# Patient Record
Sex: FEMALE | Race: WHITE | NOT HISPANIC OR LATINO | Employment: STUDENT
[De-identification: names, ages, dates, MRNs, and addresses within clinical notes are randomized per-mention and may not be internally consistent; named-entity substitution may affect disease eponyms.]

---

## 2017-07-08 ENCOUNTER — HEALTH MAINTENANCE LETTER (OUTPATIENT)
Age: 37
End: 2017-07-08

## 2017-07-14 DIAGNOSIS — O36.80X0 ENCOUNTER TO DETERMINE FETAL VIABILITY OF PREGNANCY, NOT APPLICABLE OR UNSPECIFIED FETUS: Primary | ICD-10-CM

## 2017-07-17 ENCOUNTER — RADIANT APPOINTMENT (OUTPATIENT)
Dept: ULTRASOUND IMAGING | Facility: CLINIC | Age: 37
End: 2017-07-17
Payer: COMMERCIAL

## 2017-07-17 ENCOUNTER — PRENATAL OFFICE VISIT (OUTPATIENT)
Dept: OBGYN | Facility: CLINIC | Age: 37
End: 2017-07-17
Payer: COMMERCIAL

## 2017-07-17 VITALS
WEIGHT: 270 LBS | SYSTOLIC BLOOD PRESSURE: 120 MMHG | HEIGHT: 69 IN | HEART RATE: 82 BPM | DIASTOLIC BLOOD PRESSURE: 70 MMHG | BODY MASS INDEX: 39.99 KG/M2

## 2017-07-17 DIAGNOSIS — O36.80X0 ENCOUNTER TO DETERMINE FETAL VIABILITY OF PREGNANCY, NOT APPLICABLE OR UNSPECIFIED FETUS: ICD-10-CM

## 2017-07-17 DIAGNOSIS — O09.521 HIGH-RISK PREGNANCY, ELDERLY MULTIGRAVIDA, FIRST TRIMESTER: Primary | ICD-10-CM

## 2017-07-17 DIAGNOSIS — Z36.9 ENCOUNTER FOR ANTENATAL SCREENING OF MOTHER: ICD-10-CM

## 2017-07-17 PROBLEM — O09.529 HIGH-RISK PREGNANCY, ELDERLY MULTIGRAVIDA: Status: ACTIVE | Noted: 2017-07-17

## 2017-07-17 LAB
ABO + RH BLD: NORMAL
ABO + RH BLD: NORMAL
ALBUMIN UR-MCNC: NEGATIVE MG/DL
APPEARANCE UR: CLEAR
BACTERIA #/AREA URNS HPF: ABNORMAL /HPF
BASOPHILS # BLD AUTO: 0 10E9/L (ref 0–0.2)
BASOPHILS NFR BLD AUTO: 0.1 %
BILIRUB UR QL STRIP: NEGATIVE
BLD GP AB SCN SERPL QL: NORMAL
BLOOD BANK CMNT PATIENT-IMP: NORMAL
COLOR UR AUTO: YELLOW
DIFFERENTIAL METHOD BLD: ABNORMAL
EOSINOPHIL # BLD AUTO: 0.2 10E9/L (ref 0–0.7)
EOSINOPHIL NFR BLD AUTO: 1.3 %
ERYTHROCYTE [DISTWIDTH] IN BLOOD BY AUTOMATED COUNT: 14.1 % (ref 10–15)
GLUCOSE UR STRIP-MCNC: NEGATIVE MG/DL
HCT VFR BLD AUTO: 35.8 % (ref 35–47)
HGB BLD-MCNC: 11.9 G/DL (ref 11.7–15.7)
HGB UR QL STRIP: NEGATIVE
KETONES UR STRIP-MCNC: ABNORMAL MG/DL
LEUKOCYTE ESTERASE UR QL STRIP: NEGATIVE
LYMPHOCYTES # BLD AUTO: 1.9 10E9/L (ref 0.8–5.3)
LYMPHOCYTES NFR BLD AUTO: 13 %
MCH RBC QN AUTO: 30.2 PG (ref 26.5–33)
MCHC RBC AUTO-ENTMCNC: 33.2 G/DL (ref 31.5–36.5)
MCV RBC AUTO: 91 FL (ref 78–100)
MONOCYTES # BLD AUTO: 0.9 10E9/L (ref 0–1.3)
MONOCYTES NFR BLD AUTO: 6.4 %
NEUTROPHILS # BLD AUTO: 11.6 10E9/L (ref 1.6–8.3)
NEUTROPHILS NFR BLD AUTO: 79.2 %
NITRATE UR QL: NEGATIVE
NON-SQ EPI CELLS #/AREA URNS LPF: ABNORMAL /LPF
PH UR STRIP: 6 PH (ref 5–7)
PLATELET # BLD AUTO: 263 10E9/L (ref 150–450)
RBC # BLD AUTO: 3.94 10E12/L (ref 3.8–5.2)
RBC #/AREA URNS AUTO: ABNORMAL /HPF (ref 0–2)
SP GR UR STRIP: 1.02 (ref 1–1.03)
SPECIMEN EXP DATE BLD: NORMAL
URN SPEC COLLECT METH UR: ABNORMAL
UROBILINOGEN UR STRIP-ACNC: 0.2 EU/DL (ref 0.2–1)
WBC # BLD AUTO: 14.6 10E9/L (ref 4–11)
WBC #/AREA URNS AUTO: ABNORMAL /HPF (ref 0–2)

## 2017-07-17 PROCEDURE — 81001 URINALYSIS AUTO W/SCOPE: CPT | Performed by: OBSTETRICS & GYNECOLOGY

## 2017-07-17 PROCEDURE — 86780 TREPONEMA PALLIDUM: CPT | Performed by: OBSTETRICS & GYNECOLOGY

## 2017-07-17 PROCEDURE — 86901 BLOOD TYPING SEROLOGIC RH(D): CPT | Performed by: OBSTETRICS & GYNECOLOGY

## 2017-07-17 PROCEDURE — 86900 BLOOD TYPING SEROLOGIC ABO: CPT | Performed by: OBSTETRICS & GYNECOLOGY

## 2017-07-17 PROCEDURE — 36415 COLL VENOUS BLD VENIPUNCTURE: CPT | Performed by: OBSTETRICS & GYNECOLOGY

## 2017-07-17 PROCEDURE — 87340 HEPATITIS B SURFACE AG IA: CPT | Performed by: OBSTETRICS & GYNECOLOGY

## 2017-07-17 PROCEDURE — 86762 RUBELLA ANTIBODY: CPT | Performed by: OBSTETRICS & GYNECOLOGY

## 2017-07-17 PROCEDURE — 85025 COMPLETE CBC W/AUTO DIFF WBC: CPT | Performed by: OBSTETRICS & GYNECOLOGY

## 2017-07-17 PROCEDURE — 87086 URINE CULTURE/COLONY COUNT: CPT | Performed by: OBSTETRICS & GYNECOLOGY

## 2017-07-17 PROCEDURE — 87389 HIV-1 AG W/HIV-1&-2 AB AG IA: CPT | Performed by: OBSTETRICS & GYNECOLOGY

## 2017-07-17 PROCEDURE — 76817 TRANSVAGINAL US OBSTETRIC: CPT | Performed by: OBSTETRICS & GYNECOLOGY

## 2017-07-17 PROCEDURE — 86850 RBC ANTIBODY SCREEN: CPT | Performed by: OBSTETRICS & GYNECOLOGY

## 2017-07-17 RX ORDER — SWAB
1 SWAB, NON-MEDICATED MISCELLANEOUS DAILY
Qty: 100 TABLET | Refills: 3 | Status: SHIPPED | OUTPATIENT
Start: 2017-07-17 | End: 2018-06-12

## 2017-07-17 ASSESSMENT — ANXIETY QUESTIONNAIRES
2. NOT BEING ABLE TO STOP OR CONTROL WORRYING: NOT AT ALL
5. BEING SO RESTLESS THAT IT IS HARD TO SIT STILL: NOT AT ALL
GAD7 TOTAL SCORE: 1
3. WORRYING TOO MUCH ABOUT DIFFERENT THINGS: NOT AT ALL
7. FEELING AFRAID AS IF SOMETHING AWFUL MIGHT HAPPEN: NOT AT ALL
IF YOU CHECKED OFF ANY PROBLEMS ON THIS QUESTIONNAIRE, HOW DIFFICULT HAVE THESE PROBLEMS MADE IT FOR YOU TO DO YOUR WORK, TAKE CARE OF THINGS AT HOME, OR GET ALONG WITH OTHER PEOPLE: NOT DIFFICULT AT ALL
1. FEELING NERVOUS, ANXIOUS, OR ON EDGE: NOT AT ALL
6. BECOMING EASILY ANNOYED OR IRRITABLE: SEVERAL DAYS

## 2017-07-17 ASSESSMENT — PATIENT HEALTH QUESTIONNAIRE - PHQ9: 5. POOR APPETITE OR OVEREATING: NOT AT ALL

## 2017-07-17 NOTE — NURSING NOTE
[facility] Obstetrical Risk History    Patient presents for new OB labs and teaching.      1. Please indicate any condition you have or have had in the past:  Herpes, Asthma, Abnormal Pap, Chlamydia  2. Do you smoke?  No  If yes, how many packs/day?   3. Do you drink alcoholic beverages? Yes-since last period, none since finding out is pregnant If yes, how often?  What type?   4. List any medications taken since your last period: Zyrtec, Albuterol nebulizer and inhaler, Tylenol  5. List any recreational drugs (cocaine, marijuana, etc. used since your last period:None    6. List any chemical or radiation exposure that you've encountered: [employer]  7. Are you on a restricted diet? No  If yes, please describe:  Do you have any Adventist objections to any form of treatment? No    GENETIC SCREENING    These questions apply to you, the baby's father, or anyone in either family with:    1. Patient's age 35 or greater at delivery? Yes  2. Syriac, Ukrainian, Mediterranean ancestry? No  3. Neural Tube Defect (Meningomyelocele, Spina Bifida, or Anencephaly)? No  4. Alevism, Ugandan [place] or history of Hola-Sachs disease? No  5. Down's Syndrome?   No  6. Hemophilia or clotting disorder? No  7. Muscular Dystrophy? No  8. Cystic Fibrosis? No  9. Larissa's Chorea? No  10. Mental Retardation? No  11. 3 or more miscarriages or a stillborn? No  12. Other inherited disease or chromosomal disorder? No  13. Have you or the baby's father had a child born with a birth defect? No  14. Did you or the baby's father have a birth defect yourselves? No    Do you have any other concerns about birth defects? No        -Pt has not had a flu shot this past season.  -Informed pt about genetic testing/genetic screening.

## 2017-07-17 NOTE — PROGRESS NOTES
This is a 36 year old female patient,   who presents for her first obstetrical visit.    Patient's last menstrual period was 2017. This gives her an EDC of 2018 .  She is 8w6d weeks.  Her cycles are regular.  Her last menstrual period was normal.  She has had an ultrasound on 2017 which showed viable IUP measuring 7w6d. .  Since her LMP, she has experienced  nasal congestion, breast tenderness, urgency, dizziness and difficulty sleeping).  She denies nausea, emesis, abdominal pain, fatigue, headache, loss of appetite, vaginal discharge, dysuria, pelvic pain, and vaginal bleeding.      Past History:  Her past medical history   Past Medical History:   Diagnosis Date     Abnormal Pap smear     HPV     Allergic rhinitis      Anemia     iron infusions     Asthma     Advair, Albuterol     Cervical dysplasia     SUMI I, SUMI II, treated with Leep   later had ASCUS+HPV sev times      Chlamydial cervicitis 10/2005, 3/2009    Treated both times and had neg JADEN both times     Diabetes mellitus (H)     GDDC with first pregnacy     Environmental allergies      Herpes simplex without mention of complication     valtrex at 36 weeks     History of chlamydia      Morbid obesity (H)      Postoperative hematoma involving genitourinary system     large rectus sheath hematoma, after failed attempt at laparoscopy, hospitalized for pain control      Trichomonal vulvovaginitis 06     Vaginal discharge    .      She has a history of  gestational diabetes, diet controlled    Since her last LMP she admits to the use of alcohol but none since finding out is pregnant.    HISTORY:  Obstetric History       T2      L4     SAB0   TAB0   Ectopic0   Multiple0   Live Births0       # Outcome Date GA Lbr Rene/2nd Weight Sex Delivery Anes PTL Lv   5 Current            4 Term 13 39w3d 04:05 / 00:02 9 lb 3.1 oz (4.17 kg) M  EPI  VINI      Name: MEL CONNER JANNIE GRAHAM      Apgar1:  9   3 Term  12 40w0d 03:00 / 00:16 7 lb 13.2 oz (3.55 kg) F  EPI  VINI      Name: Pattie      Apgar1:  8                Apgar5: 9   2 Para 08   7 lb 8 oz (3.402 kg) M  EPI  VINI      Name: Diamond   1 Para 99   7 lb 7 oz (3.374 kg) M Vag-Vacuum EPI  VINI      Name: [place]        Past Medical History:   Diagnosis Date     Abnormal Pap smear     HPV     Allergic rhinitis      Anemia     iron infusions     Asthma     Advair, Albuterol     Cervical dysplasia     SUMI I, SUMI II, treated with Leep   later had ASCUS+HPV sev times 2009     Chlamydial cervicitis 10/2005, 3/2009    Treated both times and had neg JADEN both times     Diabetes mellitus (H)     GDDC with first pregnacy     Environmental allergies      Herpes simplex without mention of complication     valtrex at 36 weeks     History of chlamydia      Morbid obesity (H)      Postoperative hematoma involving genitourinary system     large rectus sheath hematoma, after failed attempt at laparoscopy, hospitalized for pain control      Trichomonal vulvovaginitis 06     Vaginal discharge      Past Surgical History:   Procedure Laterality Date     ATTEMPTED LAPAROSCOPY  3/13/2014    Procedure: ATTEMPTED LAPAROSCOPY;;  Surgeon: Cee Garcia MD;  Location: [facility]     COLPOSCOPY CERVIX, BIOPSY CERVIX, ENDOCERVICAL CURETTAGE, COMBINED      2005:  SUMI I; 6/15/2009:  SUMI I.  Treated with cryo.  10/27/1997:  SUMI I-II     CONIZATION LEEP  3/13/2014    Procedure: CONIZATION LEEP;  LOOP ELECTROSURGICAL EXCISION PROCEDURE; LAPAROSCOPY ATTEMPTED;  Surgeon: Cee Garcia MD;  Location: [facility]     CRYOTHERAPY, CERVICAL  age 19    Pt reported     EXAM UNDER ANESTHESIA PELVIC  3/20/2014    Procedure: EXAM UNDER ANESTHESIA PELVIC;  EXAM UNDER ANESTHESIA, REMOVAL OF STICHES ;  Surgeon: Cee Garcia MD;  Location:  OR     GASTRIC BYPASS      pre-bypass weight was 320#     HC NASAL/SINUS SCOPE W THER INSTILL       HC REMOVAL OF OVARIAN CYST(S)        Family History   Problem Relation Age of Onset     Hypertension Mother      Allergies Mother      Obesity Mother      Asthma Father      DIABETES Father      Hypertension Father      Allergies Father      CANCER Father      Lymphoma d age 58     Asthma Brother      Allergies Sister      Depression Sister      Obesity Sister      Alzheimer Disease Maternal Grandmother      Arthritis Maternal Grandmother      Obesity Maternal Grandmother      Thyroid Disease Maternal Grandmother      Hypertension Maternal Grandfather      Cancer - colorectal Maternal Grandfather      CEREBROVASCULAR DISEASE Maternal Grandfather      DIABETES Paternal Grandmother      Social History     Social History     Marital status: Single     Spouse name: N/A     Number of children: 4     Years of education: N/A     Occupational History      [employer]     Social History Main Topics     Smoking status: Former Smoker     Quit date: 5/5/2009     Smokeless tobacco: Never Used     Alcohol use Yes      Comment: OCCS.     Drug use: No     Sexual activity: Yes     Partners: Male     Other Topics Concern     None     Social History Narrative     Current Outpatient Prescriptions   Medication Sig     Cetirizine HCl (ZYRTEC ALLERGY PO) Take 10 mg by mouth daily     VENTOLIN  (90 BASE) MCG/ACT inhaler INHALE 2 PUFFS INTO THE LUNGS EVERY 6 HOURS AS NEEDED FOR SHORTNESS OF BREATH / DYSPNEA     albuterol (2.5 MG/3ML) 0.083% nebulizer solution Take 3 mLs by nebulization every 4 hours as needed for shortness of breath / dyspnea.     No current facility-administered medications for this visit.      Facility-Administered Medications Ordered in Other Visits   Medication     ROPivacaine (NAROPIN) epidural     lidocaine-EPINEPHrine 1.5 %-1:[id number] injection     bupivacaine 0.5% PF     fentaNYL (SUBLIMAZE) injection     Allergies   Allergen Reactions     Cat Hair [Cats]      Dog Hair [Dogs]      Dust Mites      Ragweeds        Past medical, surgical, social and  "family history were reviewed and updated in EPIC.    ROS:   12 point review of systems negative other than symptoms noted below.  Head: Nasal Congestion  Breast: Tenderness  Genitourinary: Urgency  Neurologic: Dizziness  Psychiatric: Difficulty Sleeping    EXAM:  Ht 5' 8.75\" (1.746 m)  Wt 270 lb (122.5 kg)  LMP 2017  BMI 40.16 kg/m2   BMI: Body mass index is 40.16 kg/(m^2).    EXAM:  Constitutional:  Appearance: Well nourished, well developed alert, in no acute distress.  Neck:   Lymph Nodes:  No lymphadenopathy present.    Thyroid:  Gland size normal, nontender, no nodules or masses present  on palpation.  Chest:  Respiratory Effort:  Breathing unlabored.  Cardiovascular:  Heart Auscultation:  Regular rate, normal rhythm, no murmurs    present.  Breasts: {[facility] Breast exam:987289}    Axillary Lymph Nodes:  No lymphadenopathy present.  Gastrointestinal:  Abdominal Examination:  Abdomen nontender to palpation, tone  normal without rigidity or guarding, no masses present, umbilicus without  Lesions.    Liver and speen:  No hepatomegaly present, liver nontender to  palpation.    Hernias:  No hernias present.  Lymphatic: Lymph Nodes:  No other lymphadenopathy present.  Skin:  General Inspection:  No rashes present, no lesions present, no areas of  discoloration.    Genitalia and Groin:  No rashes present, no lesions present, no areas of  discoloration, no masses present.  Neurologic/Psychiatric:    Mental Status:  Oriented X3.    {[facility] Pelvic Exam:749120}    ASSESSMENT:      ICD-10-CM    1. Encounter for  screening of mother Z36 UA with Microscopic     Urine Culture Aerobic Bacterial       PLAN/PATIENT INSTRUCTIONS:    There are no Patient Instructions on file for this visit.    ***    Cee Garcia MD  "

## 2017-07-18 LAB
BACTERIA SPEC CULT: NORMAL
HBV SURFACE AG SERPL QL IA: NONREACTIVE
HIV 1+2 AB+HIV1 P24 AG SERPL QL IA: NORMAL
Lab: NORMAL
MICRO REPORT STATUS: NORMAL
RUBV IGG SERPL IA-ACNC: 18 IU/ML
SPECIMEN SOURCE: NORMAL
T PALLIDUM IGG+IGM SER QL: NEGATIVE

## 2017-07-18 ASSESSMENT — ANXIETY QUESTIONNAIRES: GAD7 TOTAL SCORE: 1

## 2017-07-18 ASSESSMENT — PATIENT HEALTH QUESTIONNAIRE - PHQ9: SUM OF ALL RESPONSES TO PHQ QUESTIONS 1-9: 0

## 2017-08-14 ENCOUNTER — RADIANT APPOINTMENT (OUTPATIENT)
Dept: ULTRASOUND IMAGING | Facility: CLINIC | Age: 37
End: 2017-08-14
Payer: COMMERCIAL

## 2017-08-14 ENCOUNTER — PRENATAL OFFICE VISIT (OUTPATIENT)
Dept: OBGYN | Facility: CLINIC | Age: 37
End: 2017-08-14
Payer: COMMERCIAL

## 2017-08-14 VITALS — SYSTOLIC BLOOD PRESSURE: 120 MMHG | DIASTOLIC BLOOD PRESSURE: 70 MMHG

## 2017-08-14 DIAGNOSIS — O36.80X0 PREGNANCY WITH INCONCLUSIVE FETAL VIABILITY, NOT APPLICABLE OR UNSPECIFIED FETUS: ICD-10-CM

## 2017-08-14 DIAGNOSIS — O28.8: Primary | ICD-10-CM

## 2017-08-14 PROCEDURE — 99207 ZZC FIRST OB VISIT: CPT | Performed by: OBSTETRICS & GYNECOLOGY

## 2017-08-14 PROCEDURE — 76817 TRANSVAGINAL US OBSTETRIC: CPT | Performed by: OBSTETRICS & GYNECOLOGY

## 2017-08-14 PROCEDURE — 82105 ALPHA-FETOPROTEIN SERUM: CPT | Mod: 90 | Performed by: OBSTETRICS & GYNECOLOGY

## 2017-08-14 PROCEDURE — 99000 SPECIMEN HANDLING OFFICE-LAB: CPT | Performed by: OBSTETRICS & GYNECOLOGY

## 2017-08-14 PROCEDURE — 36415 COLL VENOUS BLD VENIPUNCTURE: CPT | Performed by: OBSTETRICS & GYNECOLOGY

## 2017-08-14 NOTE — MR AVS SNAPSHOT
After Visit Summary   8/14/2017    Crystal Rose    MRN: [id number]           Patient Information     Date Of Birth          1980        Visit Information        Provider Department      8/14/2017 12:30 PM Cee Garcia MD [facility]        Today's Diagnoses     Integrated prenatal screen positive for neural tube defect    -  1    Pregnancy with inconclusive fetal viability, not applicable or unspecified fetus           Follow-ups after your visit        Your next 10 appointments already scheduled     Sep 13, 2017  9:00 AM CDT   ESTABLISHED PRENATAL with Cee Garcia MD   [facility]    [address]  [facility] [id number] [phone]              Who to contact     If you have questions or need follow up information about today's clinic visit or your schedule please contact [facility] [place] directly at [phone].  Normal or non-critical lab and imaging results will be communicated to you by MyChart, letter or phone within 4 business days after the clinic has received the results. If you do not hear from us within 7 days, please contact the clinic through eSofthart or phone. If you have a critical or abnormal lab result, we will notify you by phone as soon as possible.  Submit refill requests through [email or web address] or call your pharmacy and they will forward the refill request to us. Please allow 3 business days for your refill to be completed.          Additional Information About Your Visit        MyChart Information     [email or web address] gives you secure access to your electronic health record. If you see a primary care provider, you can also send messages to your care team and make appointments. If you have questions, please call your primary care clinic.  If you do not have a primary care provider, please call [phone] and they will assist you.        Care EveryWhere ID     This is  your Care EveryWhere ID. This could be used by other organizations to access your [place] medical records  [id number]        Your Vitals Were     Last Period                   05/16/2017            Blood Pressure from Last 3 Encounters:   08/14/17 120/70   07/17/17 120/70   12/30/15 112/78    Weight from Last 3 Encounters:   07/17/17 270 lb (122.5 kg)   12/30/15 268 lb (121.6 kg)   06/03/14 239 lb (108.4 kg)              We Performed the Following     Alpha fetoprotein maternal screen        Primary Care Provider Office Phone # Fax #    Cee Garcia [phone] [address] [facility] [address]  [facility] [address]        Equal Access to Services     LUPE JOHN : Hadii angeles bruceo Sonarayan, waaxda luqadaha, qaybta kaalmada adeegyada, criss torres . So [facility] [phone].    ATENCIÓN: Si habla español, tiene a garber disposición servicios gratuitos de asistencia lingüística. [facility] [phone].    We comply with applicable federal civil rights laws and [place] laws. We do not discriminate on the basis of race, color, national origin, age, disability sex, sexual orientation or gender identity.            Thank you!     Thank you for choosing [facility]  for your care. Our goal is always to provide you with excellent care. Hearing back from our patients is one way we can continue to improve our services. Please take a few minutes to complete the written survey that you may receive in the mail after your visit with us. Thank you!             Your Updated Medication List - Protect others around you: Learn how to safely use, store and throw away your medicines at www.disposemymeds.org.          This list is accurate as of: 8/14/17  2:47 PM.  Always use your most recent med list.                   Brand Name Dispense Instructions for use Diagnosis    * albuterol (2.5 MG/3ML) 0.083% neb solution     1 Box    Take 3 mLs by nebulization every 4 hours as  needed for shortness of breath / dyspnea.    Moderate persistent asthma with exacerbation       * VENTOLIN  (90 BASE) MCG/ACT Inhaler   Generic drug:  albuterol     1 Inhaler    INHALE 2 PUFFS INTO THE LUNGS EVERY 6 HOURS AS NEEDED FOR SHORTNESS OF BREATH / DYSPNEA    Unspecified asthma(493.90)       prenatal multivitamin  with iron 28-0.8 MG Tabs     100 tablet    Take 1 tablet by mouth daily    High-risk pregnancy, elderly multigravida, first trimester       ZYRTEC ALLERGY PO      Take 10 mg by mouth daily        * Notice:  This list has 2 medication(s) that are the same as other medications prescribed for you. Read the directions carefully, and ask your doctor or other care provider to review them with you.

## 2017-08-14 NOTE — PROGRESS NOTES
This is a 36 year old female patient,   who presents for her first obstetrical visit.    Patient's last menstrual period was 2017. This gives her an EDC of 2018 .  She is 8w6d weeks.  Her cycles are regular.  Her last menstrual period was normal.  She has had an ultrasound on 2017 which showed viable IUP measuring 7w6d. .  Since her LMP, she has experienced  nasal congestion, breast tenderness, urgency, dizziness and difficulty sleeping).  She denies nausea, emesis, abdominal pain, fatigue, headache, loss of appetite, vaginal discharge, dysuria, pelvic pain, and vaginal bleeding.    Patient was here last month for 1st ob visit but had to leave before I saw her since son cut his head.   Labs were done last week.   We discussed genetic testing options  She wants to do inatal test at 10 wks so will make appt for that.   Couldn't hear FHT today so sent for us.  US showed viable iup with small subchorionic bleed and low lying placenta so will check again at 20 wks  Next ob   Check at 12 wks      Past History:  Her past medical history   Past Medical History:   Diagnosis Date     Abnormal Pap smear     HPV     Allergic rhinitis      Anemia     iron infusions     Asthma     Advair, Albuterol     Cervical dysplasia     SUMI I, SUMI II, treated with Leep   later had ASCUS+HPV sev times      Chlamydial cervicitis 10/2005, 3/2009    Treated both times and had neg JADEN both times     Diabetes mellitus (H)     GDDC with first pregnacy     Environmental allergies      Herpes simplex without mention of complication     valtrex at 36 weeks     History of chlamydia      Morbid obesity (H)      Postoperative hematoma involving genitourinary system     large rectus sheath hematoma, after failed attempt at laparoscopy, hospitalized for pain control      Trichomonal vulvovaginitis 06     Vaginal discharge    .      She has a history of  gestational diabetes, diet controlled    Since her last LMP  she admits to the use of alcohol but none since finding out is pregnant.    HISTORY:  Obstetric History       T2      L4     SAB0   TAB0   Ectopic0   Multiple0   Live Births0       # Outcome Date GA Lbr Rene/2nd Weight Sex Delivery Anes PTL Lv   5 Current            4 Term 13 39w3d 04:05 / 00:02 9 lb 3.1 oz (4.17 kg) M  EPI  VINI      Name: MEL CONNER      Apgar1:  9   3 Term 12 40w0d 03:00 / 00:16 7 lb 13.2 oz (3.55 kg) F  EPI  VINI      Name: Pattie      Apgar1:  8                Apgar5: 9   2 Para 08   7 lb 8 oz (3.402 kg) M  EPI  VINI      Name: Aajahn   1 Para 99   7 lb 7 oz (3.374 kg) M Vag-Vacuum EPI  VINI      Name: Flavio        Past Medical History:   Diagnosis Date     Abnormal Pap smear     HPV     Allergic rhinitis      Anemia     iron infusions     Asthma     Advair, Albuterol     Cervical dysplasia     SUMI I, SUMI II, treated with Leep   later had ASCUS+HPV sev times 2009     Chlamydial cervicitis 10/2005, 3/2009    Treated both times and had neg JADEN both times     Diabetes mellitus (H)     GDDC with first pregnacy     Environmental allergies      Herpes simplex without mention of complication     valtrex at 36 weeks     History of chlamydia      Morbid obesity (H)      Postoperative hematoma involving genitourinary system     large rectus sheath hematoma, after failed attempt at laparoscopy, hospitalized for pain control      Trichomonal vulvovaginitis 06     Vaginal discharge      Past Surgical History:   Procedure Laterality Date     ATTEMPTED LAPAROSCOPY  3/13/2014    Procedure: ATTEMPTED LAPAROSCOPY;;  Surgeon: Cee Garcia MD;  Location: [facility]     COLPOSCOPY CERVIX, BIOPSY CERVIX, ENDOCERVICAL CURETTAGE, COMBINED      2005:  SUMI I; 6/15/2009:  SUMI I.  Treated with cryo.  10/27/1997:  SUMI I-II     CONIZATION LEEP  3/13/2014    Procedure: CONIZATION LEEP;  LOOP ELECTROSURGICAL EXCISION PROCEDURE; LAPAROSCOPY ATTEMPTED;   Surgeon: Cee Garcia MD;  Location:  [facility]     CRYOTHERAPY, CERVICAL  age 19    Pt reported     EXAM UNDER ANESTHESIA PELVIC  3/20/2014    Procedure: EXAM UNDER ANESTHESIA PELVIC;  EXAM UNDER ANESTHESIA, REMOVAL OF STICHES ;  Surgeon: Cee Garcia MD;  Location:  OR     GASTRIC BYPASS  2004    pre-bypass weight was 320#     HC NASAL/SINUS SCOPE W THER INSTILL       HC REMOVAL OF OVARIAN CYST(S)       Family History   Problem Relation Age of Onset     Hypertension Mother      Allergies Mother      Obesity Mother      Asthma Father      DIABETES Father      Hypertension Father      Allergies Father      CANCER Father      Lymphoma d age 58     Asthma Brother      Allergies Sister      Depression Sister      Obesity Sister      Alzheimer Disease Maternal Grandmother      Arthritis Maternal Grandmother      Obesity Maternal Grandmother      Thyroid Disease Maternal Grandmother      Hypertension Maternal Grandfather      Cancer - colorectal Maternal Grandfather      CEREBROVASCULAR DISEASE Maternal Grandfather      DIABETES Paternal Grandmother      Social History     Social History     Marital status: Single     Spouse name: N/A     Number of children: 4     Years of education: N/A     Occupational History      [employer]     Social History Main Topics     Smoking status: Former Smoker     Quit date: 5/5/2009     Smokeless tobacco: Never Used     Alcohol use Yes      Comment: OCCS.     Drug use: No     Sexual activity: Yes     Partners: Male     Other Topics Concern     None     Social History Narrative     Current Outpatient Prescriptions   Medication Sig     Cetirizine HCl (ZYRTEC ALLERGY PO) Take 10 mg by mouth daily     VENTOLIN  (90 BASE) MCG/ACT inhaler INHALE 2 PUFFS INTO THE LUNGS EVERY 6 HOURS AS NEEDED FOR SHORTNESS OF BREATH / DYSPNEA     albuterol (2.5 MG/3ML) 0.083% nebulizer solution Take 3 mLs by nebulization every 4 hours as needed for shortness of breath / dyspnea.     No current  "facility-administered medications for this visit.      Facility-Administered Medications Ordered in Other Visits   Medication     ROPivacaine (NAROPIN) epidural     lidocaine-EPINEPHrine 1.5 %-1:307303 injection     bupivacaine 0.5% PF     fentaNYL (SUBLIMAZE) injection     Allergies   Allergen Reactions     Cat Hair [Cats]      Dog Hair [Dogs]      Dust Mites      Ragweeds        Past medical, surgical, social and family history were reviewed and updated in EPIC.    ROS:   12 point review of systems negative other than symptoms noted below.  Head: Nasal Congestion  Breast: Tenderness  Genitourinary: Urgency  Neurologic: Dizziness  Psychiatric: Difficulty Sleeping    EXAM:  Ht 5' 8.75\" (1.746 m)  Wt 270 lb (122.5 kg)  LMP 2017  BMI 40.16 kg/m2   BMI: Body mass index is 40.16 kg/(m^2).    EXAM:  Constitutional:  Appearance: Well nourished, well developed alert, in no acute distress.  Neck:   Lymph Nodes:  No lymphadenopathy present.    Thyroid:  Gland size normal, nontender, no nodules or masses present  on palpation.  Chest:  Respiratory Effort:  Breathing unlabored.  Cardiovascular:  Heart Auscultation:  Regular rate, normal rhythm, no murmurs    present.  Breasts: Deferred.    Axillary Lymph Nodes:  No lymphadenopathy present.  Gastrointestinal:  Abdominal Examination:  Abdomen nontender to palpation, tone  normal without rigidity or guarding, no masses present, umbilicus without  Lesions.    Liver and speen:  No hepatomegaly present, liver nontender to  palpation.    Hernias:  No hernias present.  Lymphatic: Lymph Nodes:  No other lymphadenopathy present.  Skin:  General Inspection:  No rashes present, no lesions present, no areas of  discoloration.    Genitalia and Groin:  No rashes present, no lesions present, no areas of  discoloration, no masses present.  Neurologic/Psychiatric:    Mental Status:  Oriented X3.    No Pelvic Exam performed    ASSESSMENT:      ICD-10-CM    1. Encounter for  " screening of mother Z36 UA with Microscopic     Urine Culture Aerobic Bacterial       PLAN/PATIENT INSTRUCTIONS:    There are no Patient Instructions on file for this visit.    Return at 10 wks for inatal  Labs were done prior  Ob check at 12 wks    Cee Garcia MD

## 2017-08-16 LAB
# FETUSES US: NORMAL
AFP ADJ MOM AMN: NORMAL
AFP SERPL-MCNC: 10 NG/ML
AGE - REPORTED: 37.3 YR
DATING METHOD: NORMAL
DIABETIC AT CONCEPTION: NO
FAMILY MEMBER DISEASES HX: NO
FAMILY MEMBER DISEASES HX: NO
GA METHOD: NORMAL
GA: 11.86 WEEKS
HX OF HEREDITARY DISORDERS: NO
IDDM PATIENT QL: NO
INTEGRATED SCN PATIENT-IMP: NORMAL
LMP START DATE: NORMAL
PREV HX CHROMOSOME ABNORMALITY: NO
SPECIMEN DRAWN SERPL: NORMAL
TWINS: NORMAL

## 2017-08-17 ENCOUNTER — TRANSFERRED RECORDS (OUTPATIENT)
Dept: HEALTH INFORMATION MANAGEMENT | Facility: CLINIC | Age: 37
End: 2017-08-17

## 2017-08-17 ENCOUNTER — TELEPHONE (OUTPATIENT)
Dept: NURSING | Facility: CLINIC | Age: 37
End: 2017-08-17

## 2017-08-17 DIAGNOSIS — O09.521 HIGH-RISK PREGNANCY, ELDERLY MULTIGRAVIDA, FIRST TRIMESTER: Primary | ICD-10-CM

## 2017-08-17 DIAGNOSIS — O09.521 HIGH-RISK PREGNANCY, ELDERLY MULTIGRAVIDA, FIRST TRIMESTER: ICD-10-CM

## 2017-08-17 PROCEDURE — 36415 COLL VENOUS BLD VENIPUNCTURE: CPT | Performed by: OBSTETRICS & GYNECOLOGY

## 2017-08-17 PROCEDURE — 99000 SPECIMEN HANDLING OFFICE-LAB: CPT | Performed by: OBSTETRICS & GYNECOLOGY

## 2017-08-17 PROCEDURE — 40000791 ZZHCL STATISTIC VERIFI PRENATAL TRISOMY 21,18,13: Mod: 90 | Performed by: OBSTETRICS & GYNECOLOGY

## 2017-08-17 NOTE — TELEPHONE ENCOUNTER
12w2d; ANAMARIA: 2/27/18  Pt informed of message below. Innatal lab ordered. Talked with lab and Innatal lab venipuncture will be free of charge as it was an error on clinic's end, pt was supposed to get Innatal drawn the first time and AFP was drawn instead. Scheduled lab only appt today at 2:00 PM to get Innatal lab drawn (with note about free venipuncture).      Notes Recorded by Cee Garcia MD on 8/16/2017 at 8:48 PM  Wrong order was placed  She needs to return for inatal test, not afp  She isn't far enough along for afp  afp will be done at 1`6 wks

## 2017-08-22 ENCOUNTER — TELEPHONE (OUTPATIENT)
Dept: NURSING | Facility: CLINIC | Age: 37
End: 2017-08-22

## 2017-08-22 NOTE — TELEPHONE ENCOUNTER
Innatal results: Negative    Test    Result     Interpretation  Chromosome 21  No aneuploidy detected     Results consistent with two copies of chromosome 21  Chromosome 18  No aneuploidy detected  Results consistent with two copies of chromosome 18  Chromosome 13  No aneuploidy detected  Results consistent with two copies of chromosome 13  Sex Chromosome  No aneuploidy detected  Results consistent with two sex chromosomes (XY) Male        Called pt and tried to leave message to call back but her voicemail was full and could not accept messages at this time. Will try back later.

## 2017-09-13 ENCOUNTER — PRENATAL OFFICE VISIT (OUTPATIENT)
Dept: MIDWIFE SERVICES | Facility: CLINIC | Age: 37
End: 2017-09-13
Payer: COMMERCIAL

## 2017-09-13 VITALS — SYSTOLIC BLOOD PRESSURE: 118 MMHG | BODY MASS INDEX: 39.27 KG/M2 | DIASTOLIC BLOOD PRESSURE: 74 MMHG | WEIGHT: 264 LBS

## 2017-09-13 DIAGNOSIS — Z36.1 NEED FOR MATERNAL SERUM ALPHA-PROTEIN (MSAFP) SCREENING: ICD-10-CM

## 2017-09-13 DIAGNOSIS — Z23 NEED FOR PROPHYLACTIC VACCINATION AND INOCULATION AGAINST INFLUENZA: ICD-10-CM

## 2017-09-13 DIAGNOSIS — O09.522 HIGH-RISK PREGNANCY, ELDERLY MULTIGRAVIDA, SECOND TRIMESTER: Primary | ICD-10-CM

## 2017-09-13 PROCEDURE — 90686 IIV4 VACC NO PRSV 0.5 ML IM: CPT | Performed by: ADVANCED PRACTICE MIDWIFE

## 2017-09-13 PROCEDURE — 82105 ALPHA-FETOPROTEIN SERUM: CPT | Mod: 90 | Performed by: ADVANCED PRACTICE MIDWIFE

## 2017-09-13 PROCEDURE — 99000 SPECIMEN HANDLING OFFICE-LAB: CPT | Performed by: ADVANCED PRACTICE MIDWIFE

## 2017-09-13 PROCEDURE — 90471 IMMUNIZATION ADMIN: CPT | Performed by: ADVANCED PRACTICE MIDWIFE

## 2017-09-13 PROCEDURE — 36415 COLL VENOUS BLD VENIPUNCTURE: CPT | Performed by: ADVANCED PRACTICE MIDWIFE

## 2017-09-13 PROCEDURE — 99207 ZZC PRENATAL VISIT: CPT | Performed by: ADVANCED PRACTICE MIDWIFE

## 2017-09-13 NOTE — PATIENT INSTRUCTIONS
Constipation    Constipation can be caused by many factors such as poor diet, lack of activity, and medications. Often times women experience more constipation during pregnancy due to decreased intestinal motility.    DRINK TONS OF WATER! 2.5 liters (4 pints) of water per day      Activity is very important. Increase your daily activity to at least 20-60 minutes. This increases blood flow to your gut and improves bowel function    Limit caffeine and alcohol intake    Avoid foods you've identified as constipating    Increase fiber intake: there are ways to increase you fiber through your diet but there are also OTC agents such as Metamucil or Benfiber that you can supplement your diet with    Use probiotics such as Florastor and/or prebiotics such as oligosaccharides    Consider using an Omega 3 supplement, flaxseed and greg seeds are great sources    Plan adequate time for elimination, it is most effective right after activity, and it may be beneficial to plan a consistent time daily    Food Suggestions      Eat beans, nuts, whole grain breads and cereals, oats, barley, figs, apples with skin, raisins, green leafy vegetables, fresh and dried fruits (especially grapes), prunes or prune juice    Eat popcorn nightly    Eat 2-3 salads per day    Add a pinch of cayenne pepper to food    1-2 tbsp of olive oil per day    Lemon juice and/or honey in warm water every morning before breakfast    Decrease red meat, refined white flour products like white rice, white bread and pasta    Tea infusions of: rosemary, chamomile, lemon balm, senna leaf, alfalfa, fennel seeds, lavender, cascara, dandelion, licorice root tea, psyllium seeds, marshmallow root    Other remedies      Increase Vitamin B and E (800 IU if not pregnant, 400 IU if pregnant per day) and potassium, calcium, and magnesium supplements      If these remedies fail, medications are an option as well. Please talk with your midwife if you continue to struggle with  constipation. If you are pregnant please double check with us before starting any medications!      Fiber Facts    A daily intake of about 25-30 grams of fiber is recommended. Most Americans only get about 12 grams of fiber on average. Fiber is very important in the diet to promote bowel regularity, lower cholesterol, lower risk of developing type 2 diabetes, and decrease chance of weight gain.  In pregnancy your intestinal motility slows down, so fiber is even more important.    Start gradually increasing fiber intake to reduce the risk of bloating and gas. Increase by about 5 grams a day every few days until you reach your fiber goals!    Food      Amount   Grams of Fiber  Grains  Echo's All Bran Cereal   1/2 cup   10  Natural Oven's Stay Trim Bread 1 slice    5  Brown Rice (cooked)  1 cup    4  Cheerios    1 cup    3  Whole Wheat Crackers  5 crackers   3.5  Rye crackers    3 crackers   3    Cereals and whole grains are excellent ways to increase fiber in your diet. Try to find cereals that have at least 8 grams of fiber per serving.    Fruits  Blackberries     1 cup    7   Figs      3 dried   7  Apple      1     4  Pear     1    4   Orange    1    3    Vegetables  Winter squash   1 cup    9  Broccoli     1 cup    4  Corn      1 cup    4  Swiss chard (cooked)  1 cup     4  Cauliflower     1 cup     3  Potato     1 large w/skin  5    Beans  Kidney, red    1/2 cup   8  Lentils     1/2 cup cooked  8  Black     1/2 cup    7  Navy     1/2 cup   7  Cade     1/2 cup   7  White      1/2 cup   6  Garbanzo/Chickpeas  1/2 cup   5

## 2017-09-13 NOTE — PROGRESS NOTES
Flu shot given, AFP pended  Injectable Influenza Immunization Documentation    1.  Are you sick today? (Fever of 100.5 or higher on the day of the clinic)   No    2.  Have you ever had Guillain-Tatum Syndrome within 6 weeks of an influenza vaccionation?  No    3. Do you have a life-threatening allergy to eggs?  No    4. Do you have a life-threatening allergy to a component of the vaccine? May include antibiotics, gelatin or latex.  No     5. Have you ever had a reaction to a dose of flu vaccine that needed immediate medical attention?  No     Form completed by Kathy Person, GENTRY        Feels well. FM+, Denies LOF, VB, contractions.   FHR visualized via bedside ultrasound.  AFP today  Anatomy ultrasound next visit between 20-22 weeks  Return to clinic 4 weeks.    Dawit William, DNP, APRN, CNM

## 2017-09-13 NOTE — MR AVS SNAPSHOT
After Visit Summary   9/13/2017    Crystal Rose    MRN: [id number]           Patient Information     Date Of Birth          1980        Visit Information        Provider Department      9/13/2017 9:10 AM Dawit William APRN CNM [facility]        Today's Diagnoses     High-risk pregnancy, elderly multigravida, second trimester    -  1    Need for prophylactic vaccination and inoculation against influenza        Need for maternal serum alpha-protein (MSAFP) screening          Care Instructions    Constipation    Constipation can be caused by many factors such as poor diet, lack of activity, and medications. Often times women experience more constipation during pregnancy due to decreased intestinal motility.    DRINK TONS OF WATER! 2.5 liters (4 pints) of water per day      Activity is very important. Increase your daily activity to at least 20-60 minutes. This increases blood flow to your gut and improves bowel function    Limit caffeine and alcohol intake    Avoid foods you've identified as constipating    Increase fiber intake: there are ways to increase you fiber through your diet but there are also OTC agents such as Metamucil or Benfiber that you can supplement your diet with    Use probiotics such as Florastor and/or prebiotics such as oligosaccharides    Consider using an Omega 3 supplement, flaxseed and greg seeds are great sources    Plan adequate time for elimination, it is most effective right after activity, and it may be beneficial to plan a consistent time daily    Food Suggestions      Eat beans, nuts, whole grain breads and cereals, oats, barley, figs, apples with skin, raisins, green leafy vegetables, fresh and dried fruits (especially grapes), prunes or prune juice    Eat popcorn nightly    Eat 2-3 salads per day    Add a pinch of cayenne pepper to food    1-2 tbsp of olive oil per day    Lemon juice and/or honey in warm water every morning before  breakfast    Decrease red meat, refined white flour products like white rice, white bread and pasta    Tea infusions of: rosemary, chamomile, lemon balm, senna leaf, alfalfa, fennel seeds, lavender, cascara, dandelion, licorice root tea, psyllium seeds, marshmallow root    Other remedies      Increase Vitamin B and E (800 IU if not pregnant, 400 IU if pregnant per day) and potassium, calcium, and magnesium supplements      If these remedies fail, medications are an option as well. Please talk with your midwife if you continue to struggle with constipation. If you are pregnant please double check with us before starting any medications!      Fiber Facts    A daily intake of about 25-30 grams of fiber is recommended. Most Americans only get about 12 grams of fiber on average. Fiber is very important in the diet to promote bowel regularity, lower cholesterol, lower risk of developing type 2 diabetes, and decrease chance of weight gain.  In pregnancy your intestinal motility slows down, so fiber is even more important.    Start gradually increasing fiber intake to reduce the risk of bloating and gas. Increase by about 5 grams a day every few days until you reach your fiber goals!    Food      Amount   Grams of Fiber  Grains  [place]'s All Bran Cereal   1/2 cup   10  Natural Oven's Stay Trim Bread 1 slice    5  Brown Rice (cooked)  1 cup    4  Cheerios    1 cup    3  Whole Wheat Crackers  5 crackers   3.5  Rye crackers    3 crackers   3    Cereals and whole grains are excellent ways to increase fiber in your diet. Try to find cereals that have at least 8 grams of fiber per serving.    Fruits  Blackberries     1 cup    7   Figs      3 dried   7  Apple      1     4  Pear     1    4   Orange    1    3    Vegetables  Winter squash   1 cup    9  Broccoli     1 cup    4  Corn      1 cup    4  Swiss chard (cooked)  1 cup     4  Cauliflower     1 cup     3  Potato     1 large w/skin  5    Beans  Kidney, red    1/2  cup   8  Lentils     1/2 cup cooked  8  Black     1/2 cup    7  Navy     1/2 cup   7  Cade     1/2 cup   7  White      1/2 cup   6  Garbanzo/Chickpeas  1/2 cup   5                  Follow-ups after your visit        Follow-up notes from your care team     Return in about 4 weeks (around 10/11/2017) for Prenatal Visit.      Your next 10 appointments already scheduled     Oct 09, 2017 11:25 AM CDT   US PELVIC COMPLETE W TRANSVAGINAL with [email or web address]   [facility] ([facility])    [address] [address]   [phone]           Please bring a list of your medicines (including vitamins, minerals and over-the-counter drugs). Also, tell your doctor about any allergies you may have. Wear comfortable clothes and leave your valuables at home.  Adults: Drink six 8-ounce glasses of fluid one hour before your exam. Do NOT empty your bladder.  If you need to empty your bladder before your exam, try to release only a little bit of urine. Then, drink another 8oz glass of fluid.  Children: Children who are potty trained should drink at least 4 cups (32 oz) of liquid 45 minutes to one hour prior to the exam. The child s bladder must be full in order to achieve a diagnostic exam. If your child is very uncomfortable or has an urgent need to pee, please notify a technologist; they will try to find out how much longer the wait may be and provide instructions to help relieve the pressure. Occasionally it is medically necessary to insert a urinary catheter to fill the bladder.  Please call the Imaging Department at your exam site with any questions.            Oct 09, 2017 12:10 PM CDT   ESTABLISHED PRENATAL with Cee Garcia MD   [facility] ([facility])    [address]  [facility] [id number] [phone]              Future tests that were ordered for you today     Open Future Orders        Priority  Expected Expires Ordered    US OB > 14 Weeks Complete Single Routine 10/11/2017 9/13/2018 9/13/2017            Who to contact     If you have questions or need follow up information about today's clinic visit or your schedule please contact [facility] directly at [phone].  Normal or non-critical lab and imaging results will be communicated to you by MyChart, letter or phone within 4 business days after the clinic has received the results. If you do not hear from us within 7 days, please contact the clinic through [employer] or phone. If you have a critical or abnormal lab result, we will notify you by phone as soon as possible.  Submit refill requests through [employer] or call your pharmacy and they will forward the refill request to us. Please allow 3 business days for your refill to be completed.          Additional Information About Your Visit        MyChart Information     [employer] gives you secure access to your electronic health record. If you see a primary care provider, you can also send messages to your care team and make appointments. If you have questions, please call your primary care clinic.  If you do not have a primary care provider, please call [phone] and they will assist you.        Care EveryWhere ID     This is your Care EveryWhere ID. This could be used by other organizations to access your [place] medical records  [id number]        Your Vitals Were     Last Period BMI (Body Mass Index)                05/16/2017 39.27 kg/m2           Blood Pressure from Last 3 Encounters:   09/13/17 118/74   08/14/17 120/70   07/17/17 120/70    Weight from Last 3 Encounters:   09/13/17 264 lb (119.7 kg)   07/17/17 270 lb (122.5 kg)   12/30/15 268 lb (121.6 kg)              We Performed the Following     Alpha fetoprotein maternal screen     FLU VAC, SPLIT VIRUS IM > 3 YO (QUADRIVALENT) [67053]     Vaccine Administration, Initial [22582]        Primary Care Provider Office Phone # Fax  #    Cee Garcia [phone]       [address] [facility] [address]  [facility] [address]        Equal Access to Services     LUPE JOHN : Hadshaun angeles hernandez billy Murray, wacharles shrutireyes, wesley kalucada margarita, criss burgosann nicola. So [facility] [phone].    ATENCIÓN: Si habla español, tiene a garber disposición servicios gratuitos de asistencia lingüística. Llame al [phone].    We comply with applicable federal civil rights laws and [place] laws. We do not discriminate on the basis of race, color, national origin, age, disability sex, sexual orientation or gender identity.            Thank you!     Thank you for choosing [facility]  for your care. Our goal is always to provide you with excellent care. Hearing back from our patients is one way we can continue to improve our services. Please take a few minutes to complete the written survey that you may receive in the mail after your visit with us. Thank you!             Your Updated Medication List - Protect others around you: Learn how to safely use, store and throw away your medicines at www.disposemymeds.org.          This list is accurate as of: 9/13/17 11:50 AM.  Always use your most recent med list.                   Brand Name Dispense Instructions for use Diagnosis    * albuterol (2.5 MG/3ML) 0.083% neb solution     1 Box    Take 3 mLs by nebulization every 4 hours as needed for shortness of breath / dyspnea.    Moderate persistent asthma with exacerbation       * VENTOLIN  (90 BASE) MCG/ACT Inhaler   Generic drug:  albuterol     1 Inhaler    INHALE 2 PUFFS INTO THE LUNGS EVERY 6 HOURS AS NEEDED FOR SHORTNESS OF BREATH / DYSPNEA    Unspecified asthma(493.90)       prenatal multivitamin  with iron 28-0.8 MG Tabs     100 tablet    Take 1 tablet by mouth daily    High-risk pregnancy, elderly multigravida, first trimester       ZYRTEC ALLERGY PO      Take 10 mg by mouth daily        * Notice:  This  list has 2 medication(s) that are the same as other medications prescribed for you. Read the directions carefully, and ask your doctor or other care provider to review them with you.

## 2017-09-15 LAB
# FETUSES US: NORMAL
AFP ADJ MOM AMN: 1.12
AFP SERPL-MCNC: 28 NG/ML
AGE - REPORTED: 37.3 YR
DATING METHOD: NORMAL
DIABETIC AT CONCEPTION: NO
FAMILY MEMBER DISEASES HX: NO
FAMILY MEMBER DISEASES HX: NO
GA METHOD: NORMAL
GA: 16.14 WEEKS
HX OF HEREDITARY DISORDERS: NO
IDDM PATIENT QL: NO
INTEGRATED SCN PATIENT-IMP: NORMAL
LMP START DATE: NORMAL
PREV HX CHROMOSOME ABNORMALITY: NO
SPECIMEN DRAWN SERPL: NORMAL
TWINS: NORMAL

## 2017-10-09 ENCOUNTER — RADIANT APPOINTMENT (OUTPATIENT)
Dept: ULTRASOUND IMAGING | Facility: CLINIC | Age: 37
End: 2017-10-09
Payer: COMMERCIAL

## 2017-10-09 ENCOUNTER — PRENATAL OFFICE VISIT (OUTPATIENT)
Dept: OBGYN | Facility: CLINIC | Age: 37
End: 2017-10-09
Payer: COMMERCIAL

## 2017-10-09 VITALS — BODY MASS INDEX: 39.57 KG/M2 | DIASTOLIC BLOOD PRESSURE: 68 MMHG | SYSTOLIC BLOOD PRESSURE: 116 MMHG | WEIGHT: 266 LBS

## 2017-10-09 DIAGNOSIS — O09.522 HIGH-RISK PREGNANCY, ELDERLY MULTIGRAVIDA IN SECOND TRIMESTER: ICD-10-CM

## 2017-10-09 DIAGNOSIS — O09.522 HIGH-RISK PREGNANCY, ELDERLY MULTIGRAVIDA, SECOND TRIMESTER: ICD-10-CM

## 2017-10-09 PROCEDURE — 76805 OB US >/= 14 WKS SNGL FETUS: CPT | Performed by: OBSTETRICS & GYNECOLOGY

## 2017-10-09 PROCEDURE — 99207 ZZC PRENATAL VISIT: CPT | Performed by: OBSTETRICS & GYNECOLOGY

## 2017-10-09 NOTE — MR AVS SNAPSHOT
After Visit Summary   10/9/2017    Crystal Rose    MRN: [id number]           Patient Information     Date Of Birth          1980        Visit Information        Provider Department      10/9/2017 12:10 PM Cee Garcia MD [facility]        Today's Diagnoses     High-risk pregnancy, elderly multigravida in second trimester           Follow-ups after your visit        Your next 10 appointments already scheduled     Nov 06, 2017  8:50 AM CST   ESTABLISHED PRENATAL with Cee Garcia MD   [facility] ([facility]    [address] [place] [id number] [phone]              Who to contact     If you have questions or need follow up information about today's clinic visit or your schedule please contact [facility] directly at [phone].  Normal or non-critical lab and imaging results will be communicated to you by MyChart, letter or phone within 4 business days after the clinic has received the results. If you do not hear from us within 7 days, please contact the clinic through [employer] or phone. If you have a critical or abnormal lab result, we will notify you by phone as soon as possible.  Submit refill requests through [employer] or call your pharmacy and they will forward the refill request to us. Please allow 3 business days for your refill to be completed.          Additional Information About Your Visit        MyChart Information     [employer] gives you secure access to your electronic health record. If you see a primary care provider, you can also send messages to your care team and make appointments. If you have questions, please call your primary care clinic.  If you do not have a primary care provider, please call [phone] and they will assist you.        Care EveryWhere ID     This is your Care EveryWhere ID. This could be used by other organizations to access your  [place] medical records  [id number]        Your Vitals Were     Last Period BMI (Body Mass Index)                05/16/2017 39.57 kg/m2           Blood Pressure from Last 3 Encounters:   10/09/17 116/68   09/13/17 118/74   08/14/17 120/70    Weight from Last 3 Encounters:   10/09/17 120.7 kg (266 lb)   09/13/17 119.7 kg (264 lb)   07/17/17 122.5 kg (270 lb)              Today, you had the following     No orders found for display       Primary Care Provider Office Phone # Fax #    Cee Garcia [phone] [address] [facility] [address]  [facility] [address]        Equal Access to Services     LUPE [facility] : Hadii angeles bruceo Sonarayan, waaxda luqadaha, qaybta kaalmada adeegyada, criss torres . So [facility] [phone].    ATENCIÓN: Si habla español, tiene a garber disposición servicios gratuitos de asistencia lingüística. [facility] [phone].    We comply with applicable federal civil rights laws and [place] laws. We do not discriminate on the basis of race, color, national origin, age, disability, sex, sexual orientation, or gender identity.            Thank you!     Thank you for choosing [facility]  for your care. Our goal is always to provide you with excellent care. Hearing back from our patients is one way we can continue to improve our services. Please take a few minutes to complete the written survey that you may receive in the mail after your visit with us. Thank you!             Your Updated Medication List - Protect others around you: Learn how to safely use, store and throw away your medicines at www.disposemymeds.org.          This list is accurate as of: 10/9/17 12:37 PM.  Always use your most recent med list.                   Brand Name Dispense Instructions for use Diagnosis    * albuterol (2.5 MG/3ML) 0.083% neb solution     1 Box    Take 3 mLs by nebulization every 4 hours as needed for shortness of breath / dyspnea.    Moderate  persistent asthma with exacerbation       * VENTOLIN  (90 BASE) MCG/ACT Inhaler   Generic drug:  albuterol     1 Inhaler    INHALE 2 PUFFS INTO THE LUNGS EVERY 6 HOURS AS NEEDED FOR SHORTNESS OF BREATH / DYSPNEA    Unspecified asthma(493.90)       prenatal multivitamin  with iron 28-0.8 MG Tabs     100 tablet    Take 1 tablet by mouth daily    High-risk pregnancy, elderly multigravida, first trimester       ZYRTEC ALLERGY PO      Take 10 mg by mouth daily        * Notice:  This list has 2 medication(s) that are the same as other medications prescribed for you. Read the directions carefully, and ask your doctor or other care provider to review them with you.

## 2017-10-24 ENCOUNTER — OFFICE VISIT (OUTPATIENT)
Dept: URGENT CARE | Facility: URGENT CARE | Age: 37
End: 2017-10-24
Payer: COMMERCIAL

## 2017-10-24 VITALS
DIASTOLIC BLOOD PRESSURE: 80 MMHG | HEART RATE: 88 BPM | BODY MASS INDEX: 40.46 KG/M2 | SYSTOLIC BLOOD PRESSURE: 123 MMHG | OXYGEN SATURATION: 97 % | WEIGHT: 272 LBS | TEMPERATURE: 98 F

## 2017-10-24 DIAGNOSIS — H66.002 ACUTE SUPPURATIVE OTITIS MEDIA OF LEFT EAR WITHOUT SPONTANEOUS RUPTURE OF TYMPANIC MEMBRANE, RECURRENCE NOT SPECIFIED: Primary | ICD-10-CM

## 2017-10-24 DIAGNOSIS — R06.2 WHEEZING: ICD-10-CM

## 2017-10-24 PROCEDURE — 99213 OFFICE O/P EST LOW 20 MIN: CPT | Performed by: NURSE PRACTITIONER

## 2017-10-24 RX ORDER — MONTELUKAST SODIUM 10 MG/1
10 TABLET ORAL AT BEDTIME
Qty: 30 TABLET | Refills: 1 | Status: SHIPPED | OUTPATIENT
Start: 2017-10-24 | End: 2018-06-12

## 2017-10-24 RX ORDER — AMOXICILLIN 500 MG/1
500 CAPSULE ORAL 3 TIMES DAILY
Qty: 30 CAPSULE | Refills: 0 | Status: SHIPPED | OUTPATIENT
Start: 2017-10-24 | End: 2017-12-06

## 2017-10-24 RX ORDER — ALBUTEROL SULFATE 90 UG/1
2 AEROSOL, METERED RESPIRATORY (INHALATION) EVERY 6 HOURS PRN
Qty: 1 INHALER | Refills: 1 | Status: SHIPPED | OUTPATIENT
Start: 2017-10-24 | End: 2017-11-14

## 2017-10-24 NOTE — PROGRESS NOTES
SUBJECTIVE:   Crystal Rose is a 36 year old female who presents to clinic today for the following health issues:  RESPIRATORY SYMPTOMS      Duration: 3 days    Description  Sore throat, congestion, cough, Ear pain on the left, some shortness of breath    Severity: moderate    Accompanying signs and symptoms: None    History (predisposing factors):  none    Precipitating or alleviating factors: None    Therapies tried and outcome:  Tea- no relief.     Patient is 23 weeks pregnant.        Problem list and histories reviewed & adjusted, as indicated.  Additional history: as documented    Patient Active Problem List   Diagnosis     S/P gastric bypass     Pernicious anemia     CARDIOVASCULAR SCREENING; LDL GOAL LESS THAN 160     Moderate persistent asthma     Environmental allergies     Abnormal Pap smear of cervix     Indication for care in labor or delivery     High-risk pregnancy, elderly multigravida     Past Surgical History:   Procedure Laterality Date     ATTEMPTED LAPAROSCOPY  3/13/2014    Procedure: ATTEMPTED LAPAROSCOPY;;  Surgeon: Cee Garcia MD;  Location: [facility]     COLPOSCOPY CERVIX, BIOPSY CERVIX, ENDOCERVICAL CURETTAGE, COMBINED      1/6/2005:  SUMI I; 6/15/2009:  SUMI I.  Treated with cryo.  10/27/1997:  SUMI I-II     CONIZATION LEEP  3/13/2014    Procedure: CONIZATION LEEP;  LOOP ELECTROSURGICAL EXCISION PROCEDURE; LAPAROSCOPY ATTEMPTED;  Surgeon: Cee Garcia MD;  Location: [facility]     CRYOTHERAPY, CERVICAL  age 19    Pt reported     EXAM UNDER ANESTHESIA PELVIC  3/20/2014    Procedure: EXAM UNDER ANESTHESIA PELVIC;  EXAM UNDER ANESTHESIA, REMOVAL OF STICHES ;  Surgeon: Cee Garcia MD;  Location:  OR     GASTRIC BYPASS  2004    pre-bypass weight was 320#     HC NASAL/SINUS SCOPE W THER INSTILL       HC REMOVAL OF OVARIAN CYST(S)         Social History   Substance Use Topics     Smoking status: Former Smoker     Quit date: 5/5/2009     Smokeless tobacco: Never Used     Alcohol use  Yes      Comment: [facility].     Family History   Problem Relation Age of Onset     Hypertension Mother      Allergies Mother      Obesity Mother      Asthma Father      DIABETES Father      Hypertension Father      Allergies Father      CANCER Father      Lymphoma d age 58     Asthma Brother      Allergies Sister      Depression Sister      Obesity Sister      Alzheimer Disease Maternal Grandmother      Arthritis Maternal Grandmother      Obesity Maternal Grandmother      Thyroid Disease Maternal Grandmother      Hypertension Maternal Grandfather      Cancer - colorectal Maternal Grandfather      CEREBROVASCULAR DISEASE Maternal Grandfather      DIABETES Paternal Grandmother          Current Outpatient Prescriptions   Medication Sig Dispense Refill     amoxicillin (AMOXIL) 500 MG capsule Take 1 capsule (500 mg) by mouth 3 times daily 30 capsule 0     albuterol (PROAIR HFA/PROVENTIL HFA/VENTOLIN HFA) 108 (90 BASE) MCG/ACT Inhaler Inhale 2 puffs into the lungs every 6 hours as needed for shortness of breath / dyspnea or wheezing 1 Inhaler 1     montelukast (SINGULAIR) 10 MG tablet Take 1 tablet (10 mg) by mouth At Bedtime 30 tablet 1     Prenatal Vit-Fe Fumarate-FA (PRENATAL MULTIVITAMIN  WITH IRON) 28-0.8 MG TABS Take 1 tablet by mouth daily 100 tablet 3     VENTOLIN  (90 BASE) MCG/ACT inhaler INHALE 2 PUFFS INTO THE LUNGS EVERY 6 HOURS AS NEEDED FOR SHORTNESS OF BREATH / DYSPNEA 1 Inhaler 0     albuterol (2.5 MG/3ML) 0.083% nebulizer solution Take 3 mLs by nebulization every 4 hours as needed for shortness of breath / dyspnea. 1 Box 0     Cetirizine HCl (ZYRTEC ALLERGY PO) Take 10 mg by mouth daily       Allergies   Allergen Reactions     Cat Hair [Cats]      Dog Hair [Dogs]      Dust Mites      Lactose      Ragweeds          Reviewed and updated as needed this visit by clinical staff     Reviewed and updated as needed this visit by Provider         ROS:  C: NEGATIVE for fever, chills, change in  weight  ENT/MOUTH:  POSITIVE for sore throat and ear pain left  RESP:POSITIVE for  Hx asthma, SOB/dyspnea and wheezing  CV: NEGATIVE for chest pain, palpitations or peripheral edema    OBJECTIVE:     /80 (BP Location: Left arm, Patient Position: Chair, Cuff Size: Adult Large)  Pulse 88  Temp 98  F (36.7  C) (Oral)  Wt 272 lb (123.4 kg)  LMP 05/16/2017  SpO2 97%  BMI 40.46 kg/m2  Body mass index is 40.46 kg/(m^2).  GENERAL: healthy, alert and no distress  HENT: normal cephalic/atraumatic, left ear: erythematous, bulging membrane and mucopurulent effusion, nose and mouth without ulcers or lesions, oropharynx clear and oral mucous membranes moist  NECK: no adenopathy, no asymmetry, masses, or scars and thyroid normal to palpation  RESP: expiratory wheezes R upper anterior, R upper posterior, L upper anterior and L upper posterior  CV: regular rate and rhythm, normal S1 S2, no S3 or S4, no murmur, click or rub, no peripheral edema and peripheral pulses strong  ABDOMEN: soft, nontender, no hepatosplenomegaly, no masses and bowel sounds normal  MS: no gross musculoskeletal defects noted, no edema    Diagnostic Test Results:  none     ASSESSMENT/PLAN:       ICD-10-CM    1. Acute suppurative otitis media of left ear without spontaneous rupture of tympanic membrane, recurrence not specified H66.002 amoxicillin (AMOXIL) 500 MG capsule   2. Wheezing R06.2 albuterol (PROAIR HFA/PROVENTIL HFA/VENTOLIN HFA) 108 (90 BASE) MCG/ACT Inhaler     montelukast (SINGULAIR) 10 MG tablet       Antibiotics as prescribed.  Recheck ear in 2 weeks.  Albuterol every 4 hours for the next 48 hours, then as needed.  Maribel Leiva, SORIN  [facility]

## 2017-10-24 NOTE — NURSING NOTE
"Chief Complaint   Patient presents with     URI     Pt c/o URI since Monday.       Initial /80 (BP Location: Left arm, Patient Position: Chair, Cuff Size: Adult Large)  Pulse 88  Temp 98  F (36.7  C) (Oral)  Wt 272 lb (123.4 kg)  LMP 05/16/2017  SpO2 97%  BMI 40.46 kg/m2 Estimated body mass index is 40.46 kg/(m^2) as calculated from the following:    Height as of 7/17/17: 5' 8.75\" (1.746 m).    Weight as of this encounter: 272 lb (123.4 kg).  Medication Reconciliation: complete     Judith Llamas CMA (AAMA)      "

## 2017-10-24 NOTE — PATIENT INSTRUCTIONS
Otitis Media (Middle-Ear Infection) in Adults  Otitis media is another name for a middle-ear infection. It means an infection behind your eardrum. This kind of ear infection can happen after any condition that keeps fluid from draining from the middle ear. These conditions include allergies, a cold, a sore throat, or a respiratory infection.  Middle-ear infections are common in children, but they can also happen in adults. An ear infection in an adult may mean a more serious problem than in a child. So you may need additional tests. If you have an ear infection, you should see your health care provider for treatment.  What are the types of middle-ear infections?  Infections can affect the middle ear in several ways. They are:    Acute otitis media. This middle-ear infection occurs suddenly. It causes swelling and redness. Fluid and mucus become trapped inside the ear. You can have a fever and ear pain.    Otitis media with effusion. Fluid (effusion) and mucus build up in the middle ear after the infection goes away. You may feel like your middle ear is full. This can continue for months and may affect your hearing.    Chronic otitis media with effusion. Fluid (effusion) remains in the middle ear for a long time. Or it builds up again and again, even though there is no infection. This type of middle-ear infection may be hard to treat. It may also affect your hearing.  Who is more likely to get a middle-ear infection?  You are more likely to get an ear infection if you:    Smoke or are around someone who smokes    Have seasonal or year-round allergy symptoms    Have a cold or other upper respiratory infection  What causes a middle-ear infection?  The middle ear connects to the throat by a canal called the eustachian tube. This tube helps even out the pressure between the outer ear and the inner ear. A cold or allergy can irritate the tube or cause the area around it to swell. This can keep fluid from draining from  the middle ear. The fluid builds up behind the eardrum. Bacteria and viruses can grow in this fluid. The bacteria and viruses cause the middle-ear infection.  What are the symptoms of a middle-ear infection?  Common symptoms of a middle-ear infection in adults are:    Pain in 1 or both ears    Drainage from the ear    Muffled hearing    Sore throat   You may also have a fever. Rarely, your balance can be affected.  These symptoms may be the same as for other conditions. It s important to talk with your health care provider if you think you have a middle-ear infection. If you have a high fever, severe pain behind your ear, or paralysis in your face, see your provider as soon as you can.  How is a middle-ear infection diagnosed?  Your health care provider will take a medical history and do a physical exam. He or she will look at the outer ear and eardrum with an otoscope. The otoscope is a lighted tool that lets your provider see inside the ear. A pneumatic otoscope blows a puff of air into the ear to check how well your eardrum moves. If you eardrum doesn t move well, it may mean you have fluid behind it.  Your provider may also do a test called tympanometry. This test tells how well the middle ear is working. It can find any changes in pressure in the middle ear. Your provider may test your hearing with a tuning fork.  How is a middle-ear infection treated?  A middle-ear infection may be treated with:    Antibiotics, taken by mouth or as ear drops    Medication for pain    Decongestants, antihistamines, or nasal steroids  Your health care provider may also have you try autoinsufflation. This helps adjust the air pressure in your ear. For this, you pinch your nose and gently exhale. This forces air back through the eustachian tube.  The exact treatment for your ear infection will depend on the type of infection you have. In general, if your symptoms don t get better in 48 to 72 hours, contact your health care  provider.  Middle-ear infections can cause long-term problems if not treated. They can lead to:    Infection in other parts of the head    Permanent hearing loss    Paralysis of a nerve in your face  If you have a middle-ear infection that doesn t get better, you may need to see an ear, nose, and throat specialist (otolaryngologist). You may need a CT scan or MRI to check for head and neck cancer.  Ear tubes  Sometimes fluid stays in the middle ear even after you take antibiotics and the infection goes away. In this case, your health care provider may suggest that a small tube be placed in your ear. The tube is put at the opening of the eardrum. The tube keeps fluid from building up and relieves pressure in the middle ear. It can also help you hear better. This surgery is called myringotomy. It is not often done in adults.  The tubes usually fall out on their own after 6 months to a year.    6004-3186 [employer]. [address] [place] [address]. All rights reserved. This information is not intended as a substitute for professional medical care. Always follow your healthcare professional's instructions.        Bronchospasm (Adult)    Bronchospasm occurs when the airways (bronchial tubes) go into spasm and contract. This makes it hard to breathe and causes wheezing (a high-pitched whistling sound). Bronchospasm can also cause frequent coughing without wheezing.  Bronchospasm is due to irritation, inflammation, or allergic reaction of the airways. People with asthma get bronchospasm. However, not everyone with bronchospasm has asthma.  Being exposed to harmful fumes, a recent case of bronchitis, exercise, or a flare-up of chronic obstructive pulmonary disease (COPD) may cause the airways to spasm. An episode of bronchospasm may last 7 to 14 days. Medicine may be prescribed to relax the airways and prevent wheezing. Antibiotics will be prescribed only if your healthcare provider thinks there is  a bacterial infection. Antibiotics do not help a viral infection.  Home care    Drink lots of water or other fluids (at least 10 glasses a day) during an attack. This will loosen lung secretions and make it easier to breathe. If you have heart or kidney disease, check with your doctor before you drink extra fluids.    Take prescribed medicine exactly at the times advised. If you take an inhaled medicine to help with breathing, do not use it more than once every 4 hours, unless told to do so. If prescribed an antibiotic or prednisone, take all of the medicine, even if you are feeling better after a few days.    Do not smoke. Also avoid being exposed to secondhand smoke.    If you were given an inhaler, use it exactly as directed. If you need to use it more often than prescribed, your condition may be getting worse. Contact your healthcare provider.  Follow-up care  Follow up with your healthcare provider, or as advised.  Note: If you are age 65 or older, have a chronic lung disease or condition that affects your immune system, or you smoke, we recommend getting pneumococcal vaccinations, as well as an influenza vaccination (flu shot) every autumn. Ask your healthcare provider about this.  When to seek medical advice  Call your healthcare provider right away if any of these occur:    You need to use your inhalers more often than usual.    You develop a fever of 100.4 F (38 C) or higher.    You are coughing up lots of dark-colored sputum (mucus).    You do not start to improve within 24 hours.  Call 911, or get immediate medical care  Contact emergency services if any of these occur:    Coughing up bloody sputum (mucus)    Chest pain with each breath    Increased wheezing or shortness of breath  Date Last Reviewed: 9/13/2015 2000-2017 [employer]. [address] [place] [address], [place] [address]. All rights reserved. This information is not intended as a substitute for professional medical care. Always follow  your healthcare professional's instructions.

## 2017-10-24 NOTE — MR AVS SNAPSHOT
After Visit Summary   10/24/2017    Crystal Rose    MRN: [id number]           Patient Information     Date Of Birth          1980        Visit Information        Provider Department      10/24/2017 5:00 PM Maribel Leiva NP [facility]        Today's Diagnoses     Acute suppurative otitis media of left ear without spontaneous rupture of tympanic membrane, recurrence not specified    -  1    Wheezing          Care Instructions      Otitis Media (Middle-Ear Infection) in Adults  Otitis media is another name for a middle-ear infection. It means an infection behind your eardrum. This kind of ear infection can happen after any condition that keeps fluid from draining from the middle ear. These conditions include allergies, a cold, a sore throat, or a respiratory infection.  Middle-ear infections are common in children, but they can also happen in adults. An ear infection in an adult may mean a more serious problem than in a child. So you may need additional tests. If you have an ear infection, you should see your health care provider for treatment.  What are the types of middle-ear infections?  Infections can affect the middle ear in several ways. They are:    Acute otitis media. This middle-ear infection occurs suddenly. It causes swelling and redness. Fluid and mucus become trapped inside the ear. You can have a fever and ear pain.    Otitis media with effusion. Fluid (effusion) and mucus build up in the middle ear after the infection goes away. You may feel like your middle ear is full. This can continue for months and may affect your hearing.    Chronic otitis media with effusion. Fluid (effusion) remains in the middle ear for a long time. Or it builds up again and again, even though there is no infection. This type of middle-ear infection may be hard to treat. It may also affect your hearing.  Who is more likely to get a middle-ear infection?  You are more likely to get an  ear infection if you:    Smoke or are around someone who smokes    Have seasonal or year-round allergy symptoms    Have a cold or other upper respiratory infection  What causes a middle-ear infection?  The middle ear connects to the throat by a canal called the eustachian tube. This tube helps even out the pressure between the outer ear and the inner ear. A cold or allergy can irritate the tube or cause the area around it to swell. This can keep fluid from draining from the middle ear. The fluid builds up behind the eardrum. Bacteria and viruses can grow in this fluid. The bacteria and viruses cause the middle-ear infection.  What are the symptoms of a middle-ear infection?  Common symptoms of a middle-ear infection in adults are:    Pain in 1 or both ears    Drainage from the ear    Muffled hearing    Sore throat   You may also have a fever. Rarely, your balance can be affected.  These symptoms may be the same as for other conditions. It s important to talk with your health care provider if you think you have a middle-ear infection. If you have a high fever, severe pain behind your ear, or paralysis in your face, see your provider as soon as you can.  How is a middle-ear infection diagnosed?  Your health care provider will take a medical history and do a physical exam. He or she will look at the outer ear and eardrum with an otoscope. The otoscope is a lighted tool that lets your provider see inside the ear. A pneumatic otoscope blows a puff of air into the ear to check how well your eardrum moves. If you eardrum doesn t move well, it may mean you have fluid behind it.  Your provider may also do a test called tympanometry. This test tells how well the middle ear is working. It can find any changes in pressure in the middle ear. Your provider may test your hearing with a tuning fork.  How is a middle-ear infection treated?  A middle-ear infection may be treated with:    Antibiotics, taken by mouth or as ear  drops    Medication for pain    Decongestants, antihistamines, or nasal steroids  Your health care provider may also have you try autoinsufflation. This helps adjust the air pressure in your ear. For this, you pinch your nose and gently exhale. This forces air back through the eustachian tube.  The exact treatment for your ear infection will depend on the type of infection you have. In general, if your symptoms don t get better in 48 to 72 hours, contact your health care provider.  Middle-ear infections can cause long-term problems if not treated. They can lead to:    Infection in other parts of the head    Permanent hearing loss    Paralysis of a nerve in your face  If you have a middle-ear infection that doesn t get better, you may need to see an ear, nose, and throat specialist (otolaryngologist). You may need a CT scan or MRI to check for head and neck cancer.  Ear tubes  Sometimes fluid stays in the middle ear even after you take antibiotics and the infection goes away. In this case, your health care provider may suggest that a small tube be placed in your ear. The tube is put at the opening of the eardrum. The tube keeps fluid from building up and relieves pressure in the middle ear. It can also help you hear better. This surgery is called myringotomy. It is not often done in adults.  The tubes usually fall out on their own after 6 months to a year.    4192-0987 [employer]. [address] [place] [address] 12569. All rights reserved. This information is not intended as a substitute for professional medical care. Always follow your healthcare professional's instructions.        Bronchospasm (Adult)    Bronchospasm occurs when the airways (bronchial tubes) go into spasm and contract. This makes it hard to breathe and causes wheezing (a high-pitched whistling sound). Bronchospasm can also cause frequent coughing without wheezing.  Bronchospasm is due to irritation, inflammation, or allergic  reaction of the airways. People with asthma get bronchospasm. However, not everyone with bronchospasm has asthma.  Being exposed to harmful fumes, a recent case of bronchitis, exercise, or a flare-up of chronic obstructive pulmonary disease (COPD) may cause the airways to spasm. An episode of bronchospasm may last 7 to 14 days. Medicine may be prescribed to relax the airways and prevent wheezing. Antibiotics will be prescribed only if your healthcare provider thinks there is a bacterial infection. Antibiotics do not help a viral infection.  Home care    Drink lots of water or other fluids (at least 10 glasses a day) during an attack. This will loosen lung secretions and make it easier to breathe. If you have heart or kidney disease, check with your doctor before you drink extra fluids.    Take prescribed medicine exactly at the times advised. If you take an inhaled medicine to help with breathing, do not use it more than once every 4 hours, unless told to do so. If prescribed an antibiotic or prednisone, take all of the medicine, even if you are feeling better after a few days.    Do not smoke. Also avoid being exposed to secondhand smoke.    If you were given an inhaler, use it exactly as directed. If you need to use it more often than prescribed, your condition may be getting worse. Contact your healthcare provider.  Follow-up care  Follow up with your healthcare provider, or as advised.  Note: If you are age 65 or older, have a chronic lung disease or condition that affects your immune system, or you smoke, we recommend getting pneumococcal vaccinations, as well as an influenza vaccination (flu shot) every autumn. Ask your healthcare provider about this.  When to seek medical advice  Call your healthcare provider right away if any of these occur:    You need to use your inhalers more often than usual.    You develop a fever of 100.4 F (38 C) or higher.    You are coughing up lots of dark-colored sputum  (mucus).    You do not start to improve within 24 hours.  Call 911, or get immediate medical care  Contact emergency services if any of these occur:    Coughing up bloody sputum (mucus)    Chest pain with each breath    Increased wheezing or shortness of breath  Date Last Reviewed: 9/13/2015 2000-2017 [employer]. [address] [place] [address]91. All rights reserved. This information is not intended as a substitute for professional medical care. Always follow your healthcare professional's instructions.                Follow-ups after your visit        Your next 10 appointments already scheduled     Nov 06, 2017  8:50 AM CST   ESTABLISHED PRENATAL with Cee Garcia MD   [facility]    [address] [place] [address] [phone]              Who to contact     If you have questions or need follow up information about today's clinic visit or your schedule please contact [facility] directly at [phone].  Normal or non-critical lab and imaging results will be communicated to you by [employer], letter or phone within 4 business days after the clinic has received the results. If you do not hear from us within 7 days, please contact the clinic through [employer] or phone. If you have a critical or abnormal lab result, we will notify you by phone as soon as possible.  Submit refill requests through [employer] or call your pharmacy and they will forward the refill request to us. Please allow 3 business days for your refill to be completed.          Additional Information About Your Visit        [employer] Information     [employer] gives you secure access to your electronic health record. If you see a primary care provider, you can also send messages to your care team and make appointments. If you have questions, please call your primary care clinic.  If you do not have a primary care provider, please  call [phone] and they will assist you.        Care EveryWhere ID     This is your Care EveryWhere ID. This could be used by other organizations to access your [place] medical records  [id number]        Your Vitals Were     Pulse Temperature Last Period Pulse Oximetry BMI (Body Mass Index)       88 98  F (36.7  C) (Oral) 05/16/2017 97% 40.46 kg/m2        Blood Pressure from Last 3 Encounters:   10/24/17 123/80   10/09/17 116/68   09/13/17 118/74    Weight from Last 3 Encounters:   10/24/17 272 lb (123.4 kg)   10/09/17 266 lb (120.7 kg)   09/13/17 264 lb (119.7 kg)              Today, you had the following     No orders found for display         Today's Medication Changes          These changes are accurate as of: 10/24/17  5:31 PM.  If you have any questions, ask your nurse or doctor.               Start taking these medicines.        Dose/Directions    amoxicillin 500 MG capsule   Commonly known as:  AMOXIL   Used for:  Acute suppurative otitis media of left ear without spontaneous rupture of tympanic membrane, recurrence not specified   Started by:  Maribel Leiva NP        Dose:  500 mg   Take 1 capsule (500 mg) by mouth 3 times daily   Quantity:  30 capsule   Refills:  0       montelukast 10 MG tablet   Commonly known as:  SINGULAIR   Used for:  Wheezing   Started by:  Maribel Leiva NP        Dose:  10 mg   Take 1 tablet (10 mg) by mouth At Bedtime   Quantity:  30 tablet   Refills:  1         These medicines have changed or have updated prescriptions.        Dose/Directions    * albuterol (2.5 MG/3ML) 0.083% neb solution   This may have changed:  Another medication with the same name was added. Make sure you understand how and when to take each.   Used for:  Moderate persistent asthma with exacerbation   Changed by:  Stefanie Crenshaw PA-C        Dose:  1 ampule   Take 3 mLs by nebulization every 4 hours as needed for shortness of breath / dyspnea.   Quantity:  1 Box   Refills:  0       * VENTOLIN   (90 BASE) MCG/ACT Inhaler   This may have changed:  Another medication with the same name was added. Make sure you understand how and when to take each.   Used for:  Unspecified asthma(493.90)   Generic drug:  albuterol   Changed by:  Beti Vincent MD        INHALE 2 PUFFS INTO THE LUNGS EVERY 6 HOURS AS NEEDED FOR SHORTNESS OF BREATH / DYSPNEA   Quantity:  1 Inhaler   Refills:  0       * albuterol 108 (90 BASE) MCG/ACT Inhaler   Commonly known as:  PROAIR HFA/PROVENTIL HFA/VENTOLIN HFA   This may have changed:  You were already taking a medication with the same name, and this prescription was added. Make sure you understand how and when to take each.   Used for:  Wheezing   Changed by:  Maribel Leiva NP        Dose:  2 puff   Inhale 2 puffs into the lungs every 6 hours as needed for shortness of breath / dyspnea or wheezing   Quantity:  1 Inhaler   Refills:  1       * Notice:  This list has 3 medication(s) that are the same as other medications prescribed for you. Read the directions carefully, and ask your doctor or other care provider to review them with you.         Where to get your medicines      These medications were sent to [facility] [address] [place] [id number]     Phone:  [phone]     albuterol 108 (90 BASE) MCG/ACT Inhaler    amoxicillin 500 MG capsule    montelukast 10 MG tablet                Primary Care Provider Office Phone # Fax #    Cee Garcia [phone] [address] [address]        Equal Access to Services     [facility]: Hadii aad ku hadasho Soomaali, waaxda luqadaha, qaybta kaalmada margarita, criss petersen. So [facility] [phone].    ATENCIÓN: Si habla español, tiene a garber disposición servicios gratuitos de asistencia lingüística. Llame al [phone].    We comply with applicable federal civil rights laws and [place]  laws. We do not discriminate on the basis of race, color, national origin, age, disability, sex, sexual orientation, or gender identity.            Thank you!     Thank you for choosing [facility]  for your care. Our goal is always to provide you with excellent care. Hearing back from our patients is one way we can continue to improve our services. Please take a few minutes to complete the written survey that you may receive in the mail after your visit with us. Thank you!             Your Updated Medication List - Protect others around you: Learn how to safely use, store and throw away your medicines at www.disposemymeds.org.          This list is accurate as of: 10/24/17  5:31 PM.  Always use your most recent med list.                   Brand Name Dispense Instructions for use Diagnosis    * albuterol (2.5 MG/3ML) 0.083% neb solution     1 Box    Take 3 mLs by nebulization every 4 hours as needed for shortness of breath / dyspnea.    Moderate persistent asthma with exacerbation       * VENTOLIN  (90 BASE) MCG/ACT Inhaler   Generic drug:  albuterol     1 Inhaler    INHALE 2 PUFFS INTO THE LUNGS EVERY 6 HOURS AS NEEDED FOR SHORTNESS OF BREATH / DYSPNEA    Unspecified asthma(493.90)       * albuterol 108 (90 BASE) MCG/ACT Inhaler    PROAIR HFA/PROVENTIL HFA/VENTOLIN HFA    1 Inhaler    Inhale 2 puffs into the lungs every 6 hours as needed for shortness of breath / dyspnea or wheezing    Wheezing       amoxicillin 500 MG capsule    AMOXIL    30 capsule    Take 1 capsule (500 mg) by mouth 3 times daily    Acute suppurative otitis media of left ear without spontaneous rupture of tympanic membrane, recurrence not specified       montelukast 10 MG tablet    SINGULAIR    30 tablet    Take 1 tablet (10 mg) by mouth At Bedtime    Wheezing       prenatal multivitamin  with iron 28-0.8 MG Tabs     100 tablet    Take 1 tablet by mouth daily    High-risk pregnancy, elderly multigravida, first trimester        ZYRTEC ALLERGY PO      Take 10 mg by mouth daily        * Notice:  This list has 3 medication(s) that are the same as other medications prescribed for you. Read the directions carefully, and ask your doctor or other care provider to review them with you.

## 2017-11-06 ENCOUNTER — PRENATAL OFFICE VISIT (OUTPATIENT)
Dept: OBGYN | Facility: CLINIC | Age: 37
End: 2017-11-06
Payer: COMMERCIAL

## 2017-11-06 VITALS — WEIGHT: 271 LBS | BODY MASS INDEX: 40.31 KG/M2 | SYSTOLIC BLOOD PRESSURE: 112 MMHG | DIASTOLIC BLOOD PRESSURE: 66 MMHG

## 2017-11-06 DIAGNOSIS — O09.522 HIGH-RISK PREGNANCY, ELDERLY MULTIGRAVIDA IN SECOND TRIMESTER: ICD-10-CM

## 2017-11-06 PROCEDURE — 99207 ZZC PRENATAL VISIT: CPT | Performed by: OBSTETRICS & GYNECOLOGY

## 2017-11-06 NOTE — MR AVS SNAPSHOT
After Visit Summary   11/6/2017    Crystal Rose    MRN: [id number]           Patient Information     Date Of Birth          1980        Visit Information        Provider Department      11/6/2017 8:50 AM Cee Garcia MD [facility]        Today's Diagnoses     High-risk pregnancy, elderly multigravida in second trimester           Follow-ups after your visit        Your next 10 appointments already scheduled     Dec 06, 2017  8:20 AM CST   Glucose Tolerance Test with [facility]   [facility]    [address]  [facility] [address] [phone]            Dec 06, 2017  8:50 AM CST   ESTABLISHED PRENATAL with Cee Garcia MD   [facility] ([facility])    [address]  [facility] [address] [phone]              Who to contact     If you have questions or need follow up information about today's clinic visit or your schedule please contact [facility] directly at [phone].  Normal or non-critical lab and imaging results will be communicated to you by [employer], letter or phone within 4 business days after the clinic has received the results. If you do not hear from us within 7 days, please contact the clinic through [employer] or phone. If you have a critical or abnormal lab result, we will notify you by phone as soon as possible.  Submit refill requests through [employer] or call your pharmacy and they will forward the refill request to us. Please allow 3 business days for your refill to be completed.          Additional Information About Your Visit        [employer] Information     [employer] gives you secure access to your electronic health record. If you see a primary care provider, you can also send messages to your care team and make appointments. If you have questions, please call your primary care clinic.  If  you do not have a primary care provider, please call [phone] and they will assist you.        Care EveryWhere ID     This is your Care EveryWhere ID. This could be used by other organizations to access your [place] medical records  [id number]        Your Vitals Were     Last Period BMI (Body Mass Index)                05/16/2017 40.31 kg/m2           Blood Pressure from Last 3 Encounters:   11/06/17 112/66   10/24/17 123/80   10/09/17 116/68    Weight from Last 3 Encounters:   11/06/17 271 lb (122.9 kg)   10/24/17 272 lb (123.4 kg)   10/09/17 266 lb (120.7 kg)              Today, you had the following     No orders found for display       Primary Care Provider Office Phone # Fax #    Cee Garcia [phone] [address] [facility] [address]  [place] [address]        Equal Access to Services     [facility] Center[facility]TE : Hadii angeles ku janiso Sonarayan, waaxda luqadaha, qaybta kaalmada adeglenyada, criss torres . So [facility] [phone].    ATENCIÓN: Si habla español, tiene a garber disposición servicios gratuitos de asistencia lingüística. Llame al [phone].    We comply with applicable federal civil rights laws and [place] laws. We do not discriminate on the basis of race, color, national origin, age, disability, sex, sexual orientation, or gender identity.            Thank you!     Thank you for choosing [facility] [place]  for your care. Our goal is always to provide you with excellent care. Hearing back from our patients is one way we can continue to improve our services. Please take a few minutes to complete the written survey that you may receive in the mail after your visit with us. Thank you!             Your Updated Medication List - Protect others around you: Learn how to safely use, store and throw away your medicines at www.disposemymeds.org.          This list is accurate as of: 11/6/17  9:16 AM.  Always use your most recent med list.                    Brand Name Dispense Instructions for use Diagnosis    * albuterol (2.5 MG/3ML) 0.083% neb solution     1 Box    Take 3 mLs by nebulization every 4 hours as needed for shortness of breath / dyspnea.    Moderate persistent asthma with exacerbation       * VENTOLIN  (90 BASE) MCG/ACT Inhaler   Generic drug:  albuterol     1 Inhaler    INHALE 2 PUFFS INTO THE LUNGS EVERY 6 HOURS AS NEEDED FOR SHORTNESS OF BREATH / DYSPNEA    Unspecified asthma(493.90)       * albuterol 108 (90 BASE) MCG/ACT Inhaler    PROAIR HFA/PROVENTIL HFA/VENTOLIN HFA    1 Inhaler    Inhale 2 puffs into the lungs every 6 hours as needed for shortness of breath / dyspnea or wheezing    Wheezing       amoxicillin 500 MG capsule    AMOXIL    30 capsule    Take 1 capsule (500 mg) by mouth 3 times daily    Acute suppurative otitis media of left ear without spontaneous rupture of tympanic membrane, recurrence not specified       montelukast 10 MG tablet    SINGULAIR    30 tablet    Take 1 tablet (10 mg) by mouth At Bedtime    Wheezing       prenatal multivitamin  with iron 28-0.8 MG Tabs     100 tablet    Take 1 tablet by mouth daily    High-risk pregnancy, elderly multigravida, first trimester       ZYRTEC ALLERGY PO      Take 10 mg by mouth daily        * Notice:  This list has 3 medication(s) that are the same as other medications prescribed for you. Read the directions carefully, and ask your doctor or other care provider to review them with you.

## 2017-11-14 ENCOUNTER — OFFICE VISIT (OUTPATIENT)
Dept: URGENT CARE | Facility: URGENT CARE | Age: 37
End: 2017-11-14
Payer: COMMERCIAL

## 2017-11-14 VITALS — HEART RATE: 79 BPM | TEMPERATURE: 98.1 F | OXYGEN SATURATION: 96 %

## 2017-11-14 DIAGNOSIS — J45.40 MODERATE PERSISTENT ASTHMA, UNSPECIFIED WHETHER COMPLICATED: Primary | ICD-10-CM

## 2017-11-14 DIAGNOSIS — Z33.1 PREGNANT STATE, INCIDENTAL: ICD-10-CM

## 2017-11-14 PROCEDURE — 99214 OFFICE O/P EST MOD 30 MIN: CPT | Mod: 25 | Performed by: PHYSICIAN ASSISTANT

## 2017-11-14 PROCEDURE — 94640 AIRWAY INHALATION TREATMENT: CPT | Performed by: PHYSICIAN ASSISTANT

## 2017-11-14 RX ORDER — ALBUTEROL SULFATE 0.83 MG/ML
1 SOLUTION RESPIRATORY (INHALATION) EVERY 4 HOURS PRN
Qty: 1 BOX | Refills: 0 | Status: SHIPPED | OUTPATIENT
Start: 2017-11-14 | End: 2017-11-14

## 2017-11-14 RX ORDER — ALBUTEROL SULFATE 90 UG/1
2 AEROSOL, METERED RESPIRATORY (INHALATION) EVERY 4 HOURS PRN
Qty: 1 INHALER | Refills: 1 | Status: SHIPPED | OUTPATIENT
Start: 2017-11-14 | End: 2018-01-25

## 2017-11-14 RX ORDER — ALBUTEROL SULFATE 0.83 MG/ML
1 SOLUTION RESPIRATORY (INHALATION) EVERY 4 HOURS PRN
Qty: 1 BOX | Refills: 0 | Status: SHIPPED | OUTPATIENT
Start: 2017-11-14 | End: 2018-06-13

## 2017-11-14 RX ORDER — PREDNISONE 20 MG/1
20 TABLET ORAL 2 TIMES DAILY
Qty: 10 TABLET | Refills: 0 | Status: SHIPPED | OUTPATIENT
Start: 2017-11-14 | End: 2018-01-17

## 2017-11-14 RX ORDER — PREDNISONE 20 MG/1
20 TABLET ORAL 2 TIMES DAILY
Qty: 10 TABLET | Refills: 0 | Status: SHIPPED | OUTPATIENT
Start: 2017-11-14 | End: 2017-11-14

## 2017-11-14 RX ORDER — ALBUTEROL SULFATE 90 UG/1
2 AEROSOL, METERED RESPIRATORY (INHALATION) EVERY 4 HOURS PRN
Qty: 1 INHALER | Refills: 1 | Status: SHIPPED | OUTPATIENT
Start: 2017-11-14 | End: 2017-11-14

## 2017-11-14 ASSESSMENT — ENCOUNTER SYMPTOMS
SPUTUM PRODUCTION: 0
PALPITATIONS: 0
FEVER: 0
GASTROINTESTINAL NEGATIVE: 1
WHEEZING: 1
SHORTNESS OF BREATH: 1
CARDIOVASCULAR NEGATIVE: 1
HEMOPTYSIS: 0
WEIGHT LOSS: 0
COUGH: 1
CONSTITUTIONAL NEGATIVE: 1
EYE PAIN: 0
DIAPHORESIS: 0

## 2017-11-14 NOTE — MR AVS SNAPSHOT
After Visit Summary   11/14/2017    Crystal Rose    MRN: [id number]           Patient Information     Date Of Birth          1980        Visit Information        Provider Department      11/14/2017 7:20 PM Beatrice Vigil PA-C [facility]        Today's Diagnoses     Moderate persistent asthma, unspecified whether complicated    -  1       Follow-ups after your visit        Your next 10 appointments already scheduled     Dec 06, 2017  8:20 AM CST   Glucose Tolerance Test with [facility]   [facility]    [address]  [facility] [address]   [phone]            Dec 06, 2017  8:50 AM CST   ESTABLISHED PRENATAL with Cee Garcia MD   [facility]    [address]  [facility] [address]   [phone]              Who to contact     If you have questions or need follow up information about today's clinic visit or your schedule please contact [facility] directly at [phone].  Normal or non-critical lab and imaging results will be communicated to you by Zarpohart, letter or phone within 4 business days after the clinic has received the results. If you do not hear from us within 7 days, please contact the clinic through Zarpohart or phone. If you have a critical or abnormal lab result, we will notify you by phone as soon as possible.  Submit refill requests through Motionloft or call your pharmacy and they will forward the refill request to us. Please allow 3 business days for your refill to be completed.          Additional Information About Your Visit        MyChart Information     Motionloft gives you secure access to your electronic health record. If you see a primary care provider, you can also send messages to your care team and make appointments. If you have questions, please call your primary care clinic.  If you do not  have a primary care provider, please call [phone] and they will assist you.        Care EveryWhere ID     This is your Care EveryWhere ID. This could be used by other organizations to access your [facility] records  [id number]        Your Vitals Were     Pulse Temperature Last Period Pulse Oximetry          79 98.1  F (36.7  C) (Tympanic) 05/16/2017 96%         Blood Pressure from Last 3 Encounters:   11/06/17 112/66   10/24/17 123/80   10/09/17 116/68    Weight from Last 3 Encounters:   11/06/17 271 lb (122.9 kg)   10/24/17 272 lb (123.4 kg)   10/09/17 266 lb (120.7 kg)              We Performed the Following     ALBUTEROL UNIT DOSE, 1 MG -      INHALATION/NEBULIZER TREATMENT, INITIAL          Today's Medication Changes          These changes are accurate as of: 11/14/17  8:42 PM.  If you have any questions, ask your nurse or doctor.               Start taking these medicines.        Dose/Directions    predniSONE 20 MG tablet   Commonly known as:  DELTASONE   Used for:  Moderate persistent asthma, unspecified whether complicated        Dose:  20 mg   Take 1 tablet (20 mg) by mouth 2 times daily   Quantity:  10 tablet   Refills:  0         These medicines have changed or have updated prescriptions.        Dose/Directions    * VENTOLIN  (90 BASE) MCG/ACT Inhaler   This may have changed:  Another medication with the same name was added. Make sure you understand how and when to take each.   Used for:  Unspecified asthma(493.90)   Generic drug:  albuterol        INHALE 2 PUFFS INTO THE LUNGS EVERY 6 HOURS AS NEEDED FOR SHORTNESS OF BREATH / DYSPNEA   Quantity:  1 Inhaler   Refills:  0       * albuterol 108 (90 BASE) MCG/ACT Inhaler   Commonly known as:  PROAIR HFA/PROVENTIL HFA/VENTOLIN HFA   This may have changed:  You were already taking a medication with the same name, and this prescription was added. Make sure you understand how and when to take each.   Used for:  Moderate persistent asthma,  unspecified whether complicated        Dose:  2 puff   Inhale 2 puffs into the lungs every 4 hours as needed for shortness of breath / dyspnea or wheezing   Quantity:  1 Inhaler   Refills:  1       * albuterol (2.5 MG/3ML) 0.083% neb solution   This may have changed:  You were already taking a medication with the same name, and this prescription was added. Make sure you understand how and when to take each.   Used for:  Moderate persistent asthma, unspecified whether complicated        Dose:  1 vial   Take 1 vial (2.5 mg) by nebulization every 4 hours as needed for shortness of breath / dyspnea   Quantity:  1 Box   Refills:  0       * Notice:  This list has 3 medication(s) that are the same as other medications prescribed for you. Read the directions carefully, and ask your doctor or other care provider to review them with you.         Where to get your medicines      These medications were sent to [facility] [id number] [place] [id number] [facility]  [address] [place] [address]    Hours:  24-hours Phone:  [phone]     albuterol (2.5 MG/3ML) 0.083% neb solution    albuterol 108 (90 BASE) MCG/ACT Inhaler    predniSONE 20 MG tablet                Primary Care Provider Office Phone # Fax #    Cee Garcia [phone] [address]  [facility] [address]        Equal Access to Services     [facility]: Hadii aad ku hadasho Soomaali, waaxda luqadaha, qaybta kaalmada adeegyada, criss petersen. So [facility] [phone].    ATENCIÓN: Si habla español, tiene a garber disposición servicios gratuitos de asistencia lingüística. Saurav al [phone].    We comply with applicable federal civil rights laws and [place] laws. We do not discriminate on the basis of race, color, national origin, age, disability, sex, sexual orientation, or gender identity.            Thank you!     Thank you for choosing  [facility]  [facility]. Our goal is always to provide you with excellent care. Hearing back from our patients is one way we can continue to improve our services. Please take a few minutes to complete the written survey that you may receive in the mail after your visit with us. Thank you!             Your Updated Medication List - Protect others around you: Learn how to safely use, store and throw away your medicines at www.disposemymeds.org.          This list is accurate as of: 11/14/17  8:42 PM.  Always use your most recent med list.                   Brand Name Dispense Instructions for use Diagnosis    amoxicillin 500 MG capsule    AMOXIL    30 capsule    Take 1 capsule (500 mg) by mouth 3 times daily    Acute suppurative otitis media of left ear without spontaneous rupture of tympanic membrane, recurrence not specified       montelukast 10 MG tablet    SINGULAIR    30 tablet    Take 1 tablet (10 mg) by mouth At Bedtime    Wheezing       predniSONE 20 MG tablet    DELTASONE    10 tablet    Take 1 tablet (20 mg) by mouth 2 times daily    Moderate persistent asthma, unspecified whether complicated       prenatal multivitamin  with iron 28-0.8 MG Tabs     100 tablet    Take 1 tablet by mouth daily    High-risk pregnancy, elderly multigravida, first trimester       * VENTOLIN  (90 BASE) MCG/ACT Inhaler   Generic drug:  albuterol     1 Inhaler    INHALE 2 PUFFS INTO THE LUNGS EVERY 6 HOURS AS NEEDED FOR SHORTNESS OF BREATH / DYSPNEA    Unspecified asthma(493.90)       * albuterol 108 (90 BASE) MCG/ACT Inhaler    PROAIR HFA/PROVENTIL HFA/VENTOLIN HFA    1 Inhaler    Inhale 2 puffs into the lungs every 4 hours as needed for shortness of breath / dyspnea or wheezing    Moderate persistent asthma, unspecified whether complicated       * albuterol (2.5 MG/3ML) 0.083% neb solution     1 Box    Take 1 vial (2.5 mg) by nebulization every 4 hours as needed for shortness of breath / dyspnea    Moderate  persistent asthma, unspecified whether complicated       ZYRTEC ALLERGY PO      Take 10 mg by mouth daily        * Notice:  This list has 3 medication(s) that are the same as other medications prescribed for you. Read the directions carefully, and ask your doctor or other care provider to review them with you.

## 2017-11-15 NOTE — PROGRESS NOTES
SUBJECTIVE:                                                         HPI  Crystal Rose is a 37 year old female who presents to clinic today with worsening asthma for the past 2days.  She is currently 25weeks pregnant.  Has been easily winded and short of breath with activities since the changes in the weather.  Has noticed worsening nonproductive cough, shortness of breath and wheezing for the past 2days and has been taking her Mom's inhaler with good relief.  Symptoms feel similar to her previous asthma flareups in the past.  No sore throat or sinus congestion/pain/pressure.  No abdominal pain, n/v, constipation, diarrhea, bloody or black tarry stools.  No fever, chills or sweats.  Is currently on singulair for her asthma as well.  No ill contacts.  Non-smoker.  No chest pain, palpitations, orthopnea, PND or peripheral edema.  No HA, visual disturbances, dizziness, one sided weakness or slurred speech.      Reviewed PMH,  Patient Active Problem List   Diagnosis     S/P gastric bypass     Pernicious anemia     CARDIOVASCULAR SCREENING; LDL GOAL LESS THAN 160     Moderate persistent asthma     Environmental allergies     Abnormal Pap smear of cervix     Indication for care in labor or delivery     High-risk pregnancy, elderly multigravida     Current Outpatient Prescriptions   Medication Sig Dispense Refill     amoxicillin (AMOXIL) 500 MG capsule Take 1 capsule (500 mg) by mouth 3 times daily 30 capsule 0     montelukast (SINGULAIR) 10 MG tablet Take 1 tablet (10 mg) by mouth At Bedtime 30 tablet 1     Cetirizine HCl (ZYRTEC ALLERGY PO) Take 10 mg by mouth daily       Prenatal Vit-Fe Fumarate-FA (PRENATAL MULTIVITAMIN  WITH IRON) 28-0.8 MG TABS Take 1 tablet by mouth daily 100 tablet 3     VENTOLIN  (90 BASE) MCG/ACT inhaler INHALE 2 PUFFS INTO THE LUNGS EVERY 6 HOURS AS NEEDED FOR SHORTNESS OF BREATH / DYSPNEA 1 Inhaler 0     [DISCONTINUED] albuterol (PROAIR HFA/PROVENTIL HFA/VENTOLIN HFA) 108 (90  BASE) MCG/ACT Inhaler Inhale 2 puffs into the lungs every 4 hours as needed for shortness of breath / dyspnea or wheezing 1 Inhaler 1     [DISCONTINUED] albuterol (2.5 MG/3ML) 0.083% neb solution Take 1 vial (2.5 mg) by nebulization every 4 hours as needed for shortness of breath / dyspnea 1 Box 0     [DISCONTINUED] albuterol (PROAIR HFA/PROVENTIL HFA/VENTOLIN HFA) 108 (90 BASE) MCG/ACT Inhaler Inhale 2 puffs into the lungs every 6 hours as needed for shortness of breath / dyspnea or wheezing 1 Inhaler 1     [DISCONTINUED] albuterol (2.5 MG/3ML) 0.083% nebulizer solution Take 3 mLs by nebulization every 4 hours as needed for shortness of breath / dyspnea. 1 Box 0     Allergies   Allergen Reactions     Cat Hair [Cats]      Dog Hair [Dogs]      Dust Mites      Lactose      Ragweeds        Review of Systems   Constitutional: Negative.  Negative for diaphoresis, fever and weight loss.   HENT: Negative.    Eyes: Negative for pain.   Respiratory: Positive for cough, shortness of breath and wheezing. Negative for hemoptysis and sputum production.    Cardiovascular: Negative.  Negative for chest pain and palpitations.   Gastrointestinal: Negative.    Genitourinary: Negative.    Skin: Negative.    Endo/Heme/Allergies: Negative for environmental allergies.   All other systems reviewed and are negative.      Pulse 79  Temp 98.1  F (36.7  C) (Tympanic)  LMP 05/16/2017  SpO2 96%  Physical Exam   Constitutional: She is oriented to person, place, and time and well-developed, well-nourished, and in no distress. No distress.   HENT:   Head: Normocephalic and atraumatic.   Nose: Nose normal.   Mouth/Throat: Oropharynx is clear and moist. No oropharyngeal exudate.   Eyes: Conjunctivae and EOM are normal. Pupils are equal, round, and reactive to light. No scleral icterus.   Neck: Normal range of motion. Neck supple. No thyromegaly present.   Cardiovascular: Normal rate, regular rhythm, normal heart sounds and intact distal pulses.   Exam reveals no gallop and no friction rub.    No murmur heard.  Pulmonary/Chest: Effort normal and breath sounds normal. No accessory muscle usage. No respiratory distress. She has no decreased breath sounds. She has no wheezes. She has no rhonchi. She has no rales.   Lymphadenopathy:     She has no cervical adenopathy.   Neurological: She is alert and oriented to person, place, and time.   Skin: Skin is warm and dry. No cyanosis. Nails show no clubbing.   Psychiatric: Mood and affect normal.   Nursing note and vitals reviewed.        Assessment/Plan:  Moderate persistent asthma, unspecified whether complicated:  Albuterol neb was administered in clinic with good improvement of her symptoms.  I am concerned that she may be having an asthma exacerbation.  Will start prednisone X5days as the benefits outweigh the risks per UpToDate.  Discussed risks and benefits of medication along with side effects and direction for use.  Will have patient discuss with her OBGYN prior to taking as well.  Will also send home with albuterol nebs and inh as needed for shortness of breath and wheezing.  To the ER if worsening shortness of breath, wheezing, chest pain, fevers or hemoptysis.  -     ALBUTEROL UNIT DOSE, 1 MG -   -     INHALATION/NEBULIZER TREATMENT, INITIAL  -     albuterol (PROAIR HFA/PROVENTIL HFA/VENTOLIN HFA) 108 (90 BASE) MCG/ACT Inhaler; Inhale 2 puffs into the lungs every 4 hours as needed for shortness of breath / dyspnea or wheezing  -     albuterol (2.5 MG/3ML) 0.083% neb solution; Take 1 vial (2.5 mg) by nebulization every 4 hours as needed for shortness of breath / dyspnea  -     predniSONE (DELTASONE) 20 MG tablet; Take 1 tablet (20 mg) by mouth 2 times daily    Pregnant state, incidental              Beatrice See RUTH Vigil

## 2017-11-15 NOTE — NURSING NOTE
"Chief Complaint   Patient presents with     Asthma     trouble breathing X 1 day, out of inhaler       Initial Pulse 79  Temp 98.1  F (36.7  C) (Tympanic)  LMP 05/16/2017  SpO2 96% Estimated body mass index is 40.31 kg/(m^2) as calculated from the following:    Height as of 7/17/17: 5' 8.75\" (1.746 m).    Weight as of 11/6/17: 271 lb (122.9 kg).  Medication Reconciliation: complete   Indigo Estrada CMA      "

## 2017-11-15 NOTE — NURSING NOTE
The following nebulizer treatment was given:     MEDICATION: Albuterol Sulfate 2.5 mg  : [employer]  LOT #: [id number]  EXPIRATION DATE:  11/2018  NDC # 3950-9821-38    MARVA MOORE MA

## 2017-12-06 ENCOUNTER — PRENATAL OFFICE VISIT (OUTPATIENT)
Dept: OBGYN | Facility: CLINIC | Age: 37
End: 2017-12-06
Payer: COMMERCIAL

## 2017-12-06 VITALS — BODY MASS INDEX: 41.06 KG/M2 | SYSTOLIC BLOOD PRESSURE: 112 MMHG | DIASTOLIC BLOOD PRESSURE: 66 MMHG | WEIGHT: 276 LBS

## 2017-12-06 DIAGNOSIS — Z36.9 ENCOUNTER FOR ANTENATAL SCREENING OF MOTHER: Primary | ICD-10-CM

## 2017-12-06 DIAGNOSIS — Z23 NEED FOR TDAP VACCINATION: ICD-10-CM

## 2017-12-06 DIAGNOSIS — O09.523 HIGH-RISK PREGNANCY, ELDERLY MULTIGRAVIDA IN THIRD TRIMESTER: ICD-10-CM

## 2017-12-06 DIAGNOSIS — Z11.3 SCREEN FOR STD (SEXUALLY TRANSMITTED DISEASE): Primary | ICD-10-CM

## 2017-12-06 DIAGNOSIS — A60.00 HERPES SIMPLEX INFECTION OF GENITOURINARY SYSTEM: ICD-10-CM

## 2017-12-06 LAB
GLUCOSE 1H P 50 G GLC PO SERPL-MCNC: 83 MG/DL (ref 60–129)
HGB BLD-MCNC: 9.6 G/DL (ref 11.7–15.7)

## 2017-12-06 PROCEDURE — 82950 GLUCOSE TEST: CPT | Performed by: OBSTETRICS & GYNECOLOGY

## 2017-12-06 PROCEDURE — 99207 ZZC PRENATAL VISIT: CPT | Performed by: OBSTETRICS & GYNECOLOGY

## 2017-12-06 PROCEDURE — 90471 IMMUNIZATION ADMIN: CPT

## 2017-12-06 PROCEDURE — 36415 COLL VENOUS BLD VENIPUNCTURE: CPT | Performed by: OBSTETRICS & GYNECOLOGY

## 2017-12-06 PROCEDURE — 86780 TREPONEMA PALLIDUM: CPT | Performed by: OBSTETRICS & GYNECOLOGY

## 2017-12-06 PROCEDURE — 00000218 ZZHCL STATISTIC OBHBG - HEMOGLOBIN: Performed by: OBSTETRICS & GYNECOLOGY

## 2017-12-06 PROCEDURE — 90715 TDAP VACCINE 7 YRS/> IM: CPT

## 2017-12-06 NOTE — MR AVS SNAPSHOT
After Visit Summary   12/6/2017    Crystal Rose    MRN: [id number]           Patient Information     Date Of Birth          1980        Visit Information        Provider Department      12/6/2017 8:50 AM Cee Garcia MD [facility] [place]        Today's Diagnoses     Screen for STD (sexually transmitted disease)    -  1    High-risk pregnancy, elderly multigravida in third trimester        Herpes simplex infection of genitourinary system           Follow-ups after your visit        Your next 10 appointments already scheduled     Dec 20, 2017  9:50 AM CST   ESTABLISHED PRENATAL with Cee Garcia MD   [facility] [place] ([facility] [place])    [address] [place] [address] [address]  [place] [address]   [phone]            Jan 03, 2018  9:50 AM CST   ESTABLISHED PRENATAL with Cee Garcia MD   [facility] [place] ([facility] [place])    [address] [place] [address] [address]  [place] [address]   [phone]            Jan 17, 2018  9:50 AM CST   ESTABLISHED PRENATAL with Cee Garcia MD   [facility] [place] ([facility] [place])    [address] [place] [address] [address]  [place] [address]   [phone]            Jan 31, 2018  9:50 AM CST   ESTABLISHED PRENATAL with Cee Garcia MD   [facility] [place] ([facility] [place])    [address] [place] [address] [address]  [place] [address]   [phone]              Future tests that were ordered for you today     Open Future Orders        Priority Expected Expires Ordered    TDAP VACCINE (ADACEL) Routine  12/31/2017 11/27/2017    VACCINE ADMINISTRATION, INITIAL Routine  12/31/2017 11/27/2017            Who to contact     If you have questions or need follow up information about today's clinic visit or your schedule please contact [facility] directly at [phone].  Normal or non-critical lab  and imaging results will be communicated to you by MyChart, letter or phone within 4 business days after the clinic has received the results. If you do not hear from us within 7 days, please contact the clinic through TRX Systemst or phone. If you have a critical or abnormal lab result, we will notify you by phone as soon as possible.  Submit refill requests through Musicane or call your pharmacy and they will forward the refill request to us. Please allow 3 business days for your refill to be completed.          Additional Information About Your Visit        [employer] Information     Musicane gives you secure access to your electronic health record. If you see a primary care provider, you can also send messages to your care team and make appointments. If you have questions, please call your primary care clinic.  If you do not have a primary care provider, please call [phone] and they will assist you.        Care EveryWhere ID     This is your Care EveryWhere ID. This could be used by other organizations to access your [facility] medical records  [id number]        Your Vitals Were     Last Period BMI (Body Mass Index)                05/16/2017 41.06 kg/m2           Blood Pressure from Last 3 Encounters:   12/06/17 112/66   11/06/17 112/66   10/24/17 123/80    Weight from Last 3 Encounters:   12/06/17 276 lb (125.2 kg)   11/06/17 271 lb (122.9 kg)   10/24/17 272 lb (123.4 kg)              We Performed the Following     Anti Treponema        Primary Care Provider Office Phone # Fax #    Cee Garcia [phone] [address] [address]        Equal Access to Services     [facility]: Hadii aad ku hadasho Soomaali, waaxda luqadaha, qaybta kaalmada margarita, criss torres . So [facility] [phone].    ATENCIÓN: Si habla español, tiene a garber disposición servicios gratuitos de asistencia lingüística. Llame al [phone].    We comply with applicable  federal civil rights laws and [place] laws. We do not discriminate on the basis of race, color, national origin, age, disability, sex, sexual orientation, or gender identity.            Thank you!     Thank you for choosing [facility]  for your care. Our goal is always to provide you with excellent care. Hearing back from our patients is one way we can continue to improve our services. Please take a few minutes to complete the written survey that you may receive in the mail after your visit with us. Thank you!             Your Updated Medication List - Protect others around you: Learn how to safely use, store and throw away your medicines at www.disposemymeds.org.          This list is accurate as of: 12/6/17  9:57 AM.  Always use your most recent med list.                   Brand Name Dispense Instructions for use Diagnosis    montelukast 10 MG tablet    SINGULAIR    30 tablet    Take 1 tablet (10 mg) by mouth At Bedtime    Wheezing       predniSONE 20 MG tablet    DELTASONE    10 tablet    Take 1 tablet (20 mg) by mouth 2 times daily    Moderate persistent asthma, unspecified whether complicated       prenatal multivitamin  with iron 28-0.8 MG Tabs     100 tablet    Take 1 tablet by mouth daily    High-risk pregnancy, elderly multigravida, first trimester       * VENTOLIN  (90 BASE) MCG/ACT Inhaler   Generic drug:  albuterol     1 Inhaler    INHALE 2 PUFFS INTO THE LUNGS EVERY 6 HOURS AS NEEDED FOR SHORTNESS OF BREATH / DYSPNEA    Unspecified asthma(493.90)       * albuterol 108 (90 BASE) MCG/ACT Inhaler    PROAIR HFA/PROVENTIL HFA/VENTOLIN HFA    1 Inhaler    Inhale 2 puffs into the lungs every 4 hours as needed for shortness of breath / dyspnea or wheezing    Moderate persistent asthma, unspecified whether complicated       * albuterol (2.5 MG/3ML) 0.083% neb solution     1 Box    Take 1 vial (2.5 mg) by nebulization every 4 hours as needed for shortness of breath / dyspnea     Moderate persistent asthma, unspecified whether complicated       ZYRTEC ALLERGY PO      Take 10 mg by mouth daily        * Notice:  This list has 3 medication(s) that are the same as other medications prescribed for you. Read the directions carefully, and ask your doctor or other care provider to review them with you.

## 2017-12-06 NOTE — PROGRESS NOTES
Syphilis is a sexually transmitted disease that can cause birth defects in the babies of untreated mothers. Every pregnant patient is tested for syphilis early in each pregnancy as part of the routine lab work. The [place] Department of [facility] has seen an increase in the rate of syphilis in [place]. The [facility] now recommends testing for syphilis 3 times during a pregnancy, the new prenatal visit, 28 weeks and when admitted for delivery. Patient accepts lab testing for syphilis.

## 2017-12-07 LAB — T PALLIDUM IGG+IGM SER QL: NEGATIVE

## 2018-01-03 ENCOUNTER — PRENATAL OFFICE VISIT (OUTPATIENT)
Dept: OBGYN | Facility: CLINIC | Age: 38
End: 2018-01-03
Payer: COMMERCIAL

## 2018-01-03 VITALS — WEIGHT: 273 LBS | SYSTOLIC BLOOD PRESSURE: 110 MMHG | BODY MASS INDEX: 40.61 KG/M2 | DIASTOLIC BLOOD PRESSURE: 62 MMHG

## 2018-01-03 DIAGNOSIS — O09.523 HIGH-RISK PREGNANCY, ELDERLY MULTIGRAVIDA IN THIRD TRIMESTER: ICD-10-CM

## 2018-01-03 DIAGNOSIS — O36.63X0 EXCESSIVE FETAL GROWTH AFFECTING PREGNANCY IN THIRD TRIMESTER, SINGLE OR UNSPECIFIED FETUS: Primary | ICD-10-CM

## 2018-01-03 PROCEDURE — 99207 ZZC PRENATAL VISIT: CPT | Performed by: OBSTETRICS & GYNECOLOGY

## 2018-01-03 NOTE — MR AVS SNAPSHOT
After Visit Summary   1/3/2018    Crystal Rose    MRN: [id number]           Patient Information     Date Of Birth          1980        Visit Information        Provider Department      1/3/2018 9:50 AM Cee Garcia MD [facility] [place]        Today's Diagnoses     Excessive fetal growth affecting pregnancy in third trimester, single or unspecified fetus    -  1    High-risk pregnancy, elderly multigravida in third trimester           Follow-ups after your visit        Your next 10 appointments already scheduled     Jan 03, 2018  9:50 AM CST   ESTABLISHED PRENATAL with Cee Garcia MD   [facility] [place] ([facility] [place])    [address]  [place] [address]   [phone]            Jan 17, 2018  8:40 AM CST   (Arrive by 8:25 AM)   US OB SINGLE FOLLOW UP REPEAT with WEUS1   [facility] [place] ([facility] [place])    [address]  [place] [address]   [phone]           Please bring a list of your medicines (including vitamins, minerals and over-the-counter drugs). Also, tell your doctor about any allergies you may have. Wear comfortable clothes and leave your valuables at home.  If you re less than 20 weeks drink four 8-ounce glasses of fluid an hour before your exam. If you need to empty your bladder before your exam, try to release only a little urine. Then, drink another glass of fluid.  You may have up to two family members in the exam room. If you bring a small child, an adult must be there to care for him or her.  Please call the Imaging Department at your exam site with any questions.            Jan 17, 2018  9:50 AM CST   ESTABLISHED PRENATAL with Cee Garcia MD   [facility] [place] ([facility] [place])    [address]  [place] [address]   [phone]            Jan 31, 2018  9:50 AM CST   ESTABLISHED  PRENATAL with Cee Garcia MD   [facility] [place] ([facility] [place])    [address] [address]  [place] [address] [phone]              Future tests that were ordered for you today     Open Future Orders        Priority Expected Expires Ordered    US OB Single Follow Up Repeat Routine  1/4/2019 1/3/2018            Who to contact     If you have questions or need follow up information about today's clinic visit or your schedule please contact [facility] [place] directly at [phone].  Normal or non-critical lab and imaging results will be communicated to you by [employer], letter or phone within 4 business days after the clinic has received the results. If you do not hear from us within 7 days, please contact the clinic through [place] [employer] or phone. If you have a critical or abnormal lab result, we will notify you by phone as soon as possible.  Submit refill requests through [employer] or call your pharmacy and they will forward the refill request to us. Please allow 3 business days for your refill to be completed.          Additional Information About Your Visit        Social[employer] Information     [employer] gives you secure access to your electronic health record. If you see a primary care provider, you can also send messages to your care team and make appointments. If you have questions, please call your primary care clinic.  If you do not have a primary care provider, please call [phone] and they will assist you.        Care EveryWhere ID     This is your Care EveryWhere ID. This could be used by other organizations to access your [place] medical records  [id number]        Your Vitals Were     Last Period BMI (Body Mass Index)                05/16/2017 40.61 kg/m2           Blood Pressure from Last 3 Encounters:   01/03/18 110/62   12/06/17 112/66   11/06/17 112/66    Weight from Last 3 Encounters:   01/03/18 273 lb (123.8 kg)   12/06/17 276 lb (125.2  kg)   11/06/17 271 lb (122.9 kg)               Primary Care Provider Office Phone # Fax #    Cee Garcia [phone] [address] [facility] [address]  [facility] [address]        Equal Access to Services     BAKARIRADHA DORA : Hadii aad ku hadtyo Soomaali, waaxda luqadaha, qaybta kaalmada adeegyada, waxay erniein hayelizabethn adeglen payton labettyann . So [facility] [phone].    ATENCIÓN: Si habla español, tiene a garber disposición servicios gratuitos de asistencia lingüística. Llame al [phone].    We comply with applicable federal civil rights laws and [place] laws. We do not discriminate on the basis of race, color, national origin, age, disability, sex, sexual orientation, or gender identity.            Thank you!     Thank you for choosing [facility] [place]  for your care. Our goal is always to provide you with excellent care. Hearing back from our patients is one way we can continue to improve our services. Please take a few minutes to complete the written survey that you may receive in the mail after your visit with us. Thank you!             Your Updated Medication List - Protect others around you: Learn how to safely use, store and throw away your medicines at www.disposemymeds.org.          This list is accurate as of: 1/3/18  9:29 AM.  Always use your most recent med list.                   Brand Name Dispense Instructions for use Diagnosis    montelukast 10 MG tablet    SINGULAIR    30 tablet    Take 1 tablet (10 mg) by mouth At Bedtime    Wheezing       predniSONE 20 MG tablet    DELTASONE    10 tablet    Take 1 tablet (20 mg) by mouth 2 times daily    Moderate persistent asthma, unspecified whether complicated       prenatal multivitamin  with iron 28-0.8 MG Tabs     100 tablet    Take 1 tablet by mouth daily    High-risk pregnancy, elderly multigravida, first trimester       * VENTOLIN  (90 BASE) MCG/ACT Inhaler   Generic drug:  albuterol     1 Inhaler    INHALE 2 PUFFS INTO THE  LUNGS EVERY 6 HOURS AS NEEDED FOR SHORTNESS OF BREATH / DYSPNEA    Unspecified asthma(493.90)       * albuterol 108 (90 BASE) MCG/ACT Inhaler    PROAIR HFA/PROVENTIL HFA/VENTOLIN HFA    1 Inhaler    Inhale 2 puffs into the lungs every 4 hours as needed for shortness of breath / dyspnea or wheezing    Moderate persistent asthma, unspecified whether complicated       * albuterol (2.5 MG/3ML) 0.083% neb solution     1 Box    Take 1 vial (2.5 mg) by nebulization every 4 hours as needed for shortness of breath / dyspnea    Moderate persistent asthma, unspecified whether complicated       ZYRTEC ALLERGY PO      Take 10 mg by mouth daily        * Notice:  This list has 3 medication(s) that are the same as other medications prescribed for you. Read the directions carefully, and ask your doctor or other care provider to review them with you.

## 2018-01-17 ENCOUNTER — PRENATAL OFFICE VISIT (OUTPATIENT)
Dept: OBGYN | Facility: CLINIC | Age: 38
End: 2018-01-17
Payer: COMMERCIAL

## 2018-01-17 ENCOUNTER — RADIANT APPOINTMENT (OUTPATIENT)
Dept: ULTRASOUND IMAGING | Facility: CLINIC | Age: 38
End: 2018-01-17
Attending: OBSTETRICS & GYNECOLOGY
Payer: COMMERCIAL

## 2018-01-17 VITALS — DIASTOLIC BLOOD PRESSURE: 66 MMHG | BODY MASS INDEX: 41.5 KG/M2 | SYSTOLIC BLOOD PRESSURE: 120 MMHG | WEIGHT: 279 LBS

## 2018-01-17 DIAGNOSIS — O36.63X0 EXCESSIVE FETAL GROWTH AFFECTING PREGNANCY IN THIRD TRIMESTER, SINGLE OR UNSPECIFIED FETUS: Primary | ICD-10-CM

## 2018-01-17 DIAGNOSIS — O09.523 HIGH-RISK PREGNANCY, ELDERLY MULTIGRAVIDA IN THIRD TRIMESTER: ICD-10-CM

## 2018-01-17 DIAGNOSIS — O36.63X0 EXCESSIVE FETAL GROWTH AFFECTING PREGNANCY IN THIRD TRIMESTER, SINGLE OR UNSPECIFIED FETUS: ICD-10-CM

## 2018-01-17 PROCEDURE — 99207 ZZC PRENATAL VISIT: CPT | Performed by: OBSTETRICS & GYNECOLOGY

## 2018-01-17 PROCEDURE — 76816 OB US FOLLOW-UP PER FETUS: CPT | Performed by: OBSTETRICS & GYNECOLOGY

## 2018-01-17 NOTE — PROGRESS NOTES
Baby large on us   Repeat scan 38 wks  Discussed dating today  edc 2/27 hope for induction around 39 wks  Last baby over 9 lbs  Signed ma consent in case she gets c section

## 2018-01-17 NOTE — MR AVS SNAPSHOT
After Visit Summary   1/17/2018    Crystal Rose    MRN: [id number]           Patient Information     Date Of Birth          1980        Visit Information        Provider Department      1/17/2018 9:50 AM Cee Garcia MD [facility] [place]        Today's Diagnoses     Excessive fetal growth affecting pregnancy in third trimester, single or unspecified fetus    -  1    High-risk pregnancy, elderly multigravida in third trimester           Follow-ups after your visit        Your next 10 appointments already scheduled     Jan 31, 2018  9:50 AM CST   ESTABLISHED PRENATAL with Cee Garcia MD   [facility] [place] ([facility])    [address] [place] [id number] [phone]              Who to contact     If you have questions or need follow up information about today's clinic visit or your schedule please contact [facility] [place] directly at [phone].  Normal or non-critical lab and imaging results will be communicated to you by MyChart, letter or phone within 4 business days after the clinic has received the results. If you do not hear from us within 7 days, please contact the clinic through [employer] or phone. If you have a critical or abnormal lab result, we will notify you by phone as soon as possible.  Submit refill requests through [employer] or call your pharmacy and they will forward the refill request to us. Please allow 3 business days for your refill to be completed.          Additional Information About Your Visit        [employer] Information     [employer] gives you secure access to your electronic health record. If you see a primary care provider, you can also send messages to your care team and make appointments. If you have questions, please call your primary care clinic.  If you do not have a primary care provider, please call [phone] and they will assist you.        Care EveryWhere ID      This is your Care EveryWhere ID. This could be used by other organizations to access your [place] medical records  [id number]        Your Vitals Were     Last Period Breastfeeding? BMI (Body Mass Index)             05/16/2017 No 41.5 kg/m2          Blood Pressure from Last 3 Encounters:   01/17/18 120/66   01/03/18 110/62   12/06/17 112/66    Weight from Last 3 Encounters:   01/17/18 279 lb (126.6 kg)   01/03/18 273 lb (123.8 kg)   12/06/17 276 lb (125.2 kg)              Today, you had the following     No orders found for display       Primary Care Provider Office Phone # Fax #    Cee Garcia [phone] [address] [address]        Equal Access to Services     [facility] : Hadii aad ku hadasho Sonarayan, waaxda luqadaha, qaybta kaalmada margarita, criss torres . So [facility] [phone].    ATENCIÓN: Si habla español, tiene a garber disposición servicios gratuitos de asistencia lingüística. Saurav al [phone].    We comply with applicable federal civil rights laws and [place] laws. We do not discriminate on the basis of race, color, national origin, age, disability, sex, sexual orientation, or gender identity.            Thank you!     Thank you for choosing [facility] [place]  for your care. Our goal is always to provide you with excellent care. Hearing back from our patients is one way we can continue to improve our services. Please take a few minutes to complete the written survey that you may receive in the mail after your visit with us. Thank you!             Your Updated Medication List - Protect others around you: Learn how to safely use, store and throw away your medicines at www.disposemymeds.org.          This list is accurate as of: 1/17/18 10:22 AM.  Always use your most recent med list.                   Brand Name Dispense Instructions for use Diagnosis    * albuterol 108 (90 BASE) MCG/ACT Inhaler    PROAIR  HFA/PROVENTIL HFA/VENTOLIN HFA    1 Inhaler    Inhale 2 puffs into the lungs every 4 hours as needed for shortness of breath / dyspnea or wheezing    Moderate persistent asthma, unspecified whether complicated       * albuterol (2.5 MG/3ML) 0.083% neb solution     1 Box    Take 1 vial (2.5 mg) by nebulization every 4 hours as needed for shortness of breath / dyspnea    Moderate persistent asthma, unspecified whether complicated       montelukast 10 MG tablet    SINGULAIR    30 tablet    Take 1 tablet (10 mg) by mouth At Bedtime    Wheezing       prenatal multivitamin  with iron 28-0.8 MG Tabs     100 tablet    Take 1 tablet by mouth daily    High-risk pregnancy, elderly multigravida, first trimester       ZYRTEC ALLERGY PO      Take 10 mg by mouth daily        * Notice:  This list has 2 medication(s) that are the same as other medications prescribed for you. Read the directions carefully, and ask your doctor or other care provider to review them with you.

## 2018-01-18 ENCOUNTER — DOCUMENTATION ONLY (OUTPATIENT)
Dept: SURGERY | Facility: CLINIC | Age: 38
End: 2018-01-18

## 2018-01-25 ENCOUNTER — OFFICE VISIT (OUTPATIENT)
Dept: URGENT CARE | Facility: URGENT CARE | Age: 38
End: 2018-01-25
Payer: COMMERCIAL

## 2018-01-25 VITALS
BODY MASS INDEX: 41.5 KG/M2 | DIASTOLIC BLOOD PRESSURE: 66 MMHG | OXYGEN SATURATION: 98 % | TEMPERATURE: 98.1 F | WEIGHT: 279 LBS | HEART RATE: 90 BPM | SYSTOLIC BLOOD PRESSURE: 110 MMHG

## 2018-01-25 DIAGNOSIS — J30.9 CHRONIC ALLERGIC RHINITIS, UNSPECIFIED SEASONALITY, UNSPECIFIED TRIGGER: ICD-10-CM

## 2018-01-25 DIAGNOSIS — M26.622 ARTHRALGIA OF LEFT TEMPOROMANDIBULAR JOINT: ICD-10-CM

## 2018-01-25 DIAGNOSIS — J45.40 MODERATE PERSISTENT ASTHMA, UNSPECIFIED WHETHER COMPLICATED: Primary | ICD-10-CM

## 2018-01-25 DIAGNOSIS — H92.02 LEFT EAR PAIN: ICD-10-CM

## 2018-01-25 PROCEDURE — 99214 OFFICE O/P EST MOD 30 MIN: CPT | Performed by: FAMILY MEDICINE

## 2018-01-25 RX ORDER — ALBUTEROL SULFATE 90 UG/1
2 AEROSOL, METERED RESPIRATORY (INHALATION) EVERY 4 HOURS PRN
Qty: 1 INHALER | Refills: 1 | Status: SHIPPED | OUTPATIENT
Start: 2018-01-25 | End: 2018-06-12

## 2018-01-25 RX ORDER — FLUTICASONE PROPIONATE 50 MCG
1-2 SPRAY, SUSPENSION (ML) NASAL DAILY
Qty: 3 BOTTLE | Refills: 11 | Status: SHIPPED | OUTPATIENT
Start: 2018-01-25 | End: 2019-10-28

## 2018-01-25 NOTE — MR AVS SNAPSHOT
After Visit Summary   1/25/2018    Crystal Rose    MRN: [id number]           Patient Information     Date Of Birth          1980        Visit Information        Provider Department      1/25/2018 6:50 PM Jocelyn Darling MD [facility]        Today's Diagnoses     Moderate persistent asthma, unspecified whether complicated    -  1    Chronic allergic rhinitis, unspecified seasonality, unspecified trigger        Left ear pain        Arthralgia of left temporomandibular joint          Care Instructions      Understanding Temporomandibular Disorders (TMD)    Do you have pain in your face, jaw, or teeth? Do you have trouble chewing? Does your jaw make clicking or popping noises? These symptoms can be caused by temporomandibular disorders (TMD). This term describes a group of problems related to the temporomandibular joint (TMJ) and nearby muscles. Your symptoms may be painful and frustrating. But don t worry. Your health care team can help you treat TMD and prevent future problems.  What s wrong?  TMD causes many kinds of symptoms. That s part of the reason it can be hard to diagnose. You may have headaches, tooth pain, or muscle aches. Your pain may be constant. Or it may come and go without any apparent reason. TMD-related problems include:    Tight muscles    Joint inflammation    Joint damage    Teeth grinding or clenching  What can you do?  If you are having TMD symptoms, don t wait. Call your dentist or health care provider right away. You don t have to live with pain or discomfort. TMD can be treated. In fact, a key part of treatment is learning to manage your condition at home.  Which treatment is right for you?  Treatment helps rest the muscles and joint. It also helps relieve symptoms and restore function. Depending on the type of problem you have, your treatment plan may include:    Temporary diet changes.    New habits for managing stress and maintaining the  health of your jaw.    Medicine to reduce pain and inflammation.    Therapy to reduce pressure on the joint and restore function.    Dental treatment to reduce pressure on the joint.  How can you avoid future problems?  Treatment can help relieve your current condition. But TMD symptoms may return over time. You can avoid future problems by maintaining the health of your jaw:    Avoid foods and habits that make your symptoms worse.    Lower the stress level in your life.    Follow your treatment plan.    Pay attention to your body and get help if symptoms return.  Date Last Reviewed: 7/13/2015 2000-2017 [employer]. [address] [place] [address]. All rights reserved. This information is not intended as a substitute for professional medical care. Always follow your healthcare professional's instructions.                Follow-ups after your visit        Your next 10 appointments already scheduled     Jan 31, 2018  9:50 AM CST   ESTABLISHED PRENATAL with Cee Garcia MD   [facility] [place] ([facility])    [address] [place] [address] [phone]              Who to contact     If you have questions or need follow up information about today's clinic visit or your schedule please contact [facility] directly at [phone].  Normal or non-critical lab and imaging results will be communicated to you by MyChart, letter or phone within 4 business days after the clinic has received the results. If you do not hear from us within 7 days, please contact the clinic through MyChart or phone. If you have a critical or abnormal lab result, we will notify you by phone as soon as possible.  Submit refill requests through [employer] or call your pharmacy and they will forward the refill request to us. Please allow 3 business days for your refill to be completed.          Additional Information About Your Visit        [email or web address]  Information     [employer] gives you secure access to your electronic health record. If you see a primary care provider, you can also send messages to your care team and make appointments. If you have questions, please call your primary care clinic.  If you do not have a primary care provider, please call [phone] and they will assist you.        Care EveryWhere ID     This is your Care EveryWhere ID. This could be used by other organizations to access your [place] medical records  [id number]        Your Vitals Were     Pulse Temperature Last Period Pulse Oximetry BMI (Body Mass Index)       90 98.1  F (36.7  C) (Tympanic) 05/16/2017 98% 41.5 kg/m2        Blood Pressure from Last 3 Encounters:   01/25/18 110/66   01/17/18 120/66   01/03/18 110/62    Weight from Last 3 Encounters:   01/25/18 279 lb (126.6 kg)   01/17/18 279 lb (126.6 kg)   01/03/18 273 lb (123.8 kg)              Today, you had the following     No orders found for display         Today's Medication Changes          These changes are accurate as of 1/25/18  7:58 PM.  If you have any questions, ask your nurse or doctor.               Start taking these medicines.        Dose/Directions    fluticasone 50 MCG/ACT spray   Commonly known as:  FLONASE   Used for:  Chronic allergic rhinitis, unspecified seasonality, unspecified trigger        Dose:  1-2 spray   Spray 1-2 sprays into both nostrils daily   Quantity:  3 Bottle   Refills:  11            Where to get your medicines      These medications were sent to [id number] [address] [facility]  [address] [facility] [address]    Hours:  24-hours Phone:  [phone]     albuterol 108 (90 BASE) MCG/ACT Inhaler    fluticasone 50 MCG/ACT spray                Primary Care Provider Office Phone # Fax #    Cee Garcia [phone] [address] [address] [facility] [address] [facility] [address]  [facility] [address]        Equal Access to  Services     [facility]: Hadii aad ku hadtyrosario Mansinarayan, waaxda luqadaha, qaybta kaalmada margarita, criss torres . So [facility] [phone].    ATENCIÓN: Si vincent boyd, tiene a garber disposición servicios gratuitos de asistencia lingüística. Llame al [phone].    We comply with applicable federal civil rights laws and [place] laws. We do not discriminate on the basis of race, color, national origin, age, disability, sex, sexual orientation, or gender identity.            Thank you!     Thank you for choosing [facility]  for your care. Our goal is always to provide you with excellent care. Hearing back from our patients is one way we can continue to improve our services. Please take a few minutes to complete the written survey that you may receive in the mail after your visit with us. Thank you!             Your Updated Medication List - Protect others around you: Learn how to safely use, store and throw away your medicines at www.disposemymeds.org.          This list is accurate as of 1/25/18  7:58 PM.  Always use your most recent med list.                   Brand Name Dispense Instructions for use Diagnosis    * albuterol (2.5 MG/3ML) 0.083% neb solution     1 Box    Take 1 vial (2.5 mg) by nebulization every 4 hours as needed for shortness of breath / dyspnea    Moderate persistent asthma, unspecified whether complicated       * albuterol 108 (90 BASE) MCG/ACT Inhaler    PROAIR HFA/PROVENTIL HFA/VENTOLIN HFA    1 Inhaler    Inhale 2 puffs into the lungs every 4 hours as needed for shortness of breath / dyspnea or wheezing    Moderate persistent asthma, unspecified whether complicated       fluticasone 50 MCG/ACT spray    FLONASE    3 Bottle    Spray 1-2 sprays into both nostrils daily    Chronic allergic rhinitis, unspecified seasonality, unspecified trigger       montelukast 10 MG tablet    SINGULAIR    30 tablet    Take 1 tablet (10 mg) by mouth At Bedtime     Wheezing       prenatal multivitamin  with iron 28-0.8 MG Tabs     100 tablet    Take 1 tablet by mouth daily    High-risk pregnancy, elderly multigravida, first trimester       ZYRTEC ALLERGY PO      Take 10 mg by mouth daily        * Notice:  This list has 2 medication(s) that are the same as other medications prescribed for you. Read the directions carefully, and ask your doctor or other care provider to review them with you.

## 2018-01-26 NOTE — PROGRESS NOTES
SUBJECTIVE:                                                    Crystal Rose is a 37 year old female who presents to clinic today for the following health issues:    RESPIRATORY SYMPTOMS      Duration: 2 days    Description  ear pain bilateral    Severity: mild    Accompanying signs and symptoms: None    History (predisposing factors):  none    Precipitating or alleviating factors: None    Therapies tried and outcome:  none    *patient also needs a refill on her Albuterol Inhaler due to her asthma.      Almost 36 weeks.  Baby moving fine. No abdominal pain. No vaginal discharge no watery discharge. No concerns as far as pregnancy is concerned.    Has had pain in the left ear past couple of days  No thoughts of harming self or others      Problem list and histories reviewed & adjusted, as indicated.  Additional history: as documented    Problem list, Medication list, Allergies, and Medical/Social/Surgical histories reviewed in EPIC and updated as appropriate.    ROS:  Constitutional, HEENT, cardiovascular, pulmonary, gi and gu systems are negative, except as otherwise noted.    OBJECTIVE:                                                    /66  Pulse 90  Temp 98.1  F (36.7  C) (Tympanic)  Wt 279 lb (126.6 kg)  LMP 05/16/2017  SpO2 98%  BMI 41.5 kg/m2  Body mass index is 41.5 kg/(m^2).  GENERAL: healthy, alert and no distress  EYES: Eyes grossly normal to inspection, PERRL and conjunctivae and sclerae normal  HENT: ear canals and TM's normal, nose and mouth without ulcers or lesions  POSITIVE LEFT TMJ PAIN. Patient denies dental pain.   Sinuses: turbinates slightly swollen more pale than erythematous  NECK: no adenopathy, no asymmetry, masses, or scars and thyroid normal to palpation  RESP: lungs clear to auscultation - no rales, rhonchi or wheezes   CV: regular rate and rhythm, normal S1 S2, no S3 or S4, no murmur, click or rub, no peripheral edema and peripheral pulses strong  ABDOMEN: gravid  uterus   MS: no gross musculoskeletal defects noted, no edema  SKIN: no suspicious lesions or rashes  NEURO: Normal strength and tone, mentation intact and speech normal  PSYCH: mentation appears normal, affect normal/bright    Diagnostic Test Results:  No results found for this or any previous visit (from the past 24 hour(s)).     ASSESSMENT/PLAN:                                                        ICD-10-CM    1. Moderate persistent asthma, unspecified whether complicated J45.40 albuterol (PROAIR HFA/PROVENTIL HFA/VENTOLIN HFA) 108 (90 BASE) MCG/ACT Inhaler   2. Chronic allergic rhinitis, unspecified seasonality, unspecified trigger J30.9 fluticasone (FLONASE) 50 MCG/ACT spray   3. Left ear pain H92.02    4. Arthralgia of left temporomandibular joint M26.622      Refilled albuterol  Signs or symptoms of exacerbation discussed. No concerns she just needs a refill  Left ear pain - no signs of infection, positive TMJ pain. Recommend follow up with dentist.  If pain persist recommend referral to ENT. 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  Over the counter medications discussed.   Aware to come back in if with worsening symptoms or if no relief despite treatment plan  Patient voiced understanding and had no further questions.     MD Jocelyn Monreal MD  [facility]

## 2018-01-26 NOTE — PATIENT INSTRUCTIONS
Understanding Temporomandibular Disorders (TMD)    Do you have pain in your face, jaw, or teeth? Do you have trouble chewing? Does your jaw make clicking or popping noises? These symptoms can be caused by temporomandibular disorders (TMD). This term describes a group of problems related to the temporomandibular joint (TMJ) and nearby muscles. Your symptoms may be painful and frustrating. But don t worry. Your health care team can help you treat TMD and prevent future problems.  What s wrong?  TMD causes many kinds of symptoms. That s part of the reason it can be hard to diagnose. You may have headaches, tooth pain, or muscle aches. Your pain may be constant. Or it may come and go without any apparent reason. TMD-related problems include:    Tight muscles    Joint inflammation    Joint damage    Teeth grinding or clenching  What can you do?  If you are having TMD symptoms, don t wait. Call your dentist or health care provider right away. You don t have to live with pain or discomfort. TMD can be treated. In fact, a key part of treatment is learning to manage your condition at home.  Which treatment is right for you?  Treatment helps rest the muscles and joint. It also helps relieve symptoms and restore function. Depending on the type of problem you have, your treatment plan may include:    Temporary diet changes.    New habits for managing stress and maintaining the health of your jaw.    Medicine to reduce pain and inflammation.    Therapy to reduce pressure on the joint and restore function.    Dental treatment to reduce pressure on the joint.  How can you avoid future problems?  Treatment can help relieve your current condition. But TMD symptoms may return over time. You can avoid future problems by maintaining the health of your jaw:    Avoid foods and habits that make your symptoms worse.    Lower the stress level in your life.    Follow your treatment plan.    Pay attention to your body and get help if  symptoms return.  Date Last Reviewed: 7/13/2015 2000-2017 [employer]. [address] [place] [address], [place] [address]. All rights reserved. This information is not intended as a substitute for professional medical care. Always follow your healthcare professional's instructions.

## 2018-01-31 ENCOUNTER — PRENATAL OFFICE VISIT (OUTPATIENT)
Dept: OBGYN | Facility: CLINIC | Age: 38
End: 2018-01-31
Payer: COMMERCIAL

## 2018-01-31 VITALS — BODY MASS INDEX: 41.35 KG/M2 | DIASTOLIC BLOOD PRESSURE: 64 MMHG | SYSTOLIC BLOOD PRESSURE: 118 MMHG | WEIGHT: 278 LBS

## 2018-01-31 DIAGNOSIS — O36.63X1 LGA (LARGE FOR GESTATIONAL AGE) FETUS AFFECTING MANAGEMENT OF MOTHER, THIRD TRIMESTER, FETUS 1: ICD-10-CM

## 2018-01-31 DIAGNOSIS — Z36.85 SCREENING, ANTENATAL, FOR STREPTOCOCCUS B: Primary | ICD-10-CM

## 2018-01-31 DIAGNOSIS — O09.523 HIGH-RISK PREGNANCY, ELDERLY MULTIGRAVIDA IN THIRD TRIMESTER: ICD-10-CM

## 2018-01-31 DIAGNOSIS — A60.00 HERPES SIMPLEX INFECTION OF GENITOURINARY SYSTEM: ICD-10-CM

## 2018-01-31 PROCEDURE — 87653 STREP B DNA AMP PROBE: CPT | Performed by: OBSTETRICS & GYNECOLOGY

## 2018-01-31 PROCEDURE — 99207 ZZC PRENATAL VISIT: CPT | Performed by: OBSTETRICS & GYNECOLOGY

## 2018-01-31 RX ORDER — VALACYCLOVIR HYDROCHLORIDE 500 MG/1
500 TABLET, FILM COATED ORAL DAILY
Qty: 90 TABLET | Refills: 3 | Status: SHIPPED | OUTPATIENT
Start: 2018-01-31 | End: 2019-02-05

## 2018-01-31 NOTE — PROGRESS NOTES
"    SUBJECTIVE:                                                    Crystal Rose is a 37 year old female who presents to clinic today for the following health issues:  {Provider please address medication reconciliation discrepancies--rooming staff please delete if no med/rec issues}    {Superlists:937179}    {additional problems for provider to add:592962}    Problem list and histories reviewed & adjusted, as indicated.  Additional history: {NONE - AS DOCUMENTED:147149::\"as documented\"}    {HIST REVIEW/ LINKS 2:076143}    {PROVIDER CHARTING PREFERENCE:943527}      "

## 2018-01-31 NOTE — MR AVS SNAPSHOT
After Visit Summary   2018    Crystal Rose    MRN: [id number]           Patient Information     Date Of Birth          1980        Visit Information        Provider Department      2018 9:50 AM Cee Garcia MD [facility] [place]        Today's Diagnoses     Screening, , for Streptococcus B    -  1    LGA (large for gestational age) fetus affecting management of mother, third trimester, fetus 1        High-risk pregnancy, elderly multigravida in third trimester        Herpes simplex infection of genitourinary system           Follow-ups after your visit        Your next 10 appointments already scheduled     2018  8:50 AM CST   US PELVIC COMPLETE W TRANSVAGINAL with WEUS1   [facility] [place] ([facility] [place])    [address] [place] [address] [phone]           Please bring a list of your medicines (including vitamins, minerals and over-the-counter drugs). Also, tell your doctor about any allergies you may have. Wear comfortable clothes and leave your valuables at home.  Adults: Drink six 8-ounce glasses of fluid one hour before your exam. Do NOT empty your bladder.  If you need to empty your bladder before your exam, try to release only a little bit of urine. Then, drink another 8oz glass of fluid.  Children: Children who are potty trained should drink at least 4 cups (32 oz) of liquid 45 minutes to one hour prior to the exam. The child s bladder must be full in order to achieve a diagnostic exam. If your child is very uncomfortable or has an urgent need to pee, please notify a technologist; they will try to find out how much longer the wait may be and provide instructions to help relieve the pressure. Occasionally it is medically necessary to insert a urinary catheter to fill the bladder.  Please call the Imaging Department at your exam site with any questions.            2018   9:20 AM CST   ESTABLISHED PRENATAL with Aixa Christine Masters, DO   [facility] [place] ([facility] [place])    [address]  [place] [address] [phone]            Feb 21, 2018  8:40 AM CST   ESTABLISHED PRENATAL with Cee Garcia MD   [facility] [place] ([facility] [place])    [address]  [place] [address]   [phone]            Feb 28, 2018  9:20 AM CST   ESTABLISHED PRENATAL with Cee Garcia MD   [facility] [place] ([facility] [place])    [address]  [place] [address] [phone]              Future tests that were ordered for you today     Open Future Orders        Priority Expected Expires Ordered    US OB Ltd One Or More Fetus FU/Repeat Routine 2/14/2018 1/31/2019 1/31/2018            Who to contact     If you have questions or need follow up information about today's clinic visit or your schedule please contact [facility] [place] directly at [phone].  Normal or non-critical lab and imaging results will be communicated to you by [employer], letter or phone within 4 business days after the clinic has received the results. If you do not hear from us within 7 days, please contact the clinic through Social [employer] or phone. If you have a critical or abnormal lab result, we will notify you by phone as soon as possible.  Submit refill requests through [employer] or call your pharmacy and they will forward the refill request to us. Please allow 3 business days for your refill to be completed.          Additional Information About Your Visit        [employer] Information     [employer] gives you secure access to your electronic health record. If you see a primary care provider, you can also send messages to your care team and make appointments. If you have questions, please call your primary care clinic.  If you do not have a primary care  provider, please call [phone] and they will assist you.        Care EveryWhere ID     This is your Care EveryWhere ID. This could be used by other organizations to access your [facility] medical records  [id number]        Your Vitals Were     Last Period BMI (Body Mass Index)                05/16/2017 41.35 kg/m2           Blood Pressure from Last 3 Encounters:   01/31/18 118/64   01/25/18 110/66   01/17/18 120/66    Weight from Last 3 Encounters:   01/31/18 278 lb (126.1 kg)   01/25/18 279 lb (126.6 kg)   01/17/18 279 lb (126.6 kg)              We Performed the Following     Group B strep PCR          Today's Medication Changes          These changes are accurate as of 1/31/18 10:23 AM.  If you have any questions, ask your nurse or doctor.               Start taking these medicines.        Dose/Directions    valACYclovir 500 MG tablet   Commonly known as:  VALTREX   Used for:  Herpes simplex infection of genitourinary system        Dose:  500 mg   Take 1 tablet (500 mg) by mouth daily   Quantity:  90 tablet   Refills:  3            Where to get your medicines      These medications were sent to [facility] [address] [place] [address] - [address] [facility] [address] [facility]  [address] [facility] [address]     Phone:  [phone]     valACYclovir 500 MG tablet                Primary Care Provider Office Phone # Fax #    Cee Garcia [phone] [address] [address]        Equal Access to Services     [facility] AH: Hadii angeles ku hadasho Soomaali, waaxda luqadaha, qaybta kaalmada adeegyada, criss petersen. So [facility] [phone].    ATENCIÓN: Si habla español, tiene a garber disposición servicios gratuitos de asistencia lingüística. Llame al [phone].    We comply with applicable federal civil rights laws and [place] laws. We do not discriminate on the basis of race, color, national origin, age, disability, sex, sexual  orientation, or gender identity.            Thank you!     Thank you for choosing [facility]  for your care. Our goal is always to provide you with excellent care. Hearing back from our patients is one way we can continue to improve our services. Please take a few minutes to complete the written survey that you may receive in the mail after your visit with us. Thank you!             Your Updated Medication List - Protect others around you: Learn how to safely use, store and throw away your medicines at www.disposemymeds.org.          This list is accurate as of 1/31/18 10:23 AM.  Always use your most recent med list.                   Brand Name Dispense Instructions for use Diagnosis    * albuterol (2.5 MG/3ML) 0.083% neb solution     1 Box    Take 1 vial (2.5 mg) by nebulization every 4 hours as needed for shortness of breath / dyspnea    Moderate persistent asthma, unspecified whether complicated       * albuterol 108 (90 BASE) MCG/ACT Inhaler    PROAIR HFA/PROVENTIL HFA/VENTOLIN HFA    1 Inhaler    Inhale 2 puffs into the lungs every 4 hours as needed for shortness of breath / dyspnea or wheezing    Moderate persistent asthma, unspecified whether complicated       fluticasone 50 MCG/ACT spray    FLONASE    3 Bottle    Spray 1-2 sprays into both nostrils daily    Chronic allergic rhinitis, unspecified seasonality, unspecified trigger       montelukast 10 MG tablet    SINGULAIR    30 tablet    Take 1 tablet (10 mg) by mouth At Bedtime    Wheezing       prenatal multivitamin  with iron 28-0.8 MG Tabs     100 tablet    Take 1 tablet by mouth daily    High-risk pregnancy, elderly multigravida, first trimester       valACYclovir 500 MG tablet    VALTREX    90 tablet    Take 1 tablet (500 mg) by mouth daily    Herpes simplex infection of genitourinary system       ZYRTEC ALLERGY PO      Take 10 mg by mouth daily        * Notice:  This list has 2 medication(s) that are the same as other medications  prescribed for you. Read the directions carefully, and ask your doctor or other care provider to review them with you.

## 2018-02-01 LAB
GP B STREP DNA SPEC QL NAA+PROBE: NEGATIVE
SPECIMEN SOURCE: NORMAL

## 2018-02-14 ENCOUNTER — MYC MEDICAL ADVICE (OUTPATIENT)
Dept: OBGYN | Facility: CLINIC | Age: 38
End: 2018-02-14

## 2018-02-16 ENCOUNTER — TELEPHONE (OUTPATIENT)
Dept: NURSING | Facility: CLINIC | Age: 38
End: 2018-02-16

## 2018-02-16 ENCOUNTER — PRENATAL OFFICE VISIT (OUTPATIENT)
Dept: OBGYN | Facility: CLINIC | Age: 38
End: 2018-02-16
Payer: COMMERCIAL

## 2018-02-16 ENCOUNTER — RADIANT APPOINTMENT (OUTPATIENT)
Dept: ULTRASOUND IMAGING | Facility: CLINIC | Age: 38
End: 2018-02-16
Payer: COMMERCIAL

## 2018-02-16 VITALS — BODY MASS INDEX: 41.95 KG/M2 | SYSTOLIC BLOOD PRESSURE: 106 MMHG | WEIGHT: 282 LBS | DIASTOLIC BLOOD PRESSURE: 78 MMHG

## 2018-02-16 DIAGNOSIS — Z98.84 HISTORY OF GASTRIC BYPASS: ICD-10-CM

## 2018-02-16 DIAGNOSIS — O09.523 HIGH-RISK PREGNANCY, ELDERLY MULTIGRAVIDA IN THIRD TRIMESTER: Primary | ICD-10-CM

## 2018-02-16 DIAGNOSIS — O36.63X1 LGA (LARGE FOR GESTATIONAL AGE) FETUS AFFECTING MANAGEMENT OF MOTHER, THIRD TRIMESTER, FETUS 1: ICD-10-CM

## 2018-02-16 DIAGNOSIS — O99.013 ANEMIA AFFECTING PREGNANCY IN THIRD TRIMESTER: ICD-10-CM

## 2018-02-16 LAB — HGB BLD-MCNC: 9.9 G/DL (ref 11.7–15.7)

## 2018-02-16 PROCEDURE — 76816 OB US FOLLOW-UP PER FETUS: CPT | Performed by: OBSTETRICS & GYNECOLOGY

## 2018-02-16 PROCEDURE — 99207 ZZC PRENATAL VISIT: CPT | Performed by: OBSTETRICS & GYNECOLOGY

## 2018-02-16 PROCEDURE — 36415 COLL VENOUS BLD VENIPUNCTURE: CPT | Performed by: OBSTETRICS & GYNECOLOGY

## 2018-02-16 PROCEDURE — 85018 HEMOGLOBIN: CPT | Performed by: OBSTETRICS & GYNECOLOGY

## 2018-02-16 RX ORDER — ASCORBIC ACID 500 MG
500 TABLET ORAL DAILY
Qty: 30 TABLET | Refills: 1 | Status: SHIPPED | OUTPATIENT
Start: 2018-02-16 | End: 2018-06-12

## 2018-02-16 NOTE — TELEPHONE ENCOUNTER
Called pt and informed she could check her [employer] messages. Pt stated understanding and had no further questions.

## 2018-02-16 NOTE — MR AVS SNAPSHOT
After Visit Summary   2/16/2018    Crystal Rose    MRN: [id number]           Patient Information     Date Of Birth          1980        Visit Information        Provider Department      2/16/2018 9:20 AM Aixa Seals DO [facility] [place]        Today's Diagnoses     High-risk pregnancy, elderly multigravida in third trimester    -  1    Anemia affecting pregnancy in third trimester        History of gastric bypass           Follow-ups after your visit        Follow-up notes from your care team     Return in about 3 days (around 2/19/2018).      Your next 10 appointments already scheduled     Feb 19, 2018 10:50 AM CST   ESTABLISHED PRENATAL with Cee Garcia MD   [facility] [place] ([facility])    [address] [place] [address] [address]  [place] [address]   [phone]            Feb 21, 2018  8:40 AM CST   ESTABLISHED PRENATAL with Cee Garcia MD   [facility] [place] ([facility])    [address] [place] [address] [address]  [place] [address]   [phone]            Feb 28, 2018  9:20 AM CST   ESTABLISHED PRENATAL with Cee Garcia MD   [facility] ([facility]    [address] [place] [address] [address]  [place] [address]   [phone]              Who to contact     If you have questions or need follow up information about today's clinic visit or your schedule please contact [facility] directly at [phone].  Normal or non-critical lab and imaging results will be communicated to you by MyChart, letter or phone within 4 business days after the clinic has received the results. If you do not hear from us within 7 days, please contact the clinic through Silver[employer] or phone. If you have a critical or abnormal lab result, we will notify you by phone as soon as possible.  Submit refill requests through [employer] or call your pharmacy  and they will forward the refill request to us. Please allow 3 business days for your refill to be completed.          Additional Information About Your Visit        In [employer] Information     "[employer]" gives you secure access to your electronic health record. If you see a primary care provider, you can also send messages to your care team and make appointments. If you have questions, please call your primary care clinic.  If you do not have a primary care provider, please call [phone] and they will assist you.        Care EveryWhere ID     This is your Care EveryWhere ID. This could be used by other organizations to access your [facility] medical records  [id number]        Your Vitals Were     Last Period BMI (Body Mass Index)                05/16/2017 41.95 kg/m2           Blood Pressure from Last 3 Encounters:   02/16/18 106/78   01/31/18 118/64   01/25/18 110/66    Weight from Last 3 Encounters:   02/16/18 282 lb (127.9 kg)   01/31/18 278 lb (126.1 kg)   01/25/18 279 lb (126.6 kg)              We Performed the Following     Hemoglobin          Today's Medication Changes          These changes are accurate as of 2/16/18 10:25 AM.  If you have any questions, ask your nurse or doctor.               Start taking these medicines.        Dose/Directions    ascorbic acid 500 MG tablet   Commonly known as:  VITAMIN C   Used for:  History of gastric bypass, Anemia affecting pregnancy in third trimester   Started by:  Aixa Seals,         Dose:  500 mg   Take 1 tablet (500 mg) by mouth daily   Quantity:  30 tablet   Refills:  1       B-12 1000 MCG Tbcr   Used for:  Anemia affecting pregnancy in third trimester, History of gastric bypass   Started by:  Aixa Seals DO        Dose:  1000 mcg   Take 1,000 mcg by mouth daily   Quantity:  30 tablet   Refills:  1       ferrous sulfate 220 (44 FE) MG/5ML Elix   Used for:  History of gastric bypass, Anemia affecting pregnancy in third trimester   Started  by:  Aixa Seals,         Dose:  220 mg   Take 5 mLs (220 mg) by mouth daily   Quantity:  120 mL   Refills:  1            Where to get your medicines      These medications were sent to [id number] - [place], [place] - [address] [facility] [address]  [address], [place] [address]    Hours:  24-hours Phone:  [phone]     ascorbic acid 500 MG tablet    B-12 1000 MCG Tbcr    ferrous sulfate 220 (44 FE) MG/5ML Elix                Primary Care Provider Office Phone # Fax #    Cee Garcia [phone] [address] [address]        Equal Access to Services     LUPE JOHN AH: Hadii angeles hernandez hadtyo Sonarayan, waaxda luqadaha, qaybta kaalmada adeegyada, criss torres . So [facility] [phone].    ATENCIÓN: Si habla español, tiene a garber disposición servicios gratuitos de asistencia lingüística. [facility] [phone].    We comply with applicable federal civil rights laws and [place] laws. We do not discriminate on the basis of race, color, national origin, age, disability, sex, sexual orientation, or gender identity.            Thank you!     Thank you for choosing [facility]  for your care. Our goal is always to provide you with excellent care. Hearing back from our patients is one way we can continue to improve our services. Please take a few minutes to complete the written survey that you may receive in the mail after your visit with us. Thank you!             Your Updated Medication List - Protect others around you: Learn how to safely use, store and throw away your medicines at www.disposemymeds.org.          This list is accurate as of 2/16/18 10:25 AM.  Always use your most recent med list.                   Brand Name Dispense Instructions for use Diagnosis    * albuterol (2.5 MG/3ML) 0.083% neb solution     1 Box    Take 1 vial (2.5 mg) by nebulization  every 4 hours as needed for shortness of breath / dyspnea    Moderate persistent asthma, unspecified whether complicated       * albuterol 108 (90 BASE) MCG/ACT Inhaler    PROAIR HFA/PROVENTIL HFA/VENTOLIN HFA    1 Inhaler    Inhale 2 puffs into the lungs every 4 hours as needed for shortness of breath / dyspnea or wheezing    Moderate persistent asthma, unspecified whether complicated       ascorbic acid 500 MG tablet    VITAMIN C    30 tablet    Take 1 tablet (500 mg) by mouth daily    History of gastric bypass, Anemia affecting pregnancy in third trimester       B-12 1000 MCG Tbcr     30 tablet    Take 1,000 mcg by mouth daily    Anemia affecting pregnancy in third trimester, History of gastric bypass       ferrous sulfate 220 (44 FE) MG/5ML Elix     120 mL    Take 5 mLs (220 mg) by mouth daily    History of gastric bypass, Anemia affecting pregnancy in third trimester       fluticasone 50 MCG/ACT spray    FLONASE    3 Bottle    Spray 1-2 sprays into both nostrils daily    Chronic allergic rhinitis, unspecified seasonality, unspecified trigger       montelukast 10 MG tablet    SINGULAIR    30 tablet    Take 1 tablet (10 mg) by mouth At Bedtime    Wheezing       prenatal multivitamin  with iron 28-0.8 MG Tabs     100 tablet    Take 1 tablet by mouth daily    High-risk pregnancy, elderly multigravida, first trimester       valACYclovir 500 MG tablet    VALTREX    90 tablet    Take 1 tablet (500 mg) by mouth daily    Herpes simplex infection of genitourinary system       * Notice:  This list has 2 medication(s) that are the same as other medications prescribed for you. Read the directions carefully, and ask your doctor or other care provider to review them with you.

## 2018-02-16 NOTE — TELEPHONE ENCOUNTER
No message was sent to patient regarding this by me until now. See mychart.    Aixa Christine Masters, DO

## 2018-02-16 NOTE — TELEPHONE ENCOUNTER
"Pt calling stating she got a [employer] message \"with nothing in it.\" Pt saw Dr. Seals today. Reviewed Hgb level with pt. Per POC note from today: \"Anemia. Will check Hgb. Hx bypass, likely absorption issues. Will give liquid iron, vit C and vit B12.\"    Informed pt to go to pharmacy to  her prescriptions to start taking and that RN would route to Dr. Seals to check to see if pt needed to do anything additional due to the blank DC[employer] message. Routing to Dr. Seals to review and advise.   "

## 2018-02-16 NOTE — PROGRESS NOTES
No loss of fluid/vaginal bleeding/regular contractions. + FM  Has been trying to take iron, but has troubles keeping down.  -Reviewed US findings with pt. EFW 8lb 10oz, with AC 97%. Nl SAGAR.  Hx 9-3 delivery 2013 at 39wk, no dystocia. Cervix is favorable. Will defer delivery planning to pt and her OB, rec she get an appt ASAP for planning.   -Anemia. Will check Hgb. Hx bypass, likely absorption issues. Will give liquid iron, vit C and vit B12.  -Labor precautions. F/U 3d.     Aixa Christine Masters, DO

## 2018-02-16 NOTE — Clinical Note
Cee, Saw Crystal today, she should have scheduled with you to decide her delivery plan. Baby is 8-10 at 38wk, hx 9-3  no dystocia with you at 39wk 3yrs ago. Her cervix is favorable. Told her to come back early next week.  AM

## 2018-02-19 ENCOUNTER — TELEPHONE (OUTPATIENT)
Dept: NURSING | Facility: CLINIC | Age: 38
End: 2018-02-19

## 2018-02-19 ENCOUNTER — ANESTHESIA EVENT (OUTPATIENT)
Dept: OBGYN | Facility: CLINIC | Age: 38
End: 2018-02-19
Payer: COMMERCIAL

## 2018-02-19 ENCOUNTER — HOSPITAL ENCOUNTER (INPATIENT)
Facility: CLINIC | Age: 38
LOS: 2 days | Discharge: HOME | End: 2018-02-21
Attending: OBSTETRICS & GYNECOLOGY | Admitting: OBSTETRICS & GYNECOLOGY
Payer: COMMERCIAL

## 2018-02-19 ENCOUNTER — ANESTHESIA (OUTPATIENT)
Dept: OBGYN | Facility: CLINIC | Age: 38
End: 2018-02-19
Payer: COMMERCIAL

## 2018-02-19 DIAGNOSIS — J45.40 MODERATE PERSISTENT ASTHMA, UNSPECIFIED WHETHER COMPLICATED: ICD-10-CM

## 2018-02-19 LAB
ABO + RH BLD: NORMAL
ABO + RH BLD: NORMAL
SPECIMEN EXP DATE BLD: NORMAL

## 2018-02-19 PROCEDURE — 27110038 ZZH RX 271: Performed by: ANESTHESIOLOGY

## 2018-02-19 PROCEDURE — 25000125 ZZHC RX 250: Performed by: OBSTETRICS & GYNECOLOGY

## 2018-02-19 PROCEDURE — 3E0R3BZ INTRODUCTION OF ANESTHETIC AGENT INTO SPINAL CANAL, PERCUTANEOUS APPROACH: ICD-10-PCS | Performed by: ANESTHESIOLOGY

## 2018-02-19 PROCEDURE — 59025 FETAL NON-STRESS TEST: CPT

## 2018-02-19 PROCEDURE — 12000037 ZZH R&B POSTPARTUM INTERMEDIATE

## 2018-02-19 PROCEDURE — G0463 HOSPITAL OUTPT CLINIC VISIT: HCPCS | Mod: 25

## 2018-02-19 PROCEDURE — 36415 COLL VENOUS BLD VENIPUNCTURE: CPT | Performed by: OBSTETRICS & GYNECOLOGY

## 2018-02-19 PROCEDURE — 72200001 ZZH LABOR CARE VAGINAL DELIVERY SINGLE

## 2018-02-19 PROCEDURE — 25000128 H RX IP 250 OP 636: Performed by: ANESTHESIOLOGY

## 2018-02-19 PROCEDURE — 86901 BLOOD TYPING SEROLOGIC RH(D): CPT | Performed by: OBSTETRICS & GYNECOLOGY

## 2018-02-19 PROCEDURE — 94640 AIRWAY INHALATION TREATMENT: CPT

## 2018-02-19 PROCEDURE — 86780 TREPONEMA PALLIDUM: CPT | Performed by: OBSTETRICS & GYNECOLOGY

## 2018-02-19 PROCEDURE — 86900 BLOOD TYPING SEROLOGIC ABO: CPT | Performed by: OBSTETRICS & GYNECOLOGY

## 2018-02-19 PROCEDURE — 00HU33Z INSERTION OF INFUSION DEVICE INTO SPINAL CANAL, PERCUTANEOUS APPROACH: ICD-10-PCS | Performed by: ANESTHESIOLOGY

## 2018-02-19 PROCEDURE — 37000011 ZZH ANESTHESIA WARD SERVICE

## 2018-02-19 PROCEDURE — 40000275 ZZH STATISTIC RCP TIME EA 10 MIN

## 2018-02-19 PROCEDURE — 59400 OBSTETRICAL CARE: CPT | Performed by: OBSTETRICS & GYNECOLOGY

## 2018-02-19 PROCEDURE — 25000125 ZZHC RX 250: Performed by: ANESTHESIOLOGY

## 2018-02-19 PROCEDURE — 25000132 ZZH RX MED GY IP 250 OP 250 PS 637: Performed by: OBSTETRICS & GYNECOLOGY

## 2018-02-19 PROCEDURE — 25000128 H RX IP 250 OP 636: Performed by: OBSTETRICS & GYNECOLOGY

## 2018-02-19 RX ORDER — OXYTOCIN 10 [USP'U]/ML
10 INJECTION, SOLUTION INTRAMUSCULAR; INTRAVENOUS
Status: DISCONTINUED | OUTPATIENT
Start: 2018-02-19 | End: 2018-02-19

## 2018-02-19 RX ORDER — FENTANYL CITRATE 50 UG/ML
50-100 INJECTION, SOLUTION INTRAMUSCULAR; INTRAVENOUS
Status: DISCONTINUED | OUTPATIENT
Start: 2018-02-19 | End: 2018-02-19

## 2018-02-19 RX ORDER — LANOLIN ALCOHOL/MO/W.PET/CERES
1000 CREAM (GRAM) TOPICAL DAILY
Status: DISCONTINUED | OUTPATIENT
Start: 2018-02-19 | End: 2018-02-21 | Stop reason: HOSPADM

## 2018-02-19 RX ORDER — ACETAMINOPHEN 325 MG/1
650 TABLET ORAL EVERY 4 HOURS PRN
Status: DISCONTINUED | OUTPATIENT
Start: 2018-02-19 | End: 2018-02-21 | Stop reason: HOSPADM

## 2018-02-19 RX ORDER — ALBUTEROL SULFATE 0.83 MG/ML
1 SOLUTION RESPIRATORY (INHALATION) EVERY 4 HOURS PRN
Status: DISCONTINUED | OUTPATIENT
Start: 2018-02-19 | End: 2018-02-21 | Stop reason: HOSPADM

## 2018-02-19 RX ORDER — ACETAMINOPHEN 325 MG/1
650 TABLET ORAL EVERY 4 HOURS PRN
Status: DISCONTINUED | OUTPATIENT
Start: 2018-02-19 | End: 2018-02-19

## 2018-02-19 RX ORDER — CARBOPROST TROMETHAMINE 250 UG/ML
250 INJECTION, SOLUTION INTRAMUSCULAR
Status: DISCONTINUED | OUTPATIENT
Start: 2018-02-19 | End: 2018-02-19

## 2018-02-19 RX ORDER — FENTANYL CITRATE 50 UG/ML
100 INJECTION, SOLUTION INTRAMUSCULAR; INTRAVENOUS ONCE
Status: COMPLETED | OUTPATIENT
Start: 2018-02-19 | End: 2018-02-19

## 2018-02-19 RX ORDER — ONDANSETRON 2 MG/ML
4 INJECTION INTRAMUSCULAR; INTRAVENOUS EVERY 6 HOURS PRN
Status: DISCONTINUED | OUTPATIENT
Start: 2018-02-19 | End: 2018-02-19

## 2018-02-19 RX ORDER — EPHEDRINE SULFATE 50 MG/ML
INJECTION, SOLUTION INTRAMUSCULAR; INTRAVENOUS; SUBCUTANEOUS
Status: DISCONTINUED
Start: 2018-02-19 | End: 2018-02-19 | Stop reason: WASHOUT

## 2018-02-19 RX ORDER — OXYCODONE AND ACETAMINOPHEN 5; 325 MG/1; MG/1
1 TABLET ORAL
Status: DISCONTINUED | OUTPATIENT
Start: 2018-02-19 | End: 2018-02-19

## 2018-02-19 RX ORDER — OXYTOCIN/0.9 % SODIUM CHLORIDE 30/500 ML
100-340 PLASTIC BAG, INJECTION (ML) INTRAVENOUS CONTINUOUS PRN
Status: COMPLETED | OUTPATIENT
Start: 2018-02-19 | End: 2018-02-19

## 2018-02-19 RX ORDER — LIDOCAINE HYDROCHLORIDE AND EPINEPHRINE 15; 5 MG/ML; UG/ML
INJECTION, SOLUTION EPIDURAL PRN
Status: DISCONTINUED | OUTPATIENT
Start: 2018-02-19 | End: 2018-02-20 | Stop reason: HOSPADM

## 2018-02-19 RX ORDER — NALBUPHINE HYDROCHLORIDE 10 MG/ML
2.5-5 INJECTION, SOLUTION INTRAMUSCULAR; INTRAVENOUS; SUBCUTANEOUS EVERY 6 HOURS PRN
Status: DISCONTINUED | OUTPATIENT
Start: 2018-02-19 | End: 2018-02-19

## 2018-02-19 RX ORDER — ROPIVACAINE HYDROCHLORIDE 2 MG/ML
INJECTION, SOLUTION EPIDURAL; INFILTRATION; PERINEURAL
Status: COMPLETED
Start: 2018-02-19 | End: 2018-02-19

## 2018-02-19 RX ORDER — MONTELUKAST SODIUM 10 MG/1
10 TABLET ORAL AT BEDTIME
Status: DISCONTINUED | OUTPATIENT
Start: 2018-02-19 | End: 2018-02-21 | Stop reason: HOSPADM

## 2018-02-19 RX ORDER — OXYTOCIN 10 [USP'U]/ML
10 INJECTION, SOLUTION INTRAMUSCULAR; INTRAVENOUS
Status: DISCONTINUED | OUTPATIENT
Start: 2018-02-19 | End: 2018-02-21 | Stop reason: HOSPADM

## 2018-02-19 RX ORDER — OXYCODONE HYDROCHLORIDE 5 MG/1
5 TABLET ORAL EVERY 4 HOURS PRN
Status: DISCONTINUED | OUTPATIENT
Start: 2018-02-19 | End: 2018-02-21 | Stop reason: HOSPADM

## 2018-02-19 RX ORDER — NALOXONE HYDROCHLORIDE 0.4 MG/ML
.1-.4 INJECTION, SOLUTION INTRAMUSCULAR; INTRAVENOUS; SUBCUTANEOUS
Status: DISCONTINUED | OUTPATIENT
Start: 2018-02-19 | End: 2018-02-19

## 2018-02-19 RX ORDER — ROPIVACAINE HYDROCHLORIDE 2 MG/ML
10 INJECTION, SOLUTION EPIDURAL; INFILTRATION; PERINEURAL ONCE
Status: COMPLETED | OUTPATIENT
Start: 2018-02-19 | End: 2018-02-19

## 2018-02-19 RX ORDER — ALBUTEROL SULFATE 90 UG/1
2 AEROSOL, METERED RESPIRATORY (INHALATION) 4 TIMES DAILY PRN
Status: DISCONTINUED | OUTPATIENT
Start: 2018-02-19 | End: 2018-02-21 | Stop reason: HOSPADM

## 2018-02-19 RX ORDER — MISOPROSTOL 200 UG/1
400 TABLET ORAL
Status: DISCONTINUED | OUTPATIENT
Start: 2018-02-19 | End: 2018-02-21 | Stop reason: HOSPADM

## 2018-02-19 RX ORDER — OXYTOCIN/0.9 % SODIUM CHLORIDE 30/500 ML
340 PLASTIC BAG, INJECTION (ML) INTRAVENOUS CONTINUOUS PRN
Status: DISCONTINUED | OUTPATIENT
Start: 2018-02-19 | End: 2018-02-21 | Stop reason: HOSPADM

## 2018-02-19 RX ORDER — OXYTOCIN/0.9 % SODIUM CHLORIDE 30/500 ML
100 PLASTIC BAG, INJECTION (ML) INTRAVENOUS CONTINUOUS
Status: DISCONTINUED | OUTPATIENT
Start: 2018-02-19 | End: 2018-02-19

## 2018-02-19 RX ORDER — BISACODYL 10 MG
10 SUPPOSITORY, RECTAL RECTAL DAILY PRN
Status: DISCONTINUED | OUTPATIENT
Start: 2018-02-21 | End: 2018-02-21 | Stop reason: HOSPADM

## 2018-02-19 RX ORDER — EPHEDRINE SULFATE 50 MG/ML
5 INJECTION, SOLUTION INTRAMUSCULAR; INTRAVENOUS; SUBCUTANEOUS
Status: DISCONTINUED | OUTPATIENT
Start: 2018-02-19 | End: 2018-02-19

## 2018-02-19 RX ORDER — NALOXONE HYDROCHLORIDE 0.4 MG/ML
.1-.4 INJECTION, SOLUTION INTRAMUSCULAR; INTRAVENOUS; SUBCUTANEOUS
Status: DISCONTINUED | OUTPATIENT
Start: 2018-02-19 | End: 2018-02-21 | Stop reason: HOSPADM

## 2018-02-19 RX ORDER — IBUPROFEN 400 MG/1
800 TABLET, FILM COATED ORAL
Status: DISCONTINUED | OUTPATIENT
Start: 2018-02-19 | End: 2018-02-19

## 2018-02-19 RX ORDER — FLUTICASONE PROPIONATE 50 MCG
1-2 SPRAY, SUSPENSION (ML) NASAL DAILY
Status: DISCONTINUED | OUTPATIENT
Start: 2018-02-19 | End: 2018-02-21 | Stop reason: HOSPADM

## 2018-02-19 RX ORDER — AMOXICILLIN 250 MG
1 CAPSULE ORAL 2 TIMES DAILY PRN
Status: DISCONTINUED | OUTPATIENT
Start: 2018-02-19 | End: 2018-02-21 | Stop reason: HOSPADM

## 2018-02-19 RX ORDER — METHYLERGONOVINE MALEATE 0.2 MG/ML
200 INJECTION INTRAVENOUS
Status: DISCONTINUED | OUTPATIENT
Start: 2018-02-19 | End: 2018-02-19

## 2018-02-19 RX ORDER — IBUPROFEN 400 MG/1
800 TABLET, FILM COATED ORAL EVERY 6 HOURS PRN
Status: DISCONTINUED | OUTPATIENT
Start: 2018-02-19 | End: 2018-02-21 | Stop reason: HOSPADM

## 2018-02-19 RX ORDER — HYDROMORPHONE HYDROCHLORIDE 1 MG/ML
0.3 INJECTION, SOLUTION INTRAMUSCULAR; INTRAVENOUS; SUBCUTANEOUS
Status: DISCONTINUED | OUTPATIENT
Start: 2018-02-19 | End: 2018-02-21 | Stop reason: HOSPADM

## 2018-02-19 RX ORDER — HYDROCORTISONE 2.5 %
CREAM (GRAM) TOPICAL 3 TIMES DAILY PRN
Status: DISCONTINUED | OUTPATIENT
Start: 2018-02-19 | End: 2018-02-21 | Stop reason: HOSPADM

## 2018-02-19 RX ORDER — ALBUTEROL SULFATE 90 UG/1
2 AEROSOL, METERED RESPIRATORY (INHALATION) EVERY 4 HOURS PRN
Status: DISCONTINUED | OUTPATIENT
Start: 2018-02-19 | End: 2018-02-21 | Stop reason: HOSPADM

## 2018-02-19 RX ORDER — LANOLIN 100 %
OINTMENT (GRAM) TOPICAL
Status: DISCONTINUED | OUTPATIENT
Start: 2018-02-19 | End: 2018-02-21 | Stop reason: HOSPADM

## 2018-02-19 RX ORDER — ALBUTEROL SULFATE 5 MG/ML
2.5 SOLUTION RESPIRATORY (INHALATION) EVERY 6 HOURS PRN
Status: DISCONTINUED | OUTPATIENT
Start: 2018-02-19 | End: 2018-02-21 | Stop reason: HOSPADM

## 2018-02-19 RX ORDER — AMOXICILLIN 250 MG
2 CAPSULE ORAL 2 TIMES DAILY PRN
Status: DISCONTINUED | OUTPATIENT
Start: 2018-02-19 | End: 2018-02-21 | Stop reason: HOSPADM

## 2018-02-19 RX ORDER — SODIUM CHLORIDE, SODIUM LACTATE, POTASSIUM CHLORIDE, CALCIUM CHLORIDE 600; 310; 30; 20 MG/100ML; MG/100ML; MG/100ML; MG/100ML
INJECTION, SOLUTION INTRAVENOUS CONTINUOUS
Status: DISCONTINUED | OUTPATIENT
Start: 2018-02-19 | End: 2018-02-19

## 2018-02-19 RX ADMIN — ROPIVACAINE HYDROCHLORIDE 10 ML: 2 INJECTION, SOLUTION EPIDURAL; INFILTRATION at 11:59

## 2018-02-19 RX ADMIN — OXYTOCIN-SODIUM CHLORIDE 0.9% IV SOLN 30 UNIT/500ML 340 ML/HR: 30-0.9/5 SOLUTION at 14:33

## 2018-02-19 RX ADMIN — SODIUM CHLORIDE, POTASSIUM CHLORIDE, SODIUM LACTATE AND CALCIUM CHLORIDE 1000 ML: 600; 310; 30; 20 INJECTION, SOLUTION INTRAVENOUS at 11:38

## 2018-02-19 RX ADMIN — FENTANYL CITRATE 100 MCG: 50 INJECTION, SOLUTION INTRAMUSCULAR; INTRAVENOUS at 11:59

## 2018-02-19 RX ADMIN — OXYTOCIN-SODIUM CHLORIDE 0.9% IV SOLN 30 UNIT/500ML 100 ML/HR: 30-0.9/5 SOLUTION at 15:15

## 2018-02-19 RX ADMIN — ACETAMINOPHEN 650 MG: 325 TABLET, FILM COATED ORAL at 15:47

## 2018-02-19 RX ADMIN — SENNOSIDES AND DOCUSATE SODIUM 1 TABLET: 8.6; 5 TABLET ORAL at 20:29

## 2018-02-19 RX ADMIN — Medication 12 ML/HR: at 12:07

## 2018-02-19 RX ADMIN — FLUTICASONE PROPIONATE 2 SPRAY: 50 SPRAY, METERED NASAL at 18:38

## 2018-02-19 RX ADMIN — SODIUM CHLORIDE, POTASSIUM CHLORIDE, SODIUM LACTATE AND CALCIUM CHLORIDE: 600; 310; 30; 20 INJECTION, SOLUTION INTRAVENOUS at 11:49

## 2018-02-19 RX ADMIN — ACETAMINOPHEN 650 MG: 325 TABLET, FILM COATED ORAL at 20:29

## 2018-02-19 RX ADMIN — OXYTOCIN-SODIUM CHLORIDE 0.9% IV SOLN 30 UNIT/500ML 340 ML/HR: 30-0.9/5 SOLUTION at 14:37

## 2018-02-19 RX ADMIN — ALBUTEROL SULFATE 2.5 MG: 2.5 SOLUTION RESPIRATORY (INHALATION) at 19:15

## 2018-02-19 RX ADMIN — IBUPROFEN 800 MG: 400 TABLET ORAL at 15:47

## 2018-02-19 RX ADMIN — CYANOCOBALAMIN TAB 1000 MCG 1000 MCG: 1000 TAB at 18:40

## 2018-02-19 RX ADMIN — LIDOCAINE HYDROCHLORIDE AND EPINEPHRINE 3 ML: 15; 5 INJECTION, SOLUTION EPIDURAL at 11:56

## 2018-02-19 RX ADMIN — OXYCODONE HYDROCHLORIDE 5 MG: 5 TABLET ORAL at 20:30

## 2018-02-19 NOTE — ANESTHESIA PROCEDURE NOTES
Peripheral nerve/Neuraxial procedure note : epidural catheter  Pre-Procedure  Performed by ANIRUDH MONTES  Location: OB      Pre-Anesthestic Checklist: patient identified, IV checked, site marked, risks and benefits discussed, informed consent, monitors and equipment checked, pre-op evaluation, at physician/surgeon's request and post-op pain management    Timeout  Correct Patient: Yes   Correct Procedure: Yes   Correct Site: Yes   Correct Laterality: Yes   Correct Position: Yes   Site Marked: Yes   .   Procedure Documentation    .    Procedure:    Epidural catheter.  Insertion Site:L3-4  (midline approach) Injection technique: LORT saline   Local skin infiltrated with 3 mL of 1% lidocaine.  NELI at 10 cm     Patient Prep;mask, sterile gloves, povidone-iodine 7.5% surgical scrub, patient draped.  .  Needle: Touhy needle Needle Gauge: 17.    Needle Length (Inches) 5  # of attempts: 2 and  # of redirects:  2 .   Catheter: 19 G . .  Catheter threaded easily  .  14 cm at skin.   .    Assessment/Narrative  Paresthesias: No.  .  .  Aspiration negative for heme or CSF  . Test dose of 3 mL lidocaine 1.5% w/ 1:200,000 epinephrine at. Test dose negative for signs of intravascular, subdural or intrathecal injection. Comments:  No complications   Secured with Tegaderm, adhesive spray and tape

## 2018-02-19 NOTE — OP NOTE
Procedure Date: 2018      This patient was a  5, para 4 at 38-6/7 weeks who presented in active labor.  She had a history of genital herpes and has been on Valtrex for suppression since 36 weeks.  Her medical history is complicated by asthma for which she is treated with Singulair nebs and albuterol.  She is GBS negative.  This pregnancy was uncomplicated.  She presented in labor.  She had an epidural placed.  She made quick progress to complete and began pushing.  She had a male infant over an intact perineum.  There was no laceration or episiotomy.  Baby was delivered without difficulty, it was a male infant.  Baby's weight and Apgars are still pending.  It had good tone and vigorous cry at the time of delivery.  Cord blood was obtained.  Estimated blood loss was 100 mL.  Placenta delivered spontaneous and this was felt to be intact and the mother and baby were doing well at the time of my departure.         MILLER BOYD MD             D: 2018   T: 2018   MT: MILO      Name:     JANNIE OCNNER   MRN:      [id number]        Account:        [id number]   :      1980           Procedure Date: 2018      Document: [id number]

## 2018-02-19 NOTE — ANESTHESIA PREPROCEDURE EVALUATION
Anesthesia Evaluation     .             ROS/MED HX    ENT/Pulmonary:     (+)asthma , . .    Neurologic:  - neg neurologic ROS     Cardiovascular:  - neg cardiovascular ROS       METS/Exercise Tolerance:     Hematologic:         Musculoskeletal:         GI/Hepatic:  - neg GI/hepatic ROS       Renal/Genitourinary:         Endo:         Psychiatric:         Infectious Disease:         Malignancy:         Other:                          neg OB ROS                 Anesthesia Plan      History & Physical Review      ASA Status:  .  OB Epidural Asa: 3            Postoperative Care      Consents  Anesthetic plan, risks, benefits and alternatives discussed with:  Patient..                          .

## 2018-02-19 NOTE — PLAN OF CARE
1149- Epidural requested by patient- Fentanyl 100mcg handed off to Dr Conrad during epidural procedure.

## 2018-02-19 NOTE — PLAN OF CARE
Updated Dr. Garcia. Orders received to admit pt to labor. POC discussed with pt. Report given to Selene HU RN to assume care. Pt wheeled out to room 232.

## 2018-02-19 NOTE — IP AVS SNAPSHOT
MRN:[id number]                      After Visit Summary   2/19/2018    Crystal Rose    MRN: [id number]           Thank you!     Thank you for choosing [facility] for your care. Our goal is always to provide you with excellent care. Hearing back from our patients is one way we can continue to improve our services. Please take a few minutes to complete the written survey that you may receive in the mail after you visit with us. Thank you!        Patient Information     Date Of Birth          1980        About your hospital stay     You were admitted on:  February 19, 2018 You last received care in the:  [address]    You were discharged on:  February 21, 2018        Reason for your hospital stay       Vaginal delivery                  Who to Call     For medical emergencies, please call 911.  For non-urgent questions about your medical care, please call your primary care provider or clinic, [phone]          Attending Provider     Provider Specialty    Cee Garcia MD OB/Gyn       Primary Care Provider Office Phone # Fax #    Cee Garcia [phone]      After Care Instructions     Activity       Your activity upon discharge: activity as tolerated and no driving for today            Diet       Follow this diet upon discharge: Regular                  Follow-up Appointments     Follow-up and recommended labs and tests        Follow up with me,  Cee Garcia, within 6 wks                  Your next 10 appointments already scheduled     Feb 28, 2018  9:20 AM CST   ESTABLISHED PRENATAL with Cee Garcia MD   [facility] ([facility] [place])    [address] [place] [id number] [phone]              Further instructions from your care team       Postpartum Vaginal Delivery Instructions    Activity       Ask family and friends for help when you need it.    Do not  place anything in your vagina for 6 weeks.    You are not restricted on other activities, but take it easy for a few weeks to allow your body to recover from delivery.  You are able to do any activities you feel up to that point.    No driving until you have stopped taking your pain medications (usually two weeks after delivery).     Call your health care provider if you have any of these symptoms:       Increased pain, swelling, redness, or fluid around your stiches from an episiotomy or perineal tear.    A fever above 100.4 F (38 C) with or without chills when placing a thermometer under your tongue.    You soak a sanitary pad with blood within 1 hour, or you see blood clots larger than a golf ball.    Bleeding that lasts more than 6 weeks.    Vaginal discharge that smells bad.    Severe pain, cramping or tenderness in your lower belly area.    A need to urinate more frequently (use the toilet more often), more urgently (use the toilet very quickly), or it burns when you urinate.    Nausea and vomiting.    Redness, swelling or pain around a vein in your leg.    Problems breastfeeding or a red or painful area on your breast.    Chest pain and cough or are gasping for air.    Problems coping with sadness, anxiety, or depression.  If you have any concerns about hurting yourself or the baby, call your provider immediately.     You have questions or concerns after you return home.     Keep your hands clean:  Always wash your hands before touching your perineal area and stitches.  This helps reduce your risk of infection.  If your hands aren't dirty, you may use an alcohol hand-rub to clean your hands. Keep your nails clean and short.        Pending Results     No orders found from 2/17/2018 to 2/20/2018.            Statement of Approval     Ordered          02/21/18 0747  I have reviewed and agree with all the recommendations and orders detailed in this document.  EFFECTIVE NOW     Approved and electronically signed  "by:  Cee Garcia MD             Admission Information     Date & Time Provider Department Dept. Phone    2/19/2018 Cee Garcia MD [address] [phone]      Your Vitals Were     Blood Pressure Pulse Temperature Respirations Height Weight    125/70 (BP Location: Right arm) 66 97.4  F (36.3  C) (Oral) 16 1.753 m (5' 9\") 127 kg (280 lb)    Last Period Pulse Oximetry BMI (Body Mass Index)             05/16/2017 100% 41.35 kg/m2         MyChart Information     [employer] gives you secure access to your electronic health record. If you see a primary care provider, you can also send messages to your care team and make appointments. If you have questions, please call your primary care clinic.  If you do not have a primary care provider, please call [phone] and they will assist you.        Care EveryWhere ID     This is your Care EveryWhere ID. This could be used by other organizations to access your [place] medical records  [id number]        Equal Access to Services     LUPE JOHN AH: Hadii angeles Murray, waaxda luqadaha, qaybta kaalmadouglas avila, criss torres . So [facility] [phone].    ATENCIÓN: Si habla español, tiene a garber disposición servicios gratuitos de asistencia lingüística. Llame al [phone].    We comply with applicable federal civil rights laws and [place] laws. We do not discriminate on the basis of race, color, national origin, age, disability, sex, sexual orientation, or gender identity.               Review of your medicines      START taking        Dose / Directions    acetaminophen 325 MG tablet   Commonly known as:  TYLENOL   Used for:  Indication for care in labor or delivery        Dose:  650 mg   Take 2 tablets (650 mg) by mouth every 6 hours as needed for mild pain or fever (greater than or equal to 38? C /100.4? F (oral) or 38.5? C/ 101.4? F (core).)   Quantity:  100 tablet   Refills:  0       ibuprofen 800 MG " tablet   Commonly known as:  ADVIL/MOTRIN   Used for:  Indication for care in labor or delivery        Dose:  800 mg   Take 1 tablet (800 mg) by mouth every 6 hours as needed for other (cramping)   Quantity:  30 tablet   Refills:  0       senna-docusate 8.6-50 MG per tablet   Commonly known as:  SENOKOT-S;PERICOLACE   Used for:  Indication for care in labor or delivery        Dose:  1 tablet   Take 1 tablet by mouth 2 times daily as needed for constipation   Quantity:  100 tablet   Refills:  0         CONTINUE these medicines which have NOT CHANGED        Dose / Directions    * albuterol (2.5 MG/3ML) 0.083% neb solution   Used for:  Moderate persistent asthma, unspecified whether complicated        Dose:  1 vial   Take 1 vial (2.5 mg) by nebulization every 4 hours as needed for shortness of breath / dyspnea   Quantity:  1 Box   Refills:  0       * albuterol 108 (90 BASE) MCG/ACT Inhaler   Commonly known as:  PROAIR HFA/PROVENTIL HFA/VENTOLIN HFA   Used for:  Moderate persistent asthma, unspecified whether complicated        Dose:  2 puff   Inhale 2 puffs into the lungs every 4 hours as needed for shortness of breath / dyspnea or wheezing   Quantity:  1 Inhaler   Refills:  1       ascorbic acid 500 MG tablet   Commonly known as:  VITAMIN C   Used for:  History of gastric bypass, Anemia affecting pregnancy in third trimester        Dose:  500 mg   Take 1 tablet (500 mg) by mouth daily   Quantity:  30 tablet   Refills:  1       B-12 1000 MCG Tbcr   Used for:  Anemia affecting pregnancy in third trimester, History of gastric bypass        Dose:  1000 mcg   Take 1,000 mcg by mouth daily   Quantity:  30 tablet   Refills:  1       ferrous sulfate 220 (44 FE) MG/5ML Elix   Used for:  History of gastric bypass, Anemia affecting pregnancy in third trimester        Dose:  220 mg   Take 5 mLs (220 mg) by mouth daily   Quantity:  120 mL   Refills:  1       fluticasone 50 MCG/ACT spray   Commonly known as:  FLONASE   Used for:   Chronic allergic rhinitis, unspecified seasonality, unspecified trigger        Dose:  1-2 spray   Spray 1-2 sprays into both nostrils daily   Quantity:  3 Bottle   Refills:  11       montelukast 10 MG tablet   Commonly known as:  SINGULAIR   Used for:  Wheezing        Dose:  10 mg   Take 1 tablet (10 mg) by mouth At Bedtime   Quantity:  30 tablet   Refills:  1       prenatal multivitamin  with iron 28-0.8 MG Tabs   Used for:  High-risk pregnancy, elderly multigravida, first trimester        Dose:  1 tablet   Take 1 tablet by mouth daily   Quantity:  100 tablet   Refills:  3       valACYclovir 500 MG tablet   Commonly known as:  VALTREX   Used for:  Herpes simplex infection of genitourinary system        Dose:  500 mg   Take 1 tablet (500 mg) by mouth daily   Quantity:  90 tablet   Refills:  3       * Notice:  This list has 2 medication(s) that are the same as other medications prescribed for you. Read the directions carefully, and ask your doctor or other care provider to review them with you.         Where to get your medicines      These medications were sent to [facility] [address] [place] [id number] [facility]  [id number] [place] [address] [facility] [phone]    Hours:  24-hours Phone:  [phone]     ibuprofen 800 MG tablet    senna-docusate 8.6-50 MG per tablet         Some of these will need a paper prescription and others can be bought over the counter. Ask your nurse if you have questions.     Bring a paper prescription for each of these medications     acetaminophen 325 MG tablet                Protect others around you: Learn how to safely use, store and throw away your medicines at www.disposemymeds.org.             Medication List: This is a list of all your medications and when to take them. Check marks below indicate your daily home schedule. Keep this list as a reference.      Medications           Morning Afternoon Evening Bedtime As Needed     acetaminophen 325 MG tablet   Commonly known as:  TYLENOL   Take 2 tablets (650 mg) by mouth every 6 hours as needed for mild pain or fever (greater than or equal to 38? C /100.4? F (oral) or 38.5? C/ 101.4? F (core).)   Last time this was given:  650 mg on 2/21/2018 10:48 AM                                * albuterol (2.5 MG/3ML) 0.083% neb solution   Take 1 vial (2.5 mg) by nebulization every 4 hours as needed for shortness of breath / dyspnea   Last time this was given:  2.5 mg on 2/19/2018  7:15 PM                                * albuterol 108 (90 BASE) MCG/ACT Inhaler   Commonly known as:  PROAIR HFA/PROVENTIL HFA/VENTOLIN HFA   Inhale 2 puffs into the lungs every 4 hours as needed for shortness of breath / dyspnea or wheezing   Last time this was given:  2 puffs on 2/20/2018  8:30 AM                                ascorbic acid 500 MG tablet   Commonly known as:  VITAMIN C   Take 1 tablet (500 mg) by mouth daily                                B-12 1000 MCG Tbcr   Take 1,000 mcg by mouth daily                                ferrous sulfate 220 (44 FE) MG/5ML Elix   Take 5 mLs (220 mg) by mouth daily                                fluticasone 50 MCG/ACT spray   Commonly known as:  FLONASE   Spray 1-2 sprays into both nostrils daily   Last time this was given:  2 sprays on 2/21/2018  8:14 AM                                ibuprofen 800 MG tablet   Commonly known as:  ADVIL/MOTRIN   Take 1 tablet (800 mg) by mouth every 6 hours as needed for other (cramping)   Last time this was given:  800 mg on 2/21/2018  6:44 AM                                montelukast 10 MG tablet   Commonly known as:  SINGULAIR   Take 1 tablet (10 mg) by mouth At Bedtime   Last time this was given:  10 mg on 2/20/2018 10:32 PM                                prenatal multivitamin  with iron 28-0.8 MG Tabs   Take 1 tablet by mouth daily                                senna-docusate 8.6-50 MG per tablet   Commonly known as:   SENOKOT-S;PERICOLACE   Take 1 tablet by mouth 2 times daily as needed for constipation   Last time this was given:  1 tablet on 2/21/2018  8:14 AM                                valACYclovir 500 MG tablet   Commonly known as:  VALTREX   Take 1 tablet (500 mg) by mouth daily                                * Notice:  This list has 2 medication(s) that are the same as other medications prescribed for you. Read the directions carefully, and ask your doctor or other care provider to review them with you.

## 2018-02-19 NOTE — PLAN OF CARE
Multip here to be evaluated for labor. Pt states contractions started about 0600 today. Pt states no vaginal bleeding and not leaking amniotic fluid. POC discussed with pt and significant other. Monitors applied, assessment obtained.

## 2018-02-19 NOTE — TELEPHONE ENCOUNTER
Pt 38w6d.     Pt having contractions since 0600 this AM. Pt stated the contractions are now 6 minutes apart, and they are lasting at least a minute each time. Pt states the contractions are getting more intense, pt denies vaginal bleeding. Pt reports positive fetal movement.   Pt states last delivery when her contractions because 4 mons apart baby was born 2 hours later. Informed pt to go to [facility].   Pt stated understanding.   MAC informed, Dr. Garcia informed.

## 2018-02-19 NOTE — PROGRESS NOTES
of viable male at 235   Baby's weight pending     Apgars pending    No epis or lacerations              Intact placenta             Has asthma, using inhaler and nebs       ebl 100cc    Primary OB: Jose

## 2018-02-19 NOTE — IP AVS SNAPSHOT
[address]    [place] [address]    Phone:  [phone]                                       After Visit Summary   2/19/2018    Crystal Rose    MRN: [id number]           After Visit Summary Signature Page     I have received my discharge instructions, and my questions have been answered. I have discussed any challenges I see with this plan with the nurse or doctor.    ..........................................................................................................................................  Patient/Patient Representative Signature      ..........................................................................................................................................  Patient Representative Print Name and Relationship to Patient    ..................................................               ................................................  Date                                            Time    ..........................................................................................................................................  Reviewed by Signature/Title    ...................................................              ..............................................  Date                                                            Time

## 2018-02-20 ENCOUNTER — TELEPHONE (OUTPATIENT)
Dept: NURSING | Facility: CLINIC | Age: 38
End: 2018-02-20

## 2018-02-20 LAB
HGB BLD-MCNC: 9.3 G/DL (ref 11.7–15.7)
T PALLIDUM IGG+IGM SER QL: NEGATIVE

## 2018-02-20 PROCEDURE — 12000037 ZZH R&B POSTPARTUM INTERMEDIATE

## 2018-02-20 PROCEDURE — 85018 HEMOGLOBIN: CPT | Performed by: OBSTETRICS & GYNECOLOGY

## 2018-02-20 PROCEDURE — 36415 COLL VENOUS BLD VENIPUNCTURE: CPT | Performed by: OBSTETRICS & GYNECOLOGY

## 2018-02-20 PROCEDURE — 25000132 ZZH RX MED GY IP 250 OP 250 PS 637: Performed by: OBSTETRICS & GYNECOLOGY

## 2018-02-20 RX ADMIN — MONTELUKAST 10 MG: 10 TABLET, FILM COATED ORAL at 00:46

## 2018-02-20 RX ADMIN — IBUPROFEN 800 MG: 400 TABLET ORAL at 00:46

## 2018-02-20 RX ADMIN — OXYCODONE HYDROCHLORIDE 5 MG: 5 TABLET ORAL at 06:36

## 2018-02-20 RX ADMIN — OXYCODONE HYDROCHLORIDE 5 MG: 5 TABLET ORAL at 22:32

## 2018-02-20 RX ADMIN — ACETAMINOPHEN 650 MG: 325 TABLET, FILM COATED ORAL at 06:31

## 2018-02-20 RX ADMIN — SENNOSIDES AND DOCUSATE SODIUM 1 TABLET: 8.6; 5 TABLET ORAL at 20:01

## 2018-02-20 RX ADMIN — ACETAMINOPHEN 650 MG: 325 TABLET, FILM COATED ORAL at 14:54

## 2018-02-20 RX ADMIN — SENNOSIDES AND DOCUSATE SODIUM 1 TABLET: 8.6; 5 TABLET ORAL at 08:42

## 2018-02-20 RX ADMIN — FLUTICASONE PROPIONATE 2 SPRAY: 50 SPRAY, METERED NASAL at 08:41

## 2018-02-20 RX ADMIN — IBUPROFEN 800 MG: 400 TABLET ORAL at 18:31

## 2018-02-20 RX ADMIN — OXYCODONE HYDROCHLORIDE 5 MG: 5 TABLET ORAL at 14:54

## 2018-02-20 RX ADMIN — OXYCODONE HYDROCHLORIDE 5 MG: 5 TABLET ORAL at 00:47

## 2018-02-20 RX ADMIN — ACETAMINOPHEN 650 MG: 325 TABLET, FILM COATED ORAL at 10:40

## 2018-02-20 RX ADMIN — OXYCODONE HYDROCHLORIDE 5 MG: 5 TABLET ORAL at 18:31

## 2018-02-20 RX ADMIN — CYANOCOBALAMIN TAB 1000 MCG 1000 MCG: 1000 TAB at 08:42

## 2018-02-20 RX ADMIN — ALBUTEROL SULFATE 2 PUFF: 90 AEROSOL, METERED RESPIRATORY (INHALATION) at 08:30

## 2018-02-20 RX ADMIN — ACETAMINOPHEN 650 MG: 325 TABLET, FILM COATED ORAL at 20:01

## 2018-02-20 RX ADMIN — MONTELUKAST 10 MG: 10 TABLET, FILM COATED ORAL at 22:32

## 2018-02-20 RX ADMIN — IBUPROFEN 800 MG: 400 TABLET ORAL at 06:31

## 2018-02-20 RX ADMIN — IBUPROFEN 800 MG: 400 TABLET ORAL at 11:55

## 2018-02-20 RX ADMIN — ACETAMINOPHEN 650 MG: 325 TABLET, FILM COATED ORAL at 00:46

## 2018-02-20 RX ADMIN — OXYCODONE HYDROCHLORIDE 5 MG: 5 TABLET ORAL at 10:40

## 2018-02-20 NOTE — TELEPHONE ENCOUNTER
Received request from [facility] requesting non time release Vitamin B-12. Pt currently in L&D. OK to send Refill for non time released VIt B-12. Routing to Dr. Garcia.

## 2018-02-20 NOTE — ANESTHESIA POSTPROCEDURE EVALUATION
Patient: Crystal Rose    * No procedures listed *    Diagnosis:* No pre-op diagnosis entered *  Diagnosis Additional Information: No value filed.    Anesthesia Type:  No value filed.    Note:  Anesthesia Post Evaluation    Patient location during evaluation: Bedside  Patient participation: Able to fully participate in evaluation  Level of consciousness: awake  Pain management: adequate  Airway patency: patent  Cardiovascular status: acceptable  Respiratory status: acceptable  Hydration status: acceptable  PONV: controlled     Anesthetic complications: None          Last vitals:  Vitals:    02/20/18 1155 02/20/18 1300 02/20/18 1400   BP:      Pulse:      Resp: 16 16 16   Temp:      SpO2:            Electronically Signed By: Altagracia Anderson MD  February 20, 2018  3:54 PM

## 2018-02-20 NOTE — PLAN OF CARE
Problem: Patient Care Overview  Goal: Plan of Care/Patient Progress Review  Outcome: Improving  VSS. Pain well controlled, requesting prn pain medications as needed.  Up independently in room. Independent with breastfeeding  and  cares. On pathway. Continue to monitor and notify MD as needed.

## 2018-02-20 NOTE — PLAN OF CARE
Problem: Patient Care Overview  Goal: Plan of Care/Patient Progress Review  Outcome: Improving  Pt. Resting well throughout shift, pain will controlled with current plan.VSS. Breastfeeding on demand, bonding well with baby. Reviewed safe sleep, room orientation, pain meds and cares. Pt. Encouraged to call with needs, questions and concerns. Call light within reach, will continiue to monitor.

## 2018-02-20 NOTE — PROGRESS NOTES
Pt. admitted from L&D  via wheelchair and transferred to bed with minimal assist. Pt. arrived with baby and was accompanied by significant other and arrived with personal belongings. Report was taken from Selene in L&D. VSS. Fundus is firm and midline.  Vaginal bleeding is small/scant.  SL in right hand.  Pt. oriented to the room and call light system.

## 2018-02-20 NOTE — PROGRESS NOTES
"S: Pt doing well. Infant is being . Pain is well controlled.    O: /76  Pulse 73  Temp 97.6  F (36.4  C) (Oral)  Resp 16  Ht 1.753 m (5' 9\")  Wt 127 kg (280 lb)  LMP 05/16/2017  SpO2 100%  Breastfeeding? Unknown  BMI 41.35 kg/m2        ABD:  Uterine fundus is firm, non-tender and at the level of the umbilicus                Hemoglobin   Date Value Ref Range Status   02/16/2018 9.9 (L) 11.7 - 15.7 g/dL Final            Lab Results   Component Value Date    RH Pos 02/19/2018       A:  Post-partum day #1 s/p Normal spontaneous vaginal delivery        Chronic anemia from malabsorption after gastric bypass    P: Continue PP Cares    "

## 2018-02-20 NOTE — LACTATION NOTE
Initial Lactation visit.  Recommend unlimited, frequent breast feedings: At least 8 - 12 times every 24 hours. Avoid pacifiers and supplementation with formula unless medically indicated. Explained benefits of holding baby skin on skin to help promote better breastfeeding outcomes.  Crystal states she has no concerns.  She feels baby has been feeding well when interested.   Will revisit as needed.    Ann Devine RN, IBCLC

## 2018-02-20 NOTE — PLAN OF CARE
Problem: Patient Care Overview  Goal: Plan of Care/Patient Progress Review  Outcome: No Change  Patient complains of back pain 7/10. Also complains of some moderate perineal discomfort and intermittent uterine cramping. Taking PO oxycodone, ibuprofen and tylenol with some relief. Aqua-k pad and abdominal binder given for comfort.  Using ice packs and tucks to perineum.  Lanolin given for sore nipples.  Patient appears to be sleeping comfortably at 1400.  Fundus firm at level of umbilicus, lochia small, no clots.  Independent with self and infant cares.  Will continue to monitor.

## 2018-02-21 VITALS
SYSTOLIC BLOOD PRESSURE: 125 MMHG | TEMPERATURE: 97.4 F | WEIGHT: 280 LBS | HEIGHT: 69 IN | OXYGEN SATURATION: 100 % | RESPIRATION RATE: 16 BRPM | HEART RATE: 66 BPM | DIASTOLIC BLOOD PRESSURE: 70 MMHG | BODY MASS INDEX: 41.47 KG/M2

## 2018-02-21 PROCEDURE — 25000132 ZZH RX MED GY IP 250 OP 250 PS 637: Performed by: OBSTETRICS & GYNECOLOGY

## 2018-02-21 RX ORDER — ACETAMINOPHEN 325 MG/1
650 TABLET ORAL EVERY 6 HOURS PRN
Qty: 100 TABLET | Refills: 0 | Status: SHIPPED | OUTPATIENT
Start: 2018-02-21 | End: 2018-04-02

## 2018-02-21 RX ORDER — IBUPROFEN 800 MG/1
800 TABLET, FILM COATED ORAL EVERY 6 HOURS PRN
Qty: 30 TABLET | Refills: 0 | Status: SHIPPED | OUTPATIENT
Start: 2018-02-21 | End: 2018-04-02

## 2018-02-21 RX ORDER — AMOXICILLIN 250 MG
1 CAPSULE ORAL 2 TIMES DAILY PRN
Qty: 100 TABLET | Refills: 0 | Status: SHIPPED | OUTPATIENT
Start: 2018-02-21 | End: 2018-04-02

## 2018-02-21 RX ADMIN — ACETAMINOPHEN 650 MG: 325 TABLET, FILM COATED ORAL at 06:44

## 2018-02-21 RX ADMIN — OXYCODONE HYDROCHLORIDE 5 MG: 5 TABLET ORAL at 02:26

## 2018-02-21 RX ADMIN — OXYCODONE HYDROCHLORIDE 5 MG: 5 TABLET ORAL at 10:48

## 2018-02-21 RX ADMIN — CYANOCOBALAMIN TAB 1000 MCG 1000 MCG: 1000 TAB at 08:14

## 2018-02-21 RX ADMIN — IBUPROFEN 800 MG: 400 TABLET ORAL at 12:23

## 2018-02-21 RX ADMIN — FLUTICASONE PROPIONATE 2 SPRAY: 50 SPRAY, METERED NASAL at 08:14

## 2018-02-21 RX ADMIN — ACETAMINOPHEN 650 MG: 325 TABLET, FILM COATED ORAL at 00:30

## 2018-02-21 RX ADMIN — OXYCODONE HYDROCHLORIDE 5 MG: 5 TABLET ORAL at 06:44

## 2018-02-21 RX ADMIN — ACETAMINOPHEN 650 MG: 325 TABLET, FILM COATED ORAL at 10:48

## 2018-02-21 RX ADMIN — IBUPROFEN 800 MG: 400 TABLET ORAL at 00:30

## 2018-02-21 RX ADMIN — IBUPROFEN 800 MG: 400 TABLET ORAL at 06:44

## 2018-02-21 RX ADMIN — SENNOSIDES AND DOCUSATE SODIUM 1 TABLET: 8.6; 5 TABLET ORAL at 08:14

## 2018-02-21 NOTE — PLAN OF CARE
Problem: Patient Care Overview  Goal: Plan of Care/Patient Progress Review  Outcome: Improving  Pt. Stable, with pain adequately controlled with current meds. Heat pack continued to be used for relief of back pain. Encouraged ambulation and hydration. VSS, Encouraged pt to call with questions and concerns, call light within reach, will continue to monitor.

## 2018-02-21 NOTE — PLAN OF CARE
Problem: Patient Care Overview  Goal: Plan of Care/Patient Progress Review  Outcome: Adequate for Discharge Date Met: 02/21/18  Patient rating back pain 5-7/10 as well as moderate uterine cramping.  Using aqua-k pad for comfort. Abdominal binder given to help with back. Taking ibuprofen and tylenol PO as well as oxycodone while in hospital.  Encouraged patient to call clinic if inadequate pain control at home with tylenol and ibuprofen. Prescription given for PO tylenol. Ibuprofen and senna prescriptions sent to own pharmacy per Dr. Garcia.  Breast pump given per patient request.  Using lanolin for tender nipples.  Discharge instructions explained to patient and all questions/concerns addressed.

## 2018-02-21 NOTE — PROGRESS NOTES
"S: Pt doing well. Infant is being . Pain is well controlled.    O: /65  Pulse 77  Temp 97.7  F (36.5  C) (Oral)  Resp 16  Ht 5' 9\" (1.753 m)  Wt 280 lb (127 kg)  LMP 05/16/2017  SpO2 100%  Breastfeeding? Unknown  BMI 41.35 kg/m2        ABD:  Uterine fundus is firm, non-tender and at the level of the umbilicus                Hemoglobin   Date Value Ref Range Status   02/20/2018 9.3 (L) 11.7 - 15.7 g/dL Final            Lab Results   Component Value Date    RH Pos 02/19/2018       A:  Post-partum day #2 s/p Normal spontaneous vaginal delivery    P: Continue PP Cares    "

## 2018-02-26 NOTE — TELEPHONE ENCOUNTER
Pt does not have PCP. Does have VItamin B12 for now but needs refill going forward. Routing to Dr. Garcia. OK to send for 30 days until she establishes care with PCP?  Nick wants to send non time released Vit B 12. Pt was taking Cyanocobalamin (B-12) 1000 MCG TBCR.

## 2018-02-26 NOTE — TELEPHONE ENCOUNTER
We don't manage her non pregnancy problems  She had a prior gastric bypass so her supplements relate to that and should be handled by her primary care clinic.

## 2018-04-02 ENCOUNTER — PRENATAL OFFICE VISIT (OUTPATIENT)
Dept: OBGYN | Facility: CLINIC | Age: 38
End: 2018-04-02
Payer: COMMERCIAL

## 2018-04-02 ENCOUNTER — RESULT FOLLOW UP (OUTPATIENT)
Dept: OBGYN | Facility: CLINIC | Age: 38
End: 2018-04-02

## 2018-04-02 VITALS — BODY MASS INDEX: 39.13 KG/M2 | WEIGHT: 265 LBS | SYSTOLIC BLOOD PRESSURE: 120 MMHG | DIASTOLIC BLOOD PRESSURE: 90 MMHG

## 2018-04-02 DIAGNOSIS — N87.9 CERVICAL DYSPLASIA: ICD-10-CM

## 2018-04-02 DIAGNOSIS — Z13.0 SCREENING, ANEMIA, DEFICIENCY, IRON: ICD-10-CM

## 2018-04-02 DIAGNOSIS — Z98.84 HISTORY OF GASTRIC BYPASS: ICD-10-CM

## 2018-04-02 DIAGNOSIS — N92.0 MENORRHAGIA WITH REGULAR CYCLE: Primary | ICD-10-CM

## 2018-04-02 DIAGNOSIS — B97.7 HPV IN FEMALE: ICD-10-CM

## 2018-04-02 DIAGNOSIS — Z30.011 OCP (ORAL CONTRACEPTIVE PILLS) INITIATION: ICD-10-CM

## 2018-04-02 PROBLEM — O09.529 HIGH-RISK PREGNANCY, ELDERLY MULTIGRAVIDA: Status: RESOLVED | Noted: 2017-07-17 | Resolved: 2018-04-02

## 2018-04-02 LAB
ERYTHROCYTE [DISTWIDTH] IN BLOOD BY AUTOMATED COUNT: 18.2 % (ref 10–15)
HCT VFR BLD AUTO: 34.5 % (ref 35–47)
HGB BLD-MCNC: 11 G/DL (ref 11.7–15.7)
MCH RBC QN AUTO: 26.9 PG (ref 26.5–33)
MCHC RBC AUTO-ENTMCNC: 31.9 G/DL (ref 31.5–36.5)
MCV RBC AUTO: 84 FL (ref 78–100)
PLATELET # BLD AUTO: 304 10E9/L (ref 150–450)
RBC # BLD AUTO: 4.09 10E12/L (ref 3.8–5.2)
WBC # BLD AUTO: 9.4 10E9/L (ref 4–11)

## 2018-04-02 PROCEDURE — 99207 ZZC POST PARTUM EXAM: CPT | Performed by: OBSTETRICS & GYNECOLOGY

## 2018-04-02 PROCEDURE — 85027 COMPLETE CBC AUTOMATED: CPT | Performed by: OBSTETRICS & GYNECOLOGY

## 2018-04-02 PROCEDURE — G0145 SCR C/V CYTO,THINLAYER,RESCR: HCPCS | Performed by: OBSTETRICS & GYNECOLOGY

## 2018-04-02 PROCEDURE — 82728 ASSAY OF FERRITIN: CPT | Performed by: OBSTETRICS & GYNECOLOGY

## 2018-04-02 PROCEDURE — 99215 OFFICE O/P EST HI 40 MIN: CPT | Performed by: OBSTETRICS & GYNECOLOGY

## 2018-04-02 PROCEDURE — 36415 COLL VENOUS BLD VENIPUNCTURE: CPT | Performed by: OBSTETRICS & GYNECOLOGY

## 2018-04-02 PROCEDURE — 83550 IRON BINDING TEST: CPT | Performed by: OBSTETRICS & GYNECOLOGY

## 2018-04-02 PROCEDURE — 87624 HPV HI-RISK TYP POOLED RSLT: CPT | Performed by: OBSTETRICS & GYNECOLOGY

## 2018-04-02 PROCEDURE — 83540 ASSAY OF IRON: CPT | Performed by: OBSTETRICS & GYNECOLOGY

## 2018-04-02 RX ORDER — ACETAMINOPHEN AND CODEINE PHOSPHATE 120; 12 MG/5ML; MG/5ML
1 SOLUTION ORAL DAILY
Qty: 84 TABLET | Refills: 3 | Status: SHIPPED | OUTPATIENT
Start: 2018-04-02 | End: 2018-06-12

## 2018-04-02 ASSESSMENT — ANXIETY QUESTIONNAIRES
3. WORRYING TOO MUCH ABOUT DIFFERENT THINGS: NOT AT ALL
7. FEELING AFRAID AS IF SOMETHING AWFUL MIGHT HAPPEN: SEVERAL DAYS
5. BEING SO RESTLESS THAT IT IS HARD TO SIT STILL: NOT AT ALL
6. BECOMING EASILY ANNOYED OR IRRITABLE: SEVERAL DAYS
IF YOU CHECKED OFF ANY PROBLEMS ON THIS QUESTIONNAIRE, HOW DIFFICULT HAVE THESE PROBLEMS MADE IT FOR YOU TO DO YOUR WORK, TAKE CARE OF THINGS AT HOME, OR GET ALONG WITH OTHER PEOPLE: SOMEWHAT DIFFICULT
GAD7 TOTAL SCORE: 3
2. NOT BEING ABLE TO STOP OR CONTROL WORRYING: NOT AT ALL
1. FEELING NERVOUS, ANXIOUS, OR ON EDGE: NOT AT ALL

## 2018-04-02 ASSESSMENT — PATIENT HEALTH QUESTIONNAIRE - PHQ9: 5. POOR APPETITE OR OVEREATING: SEVERAL DAYS

## 2018-04-02 NOTE — LETTER
April 24, 2019      Crystal Jeannettestephan Rose  [address] [facility] [address]    Dear MsPietroMilton,      At [place], your health and wellness is our primary concern. That is why we are following up on a colposcopy from 04/30/18. Your provider had recommended that you have a Pap smear and HPV test completed by 04/30/19. Our records do not show that this has been scheduled.    It is important to complete the follow up that your provider has suggested for you to ensure that there are no worsening changes which may, over time, develop into cancer.    Please contact our office at  [phone] to schedule an appointment for a Pap smear and HPV test at your earliest convenience. If you have questions or concerns, please call the clinic and we will be happy to assist you.    If you have completed the tests outside of [place], please have the results forwarded to our office. We will update the chart for your primary Physician to review before your next annual physical.     Thank you for choosing [place]!    Sincerely,      Your [place] Care Team/[facility]

## 2018-04-02 NOTE — MR AVS SNAPSHOT
After Visit Summary   4/2/2018    Crystal Rose    MRN: [id number]           Patient Information     Date Of Birth          1980        Visit Information        Provider Department      4/2/2018 10:30 AM Cee Garcia MD [facility]        Today's Diagnoses     Menorrhagia with regular cycle    -  1    Postpartum care and examination immediately after delivery        Screening, anemia, deficiency, iron        HPV in female        OCP (oral contraceptive pills) initiation        History of gastric bypass           Follow-ups after your visit        Who to contact     If you have questions or need follow up information about today's clinic visit or your schedule please contact [facility] directly at [phone].  Normal or non-critical lab and imaging results will be communicated to you by Balance Financialhart, letter or phone within 4 business days after the clinic has received the results. If you do not hear from us within 7 days, please contact the clinic through [employer] or phone. If you have a critical or abnormal lab result, we will notify you by phone as soon as possible.  Submit refill requests through [employer] or call your pharmacy and they will forward the refill request to us. Please allow 3 business days for your refill to be completed.          Additional Information About Your Visit        MyChart Information     [employer] gives you secure access to your electronic health record. If you see a primary care provider, you can also send messages to your care team and make appointments. If you have questions, please call your primary care clinic.  If you do not have a primary care provider, please call [phone] and they will assist you.        Care EveryWhere ID     This is your Care EveryWhere ID. This could be used by other organizations to access your [place] medical records  [id number]        Your Vitals Were     Last Period BMI (Body Mass  Index)                05/16/2017 39.13 kg/m2           Blood Pressure from Last 3 Encounters:   04/02/18 120/90   02/21/18 125/70   02/16/18 106/78    Weight from Last 3 Encounters:   04/02/18 265 lb (120.2 kg)   02/19/18 280 lb (127 kg)   02/16/18 282 lb (127.9 kg)              We Performed the Following     CBC with platelets     Ferritin     HPV High Risk Types DNA Cervical     Iron and iron binding capacity     Pap imaged thin layer screen with HPV - recommended age 30 - 65          Today's Medication Changes          These changes are accurate as of 4/2/18  7:03 PM.  If you have any questions, ask your nurse or doctor.               Start taking these medicines.        Dose/Directions    norethindrone 0.35 MG per tablet   Commonly known as:  MICRONOR   Used for:  OCP (oral contraceptive pills) initiation   Started by:  Cee Garcia MD        Dose:  1 tablet   Take 1 tablet (0.35 mg) by mouth daily   Quantity:  84 tablet   Refills:  3            Where to get your medicines      These medications were sent to [facility] [id number] [place] [address] & [facility]  [address] [facility] [address]    Hours:  24-hours Phone:  [phone]     norethindrone 0.35 MG per tablet                Primary Care Provider Office Phone # Fax #    Cee Garcia [phone] [address] [address]        Equal Access to Services     RADHA [facility] Hospital[facility]TE AH: Hadii aad ku hadasho Soomaali, waaxda luqadaha, qaybta kaalmada adeegyada, waxay sonya torres . So [facility] [phone].    ATENCIÓN: Si habla español, tiene a garber disposición servicios gratuitos de asistencia lingüística. Llame al [phone].    We comply with applicable federal civil rights laws and [place] laws. We do not discriminate on the basis of race, color, national origin, age, disability, sex, sexual orientation, or gender identity.             Thank you!     Thank you for choosing [facility]  for your care. Our goal is always to provide you with excellent care. Hearing back from our patients is one way we can continue to improve our services. Please take a few minutes to complete the written survey that you may receive in the mail after your visit with us. Thank you!             Your Updated Medication List - Protect others around you: Learn how to safely use, store and throw away your medicines at www.disposemymeds.org.          This list is accurate as of 4/2/18  7:03 PM.  Always use your most recent med list.                   Brand Name Dispense Instructions for use Diagnosis    * albuterol (2.5 MG/3ML) 0.083% neb solution     1 Box    Take 1 vial (2.5 mg) by nebulization every 4 hours as needed for shortness of breath / dyspnea    Moderate persistent asthma, unspecified whether complicated       * albuterol 108 (90 BASE) MCG/ACT Inhaler    PROAIR HFA/PROVENTIL HFA/VENTOLIN HFA    1 Inhaler    Inhale 2 puffs into the lungs every 4 hours as needed for shortness of breath / dyspnea or wheezing    Moderate persistent asthma, unspecified whether complicated       ascorbic acid 500 MG tablet    VITAMIN C    30 tablet    Take 1 tablet (500 mg) by mouth daily    History of gastric bypass, Anemia affecting pregnancy in third trimester       B-12 1000 MCG Tbcr     30 tablet    Take 1,000 mcg by mouth daily    Anemia affecting pregnancy in third trimester, History of gastric bypass       ferrous sulfate 220 (44 FE) MG/5ML Elix     120 mL    Take 5 mLs (220 mg) by mouth daily    History of gastric bypass, Anemia affecting pregnancy in third trimester       fluticasone 50 MCG/ACT spray    FLONASE    3 Bottle    Spray 1-2 sprays into both nostrils daily    Chronic allergic rhinitis, unspecified seasonality, unspecified trigger       montelukast 10 MG tablet    SINGULAIR    30 tablet    Take 1 tablet (10 mg) by mouth At Bedtime    Wheezing        norethindrone 0.35 MG per tablet    MICRONOR    84 tablet    Take 1 tablet (0.35 mg) by mouth daily    OCP (oral contraceptive pills) initiation       prenatal multivitamin  with iron 28-0.8 MG Tabs     100 tablet    Take 1 tablet by mouth daily    High-risk pregnancy, elderly multigravida, first trimester       valACYclovir 500 MG tablet    VALTREX    90 tablet    Take 1 tablet (500 mg) by mouth daily    Herpes simplex infection of genitourinary system       * Notice:  This list has 2 medication(s) that are the same as other medications prescribed for you. Read the directions carefully, and ask your doctor or other care provider to review them with you.

## 2018-04-02 NOTE — PROGRESS NOTES
SUBJECTIVE:  Crystal Rose,  is here for a postpartum visit.  She had a  on 18 delivering a healthy baby boy, named Gregg weighing 9 lbs 4 oz at term.      HPI:  Patient has history of significant anemia, most likely related to malabsorption and menorrhagia  Her periods usually last 7-10 days and are heavy the entire time  She is still postpartum so periods haven't returned yet but she has a long history of serious menorrhagia  Prior attempt at laproscopic BTL failed due to laceration of inferior epigastric vessel prob related to very loose abdominal skin after 100 lb wt loss after gastric bypass.  She developed large severe post op hematoma ( football size) that took months to resolve and pain clinic management.   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  She has had significant side effects with non permanent contraceptive methods so really doesn't want IUD, OCP or depo.   She has had several unplanned pregnancies.         Last PHQ-9 score on record=   PHQ-9 SCORE 2018   Total Score 7     TERESA-7 SCORE 2017   Total Score 1 3       Delivery complications:  No  Breast feeding:  Yes,   Bladder problems:  No  Bowel problems/hemorrhoids:  No  Episiotomy/laceration/incision healed? Yes:   Vaginal flow:  None  [place]:  No  Contraception: unsure  Emotional adjustment:  doing well, happy and having some difficulties  adjusting  Back to work: 4/9/18    12 point review of systems negative other than symptoms noted below.  Head: Nasal Congestion  Breast: Tenderness  Respiratory: Shortness of Breath and Wheezing  Endocrine: Excessive Thirst  Psychiatric: Depression and [place]    OBJECTIVE:  Vitals: /90  Wt 265 lb (120.2 kg)  LMP 05/16/2017  BMI 39.13 kg/m2  BMI= Body mass index is 39.13 kg/(m^2).  General - pleasant female in no acute distress.  Breast -  deferred  Abdomen - No incision  Pelvic - EG: normal adult female, BUS: within normal limits, Vagina: well rugated, no discharge, Cervix: no lesions or CMT, Uterus: firm, normal sized and nontender, midplane in position. Adnexae: no masses or tenderness.    Very redundant vaginal skin, extremely deep vagina  Rectovaginal - deferred.    ASSESSMENT:    ICD-10-CM    1. Menorrhagia with regular cycle N92.0    2. Postpartum care and examination immediately after delivery Z39.0 Pap imaged thin layer screen with HPV - recommended age 30 - 65     HPV High Risk Types DNA Cervical   3. Screening, anemia, deficiency, iron Z13.0 CBC with platelets     Ferritin     Iron and iron binding capacity   4. HPV in female B97.7    5. OCP (oral contraceptive pills) initiation Z30.011 norethindrone (MICRONOR) 0.35 MG per tablet   6. History of gastric bypass Z98.890        PLAN:  May resume normal activities without restrictions.  Pap smear was done today.  hgb and iron studies done today since would need iron infusions if very anemic.   Hgb came back with only mild anemia at 11 so that is good news.     Starting micronor ocp  Today until hysterectomy completed  Discussed patient needs to use condoms in addition since she has had multiple contraceptive failures already    If pap or hpv abnormal will need colposcopy before deciding surgery method  Will schedule with Dr Pinedo if she needs colposcopy   Plan to refer to him for definitive surgery so will need consult prior to ultimate decision in  terms of best route.   Planning either LAVH with salpingectomy or may require open case if not enough cervix remaining from prior cervical procedures  We plan to call patient after pap and hpv results return    Long discussion today with patient over all her medical issues: menorrhagia, recurrent dysplasia and hpv, history of anemia, body habitus complicating surgical approach    Face to face 45 minute, more than 50% counceling  In addition to her postpartum visit     Full counseling was provided, and all questions were answered.   Return to clinic in one year for an annual visit.   Cee Garcia MD

## 2018-04-03 LAB
FERRITIN SERPL-MCNC: 7 NG/ML (ref 12–150)
IRON SATN MFR SERPL: 12 % (ref 15–46)
IRON SERPL-MCNC: 53 UG/DL (ref 35–180)
TIBC SERPL-MCNC: 440 UG/DL (ref 240–430)

## 2018-04-03 ASSESSMENT — ANXIETY QUESTIONNAIRES: GAD7 TOTAL SCORE: 3

## 2018-04-03 ASSESSMENT — PATIENT HEALTH QUESTIONNAIRE - PHQ9: SUM OF ALL RESPONSES TO PHQ QUESTIONS 1-9: 7

## 2018-04-04 LAB
COPATH REPORT: NORMAL
PAP: NORMAL

## 2018-04-05 PROBLEM — N87.9 CERVICAL DYSPLASIA: Status: ACTIVE | Noted: 2018-04-05

## 2018-04-05 LAB
FINAL DIAGNOSIS: ABNORMAL
HPV HR 12 DNA CVX QL NAA+PROBE: NEGATIVE
HPV16 DNA SPEC QL NAA+PROBE: NEGATIVE
HPV18 DNA SPEC QL NAA+PROBE: POSITIVE
SPECIMEN DESCRIPTION: ABNORMAL
SPECIMEN SOURCE CVX/VAG CYTO: ABNORMAL

## 2018-04-05 NOTE — PROGRESS NOTES
Age 19 yrs.  Cryotherapy? SUMI I, SUMI II, treated with Leep     3/2/08 ASCUS pap/+ HR HPV  3/12/09 ASCUS pap/+ HR HPV  7/25/11 ASCUS/+ HR HPV >> 10/19/11 Colpo: Bx-cervicitis, ECC-bening.   10/24/12 NIL pap/+ HR HPV  12/18/13 NIL pap/+ HR HPV >> 1/27/14 Colpo: Bx-SUMI 1  3/13/14 LEEP conization- SUMI 1  4/2/18 NIL pap/+ HR HPV 18. Plan: colposcopy by 7/2/18 4/6/18 left msg/advised pt of result and follow up  4/30/18 Colpo ECC-neg.  Dx NIL pap/Neg HPV.  Plan: cotest in 1 year  04/17/19 [place] cotest reminder message sent. ([facility])  04/24/19 MyChart not read, letter sent. ([facility])  05/15/19 [facility] clinic and schedule. ([facility])  06/12/19 Patient is lost to pap tracking follow-up. FYI routed to provider. ([facility])

## 2018-04-07 ENCOUNTER — CARE COORDINATION (OUTPATIENT)
Dept: OBGYN | Facility: CLINIC | Age: 38
End: 2018-04-07

## 2018-04-30 ENCOUNTER — TELEPHONE (OUTPATIENT)
Dept: OBGYN | Facility: CLINIC | Age: 38
End: 2018-04-30

## 2018-04-30 ENCOUNTER — OFFICE VISIT (OUTPATIENT)
Dept: OBGYN | Facility: CLINIC | Age: 38
End: 2018-04-30
Payer: COMMERCIAL

## 2018-04-30 VITALS
BODY MASS INDEX: 39.25 KG/M2 | HEIGHT: 69 IN | WEIGHT: 265 LBS | DIASTOLIC BLOOD PRESSURE: 76 MMHG | SYSTOLIC BLOOD PRESSURE: 120 MMHG

## 2018-04-30 DIAGNOSIS — B97.7 HIGH RISK HPV INFECTION: Primary | ICD-10-CM

## 2018-04-30 PROCEDURE — 87624 HPV HI-RISK TYP POOLED RSLT: CPT | Performed by: OBSTETRICS & GYNECOLOGY

## 2018-04-30 PROCEDURE — 57456 ENDOCERV CURETTAGE W/SCOPE: CPT | Performed by: OBSTETRICS & GYNECOLOGY

## 2018-04-30 PROCEDURE — 88305 TISSUE EXAM BY PATHOLOGIST: CPT | Performed by: OBSTETRICS & GYNECOLOGY

## 2018-04-30 PROCEDURE — G0145 SCR C/V CYTO,THINLAYER,RESCR: HCPCS | Performed by: OBSTETRICS & GYNECOLOGY

## 2018-04-30 NOTE — LETTER
May 8, 2018    Crystal Rose  [address]   [place] [address]    Dear Crystal,  We are happy to inform you that your PAP smear done during your colposcopy on 4/30/18 is normal.  We are now able to do a follow up test on PAP smears. The DNA test is for HPV (Human Papilloma Virus). Cervical cancer is closely linked with certain types of HPV. Your results showed no evidence of high risk HPV.  Therefore we recommend you return in 1 year for your next pap smear and HPV test.  You will still need to return to the clinic every year for an annual exam and other preventive tests.  Please contact the clinic at [phone] with any questions.  Sincerely,    Franki Pinedo MD/thai

## 2018-04-30 NOTE — MR AVS SNAPSHOT
"              After Visit Summary   4/30/2018    Crystal Rose    MRN: [id number]           Patient Information     Date Of Birth          1980        Visit Information        Provider Department      4/30/2018 2:45 PM Franki Pinedo MD; WE COLPOSCOPE 2 [facility]        Today's Diagnoses     High risk HPV infection    -  1       Follow-ups after your visit        Who to contact     If you have questions or need follow up information about today's clinic visit or your schedule please contact [facility] directly at [phone].  Normal or non-critical lab and imaging results will be communicated to you by Cash4[employer]hart, letter or phone within 4 business days after the clinic has received the results. If you do not hear from us within 7 days, please contact the clinic through [employer]hart or phone. If you have a critical or abnormal lab result, we will notify you by phone as soon as possible.  Submit refill requests through [employer] or call your pharmacy and they will forward the refill request to us. Please allow 3 business days for your refill to be completed.          Additional Information About Your Visit        MyChart Information     [employer] gives you secure access to your electronic health record. If you see a primary care provider, you can also send messages to your care team and make appointments. If you have questions, please call your primary care clinic.  If you do not have a primary care provider, please call [phone] and they will assist you.        Care EveryWhere ID     This is your Care EveryWhere ID. This could be used by other organizations to access your [place] medical records  [id number]        Your Vitals Were     Height Breastfeeding? BMI (Body Mass Index)             5' 9\" (1.753 m) Yes 39.13 kg/m2          Blood Pressure from Last 3 Encounters:   04/30/18 120/76   04/02/18 120/90   02/21/18 125/70    Weight from Last 3 Encounters: "   04/30/18 265 lb (120.2 kg)   04/02/18 265 lb (120.2 kg)   02/19/18 280 lb (127 kg)              We Performed the Following     COLPOSCOPY CERVIX/UPPER VAGINA W BX CERVIX     Pap imaged thin layer diagnostic with HPV (select HPV order below)     Surgical pathology exam        Primary Care Provider Office Phone # Fax #    Cee Garcia [phone] [address]  [facility] [facility] [address]        Equal Access to Services     RADHA JOHN : Hadii aad ku hadasho Soomaali, waaxda luqadaha, qaybta kaalmada adeegyada, waxay idiin hayaan adeglen torres . So [facility] [phone].    ATENCIÓN: Si habla español, tiene a garber disposición servicios gratuitos de asistencia lingüística. LlUniversity Hospitals Health System [phone].    We comply with applicable federal civil rights laws and [place] laws. We do not discriminate on the basis of race, color, national origin, age, disability, sex, sexual orientation, or gender identity.            Thank you!     Thank you for choosing [facility]  for your care. Our goal is always to provide you with excellent care. Hearing back from our patients is one way we can continue to improve our services. Please take a few minutes to complete the written survey that you may receive in the mail after your visit with us. Thank you!             Your Updated Medication List - Protect others around you: Learn how to safely use, store and throw away your medicines at www.disposemymeds.org.          This list is accurate as of 4/30/18  3:02 PM.  Always use your most recent med list.                   Brand Name Dispense Instructions for use Diagnosis    * albuterol (2.5 MG/3ML) 0.083% neb solution     1 Box    Take 1 vial (2.5 mg) by nebulization every 4 hours as needed for shortness of breath / dyspnea    Moderate persistent asthma, unspecified whether complicated       * albuterol 108 (90 Base) MCG/ACT Inhaler    PROAIR HFA/PROVENTIL HFA/VENTOLIN HFA    1 Inhaler     Inhale 2 puffs into the lungs every 4 hours as needed for shortness of breath / dyspnea or wheezing    Moderate persistent asthma, unspecified whether complicated       ascorbic acid 500 MG tablet    VITAMIN C    30 tablet    Take 1 tablet (500 mg) by mouth daily    History of gastric bypass, Anemia affecting pregnancy in third trimester       B-12 1000 MCG Tbcr     30 tablet    Take 1,000 mcg by mouth daily    Anemia affecting pregnancy in third trimester, History of gastric bypass       ferrous sulfate 220 (44 Fe) MG/5ML Elix     120 mL    Take 5 mLs (220 mg) by mouth daily    History of gastric bypass, Anemia affecting pregnancy in third trimester       fluticasone 50 MCG/ACT spray    FLONASE    3 Bottle    Spray 1-2 sprays into both nostrils daily    Chronic allergic rhinitis, unspecified seasonality, unspecified trigger       montelukast 10 MG tablet    SINGULAIR    30 tablet    Take 1 tablet (10 mg) by mouth At Bedtime    Wheezing       norethindrone 0.35 MG per tablet    MICRONOR    84 tablet    Take 1 tablet (0.35 mg) by mouth daily    OCP (oral contraceptive pills) initiation       prenatal multivitamin  with iron 28-0.8 MG Tabs     100 tablet    Take 1 tablet by mouth daily    High-risk pregnancy, elderly multigravida, first trimester       valACYclovir 500 MG tablet    VALTREX    90 tablet    Take 1 tablet (500 mg) by mouth daily    Herpes simplex infection of genitourinary system       * Notice:  This list has 2 medication(s) that are the same as other medications prescribed for you. Read the directions carefully, and ask your doctor or other care provider to review them with you.

## 2018-04-30 NOTE — PROGRESS NOTES
INDICATIONS:                                                    Is a pregnancy test required: No.  Was a consent obtained?  Yes      Crystal Rose, is a 37 year old female, who had a recent WNL pap.  HPV 18 (+)pos.  Yes prior history of abnormal pap. Here today for colposcopy. Discussed indication, risks of infection and bleeding.    Her last pap was   Lab Results   Component Value Date    PAP NIL 04/02/2018    .    PROCEDURE:                                                      Cervix is stained with acetic acid and viewed colposcopically. Squamocolumnar junction is visualized in it's entirity. no visible lesions . Biopsy done No. Endocervical curretage Done         POST PROCEDURE:                                                      IMPRESSION; no visible lesions identified.  With past history of dysplasia and conization of the cervix we will have to have a clear ECC and Pap before discussing hysteroscopy endometrial ablation and laparoscopic tubal ligation    PLAN : Await the results of the biopsies. She  tolerated the procedure well. There were no complications. Patient was discharged in stable condition.    Patient advised to call the clinic if excessive bleeding, pelvic pain, or fever.     Follow-up plan based on pathology results.    Franki Pinedo MD

## 2018-04-30 NOTE — TELEPHONE ENCOUNTER
Order Questions      Question Answer Comment     Procedure name(s) - multi select Hysteroscopy, curettage, endometrial ablation, laparoscopic tubal ligation      Reason for procedure Menorrhagia, sterilization      Is this a multi surgeon case? No      Laterality Bilateral      Request for additional equipment Other (see comments) None     Anesthesia General      Initiate Pre-op orders for above procedure: Yes, as ordered in Epic Additional orders noted there also     Location of Case: [facility] OR      Surgeon Procedure Time (incision to closure) in minutes (per procedure as applicable) 45      Note:  Surgical Case Time Needed (in minutes)     Patient Class (for admit prior to surgery, specify number of days in comments): Same day (hospital outpatient)      Why can t this outpatient surgery be done at the [facility] or  ASC? equipment      Vendor Needed? No

## 2018-05-02 LAB
COPATH REPORT: NORMAL
COPATH REPORT: NORMAL
PAP: NORMAL

## 2018-05-04 LAB
FINAL DIAGNOSIS: NORMAL
HPV HR 12 DNA CVX QL NAA+PROBE: NEGATIVE
HPV16 DNA SPEC QL NAA+PROBE: NEGATIVE
HPV18 DNA SPEC QL NAA+PROBE: NEGATIVE
SPECIMEN DESCRIPTION: NORMAL
SPECIMEN SOURCE CVX/VAG CYTO: NORMAL

## 2018-05-22 NOTE — TELEPHONE ENCOUNTER
Pt called stating that we have been playing phone tag and that she hasnt left a msg because she thinks its annoying to leave msgs. If she cant get a hold of me.  CB pt and LVM to give me a call and she is more than welcome to leave some dates on my vm if in fact she does get my vm again.    Liz Calderón  Surgery Scheduler

## 2018-05-23 NOTE — TELEPHONE ENCOUNTER
Type of surgery: [facility] AROLDO ROBISONASURE LSC BTL  Location of surgery: [facility]  Date and time of surgery: 6/14/2018 9:40am ARRIVAL 8am  Surgeon: SIMRAN Pinedo  Pre-Op Appt Date: 5/30/2018  Post-Op Appt Date: TBD   Packet sent out: MAILED 5/23/2018  Pre-cert/Authorization completed:  TBD  Date: 5/23/2018    Liz Calderón  Surgery Scheduler

## 2018-06-07 ENCOUNTER — OFFICE VISIT (OUTPATIENT)
Dept: URGENT CARE | Facility: URGENT CARE | Age: 38
End: 2018-06-07
Payer: COMMERCIAL

## 2018-06-07 VITALS
DIASTOLIC BLOOD PRESSURE: 82 MMHG | OXYGEN SATURATION: 95 % | WEIGHT: 269 LBS | RESPIRATION RATE: 20 BRPM | TEMPERATURE: 98.2 F | BODY MASS INDEX: 39.72 KG/M2 | SYSTOLIC BLOOD PRESSURE: 137 MMHG | HEART RATE: 93 BPM

## 2018-06-07 DIAGNOSIS — J45.41 MODERATE PERSISTENT ASTHMA WITH ACUTE EXACERBATION: Primary | ICD-10-CM

## 2018-06-07 PROCEDURE — 99214 OFFICE O/P EST MOD 30 MIN: CPT | Performed by: NURSE PRACTITIONER

## 2018-06-07 RX ORDER — ALBUTEROL SULFATE 90 UG/1
2 AEROSOL, METERED RESPIRATORY (INHALATION) EVERY 6 HOURS PRN
Qty: 1 INHALER | Refills: 0 | Status: SHIPPED | OUTPATIENT
Start: 2018-06-07 | End: 2018-06-12

## 2018-06-07 RX ORDER — PREDNISONE 20 MG/1
20 TABLET ORAL 2 TIMES DAILY
Qty: 10 TABLET | Refills: 0 | Status: SHIPPED | OUTPATIENT
Start: 2018-06-07 | End: 2019-02-05

## 2018-06-07 ASSESSMENT — ENCOUNTER SYMPTOMS
WHEEZING: 1
DIARRHEA: 0
SORE THROAT: 0
CHILLS: 0
COUGH: 1
SINUS PRESSURE: 1
FEVER: 0
HEADACHES: 0
NAUSEA: 0
SHORTNESS OF BREATH: 0
RHINORRHEA: 1
VOMITING: 0

## 2018-06-07 NOTE — PATIENT INSTRUCTIONS
"  Asthma (Adult)  Asthma is a disease where the medium and  small air passages within the lung go into spasm and restrict the flow of air. Inflammation and swelling of the airways cause further blockage. During an acute asthma attack, these factors cause trouble breathing, wheezing, cough and chest tightness.    An asthma attack can be triggered by many things. Common triggers include infections such as the common cold, bronchitis, and pneumonia. Irritants such as smoke or pollutants in the air, very cold air, emotional upset, and exercise can also trigger an attack. In many adults with asthma, allergies to dust, mold, pollen and animal dander can cause an asthma attack. Skipping doses of daily asthma medicine can also bring on an asthma attack.  Asthma can be controlled using the proper medicines prescribed by your healthcare provider and avoiding exposure to known triggers including allergens and irritants.  Home care    Take prescribed medicine exactly at the times advised. If you need medicine such as from a hand held inhaler or aerosol breathing machine more than every 4 hours, contact your healthcare provider or seek immediate medical attention. If prescribed an antibiotic or prednisone, take all of the medicine as prescribed, even if you are feeling better after a few days.    Don't smoke. Avoid being exposed to the smoke of others.    Some people with asthma have worsening of their symptoms when they take aspirin and non-steroidal or fever-reducing medicines like ibuprofen and naproxen. Talk to your healthcare provider if you think this may apply to you.  Follow-up care  Follow up with your healthcare provider, or as advised. Always bring all of your current medicines to any appointments with your healthcare provider. Also bring a complete list of medicines even those not taken for asthma. If you don't already have one, talk to your healthcare provider about developing your own \"Asthma Action Plan.\"  A " pneumococcal (pneumonia) vaccine and yearly flu shot (every fall) are recommended. Ask your doctor about this.  When to seek medical advice  Call your healthcare provider right away if any of these occur:     Increased wheezing or shortness of breath    Need to use your inhalers more often than usual without relief    Fever of 100.4 F (38 C) or higher, or as directed by your healthcare provider    Coughing up lots of dark-colored or bloody sputum (mucus)    Chest pain with each breath    If you use a peak flow meter as part of an Asthma Action Plan, and you are still in the yellow zone (50% to 80%) 15 minutes after using inhaler medicine.  Call 911  Call 911 if any of the following occur    Trouble walking or talking because of shortness of breath    If you use a peak flow meter as part of an Asthma Action Plan and you are still in the red zone (less than 50%) 15 minutes after using inhaler medicine    Lips or fingernails turning gray or blue  Date Last Reviewed: 5/1/2017 2000-2017 [employer]. [address] [place] [address], [place] [address]. All rights reserved. This information is not intended as a substitute for professional medical care. Always follow your healthcare professional's instructions.

## 2018-06-07 NOTE — MR AVS SNAPSHOT
After Visit Summary   6/7/2018    Crystal Rose    MRN: [id number]           Patient Information     Date Of Birth          1980        Visit Information        Provider Department      6/7/2018 4:45 PM Maribel Leiva NP [facility]        Today's Diagnoses     Moderate persistent asthma with acute exacerbation    -  1      Care Instructions      Asthma (Adult)  Asthma is a disease where the medium and  small air passages within the lung go into spasm and restrict the flow of air. Inflammation and swelling of the airways cause further blockage. During an acute asthma attack, these factors cause trouble breathing, wheezing, cough and chest tightness.    An asthma attack can be triggered by many things. Common triggers include infections such as the common cold, bronchitis, and pneumonia. Irritants such as smoke or pollutants in the air, very cold air, emotional upset, and exercise can also trigger an attack. In many adults with asthma, allergies to dust, mold, pollen and animal dander can cause an asthma attack. Skipping doses of daily asthma medicine can also bring on an asthma attack.  Asthma can be controlled using the proper medicines prescribed by your healthcare provider and avoiding exposure to known triggers including allergens and irritants.  Home care    Take prescribed medicine exactly at the times advised. If you need medicine such as from a hand held inhaler or aerosol breathing machine more than every 4 hours, contact your healthcare provider or seek immediate medical attention. If prescribed an antibiotic or prednisone, take all of the medicine as prescribed, even if you are feeling better after a few days.    Don't smoke. Avoid being exposed to the smoke of others.    Some people with asthma have worsening of their symptoms when they take aspirin and non-steroidal or fever-reducing medicines like ibuprofen and naproxen. Talk to your healthcare provider if  "you think this may apply to you.  Follow-up care  Follow up with your healthcare provider, or as advised. Always bring all of your current medicines to any appointments with your healthcare provider. Also bring a complete list of medicines even those not taken for asthma. If you don't already have one, talk to your healthcare provider about developing your own \"Asthma Action Plan.\"  A pneumococcal (pneumonia) vaccine and yearly flu shot (every fall) are recommended. Ask your doctor about this.  When to seek medical advice  Call your healthcare provider right away if any of these occur:     Increased wheezing or shortness of breath    Need to use your inhalers more often than usual without relief    Fever of 100.4 F (38 C) or higher, or as directed by your healthcare provider    Coughing up lots of dark-colored or bloody sputum (mucus)    Chest pain with each breath    If you use a peak flow meter as part of an Asthma Action Plan, and you are still in the yellow zone (50% to 80%) 15 minutes after using inhaler medicine.  Call 911  Call 911 if any of the following occur    Trouble walking or talking because of shortness of breath    If you use a peak flow meter as part of an Asthma Action Plan and you are still in the red zone (less than 50%) 15 minutes after using inhaler medicine    Lips or fingernails turning gray or blue  Date Last Reviewed: 5/1/2017 2000-2017 [employer]. [address] [place] [address]. All rights reserved. This information is not intended as a substitute for professional medical care. Always follow your healthcare professional's instructions.                Follow-ups after your visit        Your next 10 appointments already scheduled     Jun 12, 2018  4:40 PM CDT   Pre-Op physical with SHERIE Aguiar CNP   [facility]    [address] [address] [phone]            " Jun 14, 2018   Procedure with Franki Pinedo MD   [facility] (--)    [address]  [place] [address] [phone]              Who to contact     If you have questions or need follow up information about today's clinic visit or your schedule please contact [facility] directly at [phone].  Normal or non-critical lab and imaging results will be communicated to you by ISpeakhart, letter or phone within 4 business days after the clinic has received the results. If you do not hear from us within 7 days, please contact the clinic through Ve[employer] or phone. If you have a critical or abnormal lab result, we will notify you by phone as soon as possible.  Submit refill requests through [employer] or call your pharmacy and they will forward the refill request to us. Please allow 3 business days for your refill to be completed.          Additional Information About Your Visit        ISpeakharVirtual City Information     [employer] gives you secure access to your electronic health record. If you see a primary care provider, you can also send messages to your care team and make appointments. If you have questions, please call your primary care clinic.  If you do not have a primary care provider, please call [phone] and they will assist you.        Care EveryWhere ID     This is your Care EveryWhere ID. This could be used by other organizations to access your [place] medical records  [id number]        Your Vitals Were     Pulse Temperature Respirations Pulse Oximetry BMI (Body Mass Index)       93 98.2  F (36.8  C) (Oral) 20 95% 39.72 kg/m2        Blood Pressure from Last 3 Encounters:   06/07/18 137/82   04/30/18 120/76   04/02/18 120/90    Weight from Last 3 Encounters:   06/07/18 269 lb (122 kg)   04/30/18 265 lb (120.2 kg)   04/02/18 265 lb (120.2 kg)              Today, you had the following     No orders found for display         Today's Medication Changes          These  changes are accurate as of 6/7/18  5:31 PM.  If you have any questions, ask your nurse or doctor.               Start taking these medicines.        Dose/Directions    predniSONE 20 MG tablet   Commonly known as:  DELTASONE   Used for:  Moderate persistent asthma with acute exacerbation   Started by:  Maribel Leiva NP        Dose:  20 mg   Take 1 tablet (20 mg) by mouth 2 times daily for 5 days   Quantity:  10 tablet   Refills:  0         These medicines have changed or have updated prescriptions.        Dose/Directions    * albuterol (2.5 MG/3ML) 0.083% neb solution   This may have changed:  Another medication with the same name was added. Make sure you understand how and when to take each.   Used for:  Moderate persistent asthma, unspecified whether complicated   Changed by:  Maribel Leiva NP        Dose:  1 vial   Take 1 vial (2.5 mg) by nebulization every 4 hours as needed for shortness of breath / dyspnea   Quantity:  1 Box   Refills:  0       * albuterol 108 (90 Base) MCG/ACT Inhaler   Commonly known as:  PROAIR HFA/PROVENTIL HFA/VENTOLIN HFA   This may have changed:  Another medication with the same name was added. Make sure you understand how and when to take each.   Used for:  Moderate persistent asthma, unspecified whether complicated   Changed by:  Maribel Leiva NP        Dose:  2 puff   Inhale 2 puffs into the lungs every 4 hours as needed for shortness of breath / dyspnea or wheezing   Quantity:  1 Inhaler   Refills:  1       * albuterol 108 (90 Base) MCG/ACT Inhaler   Commonly known as:  PROAIR HFA/PROVENTIL HFA/VENTOLIN HFA   This may have changed:  You were already taking a medication with the same name, and this prescription was added. Make sure you understand how and when to take each.   Used for:  Moderate persistent asthma with acute exacerbation   Changed by:  Maribel Leiva NP        Dose:  2 puff   Inhale 2 puffs into the lungs every 6 hours as needed   Quantity:  1 Inhaler   Refills:  0       *  Notice:  This list has 3 medication(s) that are the same as other medications prescribed for you. Read the directions carefully, and ask your doctor or other care provider to review them with you.         Where to get your medicines      These medications were sent to [id number] - [place], [place] - [address] [facility] [address] [address] [facility] [address]  [address], [place] [address]    Hours:  24-hours Phone:  [phone]     albuterol 108 (90 Base) MCG/ACT Inhaler    predniSONE 20 MG tablet                Primary Care Provider Office Phone # Fax #    Cee Garcia [phone] [address]  [place] [address]        Equal Access to Services     LUPE JOHN : Hadii angeles hernandez hadasho Soomaali, waaxda luqadaha, qaybta kaalmada adeegyada, criss torres . So [facility] [phone].    ATENCIÓN: Si habla español, tiene a garber disposición servicios gratuitos de asistencia lingüística. [facility] [phone].    We comply with applicable federal civil rights laws and [place] laws. We do not discriminate on the basis of race, color, national origin, age, disability, sex, sexual orientation, or gender identity.            Thank you!     Thank you for choosing [facility]  for your care. Our goal is always to provide you with excellent care. Hearing back from our patients is one way we can continue to improve our services. Please take a few minutes to complete the written survey that you may receive in the mail after your visit with us. Thank you!             Your Updated Medication List - Protect others around you: Learn how to safely use, store and throw away your medicines at www.disposemymeds.org.          This list is accurate as of 6/7/18  5:31 PM.  Always use your most recent med list.                   Brand Name Dispense Instructions for use Diagnosis    * albuterol (2.5 MG/3ML) 0.083% neb solution     1 Box     Take 1 vial (2.5 mg) by nebulization every 4 hours as needed for shortness of breath / dyspnea    Moderate persistent asthma, unspecified whether complicated       * albuterol 108 (90 Base) MCG/ACT Inhaler    PROAIR HFA/PROVENTIL HFA/VENTOLIN HFA    1 Inhaler    Inhale 2 puffs into the lungs every 4 hours as needed for shortness of breath / dyspnea or wheezing    Moderate persistent asthma, unspecified whether complicated       * albuterol 108 (90 Base) MCG/ACT Inhaler    PROAIR HFA/PROVENTIL HFA/VENTOLIN HFA    1 Inhaler    Inhale 2 puffs into the lungs every 6 hours as needed    Moderate persistent asthma with acute exacerbation       ascorbic acid 500 MG tablet    VITAMIN C    30 tablet    Take 1 tablet (500 mg) by mouth daily    History of gastric bypass, Anemia affecting pregnancy in third trimester       B-12 1000 MCG Tbcr     30 tablet    Take 1,000 mcg by mouth daily    Anemia affecting pregnancy in third trimester, History of gastric bypass       ferrous sulfate 220 (44 Fe) MG/5ML Elix     120 mL    Take 5 mLs (220 mg) by mouth daily    History of gastric bypass, Anemia affecting pregnancy in third trimester       fluticasone 50 MCG/ACT spray    FLONASE    3 Bottle    Spray 1-2 sprays into both nostrils daily    Chronic allergic rhinitis, unspecified seasonality, unspecified trigger       montelukast 10 MG tablet    SINGULAIR    30 tablet    Take 1 tablet (10 mg) by mouth At Bedtime    Wheezing       norethindrone 0.35 MG per tablet    MICRONOR    84 tablet    Take 1 tablet (0.35 mg) by mouth daily    OCP (oral contraceptive pills) initiation       predniSONE 20 MG tablet    DELTASONE    10 tablet    Take 1 tablet (20 mg) by mouth 2 times daily for 5 days    Moderate persistent asthma with acute exacerbation       prenatal multivitamin  with iron 28-0.8 MG Tabs     100 tablet    Take 1 tablet by mouth daily    High-risk pregnancy, elderly multigravida, first trimester       valACYclovir 500 MG tablet     VALTREX    90 tablet    Take 1 tablet (500 mg) by mouth daily    Herpes simplex infection of genitourinary system       * Notice:  This list has 3 medication(s) that are the same as other medications prescribed for you. Read the directions carefully, and ask your doctor or other care provider to review them with you.

## 2018-06-08 ENCOUNTER — TELEPHONE (OUTPATIENT)
Dept: URGENT CARE | Facility: URGENT CARE | Age: 38
End: 2018-06-08

## 2018-06-08 NOTE — TELEPHONE ENCOUNTER
Nick is sending a drug change request stating Proventil HFA INH is not covered by patients plan.    Fax says alternative is Proair HFA, Ventolin HFA, Proair respiclick.    Please contact pharmacy with questions.

## 2018-06-08 NOTE — TELEPHONE ENCOUNTER
Please call and clarify with pharmacy the fax message below. Prescription sent on 6/7/18 is for Albuterol (ProAir HFA, Proventil HFA or Ventolin HFA). Pharmacy should fill which ever is covered for patient. This is the script that is always sent for patients. Please inquire why unable to give Ventolin or ProAir based off this prescription, thank you.     Disp Refills Start End      albuterol (PROAIR HFA/PROVENTIL HFA/VENTOLIN HFA) 108 (90 Base) MCG/ACT Inhaler 1 Inhaler 0 6/7/2018 6/21/2018    Sig - Route: Inhale 2 puffs into the lungs every 6 hours as needed - Inhalation    Class: E-Prescribe    Order: [id number]    E-Prescribing Status: Receipt confirmed by pharmacy (6/7/2018  5:31 PM CDT)      Reed Krause [phone]    Lisette Holliday RN  [facility]

## 2018-06-08 NOTE — TELEPHONE ENCOUNTER
It was taken care of already(spoke to pharmacist direction) and pt picked up Ventolin HFA.    Jeannette Anderson, CMA

## 2018-06-12 ENCOUNTER — OFFICE VISIT (OUTPATIENT)
Dept: FAMILY MEDICINE | Facility: CLINIC | Age: 38
End: 2018-06-12
Payer: COMMERCIAL

## 2018-06-12 VITALS
HEART RATE: 77 BPM | BODY MASS INDEX: 40.61 KG/M2 | TEMPERATURE: 98.1 F | SYSTOLIC BLOOD PRESSURE: 130 MMHG | WEIGHT: 275 LBS | DIASTOLIC BLOOD PRESSURE: 82 MMHG | OXYGEN SATURATION: 98 %

## 2018-06-12 DIAGNOSIS — D50.0 IRON DEFICIENCY ANEMIA DUE TO CHRONIC BLOOD LOSS: ICD-10-CM

## 2018-06-12 DIAGNOSIS — Z01.818 PREOP GENERAL PHYSICAL EXAM: Primary | ICD-10-CM

## 2018-06-12 DIAGNOSIS — Z98.84 S/P GASTRIC BYPASS: ICD-10-CM

## 2018-06-12 DIAGNOSIS — N92.0 MENORRHAGIA WITH REGULAR CYCLE: ICD-10-CM

## 2018-06-12 DIAGNOSIS — E66.01 MORBID OBESITY (H): ICD-10-CM

## 2018-06-12 DIAGNOSIS — J45.40 MODERATE PERSISTENT ASTHMA, UNSPECIFIED WHETHER COMPLICATED: ICD-10-CM

## 2018-06-12 LAB — HGB BLD-MCNC: 10.9 G/DL (ref 11.7–15.7)

## 2018-06-12 PROCEDURE — 80048 BASIC METABOLIC PNL TOTAL CA: CPT | Performed by: NURSE PRACTITIONER

## 2018-06-12 PROCEDURE — 99214 OFFICE O/P EST MOD 30 MIN: CPT | Performed by: NURSE PRACTITIONER

## 2018-06-12 PROCEDURE — 36415 COLL VENOUS BLD VENIPUNCTURE: CPT | Performed by: NURSE PRACTITIONER

## 2018-06-12 PROCEDURE — 85018 HEMOGLOBIN: CPT | Performed by: NURSE PRACTITIONER

## 2018-06-12 RX ORDER — MONTELUKAST SODIUM 10 MG/1
TABLET ORAL
Refills: 0 | COMMUNITY
Start: 2018-06-07 | End: 2018-06-12

## 2018-06-12 RX ORDER — ASCORBIC ACID 500 MG
TABLET ORAL
Refills: 0 | COMMUNITY
Start: 2018-02-16 | End: 2018-06-13

## 2018-06-12 RX ORDER — NORETHINDRONE
KIT
Refills: 3 | COMMUNITY
Start: 2018-06-07 | End: 2019-02-05

## 2018-06-12 RX ORDER — PNV NO.95/FERROUS FUM/FOLIC AC 28MG-0.8MG
TABLET ORAL
Refills: 3 | Status: ON HOLD | COMMUNITY
Start: 2017-07-17 | End: 2018-06-14

## 2018-06-12 RX ORDER — MONTELUKAST SODIUM 10 MG/1
10 TABLET ORAL AT BEDTIME
Qty: 30 TABLET | Refills: 0 | Status: SHIPPED | OUTPATIENT
Start: 2018-06-12 | End: 2019-06-18

## 2018-06-12 RX ORDER — ALBUTEROL SULFATE 90 UG/1
2 AEROSOL, METERED RESPIRATORY (INHALATION) EVERY 6 HOURS PRN
Qty: 1 INHALER | Refills: 0 | Status: SHIPPED | OUTPATIENT
Start: 2018-06-12 | End: 2018-08-13

## 2018-06-12 ASSESSMENT — PAIN SCALES - GENERAL: PAINLEVEL: NO PAIN (0)

## 2018-06-12 NOTE — PATIENT INSTRUCTIONS
At [facility], we strive to deliver an exceptional experience to you, every time we see you.  If you receive a survey in the mail, please send us back your thoughts. We really do value your feedback.    Based on your medical history, these are the current health maintenance/preventive care services that you are due for (some may have been done at this visit.)  Health Maintenance Due   Topic Date Due     ASTHMA CONTROL TEST Q6 MOS  08/04/2014     ASTHMA ACTION PLAN Q1 YR  02/04/2015     LIPID MONITORING Q5 YEARS  01/24/2017       Suggested websites for health information:  Www.[employer]Go : Up to date and easily searchable information on multiple topics.  Www.[employer].gov : medication info, interactive tutorials, watch real surgeries online  Www.familydoctor.org : good info from the Academy of Family Physicians  Www.cdc.gov : public health info, travel advisories, epidemics (H1N1)  Www.aap.org : children's health info, normal development, vaccinations  Www.health.[facility].[place].us : [place] dept of health, public health issues in [place], N1N1    Your care team:                            Family Medicine Internal Medicine   MD Kraig Samuel MD Shantel Branch-Fleming, MD Katya Georgiev PA-C Megan Hill, APRN RACHEAL Sanchez MD Pediatrics   Ant Griffith PAJERRY Camilo, MD Chrissy Reddy APRN CNP   MD Yenni Puckett MD Deborah Mielke, MD Kim Thein, APRN [facility]      Clinic hours: Monday - Thursday 7 am-7 pm; Fridays 7 am-5 pm.   Urgent care: Monday - Friday 11 am-9 pm; Saturday and Sunday 9 am-5 pm.  Pharmacy : Monday -Thursday 8 am-8 pm; Friday 8 am-6 pm; Saturday and Sunday 9 am-5 pm.     Clinic: [phone]   Pharmacy: [phone]        Before Your Surgery      Call your surgeon if there is any change in your health. This includes signs of a cold or flu (such as a sore throat, runny nose, cough, rash or fever).    Do not smoke, drink  alcohol or take over the counter medicine (unless your surgeon or primary care doctor tells you to) for the 24 hours before and after surgery.    If you take prescribed drugs: Follow your doctor s orders about which medicines to take and which to stop until after surgery.    Eating and drinking prior to surgery: follow the instructions from your surgeon    Take a shower or bath the night before surgery. Use the soap your surgeon gave you to gently clean your skin. If you do not have soap from your surgeon, use your regular soap. Do not shave or scrub the surgery site.  Wear clean pajamas and have clean sheets on your bed.

## 2018-06-12 NOTE — PROGRESS NOTES
[address] [address]  [phone]  Dept: [phone]    PRE-OP EVALUATION:  Today's date: 2018    Crystal Rose (: 1980) presents for pre-operative evaluation assessment as requested by Dr. Pinedo.  She requires evaluation and anesthesia risk assessment prior to undergoing surgery/procedure for treatment of Gynecology .    Proposed Surgery/ Procedure: Combined dilation and curettage, ablate endometrium novasure  Date of Surgery/ Procedure: 2018  Time of Surgery/ Procedure: 9:40am  Hospital/Surgical Facility: [facility]  Primary Physician: Cee Garcia  Type of Anesthesia Anticipated: General    Patient has a Health Care Directive or Living Will:  NO    1. NO - Do you have a history of heart attack, stroke, stent, bypass or surgery on an artery in the head, neck, heart or legs?  2. NO - Do you ever have any pain or discomfort in your chest?  3. NO - Do you have a history of  Heart Failure?  4. NO - Are you troubled by shortness of breath when: walking on the level, up a slight hill or at night?  5. NO - Do you currently have a cold, bronchitis or other respiratory infection?  6. NO - Do you have a cough, shortness of breath or wheezing?  7. NO - Do you sometimes get pains in the calves of your legs when you walk?  8.YES - Do you or anyone in your family have previous history of blood clots?  9. NO - Do you or does anyone in your family have a serious bleeding problem such as prolonged bleeding following surgeries or cuts?  10. YES - Have you ever had problems with anemia or been told to take iron pills?  11. NO - Have you had any abnormal blood loss such as black, tarry or bloody stools, or abnormal vaginal bleeding?  12. NO - Have you ever had a blood transfusion?  13. NO - Have you or any of your relatives ever had problems with anesthesia?  14. NO - Do you have sleep apnea, excessive snoring or daytime drowsiness?  15. NO  - Do you have any prosthetic heart valves?  16. NO - Do you have prosthetic joints?  17. NO - Is there any chance that you may be pregnant?      HPI:     HPI related to upcoming procedure: Patient with persistent vaginal bleeding after delivery in February 2018 leading to iron deficiency anemia.  Also desires tubal ligation.      ANEMIA - Patient has a recent history of moderate-severe anemia, which has not been symptomatic. Work up to date has revealed that anemia is stable. Treatment has been iron supplementation which patient is not currently taking.  Encouraged to restart once daily at this visit..                                                                                                                                            .  ASTHMA - Patient has a longstanding history of moderate-severe Asthma . Patient has been doing well overall noting NO SYMPTOMS unless she is ill.  She was ill 5 days ago and treated with oral prednisone.  Has experienced full recovery.  She is not currently on a controller inhaler.  She uses albuterol inhaler as needed.                                                                                                                                              .    MEDICAL HISTORY:     Patient Active Problem List    Diagnosis Date Noted     Moderate persistent asthma 02/09/2011     Priority: High     Cervical dysplasia 04/05/2018     Priority: Medium     Age 19 yrs.  Cryotherapy? SUMI I, SUMI II, treated with Leep     3/2/08 ASCUS pap/+ HR HPV  3/12/09 ASCUS pap/+ HR HPV  7/25/11 ASCUS/+ HR HPV >> 10/19/11 Colpo: Bx-cervicitis, ECC-bening.   10/24/12 NIL pap/+ HR HPV  12/18/13 NIL pap/+ HR HPV >> 1/27/14 Colpo: Bx-SUMI 1  3/13/14 LEEP conization- SUMI 1  4/2/18 NIL pap/+ HR HPV 18. Plan: colposcopy by 7/2/18 with Dr. Pinedo  4/30/18 Colpo ECC-neg.  Dx NIL pap/Neg HPV.  Plan: Cotest in 1 year       Indication for care in labor and delivery, antepartum 02/19/2018     Priority: Medium      Indication for care in labor or delivery 11/02/2013     Priority: Medium     Environmental allergies 01/24/2012     Priority: Medium     Abnormal Pap smear of cervix 01/24/2012     Priority: Medium     Had Cryo/  Per patient age 19       Pernicious anemia 04/30/2010     Priority: Medium     S/P gastric bypass 10/23/2009     Priority: Medium     CARDIOVASCULAR SCREENING; LDL GOAL LESS THAN 160 10/31/2010     Priority: Low      Past Medical History:   Diagnosis Date     Abnormal Pap smear     see problem list     Allergic rhinitis      Anemia     iron infusions     Asthma     Advair, Albuterol     Cervical dysplasia     SUMI I, SUMI II, treated with Leep   later had ASCUS+HPV sev times 2009     Cervical high risk HPV (human papillomavirus) test positive     see problem list     Chlamydial cervicitis 10/2005, 3/2009    Treated both times and had neg JADEN both times     Diabetes mellitus (H)     GDDC with first pregnacy     Environmental allergies      Herpes simplex without mention of complication     valtrex at 36 weeks     History of chlamydia      Morbid obesity (H)      Postoperative hematoma involving genitourinary system 2014    large rectus sheath hematoma, after failed attempt at laparoscopy, hospitalized for pain control      Trichomonal vulvovaginitis 11/16/06     Vaginal discharge      Past Surgical History:   Procedure Laterality Date     ATTEMPTED LAPAROSCOPY  3/13/2014    Procedure: ATTEMPTED LAPAROSCOPY;;  Surgeon: Cee Garcia MD;  Location: [facility]     COLPOSCOPY CERVIX, BIOPSY CERVIX, ENDOCERVICAL CURETTAGE, COMBINED      1/6/2005:  SUMI I; 6/15/2009:  SUMI I.  Treated with cryo.  10/27/1997:  SUMI I-II     CONIZATION LEEP  3/13/2014    Procedure: CONIZATION LEEP;  LOOP ELECTROSURGICAL EXCISION PROCEDURE; LAPAROSCOPY ATTEMPTED;  Surgeon: Cee Garcia MD;  Location: [facility]     CRYOTHERAPY, CERVICAL  age 19    Pt reported     EXAM UNDER ANESTHESIA PELVIC  3/20/2014    Procedure: EXAM UNDER  ANESTHESIA PELVIC;  EXAM UNDER ANESTHESIA, REMOVAL OF STICHES ;  Surgeon: Cee Garcia MD;  Location: [facility]     GASTRIC BYPASS  2004    pre-bypass weight was 320#     HC NASAL/SINUS SCOPE W THER INSTILL       HC REMOVAL OF OVARIAN CYST(S)       Current Outpatient Prescriptions   Medication Sig Dispense Refill     albuterol (PROAIR HFA/PROVENTIL HFA/VENTOLIN HFA) 108 (90 Base) MCG/ACT Inhaler Inhale 2 puffs into the lungs every 6 hours as needed 1 Inhaler 0     montelukast (SINGULAIR) 10 MG tablet Take 1 tablet (10 mg) by mouth At Bedtime 30 tablet 0     fluticasone (FLONASE) 50 MCG/ACT spray Spray 1-2 sprays into both nostrils daily (Patient not taking: Reported on 6/12/2018) 3 Bottle 11     NORLYDA 0.35 MG per tablet   3     Prenatal Vit-Fe Fumarate-FA (PRENATAL VITAMINS) 28-0.8 MG TABS TK 1 T PO D.  3     valACYclovir (VALTREX) 500 MG tablet Take 1 tablet (500 mg) by mouth daily (Patient not taking: Reported on 6/12/2018) 90 tablet 3     [DISCONTINUED] albuterol (2.5 MG/3ML) 0.083% neb solution Take 1 vial (2.5 mg) by nebulization every 4 hours as needed for shortness of breath / dyspnea (Patient not taking: Reported on 6/12/2018) 1 Box 0     OTC products: none    Allergies   Allergen Reactions     Cat Hair [Cats]      Dog Hair [Dogs]      Dust Mites      Ragweeds       Latex Allergy: NO    Social History   Substance Use Topics     Smoking status: Former Smoker     Quit date: 5/5/2009     Smokeless tobacco: Never Used     Alcohol use No     History   Drug Use No       REVIEW OF SYSTEMS:   CONSTITUTIONAL: NEGATIVE for fever, chills, change in weight  INTEGUMENTARY/SKIN: NEGATIVE for worrisome rashes, moles or lesions  EYES: NEGATIVE for vision changes or irritation  ENT/MOUTH: NEGATIVE for ear, mouth and throat problems  RESP: NEGATIVE for significant cough or SOB  BREAST: NEGATIVE for masses, tenderness or discharge  CV: NEGATIVE for chest pain, palpitations or peripheral edema  GI: NEGATIVE for nausea,  abdominal pain, heartburn, or change in bowel habits  : NEGATIVE for frequency, dysuria, or hematuria  MUSCULOSKELETAL: NEGATIVE for significant arthralgias or myalgia  NEURO: NEGATIVE for weakness, dizziness or paresthesias  ENDOCRINE: NEGATIVE for temperature intolerance, skin/hair changes  HEME: NEGATIVE for bleeding problems  PSYCHIATRIC: NEGATIVE for changes in mood or affect    EXAM:   There were no vitals taken for this visit.    GENERAL APPEARANCE: healthy, alert and no distress     EYES: EOMI, PERRL     HENT: ear canals and TM's normal and nose and mouth without ulcers or lesions     NECK: no adenopathy, no asymmetry, masses, or scars and thyroid normal to palpation     RESP: lungs clear to auscultation - no rales, rhonchi or wheezes     CV: regular rates and rhythm, normal S1 S2, no S3 or S4 and no murmur, click or rub     ABDOMEN:  soft, nontender, no HSM or masses and bowel sounds normal     MS: extremities normal- no gross deformities noted, no evidence of inflammation in joints, FROM in all extremities.     SKIN: no suspicious lesions or rashes     NEURO: Normal strength and tone, sensory exam grossly normal, mentation intact and speech normal     PSYCH: mentation appears normal. and affect normal/bright     LYMPHATICS: No cervical adenopathy    DIAGNOSTICS:   EKG: Not indicated due to non-vascular surgery and low risk of event (age <65 and without cardiac risk factors)  Hemoglobin (indicated for history of anemia or procedure with significant blood loss such as tonsillectomy, major intraperitoneal surgery, vascular surgery, major spine surgery, total joint replacement)  Serum Potassium  Serum Creatinine  Hemoglobin   Date Value Ref Range Status   06/12/2018 10.9 (L) 11.7 - 15.7 g/dL Final   ]  Creatinine   Date Value Ref Range Status   06/12/2018 0.62 0.52 - 1.04 mg/dL Final   ]  Potassium   Date Value Ref Range Status   06/12/2018 4.6 3.4 - 5.3 mmol/L Final   ]    Recent Labs   Lab Test  04/02/18    1120  02/20/18   0842   07/17/17   1511   03/19/14   1230  03/13/14   0806   HGB  11.0*  9.3*   < >  11.9   < >  8.3*  13.2   PLT  304   --    --   263   < >  265   --    INR   --    --    --    --    --   1.03   --    NA   --    --    --    --    --   140   --    POTASSIUM   --    --    --    --    --   3.8  3.7   CR   --    --    --    --    --   0.67   --     < > = values in this interval not displayed.        IMPRESSION:   Reason for surgery/procedure: Menorrhagia, contraception    The proposed surgical procedure is considered INTERMEDIATE risk.    REVISED CARDIAC RISK INDEX  The patient has the following serious cardiovascular risks for perioperative complications such as (MI, PE, VFib and 3  AV Block):  No serious cardiac risks  INTERPRETATION: 1 risks: Class II (low risk - 0.9% complication rate)    The patient has the following additional risks for perioperative complications:    Morbid obesity      ICD-10-CM    1. Preop general physical exam Z01.818 Basic metabolic panel  (Ca, Cl, CO2, Creat, Gluc, K, Na, BUN)     Hemoglobin   2. Menorrhagia with regular cycle N92.0    3. Iron deficiency anemia due to chronic blood loss D50.0    4. S/P gastric bypass Z98.84    5. Moderate persistent asthma, unspecified whether complicated J45.40 albuterol (PROAIR HFA/PROVENTIL HFA/VENTOLIN HFA) 108 (90 Base) MCG/ACT Inhaler     montelukast (SINGULAIR) 10 MG tablet   6. Morbid obesity (H) E66.01        RECOMMENDATIONS:       Pulmonary Risk  Incentive spirometry post op  Respiratory Therapy (Respiratory Care IP Consult)  post op      Obstructive Sleep Apnea (or suspected sleep apnea)  Hospital staff are advised to monitor for sleep related oxygen desaturations due to suspicion of SAMUEL due to body habitus      --Patient is to take all scheduled medications on the day of surgery     APPROVAL GIVEN to proceed with proposed procedure, without further diagnostic evaluation       Signed Electronically by: SHERIE Bennett  CNP    Copy of this evaluation report is provided to requesting physician.    [facility] Preop Guidelines    Revised Cardiac Risk Index

## 2018-06-12 NOTE — MR AVS SNAPSHOT
After Visit Summary   6/12/2018    Crystal Rose    MRN: [id number]           Patient Information     Date Of Birth          1980        Visit Information        Provider Department      6/12/2018 4:40 PM Gregoria Camilo APRN CNP [facility]        Today's Diagnoses     Preop general physical exam    -  1    Menorrhagia with regular cycle        Iron deficiency anemia due to chronic blood loss        S/P gastric bypass        Moderate persistent asthma, unspecified whether complicated        Morbid obesity (H)          Care Instructions    At [facility], we strive to deliver an exceptional experience to you, every time we see you.  If you receive a survey in the mail, please send us back your thoughts. We really do value your feedback.    Based on your medical history, these are the current health maintenance/preventive care services that you are due for (some may have been done at this visit.)  Health Maintenance Due   Topic Date Due     ASTHMA CONTROL TEST Q6 MOS  08/04/2014     ASTHMA ACTION PLAN Q1 YR  02/04/2015     LIPID MONITORING Q5 YEARS  01/24/2017       Suggested websites for health information:  Www.Face to Face Live : Up to date and easily searchable information on multiple topics.  Www.medlineplus.gov : medication info, interactive tutorials, watch real surgeries online  Www.familydoctor.org : good info from the Academy of Family Physicians  Www.cdc.gov : public health info, travel advisories, epidemics (H1N1)  Www.aap.org : children's health info, normal development, vaccinations  Www.health.state.[place].us : [place] dept of health, public health issues in [place], N1N1    Your care team:                            Family Medicine Internal Medicine   MD Kraig Samuel MD Shantel Branch-Fleming, MD Katya Georgiev PA-C Megan Hill, APRN CNP Nam Ho, MD Pediatrics   RUTH Donato, RACHEAL Linn,  MD Chrissy Dunbar APRSVITLANA CNP   MD Yenni Puckett MD Deborah Mielke, MD Kim Thein, APRN CNP      Clinic hours: Monday - Thursday 7 am-7 pm; Fridays 7 am-5 pm.   Urgent care: Monday - Friday 11 am-9 pm; Saturday and Sunday 9 am-5 pm.  Pharmacy : Monday -Thursday 8 am-8 pm; Friday 8 am-6 pm; Saturday and Sunday 9 am-5 pm.     Clinic: [phone]   Pharmacy: [phone]        Before Your Surgery      Call your surgeon if there is any change in your health. This includes signs of a cold or flu (such as a sore throat, runny nose, cough, rash or fever).    Do not smoke, drink alcohol or take over the counter medicine (unless your surgeon or primary care doctor tells you to) for the 24 hours before and after surgery.    If you take prescribed drugs: Follow your doctor s orders about which medicines to take and which to stop until after surgery.    Eating and drinking prior to surgery: follow the instructions from your surgeon    Take a shower or bath the night before surgery. Use the soap your surgeon gave you to gently clean your skin. If you do not have soap from your surgeon, use your regular soap. Do not shave or scrub the surgery site.  Wear clean pajamas and have clean sheets on your bed.           Follow-ups after your visit        Your next 10 appointments already scheduled     Jun 14, 2018   Procedure with Franki Pinedo MD   [facility] (--)    [address]  [facility] [id number] [phone]              Who to contact     If you have questions or need follow up information about today's clinic visit or your schedule please contact [facility] LIVIA BOTELLO directly at [phone].  Normal or non-critical lab and imaging results will be communicated to you by MyChart, letter or phone within 4 business days after the clinic has received the results. If you do not hear from us within 7 days, please contact the clinic through MyChart or  phone. If you have a critical or abnormal lab result, we will notify you by phone as soon as possible.  Submit refill requests through [employer] or call your pharmacy and they will forward the refill request to us. Please allow 3 business days for your refill to be completed.          Additional Information About Your Visit        [employer] Information     [employer] gives you secure access to your electronic health record. If you see a primary care provider, you can also send messages to your care team and make appointments. If you have questions, please call your primary care clinic.  If you do not have a primary care provider, please call [phone] and they will assist you.        Care EveryWhere ID     This is your Care EveryWhere ID. This could be used by other organizations to access your [facility] medical records  [id number]        Your Vitals Were     Pulse Temperature Last Period Pulse Oximetry BMI (Body Mass Index)       77 98.1  F (36.7  C) (Oral) 06/07/2018 (Exact Date) 98% 40.61 kg/m2        Blood Pressure from Last 3 Encounters:   06/12/18 130/82   06/07/18 137/82   04/30/18 120/76    Weight from Last 3 Encounters:   06/12/18 275 lb (124.7 kg)   06/07/18 269 lb (122 kg)   04/30/18 265 lb (120.2 kg)              We Performed the Following     Basic metabolic panel  (Ca, Cl, CO2, Creat, Gluc, K, Na, BUN)     Hemoglobin          Today's Medication Changes          These changes are accurate as of 6/12/18 11:59 PM.  If you have any questions, ask your nurse or doctor.               These medicines have changed or have updated prescriptions.        Dose/Directions    albuterol 108 (90 Base) MCG/ACT Inhaler   Commonly known as:  PROAIR HFA/PROVENTIL HFA/VENTOLIN HFA   This may have changed:  Another medication with the same name was removed. Continue taking this medication, and follow the directions you see here.   Used for:  Moderate persistent asthma, unspecified whether complicated   Changed by:  Ton  SHERIE Francois CNP        Dose:  2 puff   Inhale 2 puffs into the lungs every 6 hours as needed   Quantity:  1 Inhaler   Refills:  0       montelukast 10 MG tablet   Commonly known as:  SINGULAIR   This may have changed:  See the new instructions.   Used for:  Moderate persistent asthma, unspecified whether complicated   Changed by:  Gregoria Camilo APRN CNP        Dose:  10 mg   Take 1 tablet (10 mg) by mouth At Bedtime   Quantity:  30 tablet   Refills:  0         Stop taking these medicines if you haven't already. Please contact your care team if you have questions.     ascorbic acid 500 MG tablet   Commonly known as:  VITAMIN C   Stopped by:  Gregoria Camilo APRN CNP           FEROSUL 220 (44 Fe) MG/5ML Elix   Generic drug:  ferrous sulfate   Stopped by:  Gregoria Camilo APRN CNP                Where to get your medicines      These medications were sent to [facility] [address] [place] [address] [facility] [id number] [place] [address] [facility]  [address] [facility] [address]    Hours:  24-hours Phone:  [phone]     albuterol 108 (90 Base) MCG/ACT Inhaler    montelukast 10 MG tablet                Primary Care Provider Office Phone # Fax #    Cee Garcia [phone] [address] [facility] [address]  [facility] [address]        Equal Access to Services     [facility] [facility]: Hadii aad ku hadasho Soomaali, waaxda luqadaha, qaybta kaalmada adeegyada, criss hassan hayaan donovan torres . So [facility] [phone].    ATENCIÓN: Si habla español, tiene a garber disposición servicios gratuitos de asistencia lingüística. Saurav al [phone].    We comply with applicable federal civil rights laws and [place] laws. We do not discriminate on the basis of race, color, national origin, age, disability, sex, sexual orientation, or gender identity.            Thank you!     Thank you for choosing [facility]  for your  care. Our goal is always to provide you with excellent care. Hearing back from our patients is one way we can continue to improve our services. Please take a few minutes to complete the written survey that you may receive in the mail after your visit with us. Thank you!             Your Updated Medication List - Protect others around you: Learn how to safely use, store and throw away your medicines at www.disposemymeds.org.          This list is accurate as of 6/12/18 11:59 PM.  Always use your most recent med list.                   Brand Name Dispense Instructions for use Diagnosis    albuterol 108 (90 Base) MCG/ACT Inhaler    PROAIR HFA/PROVENTIL HFA/VENTOLIN HFA    1 Inhaler    Inhale 2 puffs into the lungs every 6 hours as needed    Moderate persistent asthma, unspecified whether complicated       fluticasone 50 MCG/ACT spray    FLONASE    3 Bottle    Spray 1-2 sprays into both nostrils daily    Chronic allergic rhinitis, unspecified seasonality, unspecified trigger       montelukast 10 MG tablet    SINGULAIR    30 tablet    Take 1 tablet (10 mg) by mouth At Bedtime    Moderate persistent asthma, unspecified whether complicated       NORLYDA 0.35 MG per tablet   Generic drug:  norethindrone           predniSONE 20 MG tablet    DELTASONE    10 tablet    Take 1 tablet (20 mg) by mouth 2 times daily for 5 days    Moderate persistent asthma with acute exacerbation       Prenatal Vitamins 28-0.8 MG Tabs      TK 1 T PO D.        valACYclovir 500 MG tablet    VALTREX    90 tablet    Take 1 tablet (500 mg) by mouth daily    Herpes simplex infection of genitourinary system

## 2018-06-13 ENCOUNTER — ANESTHESIA EVENT (OUTPATIENT)
Dept: SURGERY | Facility: CLINIC | Age: 38
End: 2018-06-13
Payer: COMMERCIAL

## 2018-06-13 PROBLEM — D50.0 IRON DEFICIENCY ANEMIA DUE TO CHRONIC BLOOD LOSS: Status: ACTIVE | Noted: 2018-06-13

## 2018-06-13 PROBLEM — N92.0 MENORRHAGIA WITH REGULAR CYCLE: Status: ACTIVE | Noted: 2018-06-13

## 2018-06-13 PROBLEM — E66.01 MORBID OBESITY (H): Status: ACTIVE | Noted: 2018-06-13

## 2018-06-13 LAB
ANION GAP SERPL CALCULATED.3IONS-SCNC: 8 MMOL/L (ref 3–14)
BUN SERPL-MCNC: 15 MG/DL (ref 7–30)
CALCIUM SERPL-MCNC: 8.6 MG/DL (ref 8.5–10.1)
CHLORIDE SERPL-SCNC: 109 MMOL/L (ref 94–109)
CO2 SERPL-SCNC: 23 MMOL/L (ref 20–32)
CREAT SERPL-MCNC: 0.62 MG/DL (ref 0.52–1.04)
GFR SERPL CREATININE-BSD FRML MDRD: >90 ML/MIN/1.7M2
GLUCOSE SERPL-MCNC: 106 MG/DL (ref 70–99)
POTASSIUM SERPL-SCNC: 4.6 MMOL/L (ref 3.4–5.3)
SODIUM SERPL-SCNC: 140 MMOL/L (ref 133–144)

## 2018-06-13 RX ORDER — PHENAZOPYRIDINE HYDROCHLORIDE 200 MG/1
200 TABLET, FILM COATED ORAL ONCE
Status: CANCELLED | OUTPATIENT
Start: 2018-06-13 | End: 2018-06-13

## 2018-06-13 NOTE — PROGRESS NOTES
Faxed pre-op notes, labs and no Ekg to [facility], [phone], right fax confirmed at3:20 pm today.  Cari Rollins MA/  For Teams [place]

## 2018-06-13 NOTE — H&P (VIEW-ONLY)
[address] [address]  [phone]  Dept: [phone]    PRE-OP EVALUATION:  Today's date: 2018    Crystal Rose (: 1980) presents for pre-operative evaluation assessment as requested by Dr. Pinedo.  She requires evaluation and anesthesia risk assessment prior to undergoing surgery/procedure for treatment of Gynecology .    Proposed Surgery/ Procedure: Combined dilation and curettage, ablate endometrium novasure  Date of Surgery/ Procedure: 2018  Time of Surgery/ Procedure: 9:40am  Hospital/Surgical Facility: [facility]  Primary Physician: Cee Garcia  Type of Anesthesia Anticipated: General    Patient has a Health Care Directive or Living Will:  NO    1. NO - Do you have a history of heart attack, stroke, stent, bypass or surgery on an artery in the head, neck, heart or legs?  2. NO - Do you ever have any pain or discomfort in your chest?  3. NO - Do you have a history of  Heart Failure?  4. NO - Are you troubled by shortness of breath when: walking on the level, up a slight hill or at night?  5. NO - Do you currently have a cold, bronchitis or other respiratory infection?  6. NO - Do you have a cough, shortness of breath or wheezing?  7. NO - Do you sometimes get pains in the calves of your legs when you walk?  8.YES - Do you or anyone in your family have previous history of blood clots?  9. NO - Do you or does anyone in your family have a serious bleeding problem such as prolonged bleeding following surgeries or cuts?  10. YES - Have you ever had problems with anemia or been told to take iron pills?  11. NO - Have you had any abnormal blood loss such as black, tarry or bloody stools, or abnormal vaginal bleeding?  12. NO - Have you ever had a blood transfusion?  13. NO - Have you or any of your relatives ever had problems with anesthesia?  14. NO - Do you have sleep apnea, excessive snoring or daytime drowsiness?  15. NO  - Do you have any prosthetic heart valves?  16. NO - Do you have prosthetic joints?  17. NO - Is there any chance that you may be pregnant?      HPI:     HPI related to upcoming procedure: Patient with persistent vaginal bleeding after delivery in February 2018 leading to iron deficiency anemia.  Also desires tubal ligation.      ANEMIA - Patient has a recent history of moderate-severe anemia, which has not been symptomatic. Work up to date has revealed that anemia is stable. Treatment has been iron supplementation which patient is not currently taking.  Encouraged to restart once daily at this visit..                                                                                                                                            .  ASTHMA - Patient has a longstanding history of moderate-severe Asthma . Patient has been doing well overall noting NO SYMPTOMS unless she is ill.  She was ill 5 days ago and treated with oral prednisone.  Has experienced full recovery.  She is not currently on a controller inhaler.  She uses albuterol inhaler as needed.                                                                                                                                              .    MEDICAL HISTORY:     Patient Active Problem List    Diagnosis Date Noted     Moderate persistent asthma 02/09/2011     Priority: High     Cervical dysplasia 04/05/2018     Priority: Medium     Age 19 yrs.  Cryotherapy? SUMI I, SUMI II, treated with Leep     3/2/08 ASCUS pap/+ HR HPV  3/12/09 ASCUS pap/+ HR HPV  7/25/11 ASCUS/+ HR HPV >> 10/19/11 Colpo: Bx-cervicitis, ECC-bening.   10/24/12 NIL pap/+ HR HPV  12/18/13 NIL pap/+ HR HPV >> 1/27/14 Colpo: Bx-SUMI 1  3/13/14 LEEP conization- SUMI 1  4/2/18 NIL pap/+ HR HPV 18. Plan: colposcopy by 7/2/18 with Dr. Pinedo  4/30/18 Colpo ECC-neg.  Dx NIL pap/Neg HPV.  Plan: Cotest in 1 year       Indication for care in labor and delivery, antepartum 02/19/2018     Priority: Medium      Indication for care in labor or delivery 11/02/2013     Priority: Medium     Environmental allergies 01/24/2012     Priority: Medium     Abnormal Pap smear of cervix 01/24/2012     Priority: Medium     Had Cryo/  Per patient age 19       Pernicious anemia 04/30/2010     Priority: Medium     S/P gastric bypass 10/23/2009     Priority: Medium     CARDIOVASCULAR SCREENING; LDL GOAL LESS THAN 160 10/31/2010     Priority: Low      Past Medical History:   Diagnosis Date     Abnormal Pap smear     see problem list     Allergic rhinitis      Anemia     iron infusions     Asthma     Advair, Albuterol     Cervical dysplasia     SUMI I, SUMI II, treated with Leep   later had ASCUS+HPV sev times 2009     Cervical high risk HPV (human papillomavirus) test positive     see problem list     Chlamydial cervicitis 10/2005, 3/2009    Treated both times and had neg JADEN both times     Diabetes mellitus (H)     GDDC with first pregnacy     Environmental allergies      Herpes simplex without mention of complication     valtrex at 36 weeks     History of chlamydia      Morbid obesity (H)      Postoperative hematoma involving genitourinary system 2014    large rectus sheath hematoma, after failed attempt at laparoscopy, hospitalized for pain control      Trichomonal vulvovaginitis 11/16/06     Vaginal discharge      Past Surgical History:   Procedure Laterality Date     ATTEMPTED LAPAROSCOPY  3/13/2014    Procedure: ATTEMPTED LAPAROSCOPY;;  Surgeon: Cee Garcia MD;  Location: [facility]     COLPOSCOPY CERVIX, BIOPSY CERVIX, ENDOCERVICAL CURETTAGE, COMBINED      1/6/2005:  SUMI I; 6/15/2009:  SUMI I.  Treated with cryo.  10/27/1997:  SUMI I-II     CONIZATION LEEP  3/13/2014    Procedure: CONIZATION LEEP;  LOOP ELECTROSURGICAL EXCISION PROCEDURE; LAPAROSCOPY ATTEMPTED;  Surgeon: Cee Garcia MD;  Location: [facility]     CRYOTHERAPY, CERVICAL  age 19    Pt reported     EXAM UNDER ANESTHESIA PELVIC  3/20/2014    Procedure: EXAM UNDER  ANESTHESIA PELVIC;  EXAM UNDER ANESTHESIA, REMOVAL OF STICHES ;  Surgeon: Cee Garcia MD;  Location: [facility]     GASTRIC BYPASS  2004    pre-bypass weight was 320#     HC NASAL/SINUS SCOPE W THER INSTILL       HC REMOVAL OF OVARIAN CYST(S)       Current Outpatient Prescriptions   Medication Sig Dispense Refill     albuterol (PROAIR HFA/PROVENTIL HFA/VENTOLIN HFA) 108 (90 Base) MCG/ACT Inhaler Inhale 2 puffs into the lungs every 6 hours as needed 1 Inhaler 0     montelukast (SINGULAIR) 10 MG tablet Take 1 tablet (10 mg) by mouth At Bedtime 30 tablet 0     fluticasone (FLONASE) 50 MCG/ACT spray Spray 1-2 sprays into both nostrils daily (Patient not taking: Reported on 6/12/2018) 3 Bottle 11     NORLYDA 0.35 MG per tablet   3     Prenatal Vit-Fe Fumarate-FA (PRENATAL VITAMINS) 28-0.8 MG TABS TK 1 T PO D.  3     valACYclovir (VALTREX) 500 MG tablet Take 1 tablet (500 mg) by mouth daily (Patient not taking: Reported on 6/12/2018) 90 tablet 3     [DISCONTINUED] albuterol (2.5 MG/3ML) 0.083% neb solution Take 1 vial (2.5 mg) by nebulization every 4 hours as needed for shortness of breath / dyspnea (Patient not taking: Reported on 6/12/2018) 1 Box 0     OTC products: none    Allergies   Allergen Reactions     Cat Hair [Cats]      Dog Hair [Dogs]      Dust Mites      Ragweeds       Latex Allergy: NO    Social History   Substance Use Topics     Smoking status: Former Smoker     Quit date: 5/5/2009     Smokeless tobacco: Never Used     Alcohol use No     History   Drug Use No       REVIEW OF SYSTEMS:   CONSTITUTIONAL: NEGATIVE for fever, chills, change in weight  INTEGUMENTARY/SKIN: NEGATIVE for worrisome rashes, moles or lesions  EYES: NEGATIVE for vision changes or irritation  ENT/MOUTH: NEGATIVE for ear, mouth and throat problems  RESP: NEGATIVE for significant cough or SOB  BREAST: NEGATIVE for masses, tenderness or discharge  CV: NEGATIVE for chest pain, palpitations or peripheral edema  GI: NEGATIVE for nausea,  abdominal pain, heartburn, or change in bowel habits  : NEGATIVE for frequency, dysuria, or hematuria  MUSCULOSKELETAL: NEGATIVE for significant arthralgias or myalgia  NEURO: NEGATIVE for weakness, dizziness or paresthesias  ENDOCRINE: NEGATIVE for temperature intolerance, skin/hair changes  HEME: NEGATIVE for bleeding problems  PSYCHIATRIC: NEGATIVE for changes in mood or affect    EXAM:   There were no vitals taken for this visit.    GENERAL APPEARANCE: healthy, alert and no distress     EYES: EOMI, PERRL     HENT: ear canals and TM's normal and nose and mouth without ulcers or lesions     NECK: no adenopathy, no asymmetry, masses, or scars and thyroid normal to palpation     RESP: lungs clear to auscultation - no rales, rhonchi or wheezes     CV: regular rates and rhythm, normal S1 S2, no S3 or S4 and no murmur, click or rub     ABDOMEN:  soft, nontender, no HSM or masses and bowel sounds normal     MS: extremities normal- no gross deformities noted, no evidence of inflammation in joints, FROM in all extremities.     SKIN: no suspicious lesions or rashes     NEURO: Normal strength and tone, sensory exam grossly normal, mentation intact and speech normal     PSYCH: mentation appears normal. and affect normal/bright     LYMPHATICS: No cervical adenopathy    DIAGNOSTICS:   EKG: Not indicated due to non-vascular surgery and low risk of event (age <65 and without cardiac risk factors)  Hemoglobin (indicated for history of anemia or procedure with significant blood loss such as tonsillectomy, major intraperitoneal surgery, vascular surgery, major spine surgery, total joint replacement)  Serum Potassium  Serum Creatinine  Hemoglobin   Date Value Ref Range Status   06/12/2018 10.9 (L) 11.7 - 15.7 g/dL Final   ]  Creatinine   Date Value Ref Range Status   06/12/2018 0.62 0.52 - 1.04 mg/dL Final   ]  Potassium   Date Value Ref Range Status   06/12/2018 4.6 3.4 - 5.3 mmol/L Final   ]    Recent Labs   Lab Test  04/02/18    1120  02/20/18   0842   07/17/17   1511   03/19/14   1230  03/13/14   0806   HGB  11.0*  9.3*   < >  11.9   < >  8.3*  13.2   PLT  304   --    --   263   < >  265   --    INR   --    --    --    --    --   1.03   --    NA   --    --    --    --    --   140   --    POTASSIUM   --    --    --    --    --   3.8  3.7   CR   --    --    --    --    --   0.67   --     < > = values in this interval not displayed.        IMPRESSION:   Reason for surgery/procedure: Menorrhagia, contraception    The proposed surgical procedure is considered INTERMEDIATE risk.    REVISED CARDIAC RISK INDEX  The patient has the following serious cardiovascular risks for perioperative complications such as (MI, PE, VFib and 3  AV Block):  No serious cardiac risks  INTERPRETATION: 1 risks: Class II (low risk - 0.9% complication rate)    The patient has the following additional risks for perioperative complications:    Morbid obesity      ICD-10-CM    1. Preop general physical exam Z01.818 Basic metabolic panel  (Ca, Cl, CO2, Creat, Gluc, K, Na, BUN)     Hemoglobin   2. Menorrhagia with regular cycle N92.0    3. Iron deficiency anemia due to chronic blood loss D50.0    4. S/P gastric bypass Z98.84    5. Moderate persistent asthma, unspecified whether complicated J45.40 albuterol (PROAIR HFA/PROVENTIL HFA/VENTOLIN HFA) 108 (90 Base) MCG/ACT Inhaler     montelukast (SINGULAIR) 10 MG tablet   6. Morbid obesity (H) E66.01        RECOMMENDATIONS:       Pulmonary Risk  Incentive spirometry post op  Respiratory Therapy (Respiratory Care IP Consult)  post op      Obstructive Sleep Apnea (or suspected sleep apnea)  Hospital staff are advised to monitor for sleep related oxygen desaturations due to suspicion of SAMUEL due to body habitus      --Patient is to take all scheduled medications on the day of surgery     APPROVAL GIVEN to proceed with proposed procedure, without further diagnostic evaluation       Signed Electronically by: SHERIE Bennett  CNP    Copy of this evaluation report is provided to requesting physician.    [facility] Preop Guidelines    Revised Cardiac Risk Index

## 2018-06-13 NOTE — H&P
Office Visit  Open      2018  [facility]    Gregoria Camilo, APRN CNP   Nurse Practitioner - Family    Preop general physical exam +4 more   Dx    Pre-Op Exam   Reason for visit    Progress Notes   Lizbeth Whitley MA (Medical Assistant)   Unsigned   Expand All Collapse All       [facility]  [address] [address]  [phone]  Dept: [phone]     PRE-OP EVALUATION:  Today's date: 2018     Crystal Rose (: 1980) presents for pre-operative evaluation assessment as requested by Dr. Pinedo.  She requires evaluation and anesthesia risk assessment prior to undergoing surgery/procedure for treatment of Gynecology .     Proposed Surgery/ Procedure: Combined dilation and curettage, ablate endometrium novasure  Date of Surgery/ Procedure: 2018  Time of Surgery/ Procedure: 9:40am  Hospital/Surgical Facility: [facility]  Primary Physician: Cee Garcia  Type of Anesthesia Anticipated: General     Patient has a Health Care Directive or Living Will:  NO     1. NO - Do you have a history of heart attack, stroke, stent, bypass or surgery on an artery in the head, neck, heart or legs?  2. NO - Do you ever have any pain or discomfort in your chest?  3. NO - Do you have a history of  Heart Failure?  4. NO - Are you troubled by shortness of breath when: walking on the level, up a slight hill or at night?  5. NO - Do you currently have a cold, bronchitis or other respiratory infection?  6. NO - Do you have a cough, shortness of breath or wheezing?  7. NO - Do you sometimes get pains in the calves of your legs when you walk?  8.YES - Do you or anyone in your family have previous history of blood clots?  9. NO - Do you or does anyone in your family have a serious bleeding problem such as prolonged bleeding following surgeries or cuts?  10. YES - Have you ever had problems with anemia or been told to take iron pills?  11.  NO - Have you had any abnormal blood loss such as black, tarry or bloody stools, or abnormal vaginal bleeding?  12. NO - Have you ever had a blood transfusion?  13. NO - Have you or any of your relatives ever had problems with anesthesia?  14. NO - Do you have sleep apnea, excessive snoring or daytime drowsiness?  15. NO - Do you have any prosthetic heart valves?  16. NO - Do you have prosthetic joints?  17. NO - Is there any chance that you may be pregnant?        HPI:      HPI related to upcoming procedure:              MEDICAL HISTORY:            Patient Active Problem List     Diagnosis Date Noted     Moderate persistent asthma 02/09/2011       Priority: High     Cervical dysplasia 04/05/2018       Priority: Medium       Age 19 yrs.  Cryotherapy? SUMI I, SUMI II, treated with Leep     3/2/08 ASCUS pap/+ HR HPV  3/12/09 ASCUS pap/+ HR HPV  7/25/11 ASCUS/+ HR HPV >> 10/19/11 Colpo: Bx-cervicitis, ECC-bening.   10/24/12 NIL pap/+ HR HPV  12/18/13 NIL pap/+ HR HPV >> 1/27/14 Colpo: Bx-SUMI 1  3/13/14 LEEP conization- SUMI 1  4/2/18 NIL pap/+ HR HPV 18. Plan: colposcopy by 7/2/18 with Dr. Pinedo  4/30/18 Colpo ECC-neg.  Dx NIL pap/Neg HPV.  Plan: Cotest in 1 year     Indication for care in labor and delivery, antepartum 02/19/2018       Priority: Medium     Indication for care in labor or delivery 11/02/2013       Priority: Medium     Environmental allergies 01/24/2012       Priority: Medium     Abnormal Pap smear of cervix 01/24/2012       Priority: Medium       Had Cryo/  Per patient age 19     Pernicious anemia 04/30/2010       Priority: Medium     S/P gastric bypass 10/23/2009       Priority: Medium     CARDIOVASCULAR SCREENING; LDL GOAL LESS THAN 160 10/31/2010       Priority: Low       Past Medical History         Past Medical History:   Diagnosis Date     Abnormal Pap smear       see problem list     Allergic rhinitis       Anemia       iron infusions     Asthma       Advair, Albuterol     Cervical dysplasia        SUMI I, SUMI II, treated with Leep   later had ASCUS+HPV sev times 2009     Cervical high risk HPV (human papillomavirus) test positive       see problem list     Chlamydial cervicitis 10/2005, 3/2009     Treated both times and had neg JADEN both times     Diabetes mellitus (H)       GDDC with first pregnacy     Environmental allergies       Herpes simplex without mention of complication       valtrex at 36 weeks     History of chlamydia       Morbid obesity (H)       Postoperative hematoma involving genitourinary system 2014     large rectus sheath hematoma, after failed attempt at laparoscopy, hospitalized for pain control      Trichomonal vulvovaginitis 11/16/06     Vaginal discharge            Past Surgical History    Past Surgical History:   Procedure Laterality Date     ATTEMPTED LAPAROSCOPY   3/13/2014     Procedure: ATTEMPTED LAPAROSCOPY;;  Surgeon: Cee Garcia MD;  Location: [facility]     COLPOSCOPY CERVIX, BIOPSY CERVIX, ENDOCERVICAL CURETTAGE, COMBINED         1/6/2005:  SUMI I; 6/15/2009:  SUMI I.  Treated with cryo.  10/27/1997:  SUMI I-II     CONIZATION LEEP   3/13/2014     Procedure: CONIZATION LEEP;  LOOP ELECTROSURGICAL EXCISION PROCEDURE; LAPAROSCOPY ATTEMPTED;  Surgeon: Cee Garcia MD;  Location: [facility]     CRYOTHERAPY, CERVICAL   age 19     Pt reported     EXAM UNDER ANESTHESIA PELVIC   3/20/2014     Procedure: EXAM UNDER ANESTHESIA PELVIC;  EXAM UNDER ANESTHESIA, REMOVAL OF STICHES ;  Surgeon: Cee Garcia MD;  Location:  OR     GASTRIC BYPASS   2004     pre-bypass weight was 320#     HC NASAL/SINUS SCOPE W THER INSTILL         HC REMOVAL OF OVARIAN CYST(S)              Current Outpatient Prescriptions    Current Outpatient Prescriptions   Medication Sig Dispense Refill     albuterol (2.5 MG/3ML) 0.083% neb solution Take 1 vial (2.5 mg) by nebulization every 4 hours as needed for shortness of breath / dyspnea 1 Box 0     albuterol (PROAIR HFA/PROVENTIL HFA/VENTOLIN HFA) 108 (90 Base)  MCG/ACT Inhaler Inhale 2 puffs into the lungs every 6 hours as needed 1 Inhaler 0     fluticasone (FLONASE) 50 MCG/ACT spray Spray 1-2 sprays into both nostrils daily 3 Bottle 11     predniSONE (DELTASONE) 20 MG tablet Take 1 tablet (20 mg) by mouth 2 times daily for 5 days 10 tablet 0     valACYclovir (VALTREX) 500 MG tablet Take 1 tablet (500 mg) by mouth daily (Patient taking differently: Take 500 mg by mouth daily as needed ) 90 tablet 3     [DISCONTINUED] albuterol (PROAIR HFA/PROVENTIL HFA/VENTOLIN HFA) 108 (90 BASE) MCG/ACT Inhaler Inhale 2 puffs into the lungs every 4 hours as needed for shortness of breath / dyspnea or wheezing 1 Inhaler 1         OTC products: None, except as noted above          Allergies   Allergen Reactions     Cat Hair [Cats]       Dog Hair [Dogs]       Dust Mites       Ragweeds        Latex Allergy: NO           Social History   Substance Use Topics     Smoking status: Former Smoker       Quit date: 5/5/2009     Smokeless tobacco: Never Used     Alcohol use No          History   Drug Use No         REVIEW OF SYSTEMS:   CONSTITUTIONAL: NEGATIVE for fever, chills, change in weight  INTEGUMENTARY/SKIN: NEGATIVE for worrisome rashes, moles or lesions  EYES: NEGATIVE for vision changes or irritation  ENT/MOUTH: NEGATIVE for ear, mouth and throat problems  RESP: NEGATIVE for significant cough or SOB  BREAST: NEGATIVE for masses, tenderness or discharge  CV: NEGATIVE for chest pain, palpitations or peripheral edema  GI: NEGATIVE for nausea, abdominal pain, heartburn, or change in bowel habits  : NEGATIVE for frequency, dysuria, or hematuria  MUSCULOSKELETAL: NEGATIVE for significant arthralgias or myalgia  NEURO: NEGATIVE for weakness, dizziness or paresthesias  ENDOCRINE: NEGATIVE for temperature intolerance, skin/hair changes  HEME: NEGATIVE for bleeding problems  PSYCHIATRIC: NEGATIVE for changes in mood or affect     EXAM:   There were no vitals taken for this visit.    GENERAL  APPEARANCE: healthy, alert and no distress     EYES: EOMI, PERRL     HENT: ear canals and TM's normal and nose and mouth without ulcers or lesions     NECK: no adenopathy, no asymmetry, masses, or scars and thyroid normal to palpation     RESP: lungs clear to auscultation - no rales, rhonchi or wheezes     CV: regular rates and rhythm, normal S1 S2, no S3 or S4 and no murmur, click or rub     ABDOMEN:  soft, nontender, no HSM or masses and bowel sounds normal     MS: extremities normal- no gross deformities noted, no evidence of inflammation in joints, FROM in all extremities.     SKIN: no suspicious lesions or rashes     NEURO: Normal strength and tone, sensory exam grossly normal, mentation intact and speech normal     PSYCH: mentation appears normal. and affect normal/bright     LYMPHATICS: No cervical adenopathy     DIAGNOSTICS:   No labs or EKG required for low risk surgery (cataract, skin procedure, breast biopsy, etc)               Recent Labs   Lab Test  04/02/18   1120  02/20/18   0842    07/17/17   1511    03/19/14   1230  03/13/14   0806   HGB  11.0*  9.3*   < >  11.9   < >  8.3*  13.2   PLT  304   --    --   263   < >  265   --    INR   --    --    --    --    --   1.03   --    NA   --    --    --    --    --   140   --    POTASSIUM   --    --    --    --    --   3.8  3.7   CR   --    --    --    --    --   0.67   --     < > = values in this interval not displayed.          IMPRESSION:   Diagnosis/reason for consult: menorrhagia, desire for sterilization   The proposed surgical procedure is considered LOW risk.     REVISED CARDIAC RISK INDEX  The patient has the following serious cardiovascular risks for perioperative complications such as (MI, PE, VFib and 3  AV Block):  No serious cardiac risks  INTERPRETATION: 0 risks: Class I (very low risk - 0.4% complication rate)     The patient has the following additional risks for perioperative complications:  No identified additional risks         ICD-10-CM      1. Preop general physical exam Z01.818           RECOMMENDATIONS:        Cardiovascular Risk         --Patient is to take all scheduled medications on the day of surgery EXCEPT for modifications listed below.     APPROVAL GIVEN to proceed with proposed procedure, without further diagnostic evaluation         Signed Electronically by: SHERIE Bennett CNP     Copy of this evaluation report is provided to requesting physician.     [place] Preop Guidelines     Revised Cardiac Risk Index      Instructions   Patient Instructions    At [facility], we strive to deliver an exceptional experience to you, every time we see you.  If you receive a survey in the mail, please send us back your thoughts. We really do value your feedback.     Based on your medical history, these are the current health maintenance/preventive care services that you are due for (some may have been done at this visit.)       Health Maintenance Due   Topic Date Due     ASTHMA CONTROL TEST Q6 MOS  08/04/2014     ASTHMA ACTION PLAN Q1 YR  02/04/2015     LIPID MONITORING Q5 YEARS  01/24/2017         Suggested websites for health information:  Www.CloudFlare.org : Up to date and easily searchable information on multiple topics.  Www.medlineplus.gov : medication info, interactive tutorials, watch real surgeries online  Www.familydoctor.org : good info from the Academy of Family Physicians  Www.cdc.gov : public health info, travel advisories, epidemics (H1N1)  Www.aap.org : children's health info, normal development, vaccinations  Www.health.state.[place].us : [place] dept of health, public health issues in [place], N1N1     Your care team:                                            Family Medicine Internal Medicine   MD Kraig Samuel MD Shantel Branch-Fleming, MD Katya Georgiev PA-C Megan Hill, APRN CNP Nam Ho, MD Pediatrics   RUTH Donato, MD Chrissy Reddy CNP  MD Yenni Adam MD Deborah Mielke, MD Kim Thein, SHERIE [facility]        Clinic hours: Monday - Thursday 7 am-7 pm; Fridays 7 am-5 pm.   Urgent care: Monday - Friday 11 am-9 pm; Saturday and Sunday 9 am-5 pm.  Pharmacy : Monday -Thursday 8 am-8 pm; Friday 8 am-6 pm; Saturday and Sunday 9 am-5 pm.      Clinic: [phone]                     Pharmacy: [phone]           Before Your Surgery       Call your surgeon if there is any change in your health. This includes signs of a cold or flu (such as a sore throat, runny nose, cough, rash or fever).    Do not smoke, drink alcohol or take over the counter medicine (unless your surgeon or primary care doctor tells you to) for the 24 hours before and after surgery.    If you take prescribed drugs: Follow your doctor s orders about which medicines to take and which to stop until after surgery.    Eating and drinking prior to surgery: follow the instructions from your surgeon    Take a shower or bath the night before surgery. Use the soap your surgeon gave you to gently clean your skin. If you do not have soap from your surgeon, use your regular soap. Do not shave or scrub the surgery site.  Wear clean pajamas and have clean sheets on your bed.       Additional Documentation   Vitals:     /82 (BP Location: Left arm, Patient Position: Chair, Cuff Size: Adult Large)     Pulse 77     Temp 98.1  F (36.7  C) (Oral)      Wt 275 lb (124.7 kg)     LMP 06/07/2018 (Exact Date)     SpO2 98%     BMI 40.61 kg/m2     BSA 2.46 m2           More Vitals    Flowsheets:     Infection Screenings,     Custom Formula Data,     Vitals Reassessment,     NICU VS,     Anthropometrics        Encounter Info:     Billing Info,     History,     Allergies,     Detailed Report,     Reviewed This Encounter        Orders Placed        Hemoglobin        Basic metabolic panel (Ca, Cl, CO2, Creat, Gluc, K, Na, BUN)      All Encounter Results   Medication Changes        Montelukast Sodium  10 mg Oral At Bedtime       Medication List   Visit Diagnoses        Preop general physical exam       Pernicious anemia       S/P gastric bypass       Menorrhagia with regular cycle       Moderate persistent asthma with acute exacerbation      Problem List

## 2018-06-14 ENCOUNTER — HOSPITAL ENCOUNTER (OUTPATIENT)
Facility: CLINIC | Age: 38
Discharge: HOME | End: 2018-06-14
Attending: OBSTETRICS & GYNECOLOGY | Admitting: OBSTETRICS & GYNECOLOGY
Payer: COMMERCIAL

## 2018-06-14 ENCOUNTER — SURGERY (OUTPATIENT)
Age: 38
End: 2018-06-14
Payer: COMMERCIAL

## 2018-06-14 ENCOUNTER — ANESTHESIA (OUTPATIENT)
Dept: SURGERY | Facility: CLINIC | Age: 38
End: 2018-06-14
Payer: COMMERCIAL

## 2018-06-14 VITALS
TEMPERATURE: 98.3 F | DIASTOLIC BLOOD PRESSURE: 88 MMHG | HEIGHT: 68 IN | BODY MASS INDEX: 41.31 KG/M2 | RESPIRATION RATE: 16 BRPM | SYSTOLIC BLOOD PRESSURE: 120 MMHG | OXYGEN SATURATION: 99 % | WEIGHT: 272.6 LBS

## 2018-06-14 DIAGNOSIS — N92.0 MENORRHAGIA WITH REGULAR CYCLE: Primary | ICD-10-CM

## 2018-06-14 LAB — B-HCG SERPL-ACNC: <1 IU/L (ref 0–5)

## 2018-06-14 PROCEDURE — 25000132 ZZH RX MED GY IP 250 OP 250 PS 637: Performed by: OBSTETRICS & GYNECOLOGY

## 2018-06-14 PROCEDURE — 84702 CHORIONIC GONADOTROPIN TEST: CPT | Performed by: OBSTETRICS & GYNECOLOGY

## 2018-06-14 PROCEDURE — 25000128 H RX IP 250 OP 636: Performed by: NURSE ANESTHETIST, CERTIFIED REGISTERED

## 2018-06-14 PROCEDURE — 88305 TISSUE EXAM BY PATHOLOGIST: CPT | Mod: 26 | Performed by: OBSTETRICS & GYNECOLOGY

## 2018-06-14 PROCEDURE — 25000566 ZZH SEVOFLURANE, EA 15 MIN: Performed by: OBSTETRICS & GYNECOLOGY

## 2018-06-14 PROCEDURE — 37000008 ZZH ANESTHESIA TECHNICAL FEE, 1ST 30 MIN: Performed by: OBSTETRICS & GYNECOLOGY

## 2018-06-14 PROCEDURE — 25000125 ZZHC RX 250: Performed by: OBSTETRICS & GYNECOLOGY

## 2018-06-14 PROCEDURE — 36415 COLL VENOUS BLD VENIPUNCTURE: CPT | Performed by: OBSTETRICS & GYNECOLOGY

## 2018-06-14 PROCEDURE — 25000128 H RX IP 250 OP 636: Performed by: OBSTETRICS & GYNECOLOGY

## 2018-06-14 PROCEDURE — 58563 HYSTEROSCOPY ABLATION: CPT | Performed by: OBSTETRICS & GYNECOLOGY

## 2018-06-14 PROCEDURE — 27210794 ZZH OR GENERAL SUPPLY STERILE: Performed by: OBSTETRICS & GYNECOLOGY

## 2018-06-14 PROCEDURE — 58670 LAPAROSCOPY TUBAL CAUTERY: CPT | Mod: 51 | Performed by: OBSTETRICS & GYNECOLOGY

## 2018-06-14 PROCEDURE — 71000013 ZZH RECOVERY PHASE 1 LEVEL 1 EA ADDTL HR: Performed by: OBSTETRICS & GYNECOLOGY

## 2018-06-14 PROCEDURE — 25000128 H RX IP 250 OP 636: Performed by: ANESTHESIOLOGY

## 2018-06-14 PROCEDURE — 25800025 ZZH RX 258: Performed by: OBSTETRICS & GYNECOLOGY

## 2018-06-14 PROCEDURE — 25000125 ZZHC RX 250: Performed by: NURSE ANESTHETIST, CERTIFIED REGISTERED

## 2018-06-14 PROCEDURE — 36000058 ZZH SURGERY LEVEL 3 EA 15 ADDTL MIN: Performed by: OBSTETRICS & GYNECOLOGY

## 2018-06-14 PROCEDURE — 40000170 ZZH STATISTIC PRE-PROCEDURE ASSESSMENT II: Performed by: OBSTETRICS & GYNECOLOGY

## 2018-06-14 PROCEDURE — 37000009 ZZH ANESTHESIA TECHNICAL FEE, EACH ADDTL 15 MIN: Performed by: OBSTETRICS & GYNECOLOGY

## 2018-06-14 PROCEDURE — 71000012 ZZH RECOVERY PHASE 1 LEVEL 1 FIRST HR: Performed by: OBSTETRICS & GYNECOLOGY

## 2018-06-14 PROCEDURE — 88305 TISSUE EXAM BY PATHOLOGIST: CPT | Performed by: OBSTETRICS & GYNECOLOGY

## 2018-06-14 PROCEDURE — 71000027 ZZH RECOVERY PHASE 2 EACH 15 MINS: Performed by: OBSTETRICS & GYNECOLOGY

## 2018-06-14 PROCEDURE — 36000056 ZZH SURGERY LEVEL 3 1ST 30 MIN: Performed by: OBSTETRICS & GYNECOLOGY

## 2018-06-14 RX ORDER — PROPOFOL 10 MG/ML
INJECTION, EMULSION INTRAVENOUS CONTINUOUS PRN
Status: DISCONTINUED | OUTPATIENT
Start: 2018-06-14 | End: 2018-06-14

## 2018-06-14 RX ORDER — NEOSTIGMINE METHYLSULFATE 1 MG/ML
VIAL (ML) INJECTION PRN
Status: DISCONTINUED | OUTPATIENT
Start: 2018-06-14 | End: 2018-06-14

## 2018-06-14 RX ORDER — KETOROLAC TROMETHAMINE 30 MG/ML
30 INJECTION, SOLUTION INTRAMUSCULAR; INTRAVENOUS ONCE
Status: COMPLETED | OUTPATIENT
Start: 2018-06-14 | End: 2018-06-14

## 2018-06-14 RX ORDER — ONDANSETRON 4 MG/1
4 TABLET, ORALLY DISINTEGRATING ORAL EVERY 30 MIN PRN
Status: DISCONTINUED | OUTPATIENT
Start: 2018-06-14 | End: 2018-06-14 | Stop reason: HOSPADM

## 2018-06-14 RX ORDER — MEPERIDINE HYDROCHLORIDE 25 MG/ML
12.5 INJECTION INTRAMUSCULAR; INTRAVENOUS; SUBCUTANEOUS
Status: DISCONTINUED | OUTPATIENT
Start: 2018-06-14 | End: 2018-06-14 | Stop reason: HOSPADM

## 2018-06-14 RX ORDER — HYDROMORPHONE HYDROCHLORIDE 1 MG/ML
.3-.5 INJECTION, SOLUTION INTRAMUSCULAR; INTRAVENOUS; SUBCUTANEOUS EVERY 10 MIN PRN
Status: DISCONTINUED | OUTPATIENT
Start: 2018-06-14 | End: 2018-06-14 | Stop reason: HOSPADM

## 2018-06-14 RX ORDER — OXYCODONE AND ACETAMINOPHEN 5; 325 MG/1; MG/1
1-2 TABLET ORAL EVERY 4 HOURS PRN
Qty: 12 TABLET | Refills: 0 | Status: SHIPPED | OUTPATIENT
Start: 2018-06-14 | End: 2018-09-11

## 2018-06-14 RX ORDER — SODIUM CHLORIDE, SODIUM LACTATE, POTASSIUM CHLORIDE, CALCIUM CHLORIDE 600; 310; 30; 20 MG/100ML; MG/100ML; MG/100ML; MG/100ML
INJECTION, SOLUTION INTRAVENOUS CONTINUOUS
Status: DISCONTINUED | OUTPATIENT
Start: 2018-06-14 | End: 2018-06-14 | Stop reason: HOSPADM

## 2018-06-14 RX ORDER — FENTANYL CITRATE 50 UG/ML
25-50 INJECTION, SOLUTION INTRAMUSCULAR; INTRAVENOUS
Status: DISCONTINUED | OUTPATIENT
Start: 2018-06-14 | End: 2018-06-14 | Stop reason: HOSPADM

## 2018-06-14 RX ORDER — ONDANSETRON 2 MG/ML
4 INJECTION INTRAMUSCULAR; INTRAVENOUS EVERY 30 MIN PRN
Status: DISCONTINUED | OUTPATIENT
Start: 2018-06-14 | End: 2018-06-14 | Stop reason: HOSPADM

## 2018-06-14 RX ORDER — SODIUM CHLORIDE, SODIUM LACTATE, POTASSIUM CHLORIDE, CALCIUM CHLORIDE 600; 310; 30; 20 MG/100ML; MG/100ML; MG/100ML; MG/100ML
INJECTION, SOLUTION INTRAVENOUS CONTINUOUS PRN
Status: DISCONTINUED | OUTPATIENT
Start: 2018-06-14 | End: 2018-06-14

## 2018-06-14 RX ORDER — LIDOCAINE HYDROCHLORIDE 20 MG/ML
INJECTION, SOLUTION INFILTRATION; PERINEURAL PRN
Status: DISCONTINUED | OUTPATIENT
Start: 2018-06-14 | End: 2018-06-14

## 2018-06-14 RX ORDER — PROPOFOL 10 MG/ML
INJECTION, EMULSION INTRAVENOUS PRN
Status: DISCONTINUED | OUTPATIENT
Start: 2018-06-14 | End: 2018-06-14

## 2018-06-14 RX ORDER — OXYCODONE AND ACETAMINOPHEN 5; 325 MG/1; MG/1
1 TABLET ORAL
Status: COMPLETED | OUTPATIENT
Start: 2018-06-14 | End: 2018-06-14

## 2018-06-14 RX ORDER — GLYCOPYRROLATE 0.2 MG/ML
INJECTION, SOLUTION INTRAMUSCULAR; INTRAVENOUS PRN
Status: DISCONTINUED | OUTPATIENT
Start: 2018-06-14 | End: 2018-06-14

## 2018-06-14 RX ORDER — ONDANSETRON 2 MG/ML
INJECTION INTRAMUSCULAR; INTRAVENOUS PRN
Status: DISCONTINUED | OUTPATIENT
Start: 2018-06-14 | End: 2018-06-14

## 2018-06-14 RX ORDER — NALOXONE HYDROCHLORIDE 0.4 MG/ML
.1-.4 INJECTION, SOLUTION INTRAMUSCULAR; INTRAVENOUS; SUBCUTANEOUS
Status: DISCONTINUED | OUTPATIENT
Start: 2018-06-14 | End: 2018-06-14 | Stop reason: HOSPADM

## 2018-06-14 RX ORDER — DEXAMETHASONE SODIUM PHOSPHATE 4 MG/ML
INJECTION, SOLUTION INTRA-ARTICULAR; INTRALESIONAL; INTRAMUSCULAR; INTRAVENOUS; SOFT TISSUE PRN
Status: DISCONTINUED | OUTPATIENT
Start: 2018-06-14 | End: 2018-06-14

## 2018-06-14 RX ORDER — PHYSOSTIGMINE SALICYLATE 1 MG/ML
1.2 INJECTION INTRAVENOUS
Status: DISCONTINUED | OUTPATIENT
Start: 2018-06-14 | End: 2018-06-14 | Stop reason: HOSPADM

## 2018-06-14 RX ORDER — FENTANYL CITRATE 50 UG/ML
INJECTION, SOLUTION INTRAMUSCULAR; INTRAVENOUS PRN
Status: DISCONTINUED | OUTPATIENT
Start: 2018-06-14 | End: 2018-06-14

## 2018-06-14 RX ORDER — FENTANYL CITRATE 50 UG/ML
25-50 INJECTION, SOLUTION INTRAMUSCULAR; INTRAVENOUS EVERY 5 MIN PRN
Status: DISCONTINUED | OUTPATIENT
Start: 2018-06-14 | End: 2018-06-14 | Stop reason: HOSPADM

## 2018-06-14 RX ORDER — BUPIVACAINE HYDROCHLORIDE 2.5 MG/ML
INJECTION, SOLUTION INFILTRATION; PERINEURAL PRN
Status: DISCONTINUED | OUTPATIENT
Start: 2018-06-14 | End: 2018-06-14 | Stop reason: HOSPADM

## 2018-06-14 RX ADMIN — PROPOFOL 200 MG: 10 INJECTION, EMULSION INTRAVENOUS at 09:08

## 2018-06-14 RX ADMIN — FENTANYL CITRATE 50 MCG: 50 INJECTION, SOLUTION INTRAMUSCULAR; INTRAVENOUS at 09:35

## 2018-06-14 RX ADMIN — OXYCODONE HYDROCHLORIDE AND ACETAMINOPHEN 1 TABLET: 5; 325 TABLET ORAL at 10:47

## 2018-06-14 RX ADMIN — KETOROLAC TROMETHAMINE 30 MG: 30 INJECTION, SOLUTION INTRAMUSCULAR at 11:02

## 2018-06-14 RX ADMIN — NEOSTIGMINE METHYLSULFATE 4 MG: 1 INJECTION, SOLUTION INTRAVENOUS at 09:48

## 2018-06-14 RX ADMIN — DEXMEDETOMIDINE HYDROCHLORIDE 4 MCG: 100 INJECTION, SOLUTION INTRAVENOUS at 09:22

## 2018-06-14 RX ADMIN — FENTANYL CITRATE 50 MCG: 50 INJECTION, SOLUTION INTRAMUSCULAR; INTRAVENOUS at 09:06

## 2018-06-14 RX ADMIN — PROPOFOL 40 MG: 10 INJECTION, EMULSION INTRAVENOUS at 09:34

## 2018-06-14 RX ADMIN — DEXMEDETOMIDINE HYDROCHLORIDE 4 MCG: 100 INJECTION, SOLUTION INTRAVENOUS at 09:23

## 2018-06-14 RX ADMIN — FENTANYL CITRATE 50 MCG: 50 INJECTION, SOLUTION INTRAMUSCULAR; INTRAVENOUS at 09:34

## 2018-06-14 RX ADMIN — PROPOFOL 200 MCG/KG/MIN: 10 INJECTION, EMULSION INTRAVENOUS at 09:10

## 2018-06-14 RX ADMIN — SODIUM CHLORIDE, POTASSIUM CHLORIDE, SODIUM LACTATE AND CALCIUM CHLORIDE: 600; 310; 30; 20 INJECTION, SOLUTION INTRAVENOUS at 09:06

## 2018-06-14 RX ADMIN — DEXMEDETOMIDINE HYDROCHLORIDE 4 MCG: 100 INJECTION, SOLUTION INTRAVENOUS at 09:20

## 2018-06-14 RX ADMIN — BUPIVACAINE HYDROCHLORIDE 10 ML: 2.5 INJECTION, SOLUTION EPIDURAL; INFILTRATION; INTRACAUDAL; PERINEURAL at 09:41

## 2018-06-14 RX ADMIN — FENTANYL CITRATE 50 MCG: 50 INJECTION, SOLUTION INTRAMUSCULAR; INTRAVENOUS at 10:12

## 2018-06-14 RX ADMIN — DEXMEDETOMIDINE HYDROCHLORIDE 4 MCG: 100 INJECTION, SOLUTION INTRAVENOUS at 09:21

## 2018-06-14 RX ADMIN — MIDAZOLAM 2 MG: 1 INJECTION INTRAMUSCULAR; INTRAVENOUS at 09:06

## 2018-06-14 RX ADMIN — DEXAMETHASONE SODIUM PHOSPHATE 4 MG: 4 INJECTION, SOLUTION INTRA-ARTICULAR; INTRALESIONAL; INTRAMUSCULAR; INTRAVENOUS; SOFT TISSUE at 09:29

## 2018-06-14 RX ADMIN — DEXMEDETOMIDINE HYDROCHLORIDE 4 MCG: 100 INJECTION, SOLUTION INTRAVENOUS at 09:19

## 2018-06-14 RX ADMIN — ONDANSETRON 4 MG: 2 INJECTION INTRAMUSCULAR; INTRAVENOUS at 09:41

## 2018-06-14 RX ADMIN — GLYCOPYRROLATE 0.8 MG: 0.2 INJECTION, SOLUTION INTRAMUSCULAR; INTRAVENOUS at 09:48

## 2018-06-14 RX ADMIN — ROCURONIUM BROMIDE 40 MG: 10 INJECTION INTRAVENOUS at 09:10

## 2018-06-14 RX ADMIN — SODIUM CHLORIDE 400 ML: 900 IRRIGANT IRRIGATION at 09:41

## 2018-06-14 RX ADMIN — LIDOCAINE HYDROCHLORIDE 80 MG: 20 INJECTION, SOLUTION INFILTRATION; PERINEURAL at 09:07

## 2018-06-14 RX ADMIN — PROPOFOL 100 MG: 10 INJECTION, EMULSION INTRAVENOUS at 09:10

## 2018-06-14 NOTE — DISCHARGE INSTRUCTIONS
Same Day Surgery Discharge Instructions for  Sedation and General Anesthesia       It's not unusual to feel dizzy, light-headed or faint for up to 24 hours after surgery or while taking pain medication.  If you have these symptoms: sit for a few minutes before standing and have someone assist you when you get up to walk or use the bathroom.      You should rest and relax for the next 24 hours. We recommend you make arrangements to have an adult stay with you for at least 24 hours after your discharge.  Avoid hazardous and strenuous activity.      DO NOT DRIVE any vehicle or operate mechanical equipment for 24 hours following the end of your surgery.  Even though you may feel normal, your reactions may be affected by the medication you have received.      Do not drink alcoholic beverages for 24 hours following surgery.       Slowly progress to your regular diet as you feel able. It's not unusual to feel nauseated and/or vomit after receiving anesthesia.  If you develop these symptoms, drink clear liquids (apple juice, ginger ale, broth, 7-up, etc. ) until you feel better.  If your nausea and vomiting persists for 24 hours, please notify your surgeon.        All narcotic pain medications, along with inactivity and anesthesia, can cause constipation. Drinking plenty of liquids and increasing fiber intake will help.      For any questions of a medical nature, call your surgeon.      Do not make important decisions for 24 hours.      If you had general anesthesia, you may have a sore throat for a couple of days related to the breathing tube used during surgery.  You may use Cepacol lozenges to help with this discomfort.  If it worsens or if you develop a fever, contact your surgeon.       If you feel your pain is not well managed with the pain medications prescribed by your surgeon, please contact your surgeon's office to let them know so they can address your concerns.     [facility]  Discharge  Instructions  Following D & C / Hysteroscopy    Activity  You may resume normal activities including lifting as needed.  It is permissible to climb stairs. You may drive after 24 hours as long as you are not taking narcotic pain pills.  Baths or showers are perfectly acceptable.      Vaginal Discharge  You may have some vaginal bleeding or discharge for about a week after procedure.  You may use tampons or pads.    Temperature  If you develop temperature elevations to over 101  Fahrenheit, your physician should be called immediately.    Diet  Holmes or light diet is advisable the day of surgery.  If nausea persists, continue this diet.  If severe, call.    Follow-up  Make an appointment in 1-2 weeks if instructed to at: [phone]      HOME CARE FOLLOWING LAPAROSCOPY  [facility]  [phone]      Diet  You have no restrictions on your diet.  During the evening following surgery, drink plenty of fluids and eat a light supper.    Nausea  The anesthesia may produce some nausea.  If you feel nauseated try drinking fluids such as 7-Up, tea, or soup.     Discomfort  The amount of discomfort you can expect is very unpredictable.  If you have pain that cannot be controlled with Tylenol or with the prescription you may have received, you should notify your physician.  The following complaints are not uncommon and should not be cause for concern:  1. Abdominal tenderness; abdominal cramping.  2. Low backache or pain radiating to your shoulders, chest or back.  This is a result of the gas used to inflate your abdomen during surgery.  Lying flat in bed seems to help relieve this.   3. Sore throat for a day or two resulting from the anesthesia tube used during surgery.   4. Some bruising on your abdomen.     Drainage  You may expect a small amount of drainage from the incision on your abdomen and you may change the bandage when necessary.  You will also have a small amount of vaginal drainage for several days;  this is normal and no cause for concern.  If excessive bleeding occurs, notify your physician.      If dye was used during your procedure, your urine will initially be bright blue. It will gradually return to yellow throughout the day. Drinking plenty of fluids will help to filter the dye from your urine.    Fever  A low grade fever (not over 101  Fahrenheit) is usual after this procedure.  Do not hesitate to notify your physician if your fever seems excessive.    Activity  Rest on the day of surgery then you may resume your normal activity, as tolerated. Avoid heavy lifting for one week.    You may shower.  Do not douche or use tampons.  If you also had a D&C, do not resume intercourse until bleeding has ceased.    Emergency Care  Contact your physician if you have any of these problems:   1.  A fever over 101  Fahrenheit   2.  A large amount of bleeding or drainage   3.  Severe pain    Today you received Toradol, an antiinflammatory medication similar to Ibuprofen.  You should not take other antiinflammatory medication, such as Ibuprofen, Motrin, Advil, Aleve, Naprosyn, etc, until 5 pm.     **If you have questions or concerns about your procedure,   call Dr. Pinedo at [phone]**

## 2018-06-14 NOTE — IP AVS SNAPSHOT
MRN:[id number]                      After Visit Summary   6/14/2018    Crystal Rose    MRN: [id number]           Thank you!     Thank you for choosing [facility] for your care. Our goal is always to provide you with excellent care. Hearing back from our patients is one way we can continue to improve our services. Please take a few minutes to complete the written survey that you may receive in the mail after you visit with us. Thank you!        Patient Information     Date Of Birth          1980        About your hospital stay     You were admitted on:  June 14, 2018 You last received care in the:  [facility]    You were discharged on:  June 14, 2018       Who to Call     For medical emergencies, please call 911.  For non-urgent questions about your medical care, please call your primary care provider or clinic, [phone]  For questions related to your surgery, please call your surgery clinic        Attending Provider     Provider Franki Catherine MD OB/Gyn       Primary Care Provider Office Phone # Fax #    Cee Garcia [phone]      After Care Instructions     Discharge Instructions       Patient to arrange follow up appointment in 2  weeks                  Further instructions from your care team       Same Day Surgery Discharge Instructions for  Sedation and General Anesthesia       It's not unusual to feel dizzy, light-headed or faint for up to 24 hours after surgery or while taking pain medication.  If you have these symptoms: sit for a few minutes before standing and have someone assist you when you get up to walk or use the bathroom.      You should rest and relax for the next 24 hours. We recommend you make arrangements to have an adult stay with you for at least 24 hours after your discharge.  Avoid hazardous and strenuous activity.      DO NOT DRIVE any vehicle or operate mechanical equipment for 24 hours  following the end of your surgery.  Even though you may feel normal, your reactions may be affected by the medication you have received.      Do not drink alcoholic beverages for 24 hours following surgery.       Slowly progress to your regular diet as you feel able. It's not unusual to feel nauseated and/or vomit after receiving anesthesia.  If you develop these symptoms, drink clear liquids (apple juice, ginger ale, broth, 7-up, etc. ) until you feel better.  If your nausea and vomiting persists for 24 hours, please notify your surgeon.        All narcotic pain medications, along with inactivity and anesthesia, can cause constipation. Drinking plenty of liquids and increasing fiber intake will help.      For any questions of a medical nature, call your surgeon.      Do not make important decisions for 24 hours.      If you had general anesthesia, you may have a sore throat for a couple of days related to the breathing tube used during surgery.  You may use Cepacol lozenges to help with this discomfort.  If it worsens or if you develop a fever, contact your surgeon.       If you feel your pain is not well managed with the pain medications prescribed by your surgeon, please contact your surgeon's office to let them know so they can address your concerns.     [facility]  Discharge Instructions  Following D & C / Hysteroscopy    Activity  You may resume normal activities including lifting as needed.  It is permissible to climb stairs. You may drive after 24 hours as long as you are not taking narcotic pain pills.  Baths or showers are perfectly acceptable.      Vaginal Discharge  You may have some vaginal bleeding or discharge for about a week after procedure.  You may use tampons or pads.    Temperature  If you develop temperature elevations to over 101  Fahrenheit, your physician should be called immediately.    Diet  [place] or light diet is advisable the day of surgery.  If nausea persists, continue  this diet.  If severe, call.    Follow-up  Make an appointment in 1-2 weeks if instructed to at: [phone]      HOME CARE FOLLOWING LAPAROSCOPY  [facility]  [phone]      Diet  You have no restrictions on your diet.  During the evening following surgery, drink plenty of fluids and eat a light supper.    Nausea  The anesthesia may produce some nausea.  If you feel nauseated try drinking fluids such as 7-Up, tea, or soup.     Discomfort  The amount of discomfort you can expect is very unpredictable.  If you have pain that cannot be controlled with Tylenol or with the prescription you may have received, you should notify your physician.  The following complaints are not uncommon and should not be cause for concern:  1. Abdominal tenderness; abdominal cramping.  2. Low backache or pain radiating to your shoulders, chest or back.  This is a result of the gas used to inflate your abdomen during surgery.  Lying flat in bed seems to help relieve this.   3. Sore throat for a day or two resulting from the anesthesia tube used during surgery.   4. Some bruising on your abdomen.     Drainage  You may expect a small amount of drainage from the incision on your abdomen and you may change the bandage when necessary.  You will also have a small amount of vaginal drainage for several days; this is normal and no cause for concern.  If excessive bleeding occurs, notify your physician.      If dye was used during your procedure, your urine will initially be bright blue. It will gradually return to yellow throughout the day. Drinking plenty of fluids will help to filter the dye from your urine.    Fever  A low grade fever (not over 101  Fahrenheit) is usual after this procedure.  Do not hesitate to notify your physician if your fever seems excessive.    Activity  Rest on the day of surgery then you may resume your normal activity, as tolerated. Avoid heavy lifting for one week.    You may shower.  Do not douche  "or use tampons.  If you also had a D&C, do not resume intercourse until bleeding has ceased.    Emergency Care  Contact your physician if you have any of these problems:   1.  A fever over 101  Fahrenheit   2.  A large amount of bleeding or drainage   3.  Severe pain    Today you received Toradol, an antiinflammatory medication similar to Ibuprofen.  You should not take other antiinflammatory medication, such as Ibuprofen, Motrin, Advil, Aleve, Naprosyn, etc, until 5 pm.     **If you have questions or concerns about your procedure,   call Dr. Pinedo at [phone]**                            Pending Results     Date and Time Order Name Status Description    6/14/2018 0928 Surgical pathology exam In process             Admission Information     Date & Time Provider Department Dept. Phone    6/14/2018 Franki Pinedo MD [facility] [phone]      Your Vitals Were     Blood Pressure Temperature Respirations Height Weight Last Period    120/68 98.3  F (36.8  C) (Temporal) 14 1.727 m (5' 8\") 123.7 kg (272 lb 9.6 oz) 06/07/2018 (Exact Date)    Pulse Oximetry BMI (Body Mass Index)                99% 41.45 kg/m2          MyChart Information     [employer] gives you secure access to your electronic health record. If you see a primary care provider, you can also send messages to your care team and make appointments. If you have questions, please call your primary care clinic.  If you do not have a primary care provider, please call [phone] and they will assist you.        Care EveryWhere ID     This is your Care EveryWhere ID. This could be used by other organizations to access your [facility] medical records  [id number]        Equal Access to Services     LUPE JOHN : Don Murray, baylee holden, qacriss morales. So [facility] [phone].    ATENCIÓN: Si habla español, tiene a garber disposición servicios gratuitos de " asistencia lingüística. Saurav al [phone].    We comply with applicable federal civil rights laws and [place] laws. We do not discriminate on the basis of race, color, national origin, age, disability, sex, sexual orientation, or gender identity.               Review of your medicines      START taking        Dose / Directions    oxyCODONE-acetaminophen 5-325 MG per tablet   Commonly known as:  PERCOCET   Used for:  Menorrhagia with regular cycle        Dose:  1-2 tablet   Take 1-2 tablets by mouth every 4 hours as needed for pain (moderate to severe)   Quantity:  12 tablet   Refills:  0         CONTINUE these medicines which have NOT CHANGED        Dose / Directions    albuterol 108 (90 Base) MCG/ACT Inhaler   Commonly known as:  PROAIR HFA/PROVENTIL HFA/VENTOLIN HFA   Used for:  Moderate persistent asthma, unspecified whether complicated        Dose:  2 puff   Inhale 2 puffs into the lungs every 6 hours as needed   Quantity:  1 Inhaler   Refills:  0       fluticasone 50 MCG/ACT spray   Commonly known as:  FLONASE   Used for:  Chronic allergic rhinitis, unspecified seasonality, unspecified trigger        Dose:  1-2 spray   Spray 1-2 sprays into both nostrils daily   Quantity:  3 Bottle   Refills:  11       montelukast 10 MG tablet   Commonly known as:  SINGULAIR   Used for:  Moderate persistent asthma, unspecified whether complicated        Dose:  10 mg   Take 1 tablet (10 mg) by mouth At Bedtime   Quantity:  30 tablet   Refills:  0       NORLYDA 0.35 MG per tablet   Generic drug:  norethindrone        Refills:  3       valACYclovir 500 MG tablet   Commonly known as:  VALTREX   Used for:  Herpes simplex infection of genitourinary system        Dose:  500 mg   Take 1 tablet (500 mg) by mouth daily   Quantity:  90 tablet   Refills:  3            Where to get your medicines      Some of these will need a paper prescription and others can be bought over the counter. Ask your nurse if you have questions.     Bring  a paper prescription for each of these medications     oxyCODONE-acetaminophen 5-325 MG per tablet                Protect others around you: Learn how to safely use, store and throw away your medicines at www.disposemymeds.org.        Information about OPIOIDS     PRESCRIPTION OPIOIDS: WHAT YOU NEED TO KNOW   We gave you an opioid (narcotic) pain medicine. It is important to manage your pain, but opioids are not always the best choice. You should first try all the other options your care team gave you. Take this medicine for as short a time (and as few doses) as possible.     These medicines have risks:    DO NOT drive when on new or higher doses of pain medicine. These medicines can affect your alertness and reaction times, and you could be arrested for driving under the influence (DUI). If you need to use opioids long-term, talk to your care team about driving.    DO NOT operate heave machinery    DO NOT do any other dangerous activities while taking these medicines.     DO NOT drink any alcohol while taking these medicines.      If the opioid prescribed includes acetaminophen, DO NOT take with any other medicines that contain acetaminophen. Read all labels carefully. Look for the word  acetaminophen  or  Tylenol.  Ask your pharmacist if you have questions or are unsure.    You can get addicted to pain medicines, especially if you have a history of addiction (chemical, alcohol or substance dependence). Talk to your care team about ways to reduce this risk.    Store your pills in a secure place, locked if possible. We will not replace any lost or stolen medicine. If you don t finish your medicine, please throw away (dispose) as directed by your pharmacist. The [place] Pollution Control Agency has more information about safe disposal: https://www.pca.[facility].[place].us/living-green/managing-unwanted-medications.     All opioids tend to cause constipation. Drink plenty of water and eat foods that have a lot of fiber, such  as fruits, vegetables, prune juice, apple juice and high-fiber cereal. Take a laxative (Miralax, milk of magnesia, Colace, Senna) if you don t move your bowels at least every other day.              Medication List: This is a list of all your medications and when to take them. Check marks below indicate your daily home schedule. Keep this list as a reference.      Medications           Morning Afternoon Evening Bedtime As Needed    albuterol 108 (90 Base) MCG/ACT Inhaler   Commonly known as:  PROAIR HFA/PROVENTIL HFA/VENTOLIN HFA   Inhale 2 puffs into the lungs every 6 hours as needed                                fluticasone 50 MCG/ACT spray   Commonly known as:  FLONASE   Spray 1-2 sprays into both nostrils daily                                montelukast 10 MG tablet   Commonly known as:  SINGULAIR   Take 1 tablet (10 mg) by mouth At Bedtime                                NORLYDA 0.35 MG per tablet   Generic drug:  norethindrone                                oxyCODONE-acetaminophen 5-325 MG per tablet   Commonly known as:  PERCOCET   Take 1-2 tablets by mouth every 4 hours as needed for pain (moderate to severe)   Last time this was given:  1 tablet on 6/14/2018 10:47 AM                                valACYclovir 500 MG tablet   Commonly known as:  VALTREX   Take 1 tablet (500 mg) by mouth daily

## 2018-06-14 NOTE — ANESTHESIA POSTPROCEDURE EVALUATION
Patient: Crystal Rose    Procedure(s):  HYSTEROSCOPY, DILATION AND CURETTAGE, ENDOMETRIAL ABLATION WITH NOVASURE, LAPAROSCOPIC BILATERAL TUBAL LIGATION  - Wound Class: II-Clean Contaminated   - Wound Class: I-Clean    Diagnosis:MENORRHAGIA, STERILIZATION   Diagnosis Additional Information: No value filed.    Anesthesia Type:  General    Note:  Anesthesia Post Evaluation    Patient location during evaluation: PACU  Patient participation: Able to fully participate in evaluation  Level of consciousness: awake  Pain management: adequate  Airway patency: patent  Cardiovascular status: acceptable  Respiratory status: acceptable  Hydration status: acceptable  PONV: controlled     Anesthetic complications: None          Last vitals:  Vitals:    06/14/18 0758   BP: 124/83   Resp: 20   Temp: 35.6  C (96.1  F)   SpO2: 99%         Electronically Signed By: Feng Oreilly MD  June 14, 2018  10:02 AM

## 2018-06-14 NOTE — ANESTHESIA PREPROCEDURE EVALUATION
Anesthesia Evaluation     . Pt has had prior anesthetic. Type: General           ROS/MED HX    ENT/Pulmonary:     (+)Moderate Persistent asthma , . .    Neurologic:       Cardiovascular:         METS/Exercise Tolerance:     Hematologic:     (+) Anemia, -      Musculoskeletal:         GI/Hepatic:         Renal/Genitourinary:         Endo:     (+) type II DM Obesity, .      Psychiatric:         Infectious Disease:         Malignancy:         Other:                                    Anesthesia Plan      History & Physical Review  History and physical reviewed and following examination; no interval change.    ASA Status:  3 .        Plan for General with Intravenous induction. Maintenance will be TIVA.    PONV prophylaxis:  Ondansetron (or other 5HT-3) and Dexamethasone or Solumedrol  Propofol, Zofran, and decadron      Postoperative Care  Postoperative pain management:  IV analgesics and Oral pain medications.      Consents  Anesthetic plan, risks, benefits and alternatives discussed with:  Patient..                          .

## 2018-06-14 NOTE — OP NOTE
Procedure Date: 06/14/2018      DATE OF SURGERY:  06/14/2018      REASON FOR ADMISSION:  Menorrhagia, desire for permanent sterilization.      OPERATIVE PROCEDURES:     1.  Hysteroscopy.    2.  Dilatation and curettage.    3.  NovaSure endometrial ablation.    4.  Laparoscopic bilateral tubal fulguration.      OPERATIVE FINDINGS:  The patient had lush tissue throughout the endometrial cavity that was cleaned out by D and C.  Her pattern of distribution of vaporization was excellent.  Intraabdominally, a midportion of each tube was interrupted with a 2 cm fulguration with bipolar cautery.  Her ovaries looked normal.  The anterior and posterior cul-de-sac were clean.      DESCRIPTION OF PROCEDURE:  After general anesthesia was induced, the patient was placed in the dorsal lithotomy position and prepped and draped in the usual fashion.  A Manriquez catheter was placed.  The cervix was grasped and carefully dilated.  The hysteroscope was placed.  A thorough curettage was undertaken.  Second look hysteroscopy irrigated the cavity.  The NovaSure device was placed and with a length of 5.5 and a width of 4.0, a 68 second cycle was accomplished at 121 wright.  Third look hysteroscopy showed an excellent pattern of distribution of vaporization.  A uterine manipulator was placed.      Through a subumbilical incision, the Veress needle was placed and 3 liters of CO2 were insufflated.  The laparoscope, trocar and sheath were placed without incident.  A 5 mm suprapubic trocar was placed under direct vision without complication.  A midportion of each fallopian tube was grasped, elevated and coagulated for a 2 cm fulguration on both sides.  At this time, all gas was exhausted and instruments removed.  The incisions were closed with 4-0 Vicryl and Steri-Strips.  The estimated blood loss was 2 mL.  The Manriquez catheter was removed.  The patient went to the recovery room in satisfactory condition, and will be discharged to home.  She is  asked to call for any fever, chills, change in bowel or bladder function.  She was given Percocet.         YANY SALAZAR JR, MD             D: 2018   T: 2018   MT: MILO      Name:     JANNIE CONNER   MRN:      [id number]        Account:        [id number]   :      1980           Procedure Date: 2018      Document: [id number]

## 2018-06-14 NOTE — ANESTHESIA CARE TRANSFER NOTE
Patient: Crystal Rose    Procedure(s):  HYSTEROSCOPY, DILATION AND CURETTAGE, ENDOMETRIAL ABLATION WITH NOVASURE, LAPAROSCOPIC BILATERAL TUBAL LIGATION  - Wound Class: II-Clean Contaminated   - Wound Class: I-Clean    Diagnosis: MENORRHAGIA, STERILIZATION   Diagnosis Additional Information: No value filed.    Anesthesia Type:   General     Note:  Airway :Face Mask  Patient transferred to:PACU  Comments: 954:  To par responsive, dentition unchanged from pre-op.Handoff Report: Identifed the Patient, Identified the Reponsible Provider, Reviewed the pertinent medical history, Discussed the surgical course, Reviewed Intra-OP anesthesia mangement and issues during anesthesia, Set expectations for post-procedure period and Allowed opportunity for questions and acknowledgement of understanding      Vitals: (Last set prior to Anesthesia Care Transfer)    CRNA VITALS  6/14/2018 0923 - 6/14/2018 0953      6/14/2018             NIBP: 133/87    Pulse: 90    NIBP Mean: 104    Ht Rate: 90    SpO2: 100 %    Resp Rate (set): 10                Electronically Signed By: SHERIE Kat CRNA  June 14, 2018  9:53 AM

## 2018-06-14 NOTE — BRIEF OP NOTE
OP NOTE:   Hysteroscopy, dilatation and curettage, novasure endometrial ablation, laparoscopy, bilateral tubal ligation   ebl 2 cc   No comp   ALFONSO Pinedo MD

## 2018-06-14 NOTE — IP AVS SNAPSHOT
[facility]    [address]    [place] [address]    Phone:  [phone]    Fax:  [phone]                                       After Visit Summary   6/14/2018    Crystal Rose    MRN: [id number]           After Visit Summary Signature Page     I have received my discharge instructions, and my questions have been answered. I have discussed any challenges I see with this plan with the nurse or doctor.    ..........................................................................................................................................  Patient/Patient Representative Signature      ..........................................................................................................................................  Patient Representative Print Name and Relationship to Patient    ..................................................               ................................................  Date                                            Time    ..........................................................................................................................................  Reviewed by Signature/Title    ...................................................              ..............................................  Date                                                            Time

## 2018-06-15 LAB — COPATH REPORT: NORMAL

## 2018-08-13 ENCOUNTER — OFFICE VISIT (OUTPATIENT)
Dept: FAMILY MEDICINE | Facility: CLINIC | Age: 38
End: 2018-08-13
Payer: COMMERCIAL

## 2018-08-13 VITALS
DIASTOLIC BLOOD PRESSURE: 84 MMHG | TEMPERATURE: 98.2 F | SYSTOLIC BLOOD PRESSURE: 134 MMHG | WEIGHT: 271 LBS | HEART RATE: 92 BPM | OXYGEN SATURATION: 98 % | RESPIRATION RATE: 20 BRPM | BODY MASS INDEX: 41.21 KG/M2

## 2018-08-13 DIAGNOSIS — J45.40 MODERATE PERSISTENT ASTHMA, UNSPECIFIED WHETHER COMPLICATED: ICD-10-CM

## 2018-08-13 DIAGNOSIS — M62.81 GENERALIZED MUSCLE WEAKNESS: ICD-10-CM

## 2018-08-13 DIAGNOSIS — E66.01 MORBID OBESITY (H): ICD-10-CM

## 2018-08-13 DIAGNOSIS — M54.9 ACUTE BILATERAL BACK PAIN, UNSPECIFIED BACK LOCATION: ICD-10-CM

## 2018-08-13 DIAGNOSIS — R51.9 ACUTE INTRACTABLE HEADACHE, UNSPECIFIED HEADACHE TYPE: Primary | ICD-10-CM

## 2018-08-13 LAB
BASOPHILS # BLD AUTO: 0 10E9/L (ref 0–0.2)
BASOPHILS NFR BLD AUTO: 0.3 %
DIFFERENTIAL METHOD BLD: ABNORMAL
EOSINOPHIL # BLD AUTO: 0.3 10E9/L (ref 0–0.7)
EOSINOPHIL NFR BLD AUTO: 2.5 %
ERYTHROCYTE [DISTWIDTH] IN BLOOD BY AUTOMATED COUNT: 17.3 % (ref 10–15)
HCT VFR BLD AUTO: 34.2 % (ref 35–47)
HGB BLD-MCNC: 10.8 G/DL (ref 11.7–15.7)
LYMPHOCYTES # BLD AUTO: 2.1 10E9/L (ref 0.8–5.3)
LYMPHOCYTES NFR BLD AUTO: 19 %
MCH RBC QN AUTO: 27 PG (ref 26.5–33)
MCHC RBC AUTO-ENTMCNC: 31.6 G/DL (ref 31.5–36.5)
MCV RBC AUTO: 86 FL (ref 78–100)
MONOCYTES # BLD AUTO: 0.8 10E9/L (ref 0–1.3)
MONOCYTES NFR BLD AUTO: 7.4 %
NEUTROPHILS # BLD AUTO: 7.7 10E9/L (ref 1.6–8.3)
NEUTROPHILS NFR BLD AUTO: 70.8 %
PLATELET # BLD AUTO: 415 10E9/L (ref 150–450)
RBC # BLD AUTO: 4 10E12/L (ref 3.8–5.2)
WBC # BLD AUTO: 10.8 10E9/L (ref 4–11)

## 2018-08-13 PROCEDURE — 36415 COLL VENOUS BLD VENIPUNCTURE: CPT | Performed by: NURSE PRACTITIONER

## 2018-08-13 PROCEDURE — 85025 COMPLETE CBC W/AUTO DIFF WBC: CPT | Performed by: NURSE PRACTITIONER

## 2018-08-13 PROCEDURE — 99215 OFFICE O/P EST HI 40 MIN: CPT | Performed by: NURSE PRACTITIONER

## 2018-08-13 RX ORDER — CYCLOBENZAPRINE HCL 10 MG
5-10 TABLET ORAL 3 TIMES DAILY PRN
Qty: 30 TABLET | Refills: 1 | Status: SHIPPED | OUTPATIENT
Start: 2018-08-13 | End: 2018-08-13

## 2018-08-13 RX ORDER — KETOROLAC TROMETHAMINE 30 MG/ML
30 INJECTION, SOLUTION INTRAMUSCULAR; INTRAVENOUS ONCE
Qty: 1 ML | Refills: 0 | OUTPATIENT
Start: 2018-08-13 | End: 2019-02-05

## 2018-08-13 RX ORDER — ALBUTEROL SULFATE 90 UG/1
2 AEROSOL, METERED RESPIRATORY (INHALATION) EVERY 6 HOURS PRN
Qty: 1 INHALER | Refills: 0 | Status: SHIPPED | OUTPATIENT
Start: 2018-08-13 | End: 2018-09-11

## 2018-08-13 RX ORDER — CYCLOBENZAPRINE HCL 10 MG
5-10 TABLET ORAL 3 TIMES DAILY PRN
Qty: 30 TABLET | Refills: 1 | Status: SHIPPED | OUTPATIENT
Start: 2018-08-13 | End: 2019-02-05

## 2018-08-13 ASSESSMENT — PAIN SCALES - GENERAL: PAINLEVEL: NO PAIN (0)

## 2018-08-13 NOTE — PROGRESS NOTES
SUBJECTIVE:   Crystal Rose is a 37 year old female who presents to clinic today for the following health issues:    Back Pain       Duration: 5 days        Specific cause: none    Description:   Location of pain: low back left, middle of back left, upper back left and neck left  Character of pain: stabbing  Pain radiation:none  New numbness or weakness in legs, not attributed to pain:  no     Intensity: Currently 8/10, severe    History:   Pain interferes with job: YES  History of back problems: no prior back problems  Any previous MRI or X-rays: None  Sees a specialist for back pain:  No  Therapies tried without relief: acetaminophen (Tylenol)    Alleviating factors:   Improved by: acetaminophen (Tylenol)      Precipitating factors:  Worsened by: Lifting    Functional and Psychosocial Screen (Maximus STarT Back):      Not performed today      Headache  Onset: x 2 days    Description:   Location: back left   Character: throbbing pain  Frequency:  constant  Duration:  contstant    Intensity: severe    Progression of Symptoms:  worsening    Accompanying Signs & Symptoms:  Stiff neck: YES  Neck or upper back pain: YES  Fever: no   Sinus pressure: YES  Nausea or vomiting: no   Dizziness: YES  Numbness: no   Weakness: YES  Visual changes: YES- blurry    History:   Head trauma: no   Family history of migraines: YES- aunt  Previous tests for headaches: no   Neurologist evaluations: no   Able to do daily activities: no  Wake with a headaches: YES  Do headaches wake you up: YES  Daily pain medication use: no  Work/school stressors/changes: no    Precipitating factors:   Does light make it worse: YES  Does sound make it worse: YES    Alleviating factors:  Does sleep help: no     Therapies Tried and outcome: Tylenol, helped at first then stopped working,     Feels like her eyes are about to pop out of her head.  Has throbbing pain.  Head hurts with any movement.  +neck stiffness.  Does not have history of any headache  "like this.  She denies fever but \"can't stop sweating\" x 3 days.    Problem list and histories reviewed & adjusted, as indicated.  Additional history: as documented    Patient Active Problem List   Diagnosis     S/P gastric bypass     Pernicious anemia     CARDIOVASCULAR SCREENING; LDL GOAL LESS THAN 160     Moderate persistent asthma     Environmental allergies     Abnormal Pap smear of cervix     Cervical dysplasia     Morbid obesity (H)     Iron deficiency anemia due to chronic blood loss     Menorrhagia with regular cycle     Past Surgical History:   Procedure Laterality Date     ATTEMPTED LAPAROSCOPY  3/13/2014    Procedure: ATTEMPTED LAPAROSCOPY;;  Surgeon: Cee Garcia MD;  Location: [facility]     COLPOSCOPY CERVIX, BIOPSY CERVIX, ENDOCERVICAL CURETTAGE, COMBINED      1/6/2005:  SUMI I; 6/15/2009:  SUMI I.  Treated with cryo.  10/27/1997:  SUMI I-II     CONIZATION LEEP  3/13/2014    Procedure: CONIZATION LEEP;  LOOP ELECTROSURGICAL EXCISION PROCEDURE; LAPAROSCOPY ATTEMPTED;  Surgeon: Cee Garcia MD;  Location: [facility]     CRYOTHERAPY, CERVICAL  age 19    Pt reported     DILATION AND CURETTAGE, ABLATE ENDOMETRIUM NOVASURE, COMBINED N/A 6/14/2018    Procedure: COMBINED DILATION AND CURETTAGE, ABLATE ENDOMETRIUM NOVASURE;  HYSTEROSCOPY, DILATION AND CURETTAGE, ENDOMETRIAL ABLATION WITH NOVASURE, LAPAROSCOPIC BILATERAL TUBAL LIGATION ;  Surgeon: Franki Pinedo MD;  Location: [facility]     EXAM UNDER ANESTHESIA PELVIC  3/20/2014    Procedure: EXAM UNDER ANESTHESIA PELVIC;  EXAM UNDER ANESTHESIA, REMOVAL OF STICHES ;  Surgeon: Cee Garcia MD;  Location:  OR     GASTRIC BYPASS  2004    pre-bypass weight was 320#     HC NASAL/SINUS SCOPE W THER INSTILL       HC REMOVAL OF OVARIAN CYST(S)       LAPAROSCOPIC TUBAL LIGATION Bilateral 6/14/2018    Procedure: LAPAROSCOPIC TUBAL LIGATION;;  Surgeon: Franki Pinedo MD;  Location: [facility]       Social History   Substance Use Topics     Smoking status: Former Smoker "     Quit date: 5/5/2009     Smokeless tobacco: Never Used     Alcohol use No     Family History   Problem Relation Age of Onset     Hypertension Mother      Allergies Mother      Obesity Mother      Asthma Father      Diabetes Father      Hypertension Father      Allergies Father      Cancer Father      Lymphoma d age 58     Asthma Brother      Allergies Sister      Depression Sister      Obesity Sister      Alzheimer Disease Maternal Grandmother      Arthritis Maternal Grandmother      Obesity Maternal Grandmother      Thyroid Disease Maternal Grandmother      Hypertension Maternal Grandfather      Cancer - colorectal Maternal Grandfather      Cerebrovascular Disease Maternal Grandfather      Diabetes Paternal Grandmother          Current Outpatient Prescriptions   Medication Sig Dispense Refill     albuterol (PROAIR HFA/PROVENTIL HFA/VENTOLIN HFA) 108 (90 Base) MCG/ACT Inhaler Inhale 2 puffs into the lungs every 6 hours as needed 1 Inhaler 0     cyclobenzaprine (FLEXERIL) 10 MG tablet Take 0.5-1 tablets (5-10 mg) by mouth 3 times daily as needed for muscle spasms 30 tablet 1     ketorolac (TORADOL) 30 MG/ML injection Inject 1 mL (30 mg) into the muscle once for 1 dose 1 mL 0     montelukast (SINGULAIR) 10 MG tablet Take 1 tablet (10 mg) by mouth At Bedtime 30 tablet 0     valACYclovir (VALTREX) 500 MG tablet Take 1 tablet (500 mg) by mouth daily 90 tablet 3     fluticasone (FLONASE) 50 MCG/ACT spray Spray 1-2 sprays into both nostrils daily (Patient not taking: Reported on 8/13/2018) 3 Bottle 11     NORLYDA 0.35 MG per tablet   3     oxyCODONE-acetaminophen (PERCOCET) 5-325 MG per tablet Take 1-2 tablets by mouth every 4 hours as needed for pain (moderate to severe) (Patient not taking: Reported on 8/13/2018) 12 tablet 0     Allergies   Allergen Reactions     Cat Hair [Cats]      Dog Hair [Dogs]      Dust Mites      Ragweeds      Recent Labs   Lab Test  06/12/18   1742  03/19/14   1230   01/24/12   1158   LDL   --     --    --   94   HDL   --    --    --   47*   TRIG   --    --    --   72   CR  0.62  0.67   --    --    GFRESTIMATED  >90  >90   --    --    GFRESTBLACK  >90  >90   --    --    POTASSIUM  4.6  3.8   < >   --     < > = values in this interval not displayed.      BP Readings from Last 3 Encounters:   08/13/18 134/84   06/14/18 120/88   06/12/18 130/82    Wt Readings from Last 3 Encounters:   08/13/18 271 lb (122.9 kg)   06/14/18 272 lb 9.6 oz (123.7 kg)   06/12/18 275 lb (124.7 kg)                    Reviewed and updated as needed this visit by clinical staff  Tobacco  Allergies  Meds  Problems       Reviewed and updated as needed this visit by Provider  Allergies  Meds  Problems         ROS:  Constitutional, HEENT, cardiovascular, pulmonary, GI, , musculoskeletal, neuro, skin, endocrine and psych systems are negative, except as otherwise noted.    OBJECTIVE:     /84 (BP Location: Right arm, Patient Position: Chair, Cuff Size: Adult Large)  Pulse 92  Temp 98.2  F (36.8  C) (Oral)  Resp 20  Wt 271 lb (122.9 kg)  LMP 08/10/2018 (Approximate)  SpO2 98%  Breastfeeding? No  BMI 41.21 kg/m2  Body mass index is 41.21 kg/(m^2).  GENERAL: healthy, alert and no distress  EYES: Eyes grossly normal to inspection, PERRL and conjunctivae and sclerae normal  HENT: ear canals and TM's normal, nose and mouth without ulcers or lesions  NECK: no adenopathy, no asymmetry, masses, or scars and thyroid normal to palpation  RESP: lungs clear to auscultation - no rales, rhonchi or wheezes  CV: regular rate and rhythm, normal S1 S2, no S3 or S4, no murmur, click or rub, no peripheral edema and peripheral pulses strong  ABDOMEN: soft, nontender, no hepatosplenomegaly, no masses and bowel sounds normal  MS: neck exam shows grossly reduced ROM in all planes and spine exam shows tenderness to paraspinals throughout though no significant muscle spasm noted.  SKIN: no suspicious lesions or rashes  NEURO: weakness of all  muscles 2/5 throughout- jerky movements noted during strength testing, sensory exam grossly normal, mentation intact, oriented times 4, speech normal, cranial nerves 2-12 intact and DTR's abnormal: diminished  PSYCH: mentation appears normal, affect normal/bright    Diagnostic Test Results:  No results found for this or any previous visit (from the past 24 hour(s)).    ASSESSMENT/PLAN:     1. Acute intractable headache, unspecified headache type  Concern for more concerning etiology based on abnormal neuro exam.  Advised that patient go to emergency room but she states she will not do so right now given that she has her two children with her.  She will go when her  gets home. OF note, CBC unremarkable.  Did give toradol for pain relief today.  - CBC with platelets and differential  - cyclobenzaprine (FLEXERIL) 10 MG tablet; Take 0.5-1 tablets (5-10 mg) by mouth 3 times daily as needed for muscle spasms  Dispense: 30 tablet; Refill: 1  - ketorolac (TORADOL) 30 MG/ML injection; Inject 1 mL (30 mg) into the muscle once for 1 dose  Dispense: 1 mL; Refill: 0     2. Acute bilateral back pain, unspecified back location  As above.   - CBC with platelets and differential  - ketorolac (TORADOL) 30 MG/ML injection; Inject 1 mL (30 mg) into the muscle once for 1 dose  Dispense: 1 mL; Refill: 0  - cyclobenzaprine (FLEXERIL) 10 MG tablet; Take 0.5-1 tablets (5-10 mg) by mouth 3 times daily as needed for muscle spasms  Dispense: 30 tablet; Refill: 1    3. Generalized muscle weakness  As above.     4. Morbid obesity (H)  Patient requests referral today.   - BARIATRIC ADULT REFERRAL    Patient Instructions     At [facility], we strive to deliver an exceptional experience to you, every time we see you.  If you receive a survey in the mail, please send us back your thoughts. We really do value your feedback.    Based on your medical history, these are the current health maintenance/preventive care services  that you are due for (some may have been done at this visit.)  Health Maintenance Due   Topic Date Due     ASTHMA CONTROL TEST Q6 MOS  08/04/2014     ASTHMA ACTION PLAN Q1 YR  02/04/2015     LIPID MONITORING Q5 YEARS  01/24/2017       Suggested websites for health information:  Www.[employer].org : Up to date and easily searchable information on multiple topics.  Www.medlineplus.gov : medication info, interactive tutorials, watch real surgeries online  Www.familydoctor.org : good info from the Academy of Family Physicians  Www.cdc.gov : public health info, travel advisories, epidemics (H1N1)  Www.aap.org : children's health info, normal development, vaccinations  Www.health.[facility].[place].us : [place] dept of health, public health issues in [place], N1N1    Your care team:                            Family Medicine Internal Medicine   MD Kraig Samuel MD Shantel Branch-Fleming, MD Katya Georgiev PA-C Megan Hill, APRN RACHEAL Sanchez MD Pediatrics   Ant Griffith, RUTH Camilo, MD Chrissy Reddy APRN CNP   MD Yenni Puckett MD Deborah Mielke, MD Kim Thein, APRN [facility]      Clinic hours: Monday - Thursday 7 am-7 pm; Fridays 7 am-5 pm.   Urgent care: Monday - Friday 11 am-9 pm; Saturday and Sunday 9 am-5 pm.  Pharmacy : Monday -Thursday 8 am-8 pm; Friday 8 am-6 pm; Saturday and Sunday 9 am-5 pm.     Clinic: [phone]   Pharmacy: [phone]          Gregoria Camilo, APRN [facility]

## 2018-08-13 NOTE — PATIENT INSTRUCTIONS
At [facility], we strive to deliver an exceptional experience to you, every time we see you.  If you receive a survey in the mail, please send us back your thoughts. We really do value your feedback.    Based on your medical history, these are the current health maintenance/preventive care services that you are due for (some may have been done at this visit.)  Health Maintenance Due   Topic Date Due     ASTHMA CONTROL TEST Q6 MOS  08/04/2014     ASTHMA ACTION PLAN Q1 YR  02/04/2015     LIPID MONITORING Q5 YEARS  01/24/2017       Suggested websites for health information:  Www.[employer] : Up to date and easily searchable information on multiple topics.  Www.[employer].gov : medication info, interactive tutorials, watch real surgeries online  Www.familydoctor.org : good info from the Academy of Family Physicians  Www.cdc.gov : public health info, travel advisories, epidemics (H1N1)  Www.aap.org : children's health info, normal development, vaccinations  Www.health.[facility].[place].us : [place] dept of health, public health issues in [place], N1N1    Your care team:                            Family Medicine Internal Medicine   MD Kraig Samuel MD Shantel Branch-Fleming, MD Katya Georgiev PA-C Megan Hill, APRN RACHEAL Sanchez MD Pediatrics   Ant Griffith PAJERRY Camilo, MD Chrissy Reddy APRN CNP   MD Yenni Puckett MD Deborah Mielke, MD Kim Thein, APRN [facility]      Clinic hours: Monday - Thursday 7 am-7 pm; Fridays 7 am-5 pm.   Urgent care: Monday - Friday 11 am-9 pm; Saturday and Sunday 9 am-5 pm.  Pharmacy : Monday -Thursday 8 am-8 pm; Friday 8 am-6 pm; Saturday and Sunday 9 am-5 pm.     Clinic: [phone]   Pharmacy: [phone]

## 2018-08-13 NOTE — MR AVS SNAPSHOT
After Visit Summary   8/13/2018    Crystal Rose    MRN: [id number]           Patient Information     Date Of Birth          1980        Visit Information        Provider Department      8/13/2018 6:40 PM Gregoria Camilo APRN CNP [facility]        Today's Diagnoses     Acute intractable headache, unspecified headache type    -  1    Acute bilateral back pain, unspecified back location        Morbid obesity (H)          Care Instructions    At [facility], we strive to deliver an exceptional experience to you, every time we see you.  If you receive a survey in the mail, please send us back your thoughts. We really do value your feedback.    Based on your medical history, these are the current health maintenance/preventive care services that you are due for (some may have been done at this visit.)  Health Maintenance Due   Topic Date Due     ASTHMA CONTROL TEST Q6 MOS  08/04/2014     ASTHMA ACTION PLAN Q1 YR  02/04/2015     LIPID MONITORING Q5 YEARS  01/24/2017       Suggested websites for health information:  Www.[facility] HospitalInternet college [employer] : Up to date and easily searchable information on multiple topics.  Www.medlineplus.gov : medication info, interactive tutorials, watch real surgeries online  Www.familydoctor.org : good info from the Academy of Family Physicians  Www.cdc.gov : public health info, travel advisories, epidemics (H1N1)  Www.aap.org : children's health info, normal development, vaccinations  Www.health.state.[place]us : [place] dept of health, public health issues in [place], N1N1    Your care team:                            Family Medicine Internal Medicine   MD Kraig Samuel MD Shantel Branch-Fleming, MD Katya Georgiev PA-C Megan Hill, APRN CNP Nam Ho, MD Pediatrics   RUTH Donato, MD Crhissy Reddy APRN CNP   MD Yenni Puckett MD Deborah Mielke, MD Kim Thein,  APRN CNP      Clinic hours: Monday - Thursday 7 am-7 pm; Fridays 7 am-5 pm.   Urgent care: Monday - Friday 11 am-9 pm; Saturday and Sunday 9 am-5 pm.  Pharmacy : Monday -Thursday 8 am-8 pm; Friday 8 am-6 pm; Saturday and Sunday 9 am-5 pm.     Clinic: [phone]   Pharmacy: [phone]              Follow-ups after your visit        Additional Services     BARIATRIC ADULT REFERRAL       Your provider has referred you to: [facility]: [facility] [place] [phone]  [email or web address]  [facility]: [facility] [phone]. [email or web address]    Please be aware that coverage of these services is subject to the terms and limitations of your health insurance plan.  Call member services at your health plan with any benefit or coverage questions.      Please bring the following with you to your appointment:      (1) List of current medications   (2) This referral request   (3) Any documents/labs given to you for this referral                  Who to contact     If you have questions or need follow up information about today's clinic visit or your schedule please contact [facility] directly at [phone].  Normal or non-critical lab and imaging results will be communicated to you by MyChart, letter or phone within 4 business days after the clinic has received the results. If you do not hear from us within 7 days, please contact the clinic through MyChart or phone. If you have a critical or abnormal lab result, we will notify you by phone as soon as possible.  Submit refill requests through [employer] or call your pharmacy and they will forward the refill request to us. Please allow 3 business days for your refill to be completed.          Additional Information About Your Visit         Diasome Information     [facility]ome gives you secure access to your electronic health record. If you see a primary care provider, you can also send messages to your care team and make appointments. If you have questions, please call your primary care clinic.  If you do not have a primary care provider, please call [phone] and they will assist you.        Care EveryWhere ID     This is your Care EveryWhere ID. This could be used by other organizations to access your [place] medical records  [id number]        Your Vitals Were     Pulse Temperature Respirations Last Period Pulse Oximetry Breastfeeding?    92 98.2  F (36.8  C) (Oral) 20 08/10/2018 (Approximate) 98% No    BMI (Body Mass Index)                   41.21 kg/m2            Blood Pressure from Last 3 Encounters:   08/13/18 134/84   06/14/18 120/88   06/12/18 130/82    Weight from Last 3 Encounters:   08/13/18 271 lb (122.9 kg)   06/14/18 272 lb 9.6 oz (123.7 kg)   06/12/18 275 lb (124.7 kg)              We Performed the Following     BARIATRIC ADULT REFERRAL     CBC with platelets and differential          Today's Medication Changes          These changes are accurate as of 8/13/18  7:24 PM.  If you have any questions, ask your nurse or doctor.               Start taking these medicines.        Dose/Directions    cyclobenzaprine 10 MG tablet   Commonly known as:  FLEXERIL   Used for:  Acute intractable headache, unspecified headache type, Acute bilateral back pain, unspecified back location   Started by:  Gregoria Camilo APRN CNP        Dose:  5-10 mg   Take 0.5-1 tablets (5-10 mg) by mouth 3 times daily as needed for muscle spasms   Quantity:  30 tablet   Refills:  1       ketorolac 30 MG/ML injection   Commonly known as:  TORADOL   Used for:  Acute bilateral back pain, unspecified back location   Started by:  Gregoria Camilo APRN CNP        Dose:  30 mg   Inject 1 mL (30 mg) into the muscle once for 1 dose   Quantity:  1 mL    Refills:  0            Where to get your medicines      [place] [facility] [place] [address]  [address], [facility] [address]     Phone:  [phone]     cyclobenzaprine 10 MG tablet         Some of these will need a paper prescription and others can be bought over the counter.  Ask your nurse if you have questions.     You don't need a prescription for these medications     ketorolac 30 MG/ML injection                Primary Care Provider Office Phone # Fax #    Cee Garcia [phone] [address]  [place] [address]        Equal Access to Services     RADHA [facility] HospitalMAYTE : Hadii angeles hernandez hadasho Sonarayan, waaxda luqadaha, qaybta kaalmada adeegyada, criss torres . So [facility] [phone].    ATENCIÓN: Si habla español, tiene a garber disposición servicios gratuitos de asistencia lingüística. Llame al [phone].    We comply with applicable federal civil rights laws and [place] laws. We do not discriminate on the basis of race, color, national origin, age, disability, sex, sexual orientation, or gender identity.            Thank you!     Thank you for choosing [facility]  for your care. Our goal is always to provide you with excellent care. Hearing back from our patients is one way we can continue to improve our services. Please take a few minutes to complete the written survey that you may receive in the mail after your visit with us. Thank you!             Your Updated Medication List - Protect others around you: Learn how to safely use, store and throw away your medicines at www.disposemymeds.org.          This list is accurate as of 8/13/18  7:24 PM.  Always use your most recent med list.                   Brand Name Dispense Instructions for use Diagnosis    albuterol 108 (90 Base) MCG/ACT inhaler    PROAIR HFA/PROVENTIL HFA/VENTOLIN HFA    1 Inhaler    Inhale 2  puffs into the lungs every 6 hours as needed    Moderate persistent asthma, unspecified whether complicated       cyclobenzaprine 10 MG tablet    FLEXERIL    30 tablet    Take 0.5-1 tablets (5-10 mg) by mouth 3 times daily as needed for muscle spasms    Acute intractable headache, unspecified headache type, Acute bilateral back pain, unspecified back location       fluticasone 50 MCG/ACT spray    FLONASE    3 Bottle    Spray 1-2 sprays into both nostrils daily    Chronic allergic rhinitis, unspecified seasonality, unspecified trigger       ketorolac 30 MG/ML injection    TORADOL    1 mL    Inject 1 mL (30 mg) into the muscle once for 1 dose    Acute bilateral back pain, unspecified back location       montelukast 10 MG tablet    SINGULAIR    30 tablet    Take 1 tablet (10 mg) by mouth At Bedtime    Moderate persistent asthma, unspecified whether complicated       NORLYDA 0.35 MG per tablet   Generic drug:  norethindrone           oxyCODONE-acetaminophen 5-325 MG per tablet    PERCOCET    12 tablet    Take 1-2 tablets by mouth every 4 hours as needed for pain (moderate to severe)    Menorrhagia with regular cycle       valACYclovir 500 MG tablet    VALTREX    90 tablet    Take 1 tablet (500 mg) by mouth daily    Herpes simplex infection of genitourinary system

## 2018-08-14 NOTE — NURSING NOTE
The following medication was given:     MEDICATION: Ketorolac Tromethamine 60MG/2ML (30 mg/mL) (Toradol)  ROUTE: IM  SITE: Deltoid - Left  DOSE: 30mg  LOT #: [id number]  :  [employer]  EXPIRATION DATE:  02/20  NDC#: 70226-609-49  Misti Camarillo MA

## 2018-08-27 ENCOUNTER — TELEPHONE (OUTPATIENT)
Dept: OBGYN | Facility: CLINIC | Age: 38
End: 2018-08-27

## 2018-08-27 NOTE — LETTER
[facility] [address]  [place] [address]  [phone]        August 27, 2018    Crystal Rose  [address]   [place] [address]              Dear Crystal Rose    This is to remind you that your Hemoglobin is due.    You may call our office at [phone] to schedule an appointment.          Sincerely,        Franki Pinedo MD

## 2018-09-04 ENCOUNTER — TELEPHONE (OUTPATIENT)
Dept: FAMILY MEDICINE | Facility: CLINIC | Age: 38
End: 2018-09-04

## 2018-09-04 DIAGNOSIS — J45.40 MODERATE PERSISTENT ASTHMA, UNSPECIFIED WHETHER COMPLICATED: ICD-10-CM

## 2018-09-04 RX ORDER — ALBUTEROL SULFATE 90 UG/1
2 AEROSOL, METERED RESPIRATORY (INHALATION) EVERY 6 HOURS PRN
Qty: 1 INHALER | Refills: 0 | Status: CANCELLED | OUTPATIENT
Start: 2018-09-04

## 2018-09-04 NOTE — TELEPHONE ENCOUNTER
Requested Prescriptions   Pending Prescriptions Disp Refills     albuterol (PROAIR HFA/PROVENTIL HFA/VENTOLIN HFA) 108 (90 Base) MCG/ACT inhaler  Last Written Prescription Date:  8/13/18  Last Fill Quantity: 1 inhaler,  # refills: 0   Last office visit: 8/13/2018 with prescribing provider:  Ton Castillo Office Visit:     1 Inhaler 0     Sig: Inhale 2 puffs into the lungs every 6 hours as needed    There is no refill protocol information for this order

## 2018-09-10 ENCOUNTER — MYC MEDICAL ADVICE (OUTPATIENT)
Dept: FAMILY MEDICINE | Facility: CLINIC | Age: 38
End: 2018-09-10

## 2018-09-10 DIAGNOSIS — J45.40 MODERATE PERSISTENT ASTHMA, UNSPECIFIED WHETHER COMPLICATED: ICD-10-CM

## 2018-09-10 NOTE — TELEPHONE ENCOUNTER
This writer attempted to contact patient on 09/10/18      Reason for call ACT and unable to leave message phone not in service.      If patient calls back:   Patient contacted by 2nd floor [place] Care Team (MA/TC). Inform patient that someone from the team will contact them, document that pt called and route to care team.         Misti Camarillo MA

## 2018-09-10 NOTE — TELEPHONE ENCOUNTER
ACT Total Scores 7/10/2013 9/12/2013 2/4/2014   ACT TOTAL SCORE 19 7 12   ASTHMA ER VISITS 0 = None 0 = None 0 = None   ASTHMA HOSPITALIZATIONS 0 = None 0 = None 0 = None     Please contact patient to get updated ACT score. If score is 20 or higher, please route refill request back to refill pool. If score is less than 20, please route to provider. Thank you.    David Bone RN, BSN

## 2018-09-11 RX ORDER — PREDNISONE 20 MG/1
20 TABLET ORAL 2 TIMES DAILY
Qty: 10 TABLET | Refills: 0 | Status: SHIPPED | OUTPATIENT
Start: 2018-09-11 | End: 2019-02-05

## 2018-09-11 RX ORDER — ALBUTEROL SULFATE 90 UG/1
2 AEROSOL, METERED RESPIRATORY (INHALATION) EVERY 6 HOURS PRN
Qty: 1 INHALER | Refills: 0 | Status: SHIPPED | OUTPATIENT
Start: 2018-09-11 | End: 2018-10-11

## 2018-09-12 ASSESSMENT — ASTHMA QUESTIONNAIRES: ACT_TOTALSCORE: 11

## 2018-09-14 ENCOUNTER — OFFICE VISIT (OUTPATIENT)
Dept: FAMILY MEDICINE | Facility: CLINIC | Age: 38
End: 2018-09-14
Payer: COMMERCIAL

## 2018-09-14 VITALS
WEIGHT: 274 LBS | SYSTOLIC BLOOD PRESSURE: 134 MMHG | BODY MASS INDEX: 41.66 KG/M2 | HEART RATE: 86 BPM | DIASTOLIC BLOOD PRESSURE: 80 MMHG | TEMPERATURE: 97.7 F | OXYGEN SATURATION: 97 % | RESPIRATION RATE: 16 BRPM

## 2018-09-14 DIAGNOSIS — J45.40 MODERATE PERSISTENT ASTHMA, UNSPECIFIED WHETHER COMPLICATED: ICD-10-CM

## 2018-09-14 DIAGNOSIS — E66.01 MORBID OBESITY (H): Primary | ICD-10-CM

## 2018-09-14 PROCEDURE — 99214 OFFICE O/P EST MOD 30 MIN: CPT | Performed by: NURSE PRACTITIONER

## 2018-09-14 RX ORDER — ALBUTEROL SULFATE 0.83 MG/ML
2.5 SOLUTION RESPIRATORY (INHALATION) EVERY 4 HOURS PRN
Qty: 1 BOX | Refills: 3 | Status: SHIPPED | OUTPATIENT
Start: 2018-09-14 | End: 2019-02-05

## 2018-09-14 ASSESSMENT — PAIN SCALES - GENERAL: PAINLEVEL: NO PAIN (0)

## 2018-09-14 NOTE — PROGRESS NOTES
SUBJECTIVE:   Crystal Rose is a 37 year old female who presents to clinic today for the following health issues:    Asthma Follow-Up  Was ACT completed today?    Yes    ACT Total Scores 9/14/2018   ACT TOTAL SCORE -   ASTHMA ER VISITS -   ASTHMA HOSPITALIZATIONS -   ACT TOTAL SCORE (Goal Greater than or Equal to 20) 11   In the past 12 months, how many times did you visit the emergency room for your asthma without being admitted to the hospital? 0   In the past 12 months, how many times were you hospitalized overnight because of your asthma? 0       Recent asthma triggers that patient is dealing with: allergies, weight gain, exercise, stress    Amount of exercise or physical activity: 4-5 days/week for an average of 45-60 minutes    Problems taking medications regularly: No    Medication side effects: none    Diet: low fat/cholesterol  She has taken three doses of prednisone so far.  States she is slightly better.  Still needing albuterol every 4-5 hours for wheezing.  Denies fever, chest pain, or productive cough.  No nasal congestion or sinus pressure.      Has not needed a controller inhaler in many years.      Takes claritin and singulair daily for allergy control.      Problem list and histories reviewed & adjusted, as indicated.  Additional history: as documented    Patient Active Problem List   Diagnosis     S/P gastric bypass     Pernicious anemia     CARDIOVASCULAR SCREENING; LDL GOAL LESS THAN 160     Moderate persistent asthma     Environmental allergies     Abnormal Pap smear of cervix     Cervical dysplasia     Morbid obesity (H)     Iron deficiency anemia due to chronic blood loss     Menorrhagia with regular cycle     Past Surgical History:   Procedure Laterality Date     ATTEMPTED LAPAROSCOPY  3/13/2014    Procedure: ATTEMPTED LAPAROSCOPY;;  Surgeon: Cee Garcia MD;  Location: [facility]     COLPOSCOPY CERVIX, BIOPSY CERVIX, ENDOCERVICAL CURETTAGE, COMBINED      1/6/2005:  SUMI I;  6/15/2009:  SUMI I.  Treated with cryo.  10/27/1997:  SUMI I-II     CONIZATION LEEP  3/13/2014    Procedure: CONIZATION LEEP;  LOOP ELECTROSURGICAL EXCISION PROCEDURE; LAPAROSCOPY ATTEMPTED;  Surgeon: Cee Garcia MD;  Location: [facility]     CRYOTHERAPY, CERVICAL  age 19    Pt reported     DILATION AND CURETTAGE, ABLATE ENDOMETRIUM NOVASURE, COMBINED N/A 6/14/2018    Procedure: COMBINED DILATION AND CURETTAGE, ABLATE ENDOMETRIUM NOVASURE;  HYSTEROSCOPY, DILATION AND CURETTAGE, ENDOMETRIAL ABLATION WITH NOVASURE, LAPAROSCOPIC BILATERAL TUBAL LIGATION ;  Surgeon: Franki Pinedo MD;  Location: [facility]     EXAM UNDER ANESTHESIA PELVIC  3/20/2014    Procedure: EXAM UNDER ANESTHESIA PELVIC;  EXAM UNDER ANESTHESIA, REMOVAL OF STICHES ;  Surgeon: Cee Garcia MD;  Location:  OR     GASTRIC BYPASS  2004    pre-bypass weight was 320#     HC NASAL/SINUS SCOPE W THER INSTILL       HC REMOVAL OF OVARIAN CYST(S)       LAPAROSCOPIC TUBAL LIGATION Bilateral 6/14/2018    Procedure: LAPAROSCOPIC TUBAL LIGATION;;  Surgeon: Franki Pinedo MD;  Location: [facility]       Social History   Substance Use Topics     Smoking status: Former Smoker     Quit date: 5/5/2009     Smokeless tobacco: Never Used     Alcohol use No     Family History   Problem Relation Age of Onset     Hypertension Mother      Allergies Mother      Obesity Mother      Asthma Father      Diabetes Father      Hypertension Father      Allergies Father      Cancer Father      Lymphoma d age 58     Asthma Brother      Allergies Sister      Depression Sister      Obesity Sister      Alzheimer Disease Maternal Grandmother      Arthritis Maternal Grandmother      Obesity Maternal Grandmother      Thyroid Disease Maternal Grandmother      Hypertension Maternal Grandfather      Cancer - colorectal Maternal Grandfather      Cerebrovascular Disease Maternal Grandfather      Diabetes Paternal Grandmother          Current Outpatient Prescriptions   Medication Sig Dispense  Refill     albuterol (2.5 MG/3ML) 0.083% neb solution Take 1 vial (2.5 mg) by nebulization every 4 hours as needed for shortness of breath / dyspnea 1 Box 3     albuterol (PROAIR HFA/PROVENTIL HFA/VENTOLIN HFA) 108 (90 Base) MCG/ACT inhaler Inhale 2 puffs into the lungs every 6 hours as needed 1 Inhaler 0     cyclobenzaprine (FLEXERIL) 10 MG tablet Take 0.5-1 tablets (5-10 mg) by mouth 3 times daily as needed for muscle spasms 30 tablet 1     fluticasone (FLONASE) 50 MCG/ACT spray Spray 1-2 sprays into both nostrils daily 3 Bottle 11     fluticasone-salmeterol (ADVAIR) 250-50 MCG/DOSE diskus inhaler Inhale 1 puff into the lungs 2 times daily 1 Inhaler 1     montelukast (SINGULAIR) 10 MG tablet Take 1 tablet (10 mg) by mouth At Bedtime 30 tablet 0     NORLYDA 0.35 MG per tablet   3     order for DME Equipment being ordered: Nebulizer 1 each 0     predniSONE (DELTASONE) 20 MG tablet Take 1 tablet (20 mg) by mouth 2 times daily 10 tablet 0     valACYclovir (VALTREX) 500 MG tablet Take 1 tablet (500 mg) by mouth daily 90 tablet 3     Allergies   Allergen Reactions     Cat Hair [Cats]      Dog Hair [Dogs]      Dust Mites      Ragweeds      Recent Labs   Lab Test  06/12/18   1742  03/19/14   1230   01/24/12   1158   LDL   --    --    --   94   HDL   --    --    --   47*   TRIG   --    --    --   72   CR  0.62  0.67   --    --    GFRESTIMATED  >90  >90   --    --    GFRESTBLACK  >90  >90   --    --    POTASSIUM  4.6  3.8   < >   --     < > = values in this interval not displayed.      BP Readings from Last 3 Encounters:   09/14/18 134/80   08/13/18 134/84   06/14/18 120/88    Wt Readings from Last 3 Encounters:   09/14/18 274 lb (124.3 kg)   08/13/18 271 lb (122.9 kg)   06/14/18 272 lb 9.6 oz (123.7 kg)                    Reviewed and updated as needed this visit by clinical staff  Tobacco  Allergies  Meds  Problems       Reviewed and updated as needed this visit by Provider  Allergies  Meds  Problems          ROS:  Constitutional, HEENT, cardiovascular, pulmonary, gi and gu systems are negative, except as otherwise noted.    OBJECTIVE:     /80 (BP Location: Left arm, Patient Position: Chair, Cuff Size: Adult Large)  Pulse 86  Temp 97.7  F (36.5  C) (Oral)  Resp 16  Wt 274 lb (124.3 kg)  LMP 09/03/2018 (Approximate)  SpO2 97%  BMI 41.66 kg/m2  Body mass index is 41.66 kg/(m^2).  GENERAL: healthy, alert and no distress  HENT: normal cephalic/atraumatic, ear canals and TM's normal, nose and mouth without ulcers or lesions, nasal mucosa edematous , rhinorrhea clear, oropharynx clear, oral mucous membranes moist and sinuses: not tender  NECK: no adenopathy, no asymmetry, masses, or scars and thyroid normal to palpation  RESP: expiratory wheezes bibasilar, bilateral and mild  CV: regular rate and rhythm, normal S1 S2, no S3 or S4, no murmur, click or rub, no peripheral edema and peripheral pulses strong  ABDOMEN: soft, nontender, no hepatosplenomegaly, no masses and bowel sounds normal  MS: no gross musculoskeletal defects noted, no edema    Diagnostic Test Results:  none     ASSESSMENT/PLAN:     1. Moderate persistent asthma, unspecified whether complicated  Improving on Prednisone.  Only on Day 2 of this, advised to continue.  Will initiate controller inhaler as has had symptoms for over 2 months.  Nebulizer given today in clinic.  Follow-up in 1 month for asthma.    - albuterol (2.5 MG/3ML) 0.083% neb solution; Take 1 vial (2.5 mg) by nebulization every 4 hours as needed for shortness of breath / dyspnea  Dispense: 1 Box; Refill: 3  - fluticasone-salmeterol (ADVAIR) 250-50 MCG/DOSE diskus inhaler; Inhale 1 puff into the lungs 2 times daily  Dispense: 1 Inhaler; Refill: 1  - order for DME; Equipment being ordered: Nebulizer  Dispense: 1 each; Refill: 0    2. Morbid obesity (H)  Patient had poor experience with [facility]- they still have not contacted her to make an appointment (see her previous  Teresa message).  She would like to try another location, referral given [facility].    - BARIATRIC ADULT REFERRAL  - BARIATRIC ADULT REFERRAL    Patient Instructions     At [facility], we strive to deliver an exceptional experience to you, every time we see you.  If you receive a survey in the mail, please send us back your thoughts. We really do value your feedback.    Based on your medical history, these are the current health maintenance/preventive care services that you are due for (some may have been done at this visit.)  Health Maintenance Due   Topic Date Due     ASTHMA ACTION PLAN Q1 YR  02/04/2015     LIPID MONITORING Q5 YEARS  01/24/2017     INFLUENZA VACCINE (1) 09/01/2018       Suggested websites for health information:  Www.Count includes the [facility].org : Up to date and easily searchable information on multiple topics.  Www.medlineplus.gov : medication info, interactive tutorials, watch real surgeries online  Www.familydoctor.org : good info from the Academy of Family Physicians  Www.cdc.gov : public health info, travel advisories, epidemics (H1N1)  Www.aap.org : children's health info, normal development, vaccinations  Www.health.[place] : [place] dept of health, public health issues in [place], N1N1    Your care team:                            Family Medicine Internal Medicine   MD Kraig Samuel MD Shantel Branch-Fleming, MD Katya Georgiev PA-C Megan Hill, APRN CNP Nam Ho, MD Pediatrics   RUTH Donato, MD Chrissy Reddy APRN MD Yenni Sarabia MD Deborah Mielke, MD Kim Thein, APRN [facility]      Clinic hours: Monday - Thursday 7 am-7 pm; Fridays 7 am-5 pm.   Urgent care: Monday - Friday 11 am-9 pm; Saturday and Sunday 9 am-5 pm.  Pharmacy : Monday -Thursday 8 am-8 pm; Friday 8 am-6 pm; Saturday and Sunday 9 am-5 pm.     Clinic: [phone]   Pharmacy: [phone]          Gregoria Camilo, APRN  CNP  [facility]

## 2018-09-14 NOTE — MR AVS SNAPSHOT
After Visit Summary   9/14/2018    Crystal Rose    MRN: [id number]           Patient Information     Date Of Birth          1980        Visit Information        Provider Department      9/14/2018 1:40 PM Gregoria Camilo APRN CNP [facility]        Today's Diagnoses     Morbid obesity (H)    -  1    Moderate persistent asthma, unspecified whether complicated          Care Instructions    At [facility], we strive to deliver an exceptional experience to you, every time we see you.  If you receive a survey in the mail, please send us back your thoughts. We really do value your feedback.    Based on your medical history, these are the current health maintenance/preventive care services that you are due for (some may have been done at this visit.)  Health Maintenance Due   Topic Date Due     ASTHMA ACTION PLAN Q1 YR  02/04/2015     LIPID MONITORING Q5 YEARS  01/24/2017     INFLUENZA VACCINE (1) 09/01/2018       Suggested websites for health information:  Www.Create : Up to date and easily searchable information on multiple topics.  Www.medlineplus.gov : medication info, interactive tutorials, watch real surgeries online  Www.familydoctor.org : good info from the Academy of Family Physicians  Www.cdc.gov : public health info, travel advisories, epidemics (H1N1)  Www.aap.org : children's health info, normal development, vaccinations  Www.health.state[place] : [place] dept of health, public health issues in [place], N1N1    Your care team:                            Family Medicine Internal Medicine   MD Kraig Samuel MD Shantel Branch-Fleming, MD Katya Georgiev PA-C Megan Hill, APRN CNP Nam Ho, MD Pediatrics   RUTH Donato, MD Chrissy Reddy CNP, MD Bethany Templen, MD Deborah Mielke, MD Kim Thein, APRN CNP      Clinic hours: Monday - Thursday 7 am-7 pm;  Fridays 7 am-5 pm.   Urgent care: Monday - Friday 11 am-9 pm; Saturday and Sunday 9 am-5 pm.  Pharmacy : Monday -Thursday 8 am-8 pm; Friday 8 am-6 pm; Saturday and Sunday 9 am-5 pm.     Clinic: [phone]   Pharmacy: [phone]              Follow-ups after your visit        Additional Services     BARIATRIC ADULT REFERRAL       Your provider has referred you to: [facility]: Medical and Surgical Weight [facility] [phone]. [email or web address]    Please be aware that coverage of these services is subject to the terms and limitations of your health insurance plan.  Call member services at your health plan with any benefit or coverage questions.      Please bring the following with you to your appointment:      (1) List of current medications   (2) This referral request   (3) Any documents/labs given to you for this referral            BARIATRIC ADULT REFERRAL       Your provider has referred you to: [facility]    Please be aware that coverage of these services is subject to the terms and limitations of your health insurance plan.  Call member services at your health plan with any benefit or coverage questions.      Please bring the following with you to your appointment:      (1) List of current medications   (2) This referral request   (3) Any documents/labs given to you for this referral                  Who to contact     If you have questions or need follow up information about today's clinic visit or your schedule please contact [facility] directly at [phone].  Normal or non-critical lab and imaging results will be communicated to you by MyChart, letter or phone within 4 business days after the clinic has received the results. If you do not hear from us within 7 days, please contact the clinic through MyChart or phone. If you have a critical or abnormal lab result, we will notify  you by phone as soon as possible.  Submit refill requests through [employer] or call your pharmacy and they will forward the refill request to us. Please allow 3 business days for your refill to be completed.          Additional Information About Your Visit        [email or web address] Information     [employer] gives you secure access to your electronic health record. If you see a primary care provider, you can also send messages to your care team and make appointments. If you have questions, please call your primary care clinic.  If you do not have a primary care provider, please call [phone] and they will assist you.        Care EveryWhere ID     This is your Care EveryWhere ID. This could be used by other organizations to access your [facility] medical records  [id number]        Your Vitals Were     Pulse Temperature Respirations Last Period Pulse Oximetry BMI (Body Mass Index)    86 97.7  F (36.5  C) (Oral) 16 09/03/2018 (Approximate) 97% 41.66 kg/m2       Blood Pressure from Last 3 Encounters:   09/14/18 134/80   08/13/18 134/84   06/14/18 120/88    Weight from Last 3 Encounters:   09/14/18 274 lb (124.3 kg)   08/13/18 271 lb (122.9 kg)   06/14/18 272 lb 9.6 oz (123.7 kg)              We Performed the Following     BARIATRIC ADULT REFERRAL     BARIATRIC ADULT REFERRAL          Today's Medication Changes          These changes are accurate as of 9/14/18  2:23 PM.  If you have any questions, ask your nurse or doctor.               Start taking these medicines.        Dose/Directions    fluticasone-salmeterol 250-50 MCG/DOSE diskus inhaler   Commonly known as:  ADVAIR   Used for:  Moderate persistent asthma, unspecified whether complicated   Started by:  Gregoria Camilo APRN CNP        Dose:  1 puff   Inhale 1 puff into the lungs 2 times daily   Quantity:  1 Inhaler   Refills:  1       order for DME   Used for:  Moderate persistent asthma, unspecified whether complicated   Started by:  Gregoria Camilo APRN  CNP        Equipment being ordered: Nebulizer   Quantity:  1 each   Refills:  0         These medicines have changed or have updated prescriptions.        Dose/Directions    * albuterol 108 (90 Base) MCG/ACT inhaler   Commonly known as:  PROAIR HFA/PROVENTIL HFA/VENTOLIN HFA   This may have changed:  Another medication with the same name was added. Make sure you understand how and when to take each.   Used for:  Moderate persistent asthma, unspecified whether complicated   Changed by:  Gregoria Camilo APRN CNP        Dose:  2 puff   Inhale 2 puffs into the lungs every 6 hours as needed   Quantity:  1 Inhaler   Refills:  0       * albuterol (2.5 MG/3ML) 0.083% neb solution   This may have changed:  You were already taking a medication with the same name, and this prescription was added. Make sure you understand how and when to take each.   Used for:  Moderate persistent asthma, unspecified whether complicated   Changed by:  Gregoria Camilo APRN CNP        Dose:  2.5 mg   Take 1 vial (2.5 mg) by nebulization every 4 hours as needed for shortness of breath / dyspnea   Quantity:  1 Box   Refills:  3       * Notice:  This list has 2 medication(s) that are the same as other medications prescribed for you. Read the directions carefully, and ask your doctor or other care provider to review them with you.         Where to get your medicines      These medications were sent to [id number] - [place], [place] - [address] AT [facility] [address], [facility] [address]     Phone:  [phone]     albuterol (2.5 MG/3ML) 0.083% neb solution    fluticasone-salmeterol 250-50 MCG/DOSE diskus inhaler         Some of these will need a paper prescription and others can be bought over the counter.  Ask your nurse if you have questions.     Bring a paper prescription for each of these medications     [place] [facility]                Primary Care Provider Office Phone # Faw  #    Cee Garcia [phone]       [address] [address]        Equal Access to Services     LUPE JOHN : Hadii angeles hernandez billy Murray, waleda luqreyes, wesley kalucada margarita, criss burgosann nicola. So [facility] [phone].    ATENCIÓN: Si habla español, tiene a garber disposición servicios gratuitos de asistencia lingüística. Llame al [phone].    We comply with applicable federal civil rights laws and [place] laws. We do not discriminate on the basis of race, color, national origin, age, disability, sex, sexual orientation, or gender identity.            Thank you!     Thank you for choosing [facility]  for your care. Our goal is always to provide you with excellent care. Hearing back from our patients is one way we can continue to improve our services. Please take a few minutes to complete the written survey that you may receive in the mail after your visit with us. Thank you!             Your Updated Medication List - Protect others around you: Learn how to safely use, store and throw away your medicines at www.disposemymeds.org.          This list is accurate as of 9/14/18  2:23 PM.  Always use your most recent med list.                   Brand Name Dispense Instructions for use Diagnosis    * albuterol 108 (90 Base) MCG/ACT inhaler    PROAIR HFA/PROVENTIL HFA/VENTOLIN HFA    1 Inhaler    Inhale 2 puffs into the lungs every 6 hours as needed    Moderate persistent asthma, unspecified whether complicated       * albuterol (2.5 MG/3ML) 0.083% neb solution     1 Box    Take 1 vial (2.5 mg) by nebulization every 4 hours as needed for shortness of breath / dyspnea    Moderate persistent asthma, unspecified whether complicated       cyclobenzaprine 10 MG tablet    FLEXERIL    30 tablet    Take 0.5-1 tablets (5-10 mg) by mouth 3 times daily as needed for muscle spasms    Acute intractable headache, unspecified headache type, Acute  bilateral back pain, unspecified back location       fluticasone 50 MCG/ACT spray    FLONASE    3 Bottle    Spray 1-2 sprays into both nostrils daily    Chronic allergic rhinitis, unspecified seasonality, unspecified trigger       fluticasone-salmeterol 250-50 MCG/DOSE diskus inhaler    ADVAIR    1 Inhaler    Inhale 1 puff into the lungs 2 times daily    Moderate persistent asthma, unspecified whether complicated       montelukast 10 MG tablet    SINGULAIR    30 tablet    Take 1 tablet (10 mg) by mouth At Bedtime    Moderate persistent asthma, unspecified whether complicated       NORLYDA 0.35 MG per tablet   Generic drug:  norethindrone           order for DME     1 each    Equipment being ordered: Nebulizer    Moderate persistent asthma, unspecified whether complicated       predniSONE 20 MG tablet    DELTASONE    10 tablet    Take 1 tablet (20 mg) by mouth 2 times daily    Moderate persistent asthma, unspecified whether complicated       valACYclovir 500 MG tablet    VALTREX    90 tablet    Take 1 tablet (500 mg) by mouth daily    Herpes simplex infection of genitourinary system       * Notice:  This list has 2 medication(s) that are the same as other medications prescribed for you. Read the directions carefully, and ask your doctor or other care provider to review them with you.

## 2018-09-14 NOTE — PATIENT INSTRUCTIONS
At [facility], we strive to deliver an exceptional experience to you, every time we see you.  If you receive a survey in the mail, please send us back your thoughts. We really do value your feedback.    Based on your medical history, these are the current health maintenance/preventive care services that you are due for (some may have been done at this visit.)  Health Maintenance Due   Topic Date Due     ASTHMA ACTION PLAN Q1 YR  02/04/2015     LIPID MONITORING Q5 YEARS  01/24/2017     INFLUENZA VACCINE (1) 09/01/2018       Suggested websites for health information:  Www.[employer] : Up to date and easily searchable information on multiple topics.  Www.[employer].gov : medication info, interactive tutorials, watch real surgeries online  Www.familydoctor.org : good info from the Academy of Family Physicians  Www.cdc.gov : public health info, travel advisories, epidemics (H1N1)  Www.aap.org : children's health info, normal development, vaccinations  Www.health.On [facility].[place].us : [place] dept of health, public health issues in [place], N1N1    Your care team:                            Family Medicine Internal Medicine   MD Kraig Samuel MD Shantel Branch-Fleming, MD Katya Georgiev PA-C Megan Hill, APRN RACHEAL Sanchez MD Pediatrics   Ant Griffith PAJERRY Camilo, MD Chrissy Reddy APRN CNP   MD Yenni Puckett MD Deborah Mielke, MD Kim Thein, APRN [facility]      Clinic hours: Monday - Thursday 7 am-7 pm; Fridays 7 am-5 pm.   Urgent care: Monday - Friday 11 am-9 pm; Saturday and Sunday 9 am-5 pm.  Pharmacy : Monday -Thursday 8 am-8 pm; Friday 8 am-6 pm; Saturday and Sunday 9 am-5 pm.     Clinic: [phone]   Pharmacy: [phone]

## 2018-09-15 ASSESSMENT — ASTHMA QUESTIONNAIRES: ACT_TOTALSCORE: 11

## 2018-09-22 ENCOUNTER — TRANSFERRED RECORDS (OUTPATIENT)
Dept: HEALTH INFORMATION MANAGEMENT | Facility: CLINIC | Age: 38
End: 2018-09-22

## 2018-09-26 ENCOUNTER — OFFICE VISIT (OUTPATIENT)
Dept: SURGERY | Facility: CLINIC | Age: 38
End: 2018-09-26
Payer: COMMERCIAL

## 2018-09-26 VITALS
OXYGEN SATURATION: 96 % | DIASTOLIC BLOOD PRESSURE: 88 MMHG | WEIGHT: 271.8 LBS | RESPIRATION RATE: 12 BRPM | HEIGHT: 68 IN | HEART RATE: 73 BPM | BODY MASS INDEX: 41.19 KG/M2 | SYSTOLIC BLOOD PRESSURE: 132 MMHG

## 2018-09-26 VITALS — HEIGHT: 68 IN | BODY MASS INDEX: 41.19 KG/M2 | WEIGHT: 271.8 LBS

## 2018-09-26 DIAGNOSIS — D50.8 IRON DEFICIENCY ANEMIA FOLLOWING BARIATRIC SURGERY: ICD-10-CM

## 2018-09-26 DIAGNOSIS — L30.4 INTERTRIGO: ICD-10-CM

## 2018-09-26 DIAGNOSIS — K91.2 POSTSURGICAL MALABSORPTION: ICD-10-CM

## 2018-09-26 DIAGNOSIS — R63.5 WEIGHT GAIN STATUS POST GASTRIC BYPASS: ICD-10-CM

## 2018-09-26 DIAGNOSIS — E66.01 MORBID OBESITY (H): ICD-10-CM

## 2018-09-26 DIAGNOSIS — E66.01 MORBID OBESITY WITH BMI OF 40.0-44.9, ADULT (H): ICD-10-CM

## 2018-09-26 DIAGNOSIS — Z98.84 WEIGHT GAIN STATUS POST GASTRIC BYPASS: ICD-10-CM

## 2018-09-26 DIAGNOSIS — Z13.29 SCREENING FOR ENDOCRINE, NUTRITIONAL, METABOLIC AND IMMUNITY DISORDER: ICD-10-CM

## 2018-09-26 DIAGNOSIS — Z13.228 SCREENING FOR ENDOCRINE, NUTRITIONAL, METABOLIC AND IMMUNITY DISORDER: ICD-10-CM

## 2018-09-26 DIAGNOSIS — K95.89 IRON DEFICIENCY ANEMIA FOLLOWING BARIATRIC SURGERY: ICD-10-CM

## 2018-09-26 DIAGNOSIS — Z13.21 SCREENING FOR ENDOCRINE, NUTRITIONAL, METABOLIC AND IMMUNITY DISORDER: ICD-10-CM

## 2018-09-26 DIAGNOSIS — Z98.84 BARIATRIC SURGERY STATUS: Primary | ICD-10-CM

## 2018-09-26 DIAGNOSIS — Z13.0 SCREENING FOR ENDOCRINE, NUTRITIONAL, METABOLIC AND IMMUNITY DISORDER: ICD-10-CM

## 2018-09-26 PROCEDURE — 97803 MED NUTRITION INDIV SUBSEQ: CPT | Performed by: DIETITIAN, REGISTERED

## 2018-09-26 PROCEDURE — 99215 OFFICE O/P EST HI 40 MIN: CPT | Performed by: PHYSICIAN ASSISTANT

## 2018-09-26 RX ORDER — NYSTATIN AND TRIAMCINOLONE ACETONIDE 100000; 1 [USP'U]/G; MG/G
CREAM TOPICAL
Qty: 30 G | Refills: 3 | Status: SHIPPED | OUTPATIENT
Start: 2018-09-26 | End: 2019-10-28

## 2018-09-26 RX ORDER — FERROUS SULFATE 325(65) MG
325 TABLET ORAL
COMMUNITY
End: 2019-10-28

## 2018-09-26 RX ORDER — MENTHOL 5.8 MG/1
2 LOZENGE ORAL DAILY
Qty: 180 TABLET | Refills: 3 | Status: SHIPPED | OUTPATIENT
Start: 2018-09-26 | End: 2018-12-06

## 2018-09-26 NOTE — MR AVS SNAPSHOT
MRN:[id number]                      After Visit Summary   9/26/2018    Crystal Rose    MRN: [id number]           Visit Information        Provider Department      9/26/2018 9:30 AM 1, Sh Jessica Mohan, SEBASTIÁN [facility]      [facility] Information     [employer] gives you secure access to your electronic health record. If you see a primary care provider, you can also send messages to your care team and make appointments. If you have questions, please call your primary care clinic.  If you do not have a primary care provider, please call [phone] and they will assist you.        Care EveryWhere ID     This is your Care EveryWhere ID. This could be used by other organizations to access your [place] medical records  [id number]        Equal Access to Services     LUPE JOHN : Don Murray, wacharles holden, wesley kaalkim avila, criss petersen. So [facility] [phone].    ATENCIÓN: Si habla español, tiene a garber disposición servicios gratuitos de asistencia lingüística. Llame al [phone].    We comply with applicable federal civil rights laws and [place] laws. We do not discriminate on the basis of race, color, national origin, age, disability, sex, sexual orientation, or gender identity.

## 2018-09-26 NOTE — LETTER
[facility]          [address]  [address]  [place] [address]                                         Tel:  [phone]  Fax: [phone]    2019    Crystal Rose  [address] [address]    : 1980      Dear Crystal;     We have identified that you have outstanding lab tests that have . If you would like to have the  labs completed, please contact our clinic at [phone] to have them re-ordered. If you have any questions, please contact our office.  Sincerely,    Afshan Philippe CMA

## 2018-09-26 NOTE — PROGRESS NOTES
"BARIATRIC FOLLOW UP VISIT     September 26, 2018       HISTORY OF PRESENT ILLNESS: Pt returns today for her follow-up appointment status post laparoscopic gastric bypass from 2004 with Dr Monroe. Patient here to reestablish care as she has not been seen in over 12 years.  She has regained weight following her 4 pregnancies.  Maintained well with her first 2 children but due to complications following her 3 rd child gained more weight.    She is taking daily iron and had an iron infusion 2017. Reviewed her most recent ferritin 4/2018 it was = to 7.  Not taking any other recommended post op vitamins. Had questions about a revision.      Current Weight: 271 lb 12.8 oz (123.3 kg)  Initial Weight 9/16/2004   298.1 lbs     Patient is taking the following bariatric postoperative vitamins:  1 Iron/Vit C. daily    Pt is exercising by walking 30-45 minutes daily. Planning to start at gym soon       OBESITY RELATED CONDITIONS:  PCOS  Insulin resistance - has not had A1C checked  Asthma - present on meds  GERD - resolved       SOCIAL HISTORY:  Pt denies smoking.  Pt drinks alcohol 4-6.  Avoids NSAIDS.  Daily caffeine    REVIEW OF SYSTEMS:     GI:  Nausea- No  Vomiting- No  Diarrhea-lactose intolerant so has diarrhea sometimes  Constipation- No  Dysphagia- No  Abdominal Pain- Np  Heartburn- No     SKIN:  Intertriginous irritation- Yes    PSYCH:  Depression- No        LABS/IMAGING/MEDICAL RECORDS REVIEW: labs    PHYSICAL EXAMINATION:   /88 (BP Location: Right arm, Patient Position: Sitting, Cuff Size: Adult Large)  Pulse 73  Resp 12  Ht 5' 8\" (1.727 m)  Wt 271 lb 12.8 oz (123.3 kg)  LMP 09/03/2018 (Approximate)  SpO2 96%  BMI 41.33 kg/m2    GENERAL: Alert and oriented x3. NAD  HEART: No murmurs, rubs or gallops, Regular rate and rhythm  LUNGS: Breathing unlabored, Lung sounds clear to auscultation bilaterally  ABDOMEN: soft; nontender; nondistended, incision well healed. No hernia  EXTREMITIES: No LE edema " bilaterally, Gait normal  SKIN: No intertriginous irritation or rash      ASSESSMENT AND PLAN:      13 years status Open Gastric Bypass   Weight gain status post gastric bypass - Discussed that at this facility revisions are not being performed.  Discussed the importance of lifestyle with weight loss.  Also mentioned seeing the medical weight loss provider Dr Tyson to discuss adding on medications.  Referral was placed today.  Morbid Obesity - Body mass index is 41.33 kg/(m^2).  Post surgical malabsorption:   Ordered CBC, vitamin B12, vitamin D, PTH, ferritin, TIBC, and iron labs.   Follow food plan per dietitian recommendations.   RESTART taking recommended post-op vitamins.- scripts sent to pharmacy  Insulin resistance - Ordered A1C  Iron deficiency anemia following bariatric surgery - Will have patient take 2 vitron C daily in addition to her starting 2 MVI with minerals and iron.   intertrigo - Discussed hygiene and medication sent to pharmacy  DEXA Scan -ordered due to malabsorption  Discussed attending a support group  Discussed stopping caffeine  Return to clinic in 2 months to discuss lifestyle changes and vitamins.  Might need to recheck some of her vitamin labs.  Could order an UGI - but since revision is not an option not sure of the benefit.      I spent a total of 45 minutes face to face with Crystal during today's office visit. Over 50% of this time was spent counseling the patient and/or coordinating care.

## 2018-09-26 NOTE — PATIENT INSTRUCTIONS
13 years status Open Gastric Bypass   Weight gain status post gastric bypass - Discussed that at this facility revisions are not being performed.  Discussed the importance of lifestyle with weight loss.  Also mentioned seeing the medical weight loss provider Dr Tyson to discuss adding on medications.  Referral was placed today.  Morbid Obesity - Body mass index is 41.33 kg/(m^2).  Post surgical malabsorption:   Ordered CBC, vitamin B12, vitamin D, PTH, ferritin, TIBC, and iron labs.   Follow food plan per dietitian recommendations.   RESTART taking recommended post-op vitamins.- scripts sent to pharmacy  Insulin resistance - Ordered A1C  Iron deficiency anemia following bariatric surgery - Will have patient take 2 vitron C daily in addition to her starting 2 MVI with minerals and iron.   intertrigo - Discussed hygiene and medication sent to pharmacy  DEXA Scan -ordered due to malabsorption  Discussed attending a support group  Discussed stopping caffeine  Return to clinic in 2 months to discuss lifestyle changes and vitamins.

## 2018-09-26 NOTE — MR AVS SNAPSHOT
After Visit Summary   9/26/2018    Crystal Rose    MRN: [id number]           Patient Information     Date Of Birth          1980        Visit Information        Provider Department      9/26/2018 8:30 AM Adria Melgar PA-C [facility]      Today's Diagnoses     Bariatric surgery status    -  1    Postsurgical malabsorption        Intertrigo        Morbid obesity with BMI of 40.0-44.9, adult (H)        Screening for endocrine, nutritional, metabolic and immunity disorder        Iron deficiency anemia following bariatric surgery        Weight gain status post gastric bypass          Care Instructions    13 years status Open Gastric Bypass   Weight gain status post gastric bypass - Discussed that at this facility revisions are not being performed.  Discussed the importance of lifestyle with weight loss.  Also mentioned seeing the medical weight loss provider Dr Tyson to discuss adding on medications.  Referral was placed today.  Morbid Obesity - Body mass index is 41.33 kg/(m^2).  Post surgical malabsorption:   Ordered CBC, vitamin B12, vitamin D, PTH, ferritin, TIBC, and iron labs.   Follow food plan per dietitian recommendations.   RESTART taking recommended post-op vitamins.- scripts sent to pharmacy  Insulin resistance - Ordered A1C  Iron deficiency anemia following bariatric surgery - Will have patient take 2 vitron C daily in addition to her starting 2 MVI with minerals and iron.   intertrigo - Discussed hygiene and medication sent to pharmacy  DEXA Scan -ordered due to malabsorption  Discussed attending a support group  Discussed stopping caffeine  Return to clinic in 2 months to discuss lifestyle changes and vitamins.           Follow-ups after your visit        Additional Services     BARIATRIC ADULT REFERRAL       Your provider has referred you to: FM[facility]: [facility] -  [place] [phone]  [email or web address]    Please be aware that coverage of these services is subject to the terms and limitations of your health insurance plan.  Call member services at your health plan with any benefit or coverage questions.      Please bring the following with you to your appointment:      (1) List of current medications   (2) This referral request   (3) Any documents/labs given to you for this referral            NUTRITION REFERRAL       Type of nutrition instruction needed: Weight Loss  Your provider has referred you to:     [facility]                  Your next 10 appointments already scheduled     Sep 27, 2018  6:30 PM CDT   Lab visit with [facility]   [facility]    [address] [place] [id number] [phone]           Please do not eat 10-12 hours before your appointment if you are coming in fasting for labs on lipids, cholesterol, or glucose (sugar). Does not apply to pregnant women.  Water with medications is okay. Do not drink coffee or other fluids.  If you have concerns about taking your medications, please send a message by clicking on Secure Messaging, Message Your Care Team.              Future tests that were ordered for you today     Open Future Orders        Priority Expected Expires Ordered    DX Hip/Pelvis/Spine Routine  9/26/2019 9/26/2018    Hemoglobin A1c Routine 9/26/2018 3/25/2019 9/26/2018    CBC with platelets Routine 9/26/2018 9/26/2019 9/26/2018    Vitamin D Screen Routine 9/26/2018 9/26/2019 9/26/2018    Vitamin B12 Routine 9/26/2018 9/26/2019 9/26/2018    Parathyroid Hormone Intact Routine 9/26/2018 9/26/2019 9/26/2018    Iron Routine 9/26/2018 9/26/2019 9/26/2018    Iron and Iron Binding Capacity Routine 9/26/2018 9/26/2019 9/26/2018    Ferritin Routine 9/26/2018 9/26/2019  "9/26/2018            Who to contact     If you have questions or need follow up information about today's clinic visit or your schedule please contact [facility] directly at [phone].  Normal or non-critical lab and imaging results will be communicated to you by MyChart, letter or phone within 4 business days after the clinic has received the results. If you do not hear from us within 7 days, please contact the clinic through MyChart or phone. If you have a critical or abnormal lab result, we will notify you by phone as soon as possible.  Submit refill requests through [employer] or call your pharmacy and they will forward the refill request to us. Please allow 3 business days for your refill to be completed.          Additional Information About Your Visit        [employer] Information     [employer] gives you secure access to your electronic health record. If you see a primary care provider, you can also send messages to your care team and make appointments. If you have questions, please call your primary care clinic.  If you do not have a primary care provider, please call [phone] and they will assist you.        Care EveryWhere ID     This is your Care EveryWhere ID. This could be used by other organizations to access your [place] medical records  [id number]        Your Vitals Were     Pulse Respirations Height Last Period Pulse Oximetry BMI (Body Mass Index)    73 12 5' 8\" (1.727 m) 09/03/2018 (Approximate) 96% 41.33 kg/m2       Blood Pressure from Last 3 Encounters:   09/26/18 132/88   09/14/18 134/80   08/13/18 134/84    Weight from Last 3 Encounters:   09/26/18 271 lb 12.8 oz (123.3 kg)   09/26/18 271 lb 12.8 oz (123.3 kg)   09/14/18 274 lb (124.3 kg)              We Performed the Following     BARIATRIC ADULT REFERRAL     NUTRITION REFERRAL          Today's Medication Changes          These changes are accurate as of 9/26/18  4:30 PM.  If you have any questions, ask your " nurse or doctor.               Start taking these medicines.        Dose/Directions    calcium carbonate-vitamin D 600-400 MG-UNIT Chew   Commonly known as:  CALCIUM 600/VITAMIN D   Used for:  Morbid obesity with BMI of 40.0-44.9, adult (H), Bariatric surgery status, Postsurgical malabsorption   Started by:  Adria Melgar PA-C        Take one twice daily and at least 2 hours apart from iron.   Quantity:  180 tablet   Refills:  3       cholecalciferol 2000 units Caps   Used for:  Morbid obesity with BMI of 40.0-44.9, adult (H), Bariatric surgery status, Postsurgical malabsorption   Started by:  Adria Melgar PA-C        Dose:  1 capsule   Take 1 capsule by mouth daily   Quantity:  90 capsule   Refills:  3       cyanocobalamin 1000 MCG Subl   Used for:  Morbid obesity with BMI of 40.0-44.9, adult (H), Bariatric surgery status, Postsurgical malabsorption   Started by:  Adria Melgar PA-C        Dose:  1000 mcg   Place 1,000 mcg under the tongue every other day   Quantity:  90 tablet   Refills:  3       ferrous fumarate 65 mg ([place]. FE)-Vitamin C 125 mg  MG Tabs tablet   Commonly known as:  VITRON C   Used for:  Morbid obesity with BMI of 40.0-44.9, adult (H), Bariatric surgery status, Postsurgical malabsorption   Started by:  Adria Melgar PA-C        Dose:  2 tablet   Take 2 tablets by mouth daily   Quantity:  180 tablet   Refills:  3       nystatin-triamcinolone cream   Commonly known as:  MYCOLOG II   Used for:  Morbid obesity with BMI of 40.0-44.9, adult (H), Bariatric surgery status, Postsurgical malabsorption   Started by:  Adria Melgar PA-C        Apply to affected area BID up to 7 days prn rash   Quantity:  30 g   Refills:  3       QC DAILY MULTIVITAMINS/IRON Tabs   Used for:  Morbid obesity with BMI of 40.0-44.9, adult (H), Bariatric surgery status, Postsurgical malabsorption   Started by:  Adria Melgar PA-C        Dose:  2 tablet   Take 2 tablets by  mouth daily   Quantity:  180 tablet   Refills:  3            Where to get your medicines      These medications were sent to [id number] - [place], [place] - [address]  [address] [facility] [address]    Hours:  24-hours Phone:  [phone]     calcium carbonate-vitamin D 600-400 MG-UNIT Chew    cholecalciferol 2000 units Caps    cyanocobalamin 1000 MCG Subl    ferrous fumarate 65 mg ([place]. FE)-Vitamin C 125 mg  MG Tabs tablet    nystatin-triamcinolone cream    QC DAILY MULTIVITAMINS/IRON Tabs                Primary Care Provider Office Phone # Fax #    Gregoria Mccollumyoli HoangnieshaSHERIE [phone] [address]  [facility] [address]        Equal Access to Services     [facility] Hospital[facility] : Hadii aad ku hadasho Soomaali, waaxda luqadaha, qaybta kaalmada adeegyada, waxay erniein hayaaann torres . So [facility] [phone].    ATENCIÓN: Si habla español, tiene a garber disposición servicios gratuitos de asistencia lingüística. Saurav al [phone].    We comply with applicable federal civil rights laws and [place] laws. We do not discriminate on the basis of race, color, national origin, age, disability, sex, sexual orientation, or gender identity.            Thank you!     Thank you for choosing [facility]  for your care. Our goal is always to provide you with excellent care. Hearing back from our patients is one way we can continue to improve our services. Please take a few minutes to complete the written survey that you may receive in the mail after your visit with us. Thank you!             Your Updated Medication List - Protect others around you: Learn how to safely use, store and throw away your medicines at www.disposemymeds.org.          This list is accurate as of 9/26/18  4:30 PM.  Always use your most recent med list.                   Brand Name Dispense Instructions for use  Diagnosis    * albuterol 108 (90 Base) MCG/ACT inhaler    PROAIR HFA/PROVENTIL HFA/VENTOLIN HFA    1 Inhaler    Inhale 2 puffs into the lungs every 6 hours as needed    Moderate persistent asthma, unspecified whether complicated       * albuterol (2.5 MG/3ML) 0.083% neb solution     1 Box    Take 1 vial (2.5 mg) by nebulization every 4 hours as needed for shortness of breath / dyspnea    Moderate persistent asthma, unspecified whether complicated       calcium carbonate-vitamin D 600-400 MG-UNIT Chew    CALCIUM 600/VITAMIN D    180 tablet    Take one twice daily and at least 2 hours apart from iron.    Morbid obesity with BMI of 40.0-44.9, adult (H), Bariatric surgery status, Postsurgical malabsorption       cholecalciferol 2000 units Caps     90 capsule    Take 1 capsule by mouth daily    Morbid obesity with BMI of 40.0-44.9, adult (H), Bariatric surgery status, Postsurgical malabsorption       cyanocobalamin 1000 MCG Subl     90 tablet    Place 1,000 mcg under the tongue every other day    Morbid obesity with BMI of 40.0-44.9, adult (H), Bariatric surgery status, Postsurgical malabsorption       cyclobenzaprine 10 MG tablet    FLEXERIL    30 tablet    Take 0.5-1 tablets (5-10 mg) by mouth 3 times daily as needed for muscle spasms    Acute intractable headache, unspecified headache type, Acute bilateral back pain, unspecified back location       ferrous fumarate 65 mg ([place]. FE)-Vitamin C 125 mg  MG Tabs tablet    VITRON C    180 tablet    Take 2 tablets by mouth daily    Morbid obesity with BMI of 40.0-44.9, adult (H), Bariatric surgery status, Postsurgical malabsorption       ferrous sulfate 325 (65 Fe) MG tablet    IRON     Take 325 mg by mouth daily (with breakfast)        fluticasone 50 MCG/ACT spray    FLONASE    3 Bottle    Spray 1-2 sprays into both nostrils daily    Chronic allergic rhinitis, unspecified seasonality, unspecified trigger       fluticasone-salmeterol 250-50 MCG/DOSE diskus inhaler     ADVAIR    1 Inhaler    Inhale 1 puff into the lungs 2 times daily    Moderate persistent asthma, unspecified whether complicated       montelukast 10 MG tablet    SINGULAIR    30 tablet    Take 1 tablet (10 mg) by mouth At Bedtime    Moderate persistent asthma, unspecified whether complicated       NORLYDA 0.35 MG per tablet   Generic drug:  norethindrone           nystatin-triamcinolone cream    MYCOLOG II    30 g    Apply to affected area BID up to 7 days prn rash    Morbid obesity with BMI of 40.0-44.9, adult (H), Bariatric surgery status, Postsurgical malabsorption       order for DME     1 each    Equipment being ordered: Nebulizer    Moderate persistent asthma, unspecified whether complicated       predniSONE 20 MG tablet    DELTASONE    10 tablet    Take 1 tablet (20 mg) by mouth 2 times daily    Moderate persistent asthma, unspecified whether complicated       QC DAILY MULTIVITAMINS/IRON Tabs     180 tablet    Take 2 tablets by mouth daily    Morbid obesity with BMI of 40.0-44.9, adult (H), Bariatric surgery status, Postsurgical malabsorption       valACYclovir 500 MG tablet    VALTREX    90 tablet    Take 1 tablet (500 mg) by mouth daily    Herpes simplex infection of genitourinary system       VITAMIN C PO      Take 500 mg by mouth        * Notice:  This list has 2 medication(s) that are the same as other medications prescribed for you. Read the directions carefully, and ask your doctor or other care provider to review them with you.

## 2018-09-26 NOTE — PROGRESS NOTES
"NUTRITION POST OP APPOINTMENT  DATE OF VISIT: September 26, 2018    Crystal Rose  1980  female  [id number]  37 year old     ASSESSMENT:    REASON FOR VISIT:  Crystal is a 37 year old year old female presents today for 14 year PO nutrition follow-up appointment. Patient is accompanied by self.    DIAGNOSIS:  Status post gastric bypass surgery.  Obesity Grade III BMI >40     ANTHROPOMETRICS:  Initial Weight: 135.2 kg (298 lb 1.6 oz)  Height: 172.7 cm (5' 8\")  Current Weight: 123.3 kg (271 lb 12.8 oz)   BMI: 41.41 kg/(m^2).    VITAMINS AND MINERALS:  None  Does get periods, but they are shorter since having an ablation    NUTRITION HISTORY:  Breakfast: yogurt or oatmeal or hard cooked egg  Lunch:  avocado with tuna or grilled chicken with spinach salad, vinaigrette dressing  Supper: skips 1X per week  or fish or chicken , mixed vegetables or stir fried veggies, couscous or baked potato  Snacks: 5-10 nuts or 100 calorie pop corn or fresh fruit  Fluids consumed: Water and coffee, cranberry juice (2X per month) diluted with equal amount of water  Consuming liquid calories: Yes  Protein intake: 30-40 grams/day  Tolerate regular texture food: Yes  Any foods not tolerated details: Yes  If any food not tolerated: lactose, rice, beef  Portion size: 1 cup  Take 20-30 minutes to consume each meal: No   Eat protein foods first: No  Fluids and meals  by 30 minutes: yes  Chew foods thoroughly: Yes  Tolerating diet: Yes  Drinking high protein supplements: No  Consuming snacks per day: 1-2X  Additional Information: Patient has not followed up with this clinic in many years (2004 open gastric  Bypass) and is interested in working on weight loss. Youngest child of 5  is 7 moths (stopped breast feeding ~ 4 months ago). Lowest stated rosi was 161 pounds. Per Adria Melgar PA-C patient is interested in working with a Medical Weight loss physician (DR. Tyson's business card provided).    PHYSICAL " ACTIVITY:  Type: walks  Frequency (days per week): 5-6  Duration (min): 35-40    DIAGNOSIS:  Previous Nutrition Diagnosis: Altered gastrointestinal function related to alteration in gastrointestinal structure as evidenced by history of open gastric bypass surgery.- no change    Previous goals:  No post-op follow up in EPIC    Current Nutrition Diagnosis: Altered gastrointestinal function related to alteration in gastrointestinal structure as evidenced by history of open gastric bypass surgery.    INTERVENTION:   Nutrition Prescription: Eat 3 meals a day at regular intervals. Consume 60-90 grams of protein daily. Follow post-surgical vitamin and mineral protocol.  Assessed learning needs and learning preferences.    GOALS:  Aim to get in 60-90 g protein pr day (high protein food list provided)  Follow post-op vitamins/ mineral schedule provided)  Replace snacks with 1 approved protein drink per day  Avoid nut and limit use of avocado    Implementation: Discussed progress toward previous goals; reinforced importance of following bariatric lifestyle changes.    NUTRITION MONITORING AND EVALUATION:  Anticipated compliance: fair  Verbalized fair-good understanding.    Follow up: Patient to follow up in 1 month (medical weight loss program)    TIME SPENT WITH PATIENT:  40 minutes    Jan Martinez RD, LD  [facility]  [phone]

## 2018-09-26 NOTE — LETTER
[facility]          [address]  [address]  [place] [address]                                         Tel:  [phone]  Fax: [phone]    October 15, 2019    Crystal Rose  [address] [address]    : 1980      Dear Crystal;     We have identified that you have outstanding lab tests that have . If you would like to have the  labs completed, please contact our clinic at [phone] to have them re-ordered. If you have any questions, please contact our office.  Sincerely,    Afshan Philippe CMA

## 2018-09-26 NOTE — Clinical Note
Hi.  This bypass patient will be seeing you for medical weight loss.  I saw no reason for her to see us both so if could touch upon my visit with her today in your appointment that would be great. Thanks

## 2018-10-08 ENCOUNTER — OFFICE VISIT (OUTPATIENT)
Dept: SURGERY | Facility: CLINIC | Age: 38
End: 2018-10-08
Attending: PHYSICIAN ASSISTANT
Payer: COMMERCIAL

## 2018-10-08 VITALS
HEIGHT: 68 IN | OXYGEN SATURATION: 96 % | HEART RATE: 82 BPM | BODY MASS INDEX: 40.33 KG/M2 | RESPIRATION RATE: 12 BRPM | WEIGHT: 266.1 LBS | SYSTOLIC BLOOD PRESSURE: 128 MMHG | DIASTOLIC BLOOD PRESSURE: 84 MMHG

## 2018-10-08 DIAGNOSIS — E66.01 MORBID OBESITY (H): Primary | ICD-10-CM

## 2018-10-08 DIAGNOSIS — J45.40 MODERATE PERSISTENT ASTHMA WITHOUT COMPLICATION: ICD-10-CM

## 2018-10-08 PROCEDURE — 99244 OFF/OP CNSLTJ NEW/EST MOD 40: CPT | Performed by: FAMILY MEDICINE

## 2018-10-08 RX ORDER — PHENTERMINE HYDROCHLORIDE 37.5 MG/1
TABLET ORAL
Qty: 90 TABLET | Refills: 0 | Status: SHIPPED | OUTPATIENT
Start: 2018-10-08 | End: 2018-12-06

## 2018-10-08 NOTE — PROGRESS NOTES
HPI:  Crystal Rose is a 37 year old year old female who I was asked to see by Adria Melgar for medical bariatric consultation. Weight related co-morbidities include   Patient Active Problem List   Diagnosis     S/P gastric bypass     Pernicious anemia     CARDIOVASCULAR SCREENING; LDL GOAL LESS THAN 160     Moderate persistent asthma     Environmental allergies     Abnormal Pap smear of cervix     Cervical dysplasia     Morbid obesity (H)     Iron deficiency anemia due to chronic blood loss     Menorrhagia with regular cycle     Postsurgical malabsorption     Intertrigo     Iron deficiency anemia following bariatric surgery   .  Her BMI is Body mass index is 40.46 kg/(m^2)..    She had a gastric bypass in 2004 with Dr. Monroe. Her weight prior to surgery was 298#. Her lowest weight after surgery was 165#. She is interested in getting help with further weight loss. She had significant weight regain with her last 2 pregnancies.     Assessment/ Plan:  Crystal is a 37 year old year old female who presents for medical weight management.     Diagnosis Comments   1. Morbid obesity (H)  We discussed healthy habits to assist with weight loss. She admits to eating pretty healthy but portion sizes are an issue. She has made some healthy changes since meeting with the dietician.We discussed medication that may assist with weight loss. Phentermine was prescribed. Risks/ benefits and possible side effects were discussed and questions were answered. Written information was given.      2. Moderate persistent asthma without complication  Breathing will likely improve with weight loss.         Follow up: in 1 month with the dietician and in 2 months with myself      >60 minutes spent with patient, > 50% spent in counseling          Counseling:  We discussed [facility] Bariatric Basics including:  -eating 3 meals daily  -eating protein first  -eating slowly, chewing food well  -avoiding/limiting calorie containing  beverages  -choosing wheat, not white with breads, crackers, pastas, haim, bagels, tortillas, rice  -limiting carbohydrates in general  -limiting restaurant or cafeteria eating to twice a week or less    We discussed the importance of restorative sleep and stress management in maintaining a healthy weight.    We reviewed medications associated with weight gain.    We discussed insulin resistance and glycemic index as it relates to appetite and weight control.     We discussed the National Weight Control Registry healthy weight maintenance strategies and ways to optimize metabolism.  We discussed the importance of physical activity including cardiovascular and strength training in maintaining a healthier weight and explored viable options.    We discussed medications available for weight loss including phentermine, phendimetrazine, topamax, qsymia, lorcaserin, contrave, diethylproprion, and orlistat. We discussed the risks and benefits of each. We discussed indications, contraindications, potential side effects, and estimated costs of each. Literature was offered.    History Surrouding Consultation:  Struggles with weight started at age teens  Her weight at age 18 was unsure  She has had several past supervised and unsupervised weight loss attempts  The most weight lost was: 135# after RNY gastric bypass  Unfortunately there was not durable weight maintenance.  History of bulimia, anorexia, or binge eating disorder? none  If Present has eating disorder been in remission at least 3 years? na    Dietary History  Meals per day: 2-3  Snacks: sometimes  [id number] Snack: prezels, grapes, grapefruit - more so when pregnant  Who does the grocery shopping? patient  Who does the cooking? pateint  A typical meal includes: B: cottage cheese with fruit or yogurt and coffee (black) L: salad with spinach and tuna or grilled chicken, sometimes no meat, D: skips or snacks on nuts  (overall portion sizes have decreased since meeting  with the dietician)  Regular Pop: rare, occasional apple juice or cranberry juice (4 oz approx once a week)  Juice: occasional  Caffeine: 1 cup of coffee per day  Amount of restaurant eating per week: 2-3 x per month  Eating a the table with the TV off? yes    Physical Activity Patterns  Current physical activity routine includes: walks 35 minutes plus 5 days per week    Limitations from being physically active on a regular basis includes: time, rashes        PMH:  Past Medical History:   Diagnosis Date     Abnormal Pap smear     see problem list     Allergic rhinitis      Anemia     iron infusions     Asthma     Advair, Albuterol     Cervical dysplasia     SUMI I, SUMI II, treated with Leep   later had ASCUS+HPV sev times 2009     Cervical high risk HPV (human papillomavirus) test positive     see problem list     Chlamydial cervicitis 10/2005, 3/2009    Treated both times and had neg JADEN both times     Diabetes mellitus (H)     GDDC with first pregnacy     Environmental allergies      Herpes simplex without mention of complication     valtrex at 36 weeks     History of chlamydia      Morbid obesity (H)      Postoperative hematoma involving genitourinary system 2014    large rectus sheath hematoma, after failed attempt at laparoscopy, hospitalized for pain control      Trichomonal vulvovaginitis 11/16/06     Vaginal discharge        Past Surgical Hx:  Past Surgical History:   Procedure Laterality Date     ATTEMPTED LAPAROSCOPY  3/13/2014    Procedure: ATTEMPTED LAPAROSCOPY;;  Surgeon: Cee Garcia MD;  Location: [facility]     COLPOSCOPY CERVIX, BIOPSY CERVIX, ENDOCERVICAL CURETTAGE, COMBINED      1/6/2005:  SUMI I; 6/15/2009:  SUMI I.  Treated with cryo.  10/27/1997:  SUMI I-II     CONIZATION LEEP  3/13/2014    Procedure: CONIZATION LEEP;  LOOP ELECTROSURGICAL EXCISION PROCEDURE; LAPAROSCOPY ATTEMPTED;  Surgeon: Cee Garcia MD;  Location: [facility]     CRYOTHERAPY, CERVICAL  age 19    Pt reported     DILATION AND  CURETTAGE, ABLATE ENDOMETRIUM NOVASURE, COMBINED N/A 6/14/2018    Procedure: COMBINED DILATION AND CURETTAGE, ABLATE ENDOMETRIUM NOVASURE;  HYSTEROSCOPY, DILATION AND CURETTAGE, ENDOMETRIAL ABLATION WITH NOVASURE, LAPAROSCOPIC BILATERAL TUBAL LIGATION ;  Surgeon: Franki Pinedo MD;  Location: [facility]     EXAM UNDER ANESTHESIA PELVIC  3/20/2014    Procedure: EXAM UNDER ANESTHESIA PELVIC;  EXAM UNDER ANESTHESIA, REMOVAL OF STICHES ;  Surgeon: Cee Garcia MD;  Location:  OR     GASTRIC BYPASS  2004    pre-bypass weight was 320#     HC NASAL/SINUS SCOPE W THER INSTILL       HC REMOVAL OF OVARIAN CYST(S)       LAPAROSCOPIC TUBAL LIGATION Bilateral 6/14/2018    Procedure: LAPAROSCOPIC TUBAL LIGATION;;  Surgeon: Franki Pinedo MD;  Location: [facility]       Medication:  Current Outpatient Prescriptions   Medication     albuterol (2.5 MG/3ML) 0.083% neb solution     albuterol (PROAIR HFA/PROVENTIL HFA/VENTOLIN HFA) 108 (90 Base) MCG/ACT inhaler     Ascorbic Acid (VITAMIN C PO)     calcium carbonate-vitamin D (CALCIUM 600/VITAMIN D) 600-400 MG-UNIT CHEW     cholecalciferol 2000 units CAPS     cyanocobalamin 1000 MCG SUBL     cyclobenzaprine (FLEXERIL) 10 MG tablet     ferrous fumarate 65 mg, [place]. FE,-Vitamin C 125 mg (VITRON C)  MG TABS tablet     ferrous sulfate (IRON) 325 (65 Fe) MG tablet     fluticasone (FLONASE) 50 MCG/ACT spray     fluticasone-salmeterol (ADVAIR) 250-50 MCG/DOSE diskus inhaler     montelukast (SINGULAIR) 10 MG tablet     Multiple Vitamins-Iron (QC DAILY MULTIVITAMINS/IRON) TABS     NORLYDA 0.35 MG per tablet     nystatin-triamcinolone (MYCOLOG II) cream     order for DME     predniSONE (DELTASONE) 20 MG tablet     valACYclovir (VALTREX) 500 MG tablet     No current facility-administered medications for this visit.      Facility-Administered Medications Ordered in Other Visits   Medication     bupivacaine 0.5% PF     fentaNYL (SUBLIMAZE) injection     lidocaine-EPINEPHrine 1.5 %-[id number]  injection     ROPivacaine (NAROPIN) epidural       Allergies:Cat hair [cats]; Dog hair [dogs]; Dust mites; and Ragweeds    Family Hx:  Family History   Problem Relation Age of Onset     Hypertension Mother      Allergies Mother      Obesity Mother      Asthma Father      Diabetes Father      Hypertension Father      Allergies Father      Cancer Father      Lymphoma d age 58     Asthma Brother      Allergies Sister      Depression Sister      Obesity Sister      Alzheimer Disease Maternal Grandmother      Arthritis Maternal Grandmother      Obesity Maternal Grandmother      Thyroid Disease Maternal Grandmother      Hypertension Maternal Grandfather      Cancer - colorectal Maternal Grandfather      Cerebrovascular Disease Maternal Grandfather      Diabetes Paternal Grandmother        Social Hx:  Social History     Social History     Marital status: Single     Spouse name: N/A     Number of children: 4     Years of education: N/A     Occupational History      [employer]     Social History Main Topics     Smoking status: Former Smoker     Quit date: 5/5/2009     Smokeless tobacco: Never Used     Alcohol use No     Drug use: No     Sexual activity: Yes     Partners: Male     Birth control/ protection: None     Other Topics Concern     Not on file     Social History Narrative       Tobacco Use Hx:  History   Smoking Status     Former Smoker     Quit date: 5/5/2009   Smokeless Tobacco     Never Used       ROS  General  Fatigue: yes,   Sleep Quality:fair, baby wakes once per night  HEENT  Hx of glaucoma: yes  Vision changes: no  Cardiovascular  Chest Pain with Exertion: no  Palpitations: no  Hx of heart disease: no  Pulmonary  Shortness of breath at rest: no  Shortness of breath with exertion: yes  Snoring: no  Stop-bang score: 2  [place] Score: 12  Gastrointestinal  Heartburn: no  Abdominal pain: no  Psychiatric  Moods Stable: yes  Endocrine  Polydipsia: no  Polyuria: no  Neurologic:  Hx of seizures:  "no  Dermatologic  Rashes: yes      /84 (BP Location: Left arm, Patient Position: Sitting, Cuff Size: Adult Large)  Pulse 82  Resp 12  Ht 5' 8\" (1.727 m)  Wt 266 lb 1.6 oz (120.7 kg)  SpO2 96%  BMI 40.46 kg/m2  Wt Readings from Last 2 Encounters:   10/08/18 266 lb 1.6 oz (120.7 kg)   09/26/18 271 lb 12.8 oz (123.3 kg)     Body mass index is 40.46 kg/(m^2).  Neck circumference/Waist Circumference: [unfilled]      Physical Exam:    GEN: Alert and oriented in no acute distress.   HEENT: PERRLA, mucous membranes moist. Airway adequate  NECK: Supple without LAD or thyromegaly. Carotid bruits absent.  LUNGS: CTA without wheezes or crackles, good air movement throughout  CV: RRR no MRG  ABDOMEN: moderate protuberance, BS normal, non tender to palpation, no rebound or guarding, no HSM  SKIN: mild erythema under pannus, no skin tags, very mild acanthosis nigrans  EXTREMITIES: no edema, dorsalis pedis palpable    Labs:    Lab Results   Component Value Date    WBC 10.8 08/13/2018    HGB 10.8 (L) 08/13/2018    HCT 34.2 (L) 08/13/2018    MCV 86 08/13/2018     08/13/2018     Lab Results   Component Value Date    CHOL 155 01/24/2012     Lab Results   Component Value Date    HDL 47 (L) 01/24/2012     No components found for: LDLCALC  Lab Results   Component Value Date    TRIG 72 01/24/2012     No components found for: CHOLHDL  Lab Results   Component Value Date    ALT 18 01/14/2010    AST 18 01/14/2010    ALKPHOS 48 01/14/2010     No components found for: HGBA1C  No components found for: [id number]  "

## 2018-10-08 NOTE — PATIENT INSTRUCTIONS

## 2018-10-08 NOTE — MR AVS SNAPSHOT
After Visit Summary   10/8/2018    Crystal Rose    MRN: [id number]           Patient Information     Date Of Birth          1980        Visit Information        Provider Department      10/8/2018 1:00 PM VERONICA Tyson MD [facility] BARIATRICS      Today's Diagnoses     Morbid obesity (H)    -  1    Moderate persistent asthma without complication          Care Instructions    Eat Better ? Move More ? Live Well    Eat 3 nutrient-rich meals each day     Don t skip meals--it will cause you to overeat later in the day!     Eating fiber (vegetables/fruits/whole grains) and protein with meals helps you stay full longer     Choose foods with less than 10 grams of sugar and 5 grams of fat per serving to prevent excess calories and weight re-gain   Eat around the same times each day to develop a routine eating schedule    Avoid snacking unless physically hungry.   Planned snacks: 1-2 times per day and no more than 150 calories    Eat protein first    Protein helps with healing, maintaining adequate muscle mass, reducing hunger and optimizing nutritional status    Aim for 60-80 grams of protein per day   Fill up on Fiber    Fiber comes from plants--fruits, veggies, whole grains, nuts/seeds and beans    Fiber is low in calories, high in phytonutrients and helps you stay full longer    Aim for 25-35 grams per day by eating fiber with meals and snacks  Eat S-[id number]-Y    Take 20-30 minutes to eat each meal by taking small bites, chewing foods to applesauce consistency or 20-30 times before you swallow    Eating foods too fast can delay satiety/fullness signals and increase overeating   ? Slow down your eating by using toddler utensils, putting your fork/spoon down between bites and not watching TV or emailing during meals!   Keep a Journal          Writing down what you eat, how you feel and when you are active helps you identify new changes to work on from week  to week          Look for ways to cut 100 calories from your current diet 2-3 times per day  Drink 64 ounces of 0-Calorie drinks between meals    Water    Zero calorie Propel  or Vitamin Water      SoBe Lifewater  Zero Calories    Crystal Light , Sugar-Free Christiano-Aid , and other sugar-free lemonade or flavored fa[place]ner    Keep Caffeine to less than 300mg per day ie: 3-6oz cups coffee     Work up to 45-60 minutes of physical activity most days of the week    Helps with losing weight and prevent regaining those extra pounds!     Do a combo of cardio (walking/water exercises) and strength training (lifting weights/Vinyasa yoga)    Avoid Mindless Eating    Be present when you eat--take note of the smell, taste and quality of your food    Make a list of alternative activities you could do to prevent eating out of boredom/stress  ? Go for a walk, call a friend, chew gum, paint your nails, re-organize the garage, etc              Follow-ups after your visit        Your next 10 appointments already scheduled     Nov 21, 2018 10:00 AM YANELIS   New Weight Managment Nutrition with  [facility], RD   [facility] ([facility])    [address] [place] [id number] [phone]              Who to contact     If you have questions or need follow up information about today's clinic visit or your schedule please contact [facility] directly at [phone].  Normal or non-critical lab and imaging results will be communicated to you by MyChart, letter or phone within 4 business days after the clinic has received the results. If you do not hear from us within 7 days, please contact the clinic through MyChart or phone. If you have a critical or abnormal lab result, we will notify you by phone as soon as possible.  Submit refill requests through [facility] or call your pharmacy and they will forward the refill request to us.  "Please allow 3 business days for your refill to be completed.          Additional Information About Your Visit        [facility] [employer] Information     [facility] gives you secure access to your electronic health record. If you see a primary care provider, you can also send messages to your care team and make appointments. If you have questions, please call your primary care clinic.  If you do not have a primary care provider, please call [phone] and they will assist you.        Care EveryWhere ID     This is your Care EveryWhere ID. This could be used by other organizations to access your [facility] medical records  [id number]        Your Vitals Were     Pulse Respirations Height Pulse Oximetry BMI (Body Mass Index)       82 12 5' 8\" (1.727 m) 96% 40.46 kg/m2        Blood Pressure from Last 3 Encounters:   10/08/18 128/84   09/26/18 132/88   09/14/18 134/80    Weight from Last 3 Encounters:   10/08/18 266 lb 1.6 oz (120.7 kg)   09/26/18 271 lb 12.8 oz (123.3 kg)   09/26/18 271 lb 12.8 oz (123.3 kg)              Today, you had the following     No orders found for display         Today's Medication Changes          These changes are accurate as of 10/8/18  1:52 PM.  If you have any questions, ask your nurse or doctor.               Start taking these medicines.        Dose/Directions    phentermine 37.5 MG tablet   Commonly known as:  ADIPEX-P   Used for:  Morbid obesity (H)   Started by:  VERONICA Tyson MD        Take 1/2 tablet every morning with breakfast. Increase to 1 tablet after 1 week if needed.   Quantity:  90 tablet   Refills:  0            Where to get your medicines      Some of these will need a paper prescription and others can be bought over the counter.  Ask your nurse if you have questions.     Bring a paper prescription for each of these medications     phentermine 37.5 MG tablet                Primary Care Provider Office Phone # Fax #    SHERIE Aguiar [phone]    "    [address]  [place] [facility] [address]        Equal Access to Services     BAKARIRADHA DORA : Hadii angeles ku hadtyo Sodawnali, waaxda luqadaha, qaybta kalucada donovantriciadouglas, criss villavicenciosaskiasun petersen. So [facility] [phone].    ATENCIÓN: Si habla español, tiene a garber disposición servicios gratuitos de asistencia lingüística. Llame al [phone].    We comply with applicable federal civil rights laws and [place] laws. We do not discriminate on the basis of race, color, national origin, age, disability, sex, sexual orientation, or gender identity.            Thank you!     Thank you for choosing [facility]  for your care. Our goal is always to provide you with excellent care. Hearing back from our patients is one way we can continue to improve our services. Please take a few minutes to complete the written survey that you may receive in the mail after your visit with us. Thank you!             Your Updated Medication List - Protect others around you: Learn how to safely use, store and throw away your medicines at www.disposemymeds.org.          This list is accurate as of 10/8/18  1:52 PM.  Always use your most recent med list.                   Brand Name Dispense Instructions for use Diagnosis    * albuterol 108 (90 Base) MCG/ACT inhaler    PROAIR HFA/PROVENTIL HFA/VENTOLIN HFA    1 Inhaler    Inhale 2 puffs into the lungs every 6 hours as needed    Moderate persistent asthma, unspecified whether complicated       * albuterol (2.5 MG/3ML) 0.083% neb solution     1 Box    Take 1 vial (2.5 mg) by nebulization every 4 hours as needed for shortness of breath / dyspnea    Moderate persistent asthma, unspecified whether complicated       calcium carbonate-vitamin D 600-400 MG-UNIT Chew    CALCIUM 600/VITAMIN D    180 tablet    Take one twice daily and at least 2 hours apart from iron.    Morbid obesity with BMI of 40.0-44.9, adult (H), Bariatric surgery status, Postsurgical  malabsorption       cholecalciferol 2000 units Caps     90 capsule    Take 1 capsule by mouth daily    Morbid obesity with BMI of 40.0-44.9, adult (H), Bariatric surgery status, Postsurgical malabsorption       cyanocobalamin 1000 MCG Subl     90 tablet    Place 1,000 mcg under the tongue every other day    Morbid obesity with BMI of 40.0-44.9, adult (H), Bariatric surgery status, Postsurgical malabsorption       cyclobenzaprine 10 MG tablet    FLEXERIL    30 tablet    Take 0.5-1 tablets (5-10 mg) by mouth 3 times daily as needed for muscle spasms    Acute intractable headache, unspecified headache type, Acute bilateral back pain, unspecified back location       ferrous fumarate 65 mg ([place]. FE)-Vitamin C 125 mg  MG Tabs tablet    VITRON C    180 tablet    Take 2 tablets by mouth daily    Morbid obesity with BMI of 40.0-44.9, adult (H), Bariatric surgery status, Postsurgical malabsorption       ferrous sulfate 325 (65 Fe) MG tablet    IRON     Take 325 mg by mouth daily (with breakfast)        fluticasone 50 MCG/ACT spray    FLONASE    3 Bottle    Spray 1-2 sprays into both nostrils daily    Chronic allergic rhinitis, unspecified seasonality, unspecified trigger       fluticasone-salmeterol 250-50 MCG/DOSE diskus inhaler    ADVAIR    1 Inhaler    Inhale 1 puff into the lungs 2 times daily    Moderate persistent asthma, unspecified whether complicated       montelukast 10 MG tablet    SINGULAIR    30 tablet    Take 1 tablet (10 mg) by mouth At Bedtime    Moderate persistent asthma, unspecified whether complicated       NORLYDA 0.35 MG per tablet   Generic drug:  norethindrone           nystatin-triamcinolone cream    MYCOLOG II    30 g    Apply to affected area BID up to 7 days prn rash    Morbid obesity with BMI of 40.0-44.9, adult (H), Bariatric surgery status, Postsurgical malabsorption       order for DME     1 each    Equipment being ordered: Nebulizer    Moderate persistent asthma, unspecified whether  complicated       phentermine 37.5 MG tablet    ADIPEX-P    90 tablet    Take 1/2 tablet every morning with breakfast. Increase to 1 tablet after 1 week if needed.    Morbid obesity (H)       predniSONE 20 MG tablet    DELTASONE    10 tablet    Take 1 tablet (20 mg) by mouth 2 times daily    Moderate persistent asthma, unspecified whether complicated       QC DAILY MULTIVITAMINS/IRON Tabs     180 tablet    Take 2 tablets by mouth daily    Morbid obesity with BMI of 40.0-44.9, adult (H), Bariatric surgery status, Postsurgical malabsorption       valACYclovir 500 MG tablet    VALTREX    90 tablet    Take 1 tablet (500 mg) by mouth daily    Herpes simplex infection of genitourinary system       VITAMIN C PO      Take 500 mg by mouth        * Notice:  This list has 2 medication(s) that are the same as other medications prescribed for you. Read the directions carefully, and ask your doctor or other care provider to review them with you.

## 2018-12-06 ENCOUNTER — OFFICE VISIT (OUTPATIENT)
Dept: SURGERY | Facility: CLINIC | Age: 38
End: 2018-12-06
Payer: COMMERCIAL

## 2018-12-06 VITALS
OXYGEN SATURATION: 98 % | WEIGHT: 252.3 LBS | DIASTOLIC BLOOD PRESSURE: 82 MMHG | HEART RATE: 96 BPM | HEIGHT: 68 IN | BODY MASS INDEX: 38.24 KG/M2 | SYSTOLIC BLOOD PRESSURE: 131 MMHG

## 2018-12-06 DIAGNOSIS — Z98.84 BARIATRIC SURGERY STATUS: ICD-10-CM

## 2018-12-06 DIAGNOSIS — J45.40 MODERATE PERSISTENT ASTHMA WITHOUT COMPLICATION: ICD-10-CM

## 2018-12-06 DIAGNOSIS — K91.2 POSTSURGICAL MALABSORPTION: ICD-10-CM

## 2018-12-06 DIAGNOSIS — E66.9 OBESITY (BMI 30-39.9): Primary | ICD-10-CM

## 2018-12-06 PROCEDURE — 99214 OFFICE O/P EST MOD 30 MIN: CPT | Performed by: FAMILY MEDICINE

## 2018-12-06 RX ORDER — LORATADINE 10 MG/1
10 TABLET ORAL DAILY
COMMUNITY
End: 2020-04-28

## 2018-12-06 RX ORDER — PHENTERMINE HYDROCHLORIDE 37.5 MG/1
TABLET ORAL
Qty: 90 TABLET | Refills: 0 | Status: SHIPPED | OUTPATIENT
Start: 2018-12-06 | End: 2019-10-28

## 2018-12-06 RX ORDER — MENTHOL 5.8 MG/1
2 LOZENGE ORAL DAILY
Qty: 180 TABLET | Refills: 3 | Status: SHIPPED | OUTPATIENT
Start: 2018-12-06 | End: 2019-10-28
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
## 2018-12-06 NOTE — MR AVS SNAPSHOT
After Visit Summary   12/6/2018    Crystal Rose    MRN: [id number]           Patient Information     Date Of Birth          1980        Visit Information        Provider Department      12/6/2018 11:30 AM VERONICA Tyson MD [facility] BARIATRICS      Today's Diagnoses     Obesity (BMI 30-39.9)    -  1    Moderate persistent asthma without complication        Morbid obesity with BMI of 40.0-44.9, adult (H)        Bariatric surgery status        Postsurgical malabsorption        Morbid obesity (H)          Care Instructions    Eat Better ? Move More ? Live Well    Eat 3 nutrient-rich meals each day     Don t skip meals--it will cause you to overeat later in the day!     Eating fiber (vegetables/fruits/whole grains) and protein with meals helps you stay full longer     Choose foods with less than 10 grams of sugar and 5 grams of fat per serving to prevent excess calories and weight re-gain   Eat around the same times each day to develop a routine eating schedule    Avoid snacking unless physically hungry.   Planned snacks: 1-2 times per day and no more than 150 calories    Eat protein first    Protein helps with healing, maintaining adequate muscle mass, reducing hunger and optimizing nutritional status    Aim for 60-80 grams of protein per day   Fill up on Fiber    Fiber comes from plants--fruits, veggies, whole grains, nuts/seeds and beans    Fiber is low in calories, high in phytonutrients and helps you stay full longer    Aim for 25-35 grams per day by eating fiber with meals and snacks  Eat S-L-[id number]-W-[id number]-Y    Take 20-30 minutes to eat each meal by taking small bites, chewing foods to applesauce consistency or 20-30 times before you swallow    Eating foods too fast can delay satiety/fullness signals and increase overeating   ? Slow down your eating by using toddler utensils, putting your fork/spoon down between bites and not watching TV or emailing  during meals!   Keep a Journal          Writing down what you eat, how you feel and when you are active helps you identify new changes to work on from week to week          Look for ways to cut 100 calories from your current diet 2-3 times per day  Drink 64 ounces of 0-Calorie drinks between meals    Water    Zero calorie Propel  or Vitamin Water      SoBe Lifewater  Zero Calories    Crystal Light , Sugar-Free Christiano-Aid , and other sugar-free lemonade or flavored faulkner    Keep Caffeine to less than 300mg per day ie: 3-6oz cups coffee     Work up to 45-60 minutes of physical activity most days of the week    Helps with losing weight and prevent regaining those extra pounds!     Do a combo of cardio (walking/water exercises) and strength training (lifting weights/Vinyasa yoga)    Avoid Mindless Eating    Be present when you eat--take note of the smell, taste and quality of your food    Make a list of alternative activities you could do to prevent eating out of boredom/stress  ? Go for a walk, call a friend, chew gum, paint your nails, re-organize the garage, etc              Follow-ups after your visit        Your next 10 appointments already scheduled     Jan 09, 2019  8:30 AM CST   (Arrive by 8:15 AM)   New Weight Managment Nutrition with  Wl [facility] 3, RD   [facility] ([facility])    [address] [place] [id number] [phone]              Future tests that were ordered for you today     Open Future Orders        Priority Expected Expires Ordered    DX Hip/Pelvis/Spine w Lat Fraction Mary Jane Routine 12/31/2018 12/6/2019 12/6/2018            Who to contact     If you have questions or need follow up information about today's clinic visit or your schedule please contact [facility] directly at [phone].  Normal or non-critical lab and imaging results will be communicated to you by Teresa, letter  "or phone within 4 business days after the clinic has received the results. If you do not hear from us within 7 days, please contact the clinic through ReSnap or phone. If you have a critical or abnormal lab result, we will notify you by phone as soon as possible.  Submit refill requests through ReSnap or call your pharmacy and they will forward the refill request to us. Please allow 3 business days for your refill to be completed.          Additional Information About Your Visit        ReSnap Information     ReSnap gives you secure access to your electronic health record. If you see a primary care provider, you can also send messages to your care team and make appointments. If you have questions, please call your primary care clinic.  If you do not have a primary care provider, please call [phone] and they will assist you.        Care EveryWhere ID     This is your Care EveryWhere ID. This could be used by other organizations to access your [facility] medical records  [id number]        Your Vitals Were     Pulse Height Pulse Oximetry BMI (Body Mass Index)          96 5' 8\" (1.727 m) 98% 38.36 kg/m2         Blood Pressure from Last 3 Encounters:   12/06/18 131/82   10/08/18 128/84   09/26/18 132/88    Weight from Last 3 Encounters:   12/06/18 252 lb 4.8 oz (114.4 kg)   10/08/18 266 lb 1.6 oz (120.7 kg)   09/26/18 271 lb 12.8 oz (123.3 kg)                 Where to get your medicines      These medications were sent to [id number] - [facility] [address] [address] [address] [facility] [address] [place]  [address] [facility] [address]    Hours:  24-hours Phone:  [phone]     calcium carbonate-vitamin D 600-400 MG-UNIT chewable tablet    cyanocobalamin 1000 MCG Subl    ferrous fumarate 65 mg ([place]. FE)-Vitamin C 125 mg  MG Tabs tablet    QC DAILY MULTIVITAMINS/IRON Tabs         Some of these will need a paper prescription and others can be bought over the counter.  " Ask your nurse if you have questions.     Bring a paper prescription for each of these medications     phentermine 37.5 MG tablet          Primary Care Provider Office Phone # Fax #    SHERIE Aguiar [phone] [address]  [place] [facility] [address]        Equal Access to Services     LUPE JOHN : Hadii aad ku hadasho Soomaali, waaxda luqadaha, qaybta kaalmada adeegyada, waxay idiin hayaan adeeg kharash laEliaan . So [facility] [place] [facility] [phone].    ATENCIÓN: Si habla español, tiene a garber disposición servicios gratuitos de asistencia lingüística. Llame al [phone].    We comply with applicable federal civil rights laws and [place] laws. We do not discriminate on the basis of race, color, national origin, age, disability, sex, sexual orientation, or gender identity.            Thank you!     Thank you for choosing [facility]  for your care. Our goal is always to provide you with excellent care. Hearing back from our patients is one way we can continue to improve our services. Please take a few minutes to complete the written survey that you may receive in the mail after your visit with us. Thank you!             Your Updated Medication List - Protect others around you: Learn how to safely use, store and throw away your medicines at www.disposemymeds.org.          This list is accurate as of 12/6/18 12:06 PM.  Always use your most recent med list.                   Brand Name Dispense Instructions for use Diagnosis    * albuterol (2.5 MG/3ML) 0.083% neb solution    PROVENTIL    1 Box    Take 1 vial (2.5 mg) by nebulization every 4 hours as needed for shortness of breath / dyspnea    Moderate persistent asthma, unspecified whether complicated       * albuterol 108 (90 Base) MCG/ACT inhaler    PROAIR HFA/PROVENTIL HFA/VENTOLIN HFA    1 Inhaler    Inhale 2 puffs into the lungs every 6 hours as needed for shortness of breath / dyspnea or wheezing     Moderate persistent asthma, unspecified whether complicated       calcium carbonate-vitamin D 600-400 MG-UNIT chewable tablet    CALCIUM 600/VITAMIN D    180 tablet    Take one twice daily and at least 2 hours apart from iron.    Morbid obesity with BMI of 40.0-44.9, adult (H), Bariatric surgery status, Postsurgical malabsorption       cholecalciferol 2000 units Caps     90 capsule    Take 1 capsule by mouth daily    Morbid obesity with BMI of 40.0-44.9, adult (H), Bariatric surgery status, Postsurgical malabsorption       cyanocobalamin 1000 MCG Subl     90 tablet    Place 1,000 mcg under the tongue every other day    Morbid obesity with BMI of 40.0-44.9, adult (H), Bariatric surgery status, Postsurgical malabsorption       cyclobenzaprine 10 MG tablet    FLEXERIL    30 tablet    Take 0.5-1 tablets (5-10 mg) by mouth 3 times daily as needed for muscle spasms    Acute intractable headache, unspecified headache type, Acute bilateral back pain, unspecified back location       ferrous fumarate 65 mg (Samish. FE)-Vitamin C 125 mg  MG Tabs tablet    VITRON C    180 tablet    Take 2 tablets by mouth daily    Morbid obesity with BMI of 40.0-44.9, adult (H), Bariatric surgery status, Postsurgical malabsorption       ferrous sulfate 325 (65 Fe) MG tablet    FEROSUL     Take 325 mg by mouth daily (with breakfast)        fluticasone 50 MCG/ACT nasal spray    FLONASE    3 Bottle    Spray 1-2 sprays into both nostrils daily    Chronic allergic rhinitis, unspecified seasonality, unspecified trigger       fluticasone-salmeterol 250-50 MCG/DOSE inhaler    ADVAIR    1 Inhaler    Inhale 1 puff into the lungs 2 times daily    Moderate persistent asthma, unspecified whether complicated       loratadine 10 MG tablet    CLARITIN     Take 10 mg by mouth daily        montelukast 10 MG tablet    SINGULAIR    30 tablet    Take 1 tablet (10 mg) by mouth At Bedtime    Moderate persistent asthma, unspecified whether complicated        NORLYDA 0.35 MG tablet   Generic drug:  norethindrone           nystatin-triamcinolone 146191-8.1 UNIT/GM-% external cream    MYCOLOG II    30 g    Apply to affected area BID up to 7 days prn rash    Morbid obesity with BMI of 40.0-44.9, adult (H), Bariatric surgery status, Postsurgical malabsorption       order for DME     1 each    Equipment being ordered: Nebulizer    Moderate persistent asthma, unspecified whether complicated       phentermine 37.5 MG tablet    ADIPEX-P    90 tablet    Take 1/2 tablet every morning with breakfast. Increase to 1 tablet after 1 week if needed.    Morbid obesity (H)       predniSONE 20 MG tablet    DELTASONE    10 tablet    Take 1 tablet (20 mg) by mouth 2 times daily    Moderate persistent asthma, unspecified whether complicated       QC DAILY MULTIVITAMINS/IRON Tabs     180 tablet    Take 2 tablets by mouth daily    Morbid obesity with BMI of 40.0-44.9, adult (H), Bariatric surgery status, Postsurgical malabsorption       valACYclovir 500 MG tablet    VALTREX    90 tablet    Take 1 tablet (500 mg) by mouth daily    Herpes simplex infection of genitourinary system       VITAMIN C PO      Take 500 mg by mouth        * Notice:  This list has 2 medication(s) that are the same as other medications prescribed for you. Read the directions carefully, and ask your doctor or other care provider to review them with you.

## 2018-12-06 NOTE — PATIENT INSTRUCTIONS

## 2018-12-21 ENCOUNTER — ANCILLARY PROCEDURE (OUTPATIENT)
Dept: BONE DENSITY | Facility: CLINIC | Age: 38
End: 2018-12-21
Attending: FAMILY MEDICINE
Payer: COMMERCIAL

## 2018-12-21 DIAGNOSIS — K91.2 POSTSURGICAL MALABSORPTION: ICD-10-CM

## 2018-12-21 PROCEDURE — 77080 DXA BONE DENSITY AXIAL: CPT | Performed by: INTERNAL MEDICINE

## 2019-01-06 DIAGNOSIS — J45.40 MODERATE PERSISTENT ASTHMA, UNSPECIFIED WHETHER COMPLICATED: ICD-10-CM

## 2019-01-06 NOTE — TELEPHONE ENCOUNTER
"Requested Prescriptions   Pending Prescriptions Disp Refills     albuterol (PROAIR HFA/PROVENTIL HFA/VENTOLIN HFA) 108 (90 Base) MCG/ACT inhaler 1 Inhaler 3          Last Written Prescription Date:  10/11/18  Last Fill Quantity: 1,  # refills: 3   Last Office Visit with [facility] or [facility] prescribing provider:  9/14/18   Future Office Visit:      Sig: Inhale 2 puffs into the lungs every 6 hours as needed for shortness of breath / dyspnea or wheezing    Asthma Maintenance Inhalers - Anticholinergics Failed - 1/6/2019  4:03 PM       Failed - Asthma control assessment score within normal limits in last 6 months    Please review ACT score.          Passed - Patient is age 12 years or older       Passed - Medication is active on med list       Passed - Recent (6 mo) or future (30 days) visit within the authorizing provider's specialty    Patient had office visit in the last 6 months or has a visit in the next 30 days with authorizing provider or within the authorizing provider's specialty.  See \"Patient Info\" tab in inbasket, or \"Choose Columns\" in Meds & Orders section of the refill encounter.                  Giovanni Faarax  [facility]  "

## 2019-01-08 RX ORDER — ALBUTEROL SULFATE 90 UG/1
2 AEROSOL, METERED RESPIRATORY (INHALATION) EVERY 6 HOURS PRN
Qty: 1 INHALER | Refills: 0 | Status: SHIPPED | OUTPATIENT
Start: 2019-01-08 | End: 2019-02-05

## 2019-01-08 NOTE — TELEPHONE ENCOUNTER
ACT Total Scores 2/4/2014 9/11/2018 9/14/2018   ACT TOTAL SCORE 12 - -   ASTHMA ER VISITS 0 = None - -   ASTHMA HOSPITALIZATIONS 0 = None - -   ACT TOTAL SCORE (Goal Greater than or Equal to 20) - 11 11   In the past 12 months, how many times did you visit the emergency room for your asthma without being admitted to the hospital? - 0 0   In the past 12 months, how many times were you hospitalized overnight because of your asthma? - 0 0     Routing refill request to provider for review/approval because:  ACT score is below 20 so RN cannot refill per [facility] protocol.        David Bone RN, BSN

## 2019-01-09 ENCOUNTER — ALLIED HEALTH/NURSE VISIT (OUTPATIENT)
Dept: SURGERY | Facility: CLINIC | Age: 39
End: 2019-01-09
Payer: COMMERCIAL

## 2019-01-09 VITALS — BODY MASS INDEX: 38.24 KG/M2 | HEIGHT: 68 IN | WEIGHT: 252.3 LBS

## 2019-01-09 DIAGNOSIS — E66.01 MORBID OBESITY (H): ICD-10-CM

## 2019-01-09 DIAGNOSIS — Z98.84 S/P GASTRIC BYPASS: ICD-10-CM

## 2019-01-09 PROCEDURE — 97802 MEDICAL NUTRITION INDIV IN: CPT | Performed by: DIETITIAN, REGISTERED

## 2019-01-09 ASSESSMENT — MIFFLIN-ST. JEOR: SCORE: 1872.93

## 2019-01-09 NOTE — PROGRESS NOTES
"MEDICAL WEIGHT LOSS INITIAL EVALUATION  DIAGNOSIS:  Obesity Class II    NUTRITION HISTORY:  Breakfast: boiled eggs (1)  oatmeal  cottage cheese with fruit  yogurt occasionally   Lunch: tuna and avocado  spinach + lemon juice + cucumbers + tomatoes + grilled chicken or fish   Dinner: vegetables + cauliflower rice + baked fish/chicken  soup   Snacks: none  Beverage choices: black coffee (8-16oz), water (64oz+), cranberry juice occasionally  Dining out: occasional - salmon burger, spinach salad   Binge eating: none  Emotional eating: none  Night time eating: none  Exercise: walking downtown (15-20 minutes 3x/day)  Other: Pt s/p gastric bypass (2004). Successful with weight loss for many years but having difficulty maintaining healthy weight after birth of 5th child. Discussed overall healthy eating guidelines with some modifications for bariatric anatomy. Pt unable to tolerate most dairy including: milk (regular and lactose free), cheese (however tolerates cottage cheese), yogurt, etc). Does not tolerate soy milk.      ANTHROPOMETRICS:  Height: 5' 8\"   Weight: 252 lbs 4.8 oz   BMI:  38.36 kg/m2  NUTRITION DIAGNOSIS:   Obese, class II related to excess energy intake as evidence by BMI of  38.36 kg/m2.  NUTRITION INTERVENTIONS  Nutrition Prescription:  Recommend modified energy- nutrient intake  Implementation:  Nutrition Education (Content):    Discussed portion sizes     Determined alternative to emotional eating    Reviewed healthy snacking and beverage choices    Provided handouts: Eat Better/Move More/Live Well, protein list, Tips for Increasing Fiber and Stage 5 Meal Plan    Nutrition Education (Application):     Patient to practice goals as stated below    Patient verbalizes understanding of diet by stating she will start logging her food using ChatterBlock sonny    Anticipate fair-good compliance    Goals:  Avoid skipping meals  Trial of alternative protein supplements (suggested vegetable or pea protein " powder)  Log food using Keynoir sonny 1-2x/week, aim for less than 1000 kcals    FOLLOW UP AND MONITORING:    Other  - follow up in 8 weeks.     TIME SPENT WITH PATIENT:   55 minutes     Charmaine Wiley RD, LD  Clinical Dietitian

## 2019-02-05 ENCOUNTER — OFFICE VISIT (OUTPATIENT)
Dept: FAMILY MEDICINE | Facility: CLINIC | Age: 39
End: 2019-02-05
Payer: COMMERCIAL

## 2019-02-05 VITALS
OXYGEN SATURATION: 95 % | WEIGHT: 247 LBS | SYSTOLIC BLOOD PRESSURE: 124 MMHG | HEIGHT: 68 IN | BODY MASS INDEX: 37.44 KG/M2 | TEMPERATURE: 97.6 F | DIASTOLIC BLOOD PRESSURE: 83 MMHG | HEART RATE: 101 BPM

## 2019-02-05 DIAGNOSIS — R68.83 CHILLS (WITHOUT FEVER): ICD-10-CM

## 2019-02-05 DIAGNOSIS — J45.41 MODERATE PERSISTENT ASTHMA WITH ACUTE EXACERBATION: Primary | ICD-10-CM

## 2019-02-05 DIAGNOSIS — J01.90 ACUTE SINUSITIS WITH SYMPTOMS > 10 DAYS: ICD-10-CM

## 2019-02-05 LAB
FLUAV+FLUBV AG SPEC QL: NEGATIVE
FLUAV+FLUBV AG SPEC QL: NEGATIVE
SPECIMEN SOURCE: NORMAL

## 2019-02-05 PROCEDURE — 99214 OFFICE O/P EST MOD 30 MIN: CPT | Mod: 25 | Performed by: NURSE PRACTITIONER

## 2019-02-05 PROCEDURE — 87804 INFLUENZA ASSAY W/OPTIC: CPT | Performed by: NURSE PRACTITIONER

## 2019-02-05 PROCEDURE — 94640 AIRWAY INHALATION TREATMENT: CPT | Performed by: NURSE PRACTITIONER

## 2019-02-05 RX ORDER — IPRATROPIUM BROMIDE AND ALBUTEROL SULFATE 2.5; .5 MG/3ML; MG/3ML
3 SOLUTION RESPIRATORY (INHALATION) ONCE
Status: COMPLETED | OUTPATIENT
Start: 2019-02-05 | End: 2019-02-05

## 2019-02-05 RX ORDER — ALBUTEROL SULFATE 90 UG/1
2 AEROSOL, METERED RESPIRATORY (INHALATION) EVERY 6 HOURS PRN
Qty: 3 INHALER | Refills: 1 | Status: SHIPPED | OUTPATIENT
Start: 2019-02-05 | End: 2019-06-27

## 2019-02-05 RX ORDER — IPRATROPIUM BROMIDE AND ALBUTEROL SULFATE 2.5; .5 MG/3ML; MG/3ML
1 SOLUTION RESPIRATORY (INHALATION) EVERY 4 HOURS PRN
Qty: 25 VIAL | Refills: 3 | Status: SHIPPED | OUTPATIENT
Start: 2019-02-05 | End: 2019-04-26

## 2019-02-05 RX ORDER — PREDNISONE 20 MG/1
20 TABLET ORAL 2 TIMES DAILY
Qty: 10 TABLET | Refills: 0 | Status: SHIPPED | OUTPATIENT
Start: 2019-02-05 | End: 2019-02-25

## 2019-02-05 RX ADMIN — IPRATROPIUM BROMIDE AND ALBUTEROL SULFATE 3 ML: 2.5; .5 SOLUTION RESPIRATORY (INHALATION) at 09:21

## 2019-02-05 ASSESSMENT — MIFFLIN-ST. JEOR: SCORE: 1848.88

## 2019-02-05 ASSESSMENT — PAIN SCALES - GENERAL: PAINLEVEL: NO PAIN (0)

## 2019-02-05 NOTE — PATIENT INSTRUCTIONS
For asthma exacerbation:  -prednisone twice a day for 5 days (take with food, can cause trouble sleeping, irritability, increased appetite)  -inhaler or neb as needed for wheezing/ shortness of breath   -start doing Advair twice a day (rinse mouth afterwards to avoid thrush)    For sinuses:  -take Augmentin (antibiotic) twice a day for 10 days.   -buy Mucinex 1200 mg over the counter; take twice a day for thinning sinus drainage  -buy Sudafed 120 mg (from pharmacist with your ID), take twice a day for drying up your drainage

## 2019-02-05 NOTE — PROGRESS NOTES
SUBJECTIVE:   Crystal Rose is a 38 year old female who presents to clinic today for the following health issues:      Acute Illness   Acute illness concerns: URI  Onset: 1 week    Fever: no     Chills/Sweats: no     Headache (location?): YES    Sinus Pressure:YES    Conjunctivitis:  no    Ear Pain: YES: both    Rhinorrhea: YES    Congestion: YES    Sore Throat: YES but from drainage      Cough: YES-productive of yellow sputum    Wheeze: YES    Decreased Appetite: YES    Nausea: no     Vomiting: no     Diarrhea:  no     Dysuria/Freq.: no     Fatigue/Achiness: YES- achy head    Sick/Strep Exposure: YES     Therapies Tried and outcome: Patient used all of albuterol inhaler in the last month.  That and albuterol neb do help but for about 3 hours only.   Tried warm compression for ears with no relief     Was taking advair but only once a day.  Never followed up after initiating this in Sept 2018.    Problem list and histories reviewed & adjusted, as indicated.  Additional history: as documented    Patient Active Problem List   Diagnosis     S/P gastric bypass     Pernicious anemia     CARDIOVASCULAR SCREENING; LDL GOAL LESS THAN 160     Moderate persistent asthma     Environmental allergies     Abnormal Pap smear of cervix     Cervical dysplasia     Morbid obesity (H)     Iron deficiency anemia due to chronic blood loss     Menorrhagia with regular cycle     Postsurgical malabsorption     Intertrigo     Iron deficiency anemia following bariatric surgery     Past Surgical History:   Procedure Laterality Date     ATTEMPTED LAPAROSCOPY  3/13/2014    Procedure: ATTEMPTED LAPAROSCOPY;;  Surgeon: Cee Garcia MD;  Location: [facility]     COLPOSCOPY CERVIX, BIOPSY CERVIX, ENDOCERVICAL CURETTAGE, COMBINED      1/6/2005:  SUMI I; 6/15/2009:  SUMI I.  Treated with cryo.  10/27/1997:  SUMI I-II     CONIZATION LEEP  3/13/2014    Procedure: CONIZATION LEEP;  LOOP ELECTROSURGICAL EXCISION PROCEDURE; LAPAROSCOPY ATTEMPTED;   Surgeon: Cee Garcia MD;  Location: [facility]     CRYOTHERAPY, CERVICAL  age 19    Pt reported     DILATION AND CURETTAGE, ABLATE ENDOMETRIUM NOVASURE, COMBINED N/A 2018    Procedure: COMBINED DILATION AND CURETTAGE, ABLATE ENDOMETRIUM NOVASURE;  HYSTEROSCOPY, DILATION AND CURETTAGE, ENDOMETRIAL ABLATION WITH NOVASURE, LAPAROSCOPIC BILATERAL TUBAL LIGATION ;  Surgeon: Franki Pinedo MD;  Location: [facility]     EXAM UNDER ANESTHESIA PELVIC  3/20/2014    Procedure: EXAM UNDER ANESTHESIA PELVIC;  EXAM UNDER ANESTHESIA, REMOVAL OF STICHES ;  Surgeon: Cee Garcia MD;  Location:  OR     GASTRIC BYPASS      pre-bypass weight was 320#     HC NASAL/SINUS SCOPE W THER INSTILL       HC REMOVAL OF OVARIAN CYST(S)       LAPAROSCOPIC TUBAL LIGATION Bilateral 2018    Procedure: LAPAROSCOPIC TUBAL LIGATION;;  Surgeon: Franki Pinedo MD;  Location: [facility]       Social History     Tobacco Use     Smoking status: Former Smoker     Last attempt to quit: 2009     Years since quittin.7     Smokeless tobacco: Never Used   Substance Use Topics     Alcohol use: No     Family History   Problem Relation Age of Onset     Hypertension Mother      Allergies Mother      Obesity Mother      Asthma Father      Diabetes Father      Hypertension Father      Allergies Father      Cancer Father         Lymphoma d age 58     Asthma Brother      Allergies Sister      Depression Sister      Obesity Sister      Alzheimer Disease Maternal Grandmother      Arthritis Maternal Grandmother      Obesity Maternal Grandmother      Thyroid Disease Maternal Grandmother      Hypertension Maternal Grandfather      Cancer - colorectal Maternal Grandfather      Cerebrovascular Disease Maternal Grandfather      Diabetes Paternal Grandmother          Current Outpatient Medications   Medication Sig Dispense Refill     albuterol (PROAIR HFA/PROVENTIL HFA/VENTOLIN HFA) 108 (90 Base) MCG/ACT inhaler Inhale 2 puffs into the lungs every 6 hours  as needed for shortness of breath / dyspnea or wheezing 3 Inhaler 1     amoxicillin-clavulanate (AUGMENTIN) 875-125 MG tablet Take 1 tablet by mouth 2 times daily for 10 days 20 tablet 0     Ascorbic Acid (VITAMIN C PO) Take 500 mg by mouth       calcium carbonate-vitamin D (CALCIUM 600/VITAMIN D) 600-400 MG-UNIT chewable tablet Take one twice daily and at least 2 hours apart from iron. 180 tablet 3     cholecalciferol 2000 units CAPS Take 1 capsule by mouth daily 90 capsule 3     cyanocobalamin 1000 MCG SUBL Place 1,000 mcg under the tongue every other day 90 tablet 3     ferrous fumarate 65 mg, [place]. FE,-Vitamin C 125 mg (VITRON C)  MG TABS tablet Take 2 tablets by mouth daily 180 tablet 3     ferrous sulfate (IRON) 325 (65 Fe) MG tablet Take 325 mg by mouth daily (with breakfast)       fluticasone (FLONASE) 50 MCG/ACT spray Spray 1-2 sprays into both nostrils daily 3 Bottle 11     fluticasone-salmeterol (ADVAIR) 250-50 MCG/DOSE inhaler Inhale 1 puff into the lungs 2 times daily 1 Inhaler 3     ipratropium - albuterol 0.5 mg/2.5 mg/3 mL (DUONEB) 0.5-2.5 (3) MG/3ML neb solution Take 1 vial (3 mLs) by nebulization every 4 hours as needed for shortness of breath / dyspnea or wheezing 25 vial 3     loratadine (CLARITIN) 10 MG tablet Take 10 mg by mouth daily       montelukast (SINGULAIR) 10 MG tablet Take 1 tablet (10 mg) by mouth At Bedtime 30 tablet 0     Multiple Vitamins-Iron (QC DAILY MULTIVITAMINS/IRON) TABS Take 2 tablets by mouth daily 180 tablet 3     nystatin-triamcinolone (MYCOLOG II) cream Apply to affected area BID up to 7 days prn rash 30 g 3     order for DME Equipment being ordered: Nebulizer 1 each 0     phentermine (ADIPEX-P) 37.5 MG tablet Take 1/2 tablet every morning with breakfast. Increase to 1 tablet after 1 week if needed. 90 tablet 0     predniSONE (DELTASONE) 20 MG tablet Take 20 mg by mouth 2 times daily for 5 days. 10 tablet 0     Allergies   Allergen Reactions     Cat Hair [Cats]   "    Dog Hair [Dogs]      Dust Mites      Ragweeds      BP Readings from Last 3 Encounters:   02/05/19 124/83   12/06/18 131/82   10/08/18 128/84    Wt Readings from Last 3 Encounters:   02/05/19 112 kg (247 lb)   01/09/19 114.4 kg (252 lb 4.8 oz)   12/06/18 114.4 kg (252 lb 4.8 oz)                    Reviewed and updated as needed this visit by clinical staff  Tobacco  Allergies  Meds  Med Hx  Surg Hx  Fam Hx  Soc Hx      Reviewed and updated as needed this visit by Provider  Tobacco  Allergies  Med Hx  Surg Hx  Fam Hx  Soc Hx        ROS:  Constitutional, HEENT, cardiovascular, pulmonary, gi and gu systems are negative, except as otherwise noted.    OBJECTIVE:     /83 (BP Location: Left arm, Patient Position: Chair, Cuff Size: Adult Large)   Pulse 101   Temp 97.6  F (36.4  C) (Oral)   Ht 1.727 m (5' 8\")   Wt 112 kg (247 lb)   LMP  (LMP Unknown)   SpO2 95%   Breastfeeding? No   BMI 37.56 kg/m    Body mass index is 37.56 kg/m .  GENERAL: alert, appears ill, mild respiratory distress, fatigued   EYES: Eyes grossly normal to inspection, PERRL and conjunctivae and sclerae normal  HENT: normal cephalic/atraumatic, both ears: clear effusion, nasal mucosa edematous , rhinorrhea clear, oropharynx clear, oral mucous membranes moist, tonsillar erythema and sinuses: maxillary tenderness on both sides  NECK: no adenopathy, no asymmetry, masses, or scars and thyroid normal to palpation  RESP: expiratory wheezes bilateral and throughout and inspiratory wheezes bilateral and throughout; improved after duoneb in clinic  CV: regular rate and rhythm, normal S1 S2, no S3 or S4, no murmur, click or rub, no peripheral edema and peripheral pulses strong  ABDOMEN: soft, nontender, no hepatosplenomegaly, no masses and bowel sounds normal  MS: no gross musculoskeletal defects noted, no edema    Diagnostic Test Results:  Results for orders placed or performed in visit on 02/05/19 (from the past 24 hour(s))   Influenza " A/B antigen   Result Value Ref Range    Influenza A/B Agn Specimen Nasal     Influenza A Negative NEG^Negative    Influenza B Negative NEG^Negative       ASSESSMENT/PLAN:     1. Moderate persistent asthma with acute exacerbation  Treatment of exacerbation as below.  Advised increasing advair to twice a day.  Suspect asthma at baseline not well controlled.  Patient to follow up in one month to discuss (when not ill)  - ipratropium - albuterol 0.5 mg/2.5 mg/3 mL (DUONEB) neb solution 3 mL; Take 3 mLs by nebulization once  - predniSONE (DELTASONE) 20 MG tablet; Take 20 mg by mouth 2 times daily for 5 days.  Dispense: 10 tablet; Refill: 0  - albuterol (PROAIR HFA/PROVENTIL HFA/VENTOLIN HFA) 108 (90 Base) MCG/ACT inhaler; Inhale 2 puffs into the lungs every 6 hours as needed for shortness of breath / dyspnea or wheezing  Dispense: 3 Inhaler; Refill: 1  - ipratropium - albuterol 0.5 mg/2.5 mg/3 mL (DUONEB) 0.5-2.5 (3) MG/3ML neb solution; Take 1 vial (3 mLs) by nebulization every 4 hours as needed for shortness of breath / dyspnea or wheezing  Dispense: 25 vial; Refill: 3  - fluticasone-salmeterol (ADVAIR) 250-50 MCG/DOSE inhaler; Inhale 1 puff into the lungs 2 times daily  Dispense: 1 Inhaler; Refill: 3    2. Acute sinusitis with symptoms > 10 days  - amoxicillin-clavulanate (AUGMENTIN) 875-125 MG tablet; Take 1 tablet by mouth 2 times daily for 10 days  Dispense: 20 tablet; Refill: 0      Patient Instructions   For asthma exacerbation:  -prednisone twice a day for 5 days (take with food, can cause trouble sleeping, irritability, increased appetite)  -inhaler or neb as needed for wheezing/ shortness of breath   -start doing Advair twice a day (rinse mouth afterwards to avoid thrush)    For sinuses:  -take Augmentin (antibiotic) twice a day for 10 days.   -buy Mucinex 1200 mg over the counter; take twice a day for thinning sinus drainage  -buy Sudafed 120 mg (from pharmacist with your ID), take twice a day for drying up  your drainage      SHERIE Bennett CNP  [facility]

## 2019-02-17 ENCOUNTER — TRANSFERRED RECORDS (OUTPATIENT)
Dept: HEALTH INFORMATION MANAGEMENT | Facility: CLINIC | Age: 39
End: 2019-02-17

## 2019-02-25 ENCOUNTER — OFFICE VISIT (OUTPATIENT)
Dept: SURGERY | Facility: CLINIC | Age: 39
End: 2019-02-25
Payer: COMMERCIAL

## 2019-02-25 VITALS
WEIGHT: 243.3 LBS | OXYGEN SATURATION: 99 % | HEIGHT: 68 IN | HEART RATE: 77 BPM | DIASTOLIC BLOOD PRESSURE: 66 MMHG | BODY MASS INDEX: 36.87 KG/M2 | SYSTOLIC BLOOD PRESSURE: 138 MMHG

## 2019-02-25 DIAGNOSIS — K91.2 POSTSURGICAL MALABSORPTION: ICD-10-CM

## 2019-02-25 DIAGNOSIS — R21 RASH: ICD-10-CM

## 2019-02-25 DIAGNOSIS — E66.9 OBESITY (BMI 30-39.9): Primary | ICD-10-CM

## 2019-02-25 PROCEDURE — 99214 OFFICE O/P EST MOD 30 MIN: CPT | Performed by: FAMILY MEDICINE

## 2019-02-25 RX ORDER — PHENTERMINE HYDROCHLORIDE 37.5 MG/1
TABLET ORAL
Qty: 90 TABLET | Refills: 0 | Status: SHIPPED | OUTPATIENT
Start: 2019-02-25 | End: 2019-05-30

## 2019-02-25 RX ORDER — NYSTATIN AND TRIAMCINOLONE ACETONIDE 100000; 1 [USP'U]/G; MG/G
CREAM TOPICAL
Qty: 30 G | Refills: 3 | Status: SHIPPED | OUTPATIENT
Start: 2019-02-25 | End: 2019-10-28

## 2019-02-25 ASSESSMENT — MIFFLIN-ST. JEOR: SCORE: 1832.1

## 2019-02-25 NOTE — PROGRESS NOTES
[facility] Non Surgical Follow up Visit   Date of visit: 2/25/2019  Physician: GUY Tyson MD  Primary Care is Gregoria Camilo.  Crystal Rose   38 year old  female     Initial Weight: 266#  Initial BMI: 40.46  Today's Weight:   Wt Readings from Last 1 Encounters:   02/25/19 110.4 kg (243 lb 4.8 oz)     Body mass index is 36.99 kg/m .  Wt change since last visit (lbs): -9  Cumulative weight loss (lbs): 22.8     Assessment and Plan   Assessment: Crystal is a 38 year old year old female who presents for medical weight management.      Plan:     Diagnosis Comments   1. Obesity (BMI 30-39.9)  We discussed healthy habits to assist with further weight loss. She was congratulated on her success thus far. She will work on getting 20 grams of protein per meal. She will continue the phentermine.   2. Postsurgical malabsorption  She will continue to take all vitamins as directed       Follow up in 3 months with myself. She would benefit from a dietician visit but she declined for now.         INTERIM HISTORY  She started the phentermine and feels it helps tremendously with appetite control. She is not having any side effects.     DIETARY HISTORY  Meals Per Day: 3  Eating Protein First?: usually  Food Diary: B:1-2 boiled eggs and coffee or cottage cheese L:spinach and arugula with peppers and tomatoes sometimes with grille chicken or tunaD:cauliflower rice and veggies and grilled chicken or baked fish and spaghetti squash   Snacks Per Day: rare  Typical Snack: oatmeal- if skips dinner  Fluid Intake: >64 iz  Portion Control: yes  Calorie Containing Beverages: rare lemonaide or apple juice  Typical Protein Food Choices: eggs, chicken  Choosing Whole Grains: sometimes  Meals at Restaurant per week:not discussed  Eating at the Table: usually  TV is Off During Meals: usually    Positive Changes Since Last Visit: portion control, veggie intake  Struggling With: not always getting adequate  protein    Knowledgeable in Reading Food Labels: yes  Getting Adequate Protein: not always  Sleeping 7-8 hours/day 7-9  Stress management not discussed    PHYSICAL ACTIVITY PATTERNS:  Cardiovascular: walks 30 min at lunch 5 days a week, some additional walkuing  Strength Trainin times per week at gym    REVIEW OF SYSTEMS  GENERAL/CONSTITUTIONAL:  Fatigue: yes  HEENT:  Vision changes, glaucoma: no  CARDIOVASCULAR:  Chest Pain with Exertion: no  PULMONARY:  Dyspnea on exertion: no  NEUROLOGIC:  Paresthesias: no  PSYCHIATRIC:  Moods: yes  MUSCULOSKELETAL/RHEUMATOLOGIC  Arthralgias: no  Myalgias: no  ENDOCRINE:  Monitoring Blood Sugars: na  Sugars Well Controlled: na       Patient Profile   Social History     Social History Narrative     Not on file        Past Medical History   Past Medical History:   Diagnosis Date     Abnormal Pap smear     see problem list     Allergic rhinitis      Anemia     iron infusions     Asthma     Advair, Albuterol     Cervical dysplasia     SUMI I, SUMI II, treated with Leep   later had ASCUS+HPV sev times      Cervical high risk HPV (human papillomavirus) test positive     see problem list     Chlamydial cervicitis 10/2005, 3/2009    Treated both times and had neg JADEN both times     Diabetes mellitus (H)     GDDC with first pregnacy     Environmental allergies      Herpes simplex without mention of complication     valtrex at 36 weeks     History of chlamydia      Morbid obesity (H)      Postoperative hematoma involving genitourinary system 2014    large rectus sheath hematoma, after failed attempt at laparoscopy, hospitalized for pain control      Trichomonal vulvovaginitis 06     Vaginal discharge      Patient Active Problem List   Diagnosis     S/P gastric bypass     Pernicious anemia     CARDIOVASCULAR SCREENING; LDL GOAL LESS THAN 160     Moderate persistent asthma     Environmental allergies     Abnormal Pap smear of cervix     Cervical dysplasia     Morbid obesity (H)  "    Iron deficiency anemia due to chronic blood loss     Menorrhagia with regular cycle     Postsurgical malabsorption     Intertrigo     Iron deficiency anemia following bariatric surgery     [unfilled]    Past Surgical History  She has a past surgical history that includes Colposcopy cervix, biopsy cervix, endocervical curettage, combined; gastric bypass (2004); Attempted laparoscopy (3/13/2014); NASAL/SINUS SCOPE W THER INSTILL; REMOVAL OF OVARIAN CYST(S); cryotherapy, cervical (age 19); Conization leep (3/13/2014); Exam under anesthesia pelvic (3/20/2014); Dilation and curettage, ablate endometrium novasure, combined (N/A, 6/14/2018); and Laparoscopic tubal ligation (Bilateral, 6/14/2018).     Examination   /66 (BP Location: Left arm, Patient Position: Sitting, Cuff Size: Adult Large)   Pulse 77   Ht 1.727 m (5' 8\")   Wt 110.4 kg (243 lb 4.8 oz)   LMP  (LMP Unknown)   SpO2 99%   BMI 36.99 kg/m    [unfilled]  General:  Alert and ambulatory, NAD  HEENT:  No conjunctival pallor, moist mucous Membranes, neck is without LAD  Pulmonary:  Normal respiratory effort, no cough, no audible wheezes/crackles.  CV:  Regular rate and Rhythm, no murmurs  Abdominal: BS normal,soft, NT without rebound or guarding  Pscyh/Mood: stable         Counseling:   We reviewed the important post op bariatric recommendations:  -eating 3 meals daily  -eating protein first, getting >60gm protein daily  -eating slowly, chewing food well  -avoiding/limiting calorie containing beverages  -limiting starchy vegetables and carbohydrates, choosing wheat, not white with breads,   crackers, pastas, haim, bagels, tortillas, rice  -limiting restaurant or cafeteria eating to twice a week or less    We discussed the importance of restorative sleep and stress management in maintaining a healthy weight.  We discussed the National Weight Control Registry healthy weight maintenance strategies and ways to optimize metabolism.  We discussed " the importance of physical activity including cardiovascular and strength training in maintaining a healthier weight.    > 25 min spent with patient, > 50% spent in counseling         GUY Tyson MD  [facility].

## 2019-04-26 ENCOUNTER — MYC REFILL (OUTPATIENT)
Dept: FAMILY MEDICINE | Facility: CLINIC | Age: 39
End: 2019-04-26

## 2019-04-26 DIAGNOSIS — J45.41 MODERATE PERSISTENT ASTHMA WITH ACUTE EXACERBATION: ICD-10-CM

## 2019-04-26 NOTE — TELEPHONE ENCOUNTER
"Requested Prescriptions   Pending Prescriptions Disp Refills     ipratropium - albuterol 0.5 mg/2.5 mg/3 mL (DUONEB) 0.5-2.5 (3) MG/3ML neb solution 25 vial 3     Sig: Take 1 vial (3 mLs) by nebulization every 4 hours as needed for shortness of breath / dyspnea or wheezing       Short-Acting Beta Agonist Inhalers Protocol  Failed - 4/26/2019  9:14 AM        Failed - Asthma control assessment score within normal limits in last 6 months     Please review ACT score.           Passed - Patient is age 12 or older        Passed - Medication is active on med list        Passed - Recent (6 mo) or future (30 days) visit within the authorizing provider's specialty     Patient had office visit in the last 6 months or has a visit in the next 30 days with authorizing provider or within the authorizing provider's specialty.  See \"Patient Info\" tab in inbasket, or \"Choose Columns\" in Meds & Orders section of the refill encounter.            ipratropium - albuterol 0.5 mg/2.5 mg/3 mL (DUONEB) 0.5-2.5 (3) MG/3ML neb solution  Last Written Prescription Date:  2/5/19  Last Fill Quantity: 25,  # refills: 3   Last office visit: 2/5/2019 with prescribing provider:  BRII Camilo   Future Office Visit:      ACT Total Scores 2/4/2014 9/11/2018 9/14/2018   ACT TOTAL SCORE 12 - -   ASTHMA ER VISITS 0 = None - -   ASTHMA HOSPITALIZATIONS 0 = None - -   ACT TOTAL SCORE (Goal Greater than or Equal to 20) - 11 11   In the past 12 months, how many times did you visit the emergency room for your asthma without being admitted to the hospital? - 0 0   In the past 12 months, how many times were you hospitalized overnight because of your asthma? - 0 0       "

## 2019-04-28 DIAGNOSIS — Z30.011 OCP (ORAL CONTRACEPTIVE PILLS) INITIATION: ICD-10-CM

## 2019-04-29 RX ORDER — NORETHINDRONE
KIT
Qty: 28 TABLET | Refills: 0 | Status: SHIPPED | OUTPATIENT
Start: 2019-04-29 | End: 2019-10-28

## 2019-04-29 RX ORDER — IPRATROPIUM BROMIDE AND ALBUTEROL SULFATE 2.5; .5 MG/3ML; MG/3ML
1 SOLUTION RESPIRATORY (INHALATION) EVERY 4 HOURS PRN
Qty: 25 VIAL | Refills: 0 | Status: SHIPPED | OUTPATIENT
Start: 2019-04-29 | End: 2019-10-28

## 2019-04-29 NOTE — TELEPHONE ENCOUNTER
Routing refill request to provider for review/approval because:    Due to not having Asthma control assessment score within normal limits in last 6 months    Abbey Huggins RN

## 2019-04-29 NOTE — TELEPHONE ENCOUNTER
Sent patient a My Chart message regarding Gregoria's response.  Cari Rollins MA/  For [facility] [place]

## 2019-04-29 NOTE — TELEPHONE ENCOUNTER
"Requested Prescriptions   Pending Prescriptions Disp Refills     NORLYDA 0.35 MG tablet [Pharmacy Med Name: NORLYDA 0.35MG TABLETS 28S] 84 tablet 0     Sig: TAKE 1 TABLET(0.35 MG) BY MOUTH DAILY       Contraceptives Protocol Failed - 4/28/2019 11:53 PM        Failed - Medication is active on med list        Passed - Patient is not a current smoker if age is 35 or older        Passed - Recent (12 mo) or future (30 days) visit within the authorizing provider's specialty     Patient had office visit in the last 12 months or has a visit in the next 30 days with authorizing provider or within the authorizing provider's specialty.  See \"Patient Info\" tab in inbasket, or \"Choose Columns\" in Meds & Orders section of the refill encounter.              Passed - No active pregnancy on record        Passed - No positive pregnancy test in past 12 months        Medication is being filled for 1 time refill only due to:  pt needs an appointment for further refills   Ramya Church RN on 4/29/2019 at 7:40 AM      "

## 2019-04-29 NOTE — TELEPHONE ENCOUNTER
Will refill x 1 but based on last refills asthma not well controlled.  Please have her submit evisit to further address her asthma.  This or in-person visit will be needed to have any further refills.  SHERIE Bennett CNP

## 2019-05-15 ENCOUNTER — TELEPHONE (OUTPATIENT)
Dept: OBGYN | Facility: CLINIC | Age: 39
End: 2019-05-15

## 2019-05-15 NOTE — TELEPHONE ENCOUNTER
Pt is past due for f/u pap smear.  [facility] clinic and schedule.    Brooklyn Griffith  Pap Tracking

## 2019-05-30 ENCOUNTER — OFFICE VISIT (OUTPATIENT)
Dept: SURGERY | Facility: CLINIC | Age: 39
End: 2019-05-30
Payer: COMMERCIAL

## 2019-05-30 VITALS
WEIGHT: 227.9 LBS | OXYGEN SATURATION: 94 % | BODY MASS INDEX: 34.54 KG/M2 | DIASTOLIC BLOOD PRESSURE: 90 MMHG | HEIGHT: 68 IN | HEART RATE: 113 BPM | SYSTOLIC BLOOD PRESSURE: 132 MMHG

## 2019-05-30 DIAGNOSIS — K91.2 POSTSURGICAL MALABSORPTION: ICD-10-CM

## 2019-05-30 DIAGNOSIS — E66.9 OBESITY (BMI 30-39.9): Primary | ICD-10-CM

## 2019-05-30 DIAGNOSIS — Z98.84 BARIATRIC SURGERY STATUS: ICD-10-CM

## 2019-05-30 PROCEDURE — 99214 OFFICE O/P EST MOD 30 MIN: CPT | Performed by: FAMILY MEDICINE

## 2019-05-30 RX ORDER — PHENTERMINE HYDROCHLORIDE 37.5 MG/1
TABLET ORAL
Qty: 90 TABLET | Refills: 0 | Status: SHIPPED | OUTPATIENT
Start: 2019-05-30 | End: 2020-03-09

## 2019-05-30 ASSESSMENT — MIFFLIN-ST. JEOR: SCORE: 1762.25

## 2019-05-30 NOTE — PROGRESS NOTES
[facility] Non Surgical Follow up Visit   Date of visit: 5/30/2019  Physician: GUY Tyson MD  Primary Care is Gregoria Camilo.  Crystal Rose   38 year old  female     Initial Weight: 298# prior to surgery, 243# when seen for nonsurgical weight loss in Oct 2018. Weight at that time was 266#.    Today's Weight:   Wt Readings from Last 1 Encounters:   05/30/19 227 lb 14.4 oz (103.4 kg)     Body mass index is 34.65 kg/m .  Wt change since last visit (lbs): -15.1  Cumulative weight loss (lbs): 38.2     Assessment and Plan   Assessment: Crystal is a 38 year old year old female who presents for medical weight management.      Plan:     Diagnosis Comments   1. Obesity (BMI 30-39.9)  Patient was congratulated on her success thus far. Healthy habits to assist with further weight loss were discussed. She will continue the phentermine.   2. Bariatric surgery status  We reviewed vitamin supplementation following RNY gastric bypass. Routine labs have been order and she was reminded to have them drawn.       Follow up in 3 months with myself           INTERIM HISTORY  Patient is taking the phentermine and is tolerating it well. She thinks it is helping.   She has 5 children and this keeps her very busy.    Pt is taking 2000 international unit(s) of vitamin D3, vitamin B12 under the tongue, 2 multivitamins and two vitron C, calcium 2 tabs once a day    DIETARY HISTORY  Meals Per Day: 3  Eating Protein First?: yes  Food Diary: B:Boiled egg, cottage cheese and pineapple, steel cut oats L:spinach salads with veggies and grilled chicken or tuna D:chicken, turkey, veggie noodles and stir mckeon with cauliflower rice  Snacks Per Day: 2  Typical Snack: fruit  Fluid Intake: 64 oz  Portion Control: yes  Calorie Containing Beverages: cranberry juice 2 x per  Typical Protein Food Choices: meat, cottage cheese, turkey  Choosing Whole Grains: usually  Meals at Restaurant per week:0-1  Eating at the Table:  yes  TV is Off During Meals: yes    Positive Changes Since Last Visit: protein first, increased veggies, decreased starches, water intake  Struggling With: none    Knowledgeable in Reading Food Labels: yes  Getting Adequate Protein: yes  Sleeping 7-8 hours/day yes  Stress management not discussed    PHYSICAL ACTIVITY PATTERNS:  Cardiovascular: walking every day at lunch time and walks to work from parking ramp (18 blocks total)  Strength Trainin x per day, lifts 27# baby often    REVIEW OF SYSTEMS  GENERAL/CONSTITUTIONAL:  Fatigue: sometmies  HEENT:  Vision changes, glaucoma: no  CARDIOVASCULAR:  Chest Pain with Exertion: no  PULMONARY:  Dyspnea on exertion: no  NEUROLOGIC:  Paresthesias: no  PSYCHIATRIC:  Moods: yes  MUSCULOSKELETAL/RHEUMATOLOGIC  Arthralgias: no  Myalgias: no  ENDOCRINE:  Monitoring Blood Sugars: na  Sugars Well Controlled: na       Patient Profile   Social History     Social History Narrative     Not on file        Past Medical History   Past Medical History:   Diagnosis Date     Abnormal Pap smear     see problem list     Allergic rhinitis      Anemia     iron infusions     Asthma     Advair, Albuterol     Cervical dysplasia     SUMI I, SUMI II, treated with Leep   later had ASCUS+HPV sev times 2009     Cervical high risk HPV (human papillomavirus) test positive     see problem list     Chlamydial cervicitis 10/2005, 3/2009    Treated both times and had neg JADEN both times     Diabetes mellitus (H)     GDDC with first pregnacy     Environmental allergies      Herpes simplex without mention of complication     valtrex at 36 weeks     History of chlamydia      Morbid obesity (H)      Postoperative hematoma involving genitourinary system 2014    large rectus sheath hematoma, after failed attempt at laparoscopy, hospitalized for pain control      Trichomonal vulvovaginitis 06     Vaginal discharge      Patient Active Problem List   Diagnosis     S/P gastric bypass     Pernicious anemia      "CARDIOVASCULAR SCREENING; LDL GOAL LESS THAN 160     Moderate persistent asthma     Environmental allergies     Abnormal Pap smear of cervix     Cervical dysplasia     Morbid obesity (H)     Iron deficiency anemia due to chronic blood loss     Menorrhagia with regular cycle     Postsurgical malabsorption     Intertrigo     Iron deficiency anemia following bariatric surgery       Past Surgical History  She has a past surgical history that includes Colposcopy cervix, biopsy cervix, endocervical curettage, combined; gastric bypass (2004); Attempted laparoscopy (3/13/2014); NASAL/SINUS SCOPE W THER INSTILL; REMOVAL OF OVARIAN CYST(S); cryotherapy, cervical (age 19); Conization leep (3/13/2014); Exam under anesthesia pelvic (3/20/2014); Dilation and curettage, ablate endometrium novasure, combined (N/A, 6/14/2018); and Laparoscopic tubal ligation (Bilateral, 6/14/2018).     Examination   /90   Pulse 113   Ht 5' 8\" (1.727 m)   Wt 227 lb 14.4 oz (103.4 kg)   SpO2 94%   BMI 34.65 kg/m    [unfilled]  General:  Alert and ambulatory, NAD  HEENT:  No conjunctival pallor, moist mucous Membranes, neck is without LAD  Pulmonary:  Normal respiratory effort, no cough, no audible wheezes/crackles.  CV:  Regular rate and Rhythm, no murmurs  Abdominal: BS normal,soft, NT without rebound or guarding  Pscyh/Mood: stable         Counseling:   We reviewed the important post op bariatric recommendations:  -eating 3 meals daily  -eating protein first, getting >60gm protein daily  -eating slowly, chewing food well  -avoiding/limiting calorie containing beverages  -limiting starchy vegetables and carbohydrates, choosing wheat, not white with breads,   crackers, pastas, haim, bagels, tortillas, rice  -limiting restaurant or cafeteria eating to twice a week or less    We discussed the importance of restorative sleep and stress management in maintaining a healthy weight.  We discussed the National Weight Control Registry healthy " weight maintenance strategies and ways to optimize metabolism.  We discussed the importance of physical activity including cardiovascular and strength training in maintaining a healthier weight.    > 25 min spent with patient, > 50% spent in counseling         GUY Tyson MD  [facility]

## 2019-06-14 DIAGNOSIS — Z30.011 OCP (ORAL CONTRACEPTIVE PILLS) INITIATION: ICD-10-CM

## 2019-06-16 DIAGNOSIS — J45.40 MODERATE PERSISTENT ASTHMA, UNSPECIFIED WHETHER COMPLICATED: ICD-10-CM

## 2019-06-16 DIAGNOSIS — J45.41 MODERATE PERSISTENT ASTHMA WITH ACUTE EXACERBATION: ICD-10-CM

## 2019-06-16 NOTE — TELEPHONE ENCOUNTER
"Requested Prescriptions   Pending Prescriptions Disp Refills     fluticasone-salmeterol (ADVAIR) 250-50 MCG/DOSE inhaler  Last Written Prescription Date:  2/5/19  Last Fill Quantity: 1,  # refills: 3   Last Office Visit with [facility], [facility] or [facility] prescribing provider:  2/5/19   Future Office Visit:      1 Inhaler 3     Sig: Inhale 1 puff into the lungs 2 times daily       Inhaled Steroids Protocol Failed - 6/16/2019 11:59 AM        Failed - Asthma control assessment score within normal limits in last 6 months     Please review ACT score.           Passed - Patient is age 12 or older        Passed - Medication is active on med list        Passed - Recent (6 mo) or future (30 days) visit within the authorizing provider's specialty     Patient had office visit in the last 6 months or has a visit in the next 30 days with authorizing provider or within the authorizing provider's specialty.  See \"Patient Info\" tab in [employer], or \"Choose Columns\" in Meds & Orders section of the refill encounter.            montelukast (SINGULAIR) 10 MG tablet  Last Written Prescription Date:  6/12/18  Last Fill Quantity: 30,  # refills: 0   Last Office Visit with [facility], [facility] or [facility] prescribing provider:  2/5/19   Future Office Visit:      30 tablet 0     Sig: Take 1 tablet (10 mg) by mouth At Bedtime       Leukotriene Inhibitors Protocol Failed - 6/16/2019 11:59 AM        Failed - Asthma control assessment score within normal limits in last 6 months     Please review ACT score.           Passed - Patient is age 12 or older     If patient is under 16, ok to refill using age based dosing.           Passed - Medication is active on med list        Passed - Recent (6 mo) or future (30 days) visit within the authorizing provider's specialty     Patient had office visit in the last 6 months or has a visit in the next 30 days with authorizing provider or within the authorizing provider's specialty.  See \"Patient Info\" tab in [employer], or " "\"Choose Columns\" in Meds & Orders section of the refill encounter.              "

## 2019-06-16 NOTE — LETTER
Crystal Rose  [address] [address]          06/19/19      Dear Crystal Rose        At [facility] we care about your health and are committed to providing quality patient care. Regular appointments are a vital part of the care and management of your health and can help prevent many of the complications that can occur.      It has come to our attention that you are due for a Asthma follow up office visit. Please call [facility] at [phone] soon to schedule your appointment.    If you have transferred care to another clinic please call to inform us so that we do not continue to send you reminder letters.      Sincerely,      [facility] Care Team

## 2019-06-17 RX ORDER — NORETHINDRONE
KIT
Qty: 28 TABLET | Refills: 0 | OUTPATIENT
Start: 2019-06-17

## 2019-06-17 NOTE — TELEPHONE ENCOUNTER
"Requested Prescriptions   Pending Prescriptions Disp Refills     NORLYDA 0.35 MG tablet [Pharmacy Med Name: NORLYDA 0.35MG TABLETS 28S] 28 tablet 0     Sig: TAKE 1 TABLET BY MOUTH EVERY DAY       Contraceptives Protocol Failed - 6/14/2019 11:22 PM        Failed - Recent (12 mo) or future (30 days) visit within the authorizing provider's specialty     Patient had office visit in the last 12 months or has a visit in the next 30 days with authorizing provider or within the authorizing provider's specialty.  See \"Patient Info\" tab in inbasket, or \"Choose Columns\" in Meds & Orders section of the refill encounter.              Failed - No positive pregnancy test in past 12 months        Passed - Patient is not a current smoker if age is 35 or older        Passed - Medication is active on med list        Passed - No active pregnancy on record      Last Written Prescription Date:  4/29/19  Last Fill Quantity: 28,  # refills: 0   Last office visit: 4/30/2018 with prescribing provider:  Jose   Future Office Visit:    Pt due for annual, no appt scheduled. Pt already received one month extension. Rx denied.   "

## 2019-06-18 RX ORDER — MONTELUKAST SODIUM 10 MG/1
10 TABLET ORAL AT BEDTIME
Qty: 90 TABLET | Refills: 0 | Status: SHIPPED | OUTPATIENT
Start: 2019-06-18 | End: 2019-10-28

## 2019-06-18 NOTE — TELEPHONE ENCOUNTER
Routing refill request to provider for review/approval because:  Not current:  ACT  Kae Soler RN

## 2019-06-19 NOTE — TELEPHONE ENCOUNTER
This writer attempted to contact patient on 06/19/19      Reason for call Needs an asthma follow up appt and unable to leave a voicemail message. Sent a My Chart message and letter.      If patient calls back:   Schedule Office Visit appointment within 2 weeks with primary care, document that pt called and close encounter         Cari Rollins MA

## 2019-06-22 NOTE — PROGRESS NOTES
SUBJECTIVE:   Crystal Rose is a 37 year old female presenting with a chief complaint of   Chief Complaint   Patient presents with     Sinus Problem     Patient complains of sinus pressure and congestion.        She is an established patient of [facility].    URI Adult    Onset of symptoms was 3 day(s) ago.  Course of illness is worsening.    Severity moderate  Current and Associated symptoms: runny nose, stuffy nose, cough - productive, wheezing and congestion, wheezing, and sinus pressure   Treatment measures tried include OTC Cough med.  Predisposing factors include HX of asthma.      Review of Systems   Constitutional: Negative for chills and fever.   HENT: Positive for congestion, rhinorrhea and sinus pressure. Negative for ear pain and sore throat.    Respiratory: Positive for cough and wheezing. Negative for shortness of breath.    Gastrointestinal: Negative for diarrhea, nausea and vomiting.   Neurological: Negative for headaches.   All other systems reviewed and are negative.      Past Medical History:   Diagnosis Date     Abnormal Pap smear     see problem list     Allergic rhinitis      Anemia     iron infusions     Asthma     Advair, Albuterol     Cervical dysplasia     SUMI I, SUMI II, treated with Leep   later had ASCUS+HPV sev times 2009     Cervical high risk HPV (human papillomavirus) test positive     see problem list     Chlamydial cervicitis 10/2005, 3/2009    Treated both times and had neg JADEN both times     Diabetes mellitus (H)     GDDC with first pregnacy     Environmental allergies      Herpes simplex without mention of complication     valtrex at 36 weeks     History of chlamydia      Morbid obesity (H)      Postoperative hematoma involving genitourinary system 2014    large rectus sheath hematoma, after failed attempt at laparoscopy, hospitalized for pain control      Trichomonal vulvovaginitis 11/16/06     Vaginal discharge      Family History   Problem Relation Age of Onset      Hypertension Mother      Allergies Mother      Obesity Mother      Asthma Father      DIABETES Father      Hypertension Father      Allergies Father      CANCER Father      Lymphoma d age 58     Asthma Brother      Allergies Sister      Depression Sister      Obesity Sister      Alzheimer Disease Maternal Grandmother      Arthritis Maternal Grandmother      Obesity Maternal Grandmother      Thyroid Disease Maternal Grandmother      Hypertension Maternal Grandfather      Cancer - colorectal Maternal Grandfather      CEREBROVASCULAR DISEASE Maternal Grandfather      DIABETES Paternal Grandmother      Current Outpatient Prescriptions   Medication Sig Dispense Refill     albuterol (2.5 MG/3ML) 0.083% neb solution Take 1 vial (2.5 mg) by nebulization every 4 hours as needed for shortness of breath / dyspnea 1 Box 0     albuterol (PROAIR HFA/PROVENTIL HFA/VENTOLIN HFA) 108 (90 Base) MCG/ACT Inhaler Inhale 2 puffs into the lungs every 6 hours as needed 1 Inhaler 0     albuterol (PROAIR HFA/PROVENTIL HFA/VENTOLIN HFA) 108 (90 BASE) MCG/ACT Inhaler Inhale 2 puffs into the lungs every 4 hours as needed for shortness of breath / dyspnea or wheezing 1 Inhaler 1     ascorbic acid (VITAMIN C) 500 MG tablet Take 1 tablet (500 mg) by mouth daily 30 tablet 1     Cyanocobalamin (B-12) 1000 MCG TBCR Take 1,000 mcg by mouth daily 30 tablet 1     fluticasone (FLONASE) 50 MCG/ACT spray Spray 1-2 sprays into both nostrils daily 3 Bottle 11     norethindrone (MICRONOR) 0.35 MG per tablet Take 1 tablet (0.35 mg) by mouth daily 84 tablet 3     predniSONE (DELTASONE) 20 MG tablet Take 1 tablet (20 mg) by mouth 2 times daily for 5 days 10 tablet 0     Prenatal Vit-Fe Fumarate-FA (PRENATAL MULTIVITAMIN  WITH IRON) 28-0.8 MG TABS Take 1 tablet by mouth daily 100 tablet 3     valACYclovir (VALTREX) 500 MG tablet Take 1 tablet (500 mg) by mouth daily 90 tablet 3     ferrous sulfate 220 (44 FE) MG/5ML ELIX Take 5 mLs (220 mg) by mouth daily  (Patient not taking: Reported on 6/7/2018) 120 mL 1     montelukast (SINGULAIR) 10 MG tablet Take 1 tablet (10 mg) by mouth At Bedtime 30 tablet 1     Social History   Substance Use Topics     Smoking status: Former Smoker     Quit date: 5/5/2009     Smokeless tobacco: Never Used     Alcohol use No       OBJECTIVE  /82  Pulse 93  Temp 98.2  F (36.8  C) (Oral)  Resp 20  Wt 269 lb (122 kg)  SpO2 95%  BMI 39.72 kg/m2    Physical Exam   Constitutional: She appears well-developed and well-nourished. No distress.   HENT:   Head: Normocephalic and atraumatic.   Right Ear: Tympanic membrane and external ear normal.   Left Ear: Tympanic membrane and external ear normal.   Nose: Mucosal edema and rhinorrhea present.   Mouth/Throat: Oropharynx is clear and moist.   Eyes: EOM are normal. Pupils are equal, round, and reactive to light.   Neck: Normal range of motion. Neck supple.   Pulmonary/Chest: Effort normal. No respiratory distress. She has wheezes in the right upper field, the right middle field, the left upper field and the left middle field.   Lymphadenopathy:     She has no cervical adenopathy.   Neurological: She is alert. No cranial nerve deficit.   Skin: Skin is warm and dry. She is not diaphoretic.   Psychiatric: She has a normal mood and affect.   Nursing note and vitals reviewed.      Labs:  No results found for this or any previous visit (from the past 24 hour(s)).        ASSESSMENT:      ICD-10-CM    1. Moderate persistent asthma with acute exacerbation J45.41 predniSONE (DELTASONE) 20 MG tablet     albuterol (PROAIR HFA/PROVENTIL HFA/VENTOLIN HFA) 108 (90 Base) MCG/ACT Inhaler        Medical Decision Making:    Differential Diagnosis:      Serious Comorbid Conditions:  Adult:  Asthma    PLAN:  Asthma Plan:  1)  Medication: continue current medication regimen unchanged and begin: prednisone as prdered  2)  Discussed medication dosage, usage, side effects, and goals of treatment in detail.  3)   Avoidance of precipitants.  4)  Recheck in 1 week, sooner should new symptoms or   problems arise.    Patient Education: Instructed to notify office of fever >101, blood   in sputum, chest pain, dyspnea at rest, or other symptoms of concern to patient.            Patient Instructions     Asthma (Adult)  Asthma is a disease where the medium and  small air passages within the lung go into spasm and restrict the flow of air. Inflammation and swelling of the airways cause further blockage. During an acute asthma attack, these factors cause trouble breathing, wheezing, cough and chest tightness.    An asthma attack can be triggered by many things. Common triggers include infections such as the common cold, bronchitis, and pneumonia. Irritants such as smoke or pollutants in the air, very cold air, emotional upset, and exercise can also trigger an attack. In many adults with asthma, allergies to dust, mold, pollen and animal dander can cause an asthma attack. Skipping doses of daily asthma medicine can also bring on an asthma attack.  Asthma can be controlled using the proper medicines prescribed by your healthcare provider and avoiding exposure to known triggers including allergens and irritants.  Home care    Take prescribed medicine exactly at the times advised. If you need medicine such as from a hand held inhaler or aerosol breathing machine more than every 4 hours, contact your healthcare provider or seek immediate medical attention. If prescribed an antibiotic or prednisone, take all of the medicine as prescribed, even if you are feeling better after a few days.    Don't smoke. Avoid being exposed to the smoke of others.    Some people with asthma have worsening of their symptoms when they take aspirin and non-steroidal or fever-reducing medicines like ibuprofen and naproxen. Talk to your healthcare provider if you think this may apply to you.  Follow-up care  Follow up with your healthcare provider, or as advised.  "Always bring all of your current medicines to any appointments with your healthcare provider. Also bring a complete list of medicines even those not taken for asthma. If you don't already have one, talk to your healthcare provider about developing your own \"Asthma Action Plan.\"  A pneumococcal (pneumonia) vaccine and yearly flu shot (every fall) are recommended. Ask your doctor about this.  When to seek medical advice  Call your healthcare provider right away if any of these occur:     Increased wheezing or shortness of breath    Need to use your inhalers more often than usual without relief    Fever of 100.4 F (38 C) or higher, or as directed by your healthcare provider    Coughing up lots of dark-colored or bloody sputum (mucus)    Chest pain with each breath    If you use a peak flow meter as part of an Asthma Action Plan, and you are still in the yellow zone (50% to 80%) 15 minutes after using inhaler medicine.  Call 911  Call 911 if any of the following occur    Trouble walking or talking because of shortness of breath    If you use a peak flow meter as part of an Asthma Action Plan and you are still in the red zone (less than 50%) 15 minutes after using inhaler medicine    Lips or fingernails turning gray or blue  Date Last Reviewed: 5/1/2017 2000-2017 [employer]. [address] [place] [address], [place] [address]. All rights reserved. This information is not intended as a substitute for professional medical care. Always follow your healthcare professional's instructions.              " no

## 2019-06-25 DIAGNOSIS — J45.41 MODERATE PERSISTENT ASTHMA WITH ACUTE EXACERBATION: ICD-10-CM

## 2019-06-25 RX ORDER — ALBUTEROL SULFATE 90 UG/1
2 AEROSOL, METERED RESPIRATORY (INHALATION) EVERY 6 HOURS PRN
Qty: 3 INHALER | Refills: 1 | Status: CANCELLED | OUTPATIENT
Start: 2019-06-25

## 2019-06-25 NOTE — TELEPHONE ENCOUNTER
"Requested Prescriptions   Pending Prescriptions Disp Refills     albuterol (PROAIR HFA/PROVENTIL HFA/VENTOLIN HFA) 108 (90 Base) MCG/ACT inhaler  Last Written Prescription Date:  02/05/19  Last Fill Quantity: 3,  # refills: 1   Last Office Visit with [facility] or [facility] prescribing provider:  02/05/19-Ton   Future Office Visit:    3 Inhaler 1     Sig: Inhale 2 puffs into the lungs every 6 hours as needed for shortness of breath / dyspnea or wheezing       Asthma Maintenance Inhalers - Anticholinergics Failed - 6/25/2019  8:27 AM        Failed - Asthma control assessment score within normal limits in last 6 months     Please review ACT score.           Passed - Patient is age 12 years or older        Passed - Medication is active on med list        Passed - Recent (6 mo) or future (30 days) visit within the authorizing provider's specialty     Patient had office visit in the last 6 months or has a visit in the next 30 days with authorizing provider or within the authorizing provider's specialty.  See \"Patient Info\" tab in inbasket, or \"Choose Columns\" in Meds & Orders section of the refill encounter.              "

## 2019-06-26 NOTE — TELEPHONE ENCOUNTER
Routing refill request to provider for review/approval because:  Labs out of range:  Last ACT was < 20    Stephania Sanchez RN, BSN

## 2019-06-27 NOTE — TELEPHONE ENCOUNTER
Please sent PHQ-9 via [employer] with note that this is needed for her albuterol refill.    Jennifer Fortune M.D.

## 2019-07-03 NOTE — TELEPHONE ENCOUNTER
This writer attempted to contact patient on 07/03/19      Reason for call PHQ9 and unable to leave message.      If patient calls back:   2nd floor [place] Care Team (MA/TC) called. Inform patient that someone from the team will contact them, document that pt called and route to care team.         Misti Camarillo MA

## 2019-07-17 ENCOUNTER — TELEPHONE (OUTPATIENT)
Dept: FAMILY MEDICINE | Facility: CLINIC | Age: 39
End: 2019-07-17

## 2019-07-17 DIAGNOSIS — J45.41 MODERATE PERSISTENT ASTHMA WITH ACUTE EXACERBATION: ICD-10-CM

## 2019-07-17 RX ORDER — ALBUTEROL SULFATE 90 UG/1
2 AEROSOL, METERED RESPIRATORY (INHALATION) EVERY 6 HOURS
Qty: 8.5 G | Refills: 3 | Status: SHIPPED | OUTPATIENT
Start: 2019-07-17 | End: 2019-10-28

## 2019-07-17 NOTE — TELEPHONE ENCOUNTER
Plan does not cover albuterol (PROAIR RESPICLICK) 108 (90 Base) MCG/ACT inhaler.        Additional Information: Per fax, plan prefers Proair HFA    Maki Christine

## 2019-07-24 NOTE — TELEPHONE ENCOUNTER
This writer attempted to contact patient on 07/24/19      Reason for call ACT and unable to leave message.      If patient calls back:   2nd floor [place] Care Team (MA/TC) called. Inform patient that someone from the team will contact them, document that pt called and route to care team.         Misti Camarillo MA

## 2019-07-25 NOTE — TELEPHONE ENCOUNTER
This writer attempted to contact patient on 07/25/19      Reason for call complete ACT and unable to leave message.      If patient calls back:   2nd floor [place] Care Team  called. Inform patient that someone from the team will contact them, document that pt called and route to care team.         Luana Isaac MA

## 2019-07-30 NOTE — TELEPHONE ENCOUNTER
This writer attempted to contact patient on 07/30/19     Reason for call complete ACT and unable to leave message.     If patient calls back:              2nd floor [place] Care Team  called. Inform patient that someone from the team will contact them, document that pt called and route to care team.     Sending message via [employer] due to unable to reach by phone.    Ole Freed CMA

## 2019-08-01 NOTE — TELEPHONE ENCOUNTER
This writer attempted to contact patient on 08/01/19     Reason for call complete ACT and unable to leave message.     If patient calls back:              2nd floor [place] Care Team  called. Inform patient that someone from the team will contact them, document that pt called and route to care team.      Sending message via [employer] due to unable to reach by phone.     Ole Freed CMA

## 2019-08-14 NOTE — TELEPHONE ENCOUNTER
This writer attempted to contact patient on 08/14/19      Reason for call ACT and left message.      If patient calls back:   2nd floor [facility] Care Team (MA/TC) called. Inform patient that someone from the team will contact them, document that pt called and route to care team.         Anat Moreira MA
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
## 2019-08-29 ENCOUNTER — OFFICE VISIT (OUTPATIENT)
Dept: SURGERY | Facility: CLINIC | Age: 39
End: 2019-08-29
Payer: COMMERCIAL

## 2019-08-29 VITALS
WEIGHT: 217.5 LBS | DIASTOLIC BLOOD PRESSURE: 89 MMHG | HEIGHT: 68 IN | HEART RATE: 104 BPM | SYSTOLIC BLOOD PRESSURE: 126 MMHG | OXYGEN SATURATION: 99 % | BODY MASS INDEX: 32.96 KG/M2

## 2019-08-29 DIAGNOSIS — E66.9 OBESITY (BMI 30-39.9): Primary | ICD-10-CM

## 2019-08-29 DIAGNOSIS — K91.2 POSTSURGICAL MALABSORPTION: ICD-10-CM

## 2019-08-29 PROCEDURE — 99214 OFFICE O/P EST MOD 30 MIN: CPT | Performed by: FAMILY MEDICINE

## 2019-08-29 RX ORDER — PHENTERMINE HYDROCHLORIDE 37.5 MG/1
TABLET ORAL
Qty: 90 TABLET | Refills: 0 | Status: SHIPPED | OUTPATIENT
Start: 2019-08-29 | End: 2019-10-28

## 2019-08-29 RX ORDER — MULTIVITAMIN,THER AND MINERALS
1 TABLET ORAL 2 TIMES DAILY
Qty: 100 TABLET | Refills: 3 | Status: SHIPPED | OUTPATIENT
Start: 2019-08-29 | End: 2024-05-03

## 2019-08-29 RX ORDER — MAGNESIUM 200 MG
1000 TABLET ORAL DAILY
Qty: 100 TABLET | Refills: 3 | Status: SHIPPED | OUTPATIENT
Start: 2019-08-29 | End: 2024-05-03

## 2019-08-29 RX ORDER — CALCIUM CARB/VIT D2/MINERALS
1 TABLET ORAL DAILY
Qty: 100 TABLET | Refills: 3 | Status: SHIPPED | OUTPATIENT
Start: 2019-08-29 | End: 2019-10-28

## 2019-08-29 ASSESSMENT — MIFFLIN-ST. JEOR: SCORE: 1715.07

## 2019-08-29 NOTE — LETTER
[facility]          [address]  [address]  [place] [address]                                         Tel:  [phone]  Fax: [phone]    2020    Crystal Rose  [address] [place] [id number]      Dear Crystal;     We have identified that you have outstanding lab tests that have . If you would like to have the  labs completed, please contact our clinic at [phone] to have them re-ordered. If you have any questions, please contact our office.  Sincerely,    Agustina MORTON MA

## 2019-08-29 NOTE — LETTER
[facility]          [address]  [address]  [place] [address]                                         Tel:  [phone]  Fax: [phone]    November 15, 2019    Crystal Rose  [address] [address]    : 1980      Dear Crystal;     We have identified that you have outstanding lab tests that have . If you would like to have the  labs completed, please contact our clinic at [phone] to have them re-ordered. If you have any questions, please contact our office.  Sincerely,    Afshan Philippe CMA
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
## 2019-08-30 ENCOUNTER — TELEPHONE (OUTPATIENT)
Dept: SURGERY | Facility: CLINIC | Age: 39
End: 2019-08-30
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
## 2019-08-30 NOTE — TELEPHONE ENCOUNTER
Spoke to patient who wants to  rx and pay out of pocket.  Reviewed Advance Member Notice of Noncovered Prescription form and noted that alternate signature by provider now in clinic is not allowed.    Spoke to pharmacist; instructed to start prior authorization.  MD still needs to sign form when back in office on 9/5 if prior authorization is not accepted.    Pharmacist states she will notify patient of plan.  Patient cannot get medication today.  [facility] clinic provider RUTH Goldstein is aware of this.  Stephanie Gillette RN on 8/30/2019 at 3:19 PM

## 2019-08-30 NOTE — TELEPHONE ENCOUNTER
Received faxed Advance Member Notice of Noncovered Prescription from [facility] pharmacy re: patient phentermine.   States prescribing MD must sign for patient to pay out of pocket for controlled substance.      Called pharmacy to verify.  Informed pt needs prior authorization for primary insurance and form signed if she is paying out of pocket.  States any provider that has seen patient can sign form. Pt MD is out of the office until 9/5/19.  If pt is paying out of pocket, her cost will be $19.08 with coupon.    Called pt at pharmacy request to see if patient wants prior authorization started or if she wants to pay out of pocket.  Pt did not answer; left message for pt to call back.  Stephanie Gillette RN on 8/30/2019 at 11:54 AM

## 2019-08-30 NOTE — TELEPHONE ENCOUNTER
Prior Authorization Retail Medication Request    Medication/Dose:   ICD code (if different than what is on RX):    Previously Tried and Failed:  Phentermine has been successful.  Rationale:  Phentermine has been successful for weight loss.    Insurance Name:  [facility]    Insurance ID:  [id number]      Pharmacy Information (if different than what is on RX)  Name:  [facility] pharmacy  Phone:  [phone]    Stephanie Gillette RN on 8/30/2019 at 3:40 PM

## 2019-09-05 NOTE — TELEPHONE ENCOUNTER
Provider signed [place] form for Non-coverded prescription.    Still waiting on PA denial for phentermine and patient signature on [place] [employer] form listed above.    Patient called to alert her need for signature. LM for patient to call clinic.  Jossie Riojas MS, RD, RN

## 2019-09-11 NOTE — TELEPHONE ENCOUNTER
Called patient and LM to alert her that PA went through.    Called [facility] in [place] [address] because copy of RX for phentermine had hand written P/u 8/31/19.  Dhruv at  stated they had no RX and nothing could have been picked up.     Will discuss with provider.  Jossie Riojas, MS, RD, RN

## 2019-09-12 NOTE — TELEPHONE ENCOUNTER
Called  Pharmacy.  Patient picked up 90 day supply of Phentermine 9/1/19.  Will notify provider.  Jossie Riojas MS, RD, RN

## 2019-09-12 NOTE — TELEPHONE ENCOUNTER
Patient picked up 90 day supply of Phentermine from  pharmacy [facility].  Closing this encounter.  Jossie Riojas MS, RD, RN

## 2019-10-02 DIAGNOSIS — A60.00 HERPES SIMPLEX INFECTION OF GENITOURINARY SYSTEM: ICD-10-CM

## 2019-10-02 RX ORDER — VALACYCLOVIR HYDROCHLORIDE 500 MG/1
500 TABLET, FILM COATED ORAL DAILY
Qty: 30 TABLET | Refills: 0 | Status: SHIPPED | OUTPATIENT
Start: 2019-10-02 | End: 2019-10-28

## 2019-10-02 NOTE — TELEPHONE ENCOUNTER
"Requested Prescriptions   Pending Prescriptions Disp Refills     valACYclovir (VALTREX) 500 MG tablet 90 tablet 3     Sig: Take 1 tablet (500 mg) by mouth daily       Antivirals for Herpes Protocol Failed - 10/2/2019  2:59 PM        Failed - Recent (12 mo) or future (30 days) visit within the authorizing provider's specialty     Patient has had an office visit with the authorizing provider or a provider within the authorizing providers department within the previous 12 mos or has a future within next 30 days. See \"Patient Info\" tab in inbasket, or \"Choose Columns\" in Meds & Orders section of the refill encounter.              Failed - Medication is active on med list        Failed - Normal serum creatinine on file in past 12 months     Recent Labs   Lab Test 06/12/18  1742   CR 0.62             Passed - Patient is age 12 or older        Last Written Prescription Date:  1/31/18  Last Fill Quantity: 90,  # refills: 3   Last office visit: 4/30/2018 with Dr Franki Pinedo  Future Office Visit:  12/2/19    Medication is being filled for 1 time refill only due to:  appointment needed for further refills   Ramya Church RN on 10/2/2019 at 3:11 PM      "

## 2019-10-03 DIAGNOSIS — J45.41 MODERATE PERSISTENT ASTHMA WITH ACUTE EXACERBATION: ICD-10-CM

## 2019-10-03 NOTE — LETTER
Crystal Rose  [address] [address]          10/11/19      Dear Crsytal Rose        At [facility] we care about your health and are committed to providing quality patient care. Regular appointments are a vital part of the care and management of your health and can help prevent many of the complications that can occur.      It has come to our attention that you are due for an office visit to address your refill request. Please call [facility] at [phone] soon to schedule your appointment.    If you have transferred care to another clinic please call to inform us so that we do not continue to send you reminder letters.      Sincerely,      [facility] Care Team

## 2019-10-03 NOTE — TELEPHONE ENCOUNTER
"Requested Prescriptions   Pending Prescriptions Disp Refills     albuterol (PROAIR HFA) 108 (90 Base) MCG/ACT inhaler  Last Written Prescription Date:  07/17/19  Last Fill Quantity: 8.5g,  # refills: 3   Last Office Visit with [facility] or [facility] prescribing provider:  [place]   Future Office Visit:    8.5 g 3     Sig: Inhale 2 puffs into the lungs every 6 hours       Asthma Maintenance Inhalers - Anticholinergics Failed - 10/3/2019  8:12 AM        Failed - Asthma control assessment score within normal limits in last 6 months     Please review ACT score.           Failed - Recent (6 mo) or future (30 days) visit within the authorizing provider's specialty     Patient had office visit in the last 6 months or has a visit in the next 30 days with authorizing provider or within the authorizing provider's specialty.  See \"Patient Info\" tab in inbasket, or \"Choose Columns\" in Meds & Orders section of the refill encounter.            Passed - Patient is age 12 years or older        Passed - Medication is active on med list                                                                                 ipratropium - albuterol 0.5 mg/2.5 mg/3 mL (DUONEB) 0.5-2.5 (3) MG/3ML neb solution  Last Written Prescription Date:  04/29/19  Last Fill Quantity: 25vial,  # refills: 0   Last Office Visit with [facility], [facility] or [facility] prescribing provider:  [place]   Future Office Visit:    25 vial 0     Sig: Take 1 vial (3 mLs) by nebulization every 4 hours as needed for shortness of breath / dyspnea or wheezing       Short-Acting Beta Agonist Inhalers Protocol  Failed - 10/3/2019  8:14 AM        Failed - Asthma control assessment score within normal limits in last 6 months     Please review ACT score.           Failed - Recent (6 mo) or future (30 days) visit within the authorizing provider's specialty     Patient had office visit in the last 6 months or has a visit in the next 30 days with authorizing provider or within " "the authorizing provider's specialty.  See \"Patient Info\" tab in inbasket, or \"Choose Columns\" in Meds & Orders section of the refill encounter.            Passed - Patient is age 12 or older        Passed - Medication is active on med list              "

## 2019-10-07 RX ORDER — IPRATROPIUM BROMIDE AND ALBUTEROL SULFATE 2.5; .5 MG/3ML; MG/3ML
1 SOLUTION RESPIRATORY (INHALATION) EVERY 4 HOURS PRN
Qty: 25 VIAL | Refills: 0 | OUTPATIENT
Start: 2019-10-07

## 2019-10-07 RX ORDER — ALBUTEROL SULFATE 90 UG/1
2 AEROSOL, METERED RESPIRATORY (INHALATION) EVERY 6 HOURS
Qty: 8.5 G | Refills: 3 | OUTPATIENT
Start: 2019-10-07

## 2019-10-07 NOTE — TELEPHONE ENCOUNTER
Routing refill request to provider for review/approval because:  Not current:  ACT  >6 months since last OV   Kae Soler RN

## 2019-10-08 NOTE — TELEPHONE ENCOUNTER
This writer attempted to contact Patient on 10/08/19      Reason for call need an appointment to address refill request  and left message.      If patient calls back:   Schedule Office Visit appointment within 2 weeks with PCP, postponing message.        Cari Rollins MA

## 2019-10-11 NOTE — TELEPHONE ENCOUNTER
This writer attempted to contact Patient on 10/11/19      Reason for call Needs an appointment for refill request and left message and sent letter.      If patient calls back:   Schedule Office Visit appointment within 2 weeks with PCP, document that pt called and close encounter         Cari Rollins MA

## 2019-10-22 NOTE — TELEPHONE ENCOUNTER
Patient  returned call    Best number to reach caller: Home number on file [phone] (home)    Is it ok to leave a detailed message: Not Applicable     Relayed message/scheduled appointment for Mon 10-;

## 2019-10-22 NOTE — TELEPHONE ENCOUNTER
This writer attempted to contact patient on 10/22/19      Reason for call ACT and left message.      If patient calls back:   2nd floor [place] Care Team (MA/TC) called. Inform patient that someone from the team will contact them, document that pt called and route to care team.         Anat Moreira MA

## 2019-10-28 ENCOUNTER — OFFICE VISIT (OUTPATIENT)
Dept: FAMILY MEDICINE | Facility: CLINIC | Age: 39
End: 2019-10-28
Payer: COMMERCIAL

## 2019-10-28 VITALS
OXYGEN SATURATION: 98 % | HEIGHT: 68 IN | WEIGHT: 219 LBS | DIASTOLIC BLOOD PRESSURE: 84 MMHG | TEMPERATURE: 97.6 F | RESPIRATION RATE: 18 BRPM | HEART RATE: 85 BPM | SYSTOLIC BLOOD PRESSURE: 124 MMHG | BODY MASS INDEX: 33.19 KG/M2

## 2019-10-28 DIAGNOSIS — J45.41 MODERATE PERSISTENT ASTHMA WITH ACUTE EXACERBATION: Primary | ICD-10-CM

## 2019-10-28 DIAGNOSIS — E66.01 MORBID OBESITY (H): ICD-10-CM

## 2019-10-28 DIAGNOSIS — Z77.29 CARBON MONOXIDE EXPOSURE: ICD-10-CM

## 2019-10-28 DIAGNOSIS — F43.22 ADJUSTMENT DISORDER WITH ANXIOUS MOOD: ICD-10-CM

## 2019-10-28 DIAGNOSIS — Z59.00 HOMELESS: ICD-10-CM

## 2019-10-28 DIAGNOSIS — J45.40 MODERATE PERSISTENT ASTHMA, UNSPECIFIED WHETHER COMPLICATED: ICD-10-CM

## 2019-10-28 DIAGNOSIS — R29.898 BILATERAL LEG WEAKNESS: ICD-10-CM

## 2019-10-28 DIAGNOSIS — J30.9 CHRONIC ALLERGIC RHINITIS: ICD-10-CM

## 2019-10-28 DIAGNOSIS — M54.50 ACUTE BILATERAL LOW BACK PAIN WITHOUT SCIATICA: ICD-10-CM

## 2019-10-28 PROCEDURE — 99215 OFFICE O/P EST HI 40 MIN: CPT | Performed by: NURSE PRACTITIONER

## 2019-10-28 RX ORDER — MONTELUKAST SODIUM 10 MG/1
10 TABLET ORAL AT BEDTIME
Qty: 90 TABLET | Refills: 3 | Status: SHIPPED | OUTPATIENT
Start: 2019-10-28 | End: 2021-05-05

## 2019-10-28 RX ORDER — METHOCARBAMOL 500 MG/1
500 TABLET, FILM COATED ORAL
Qty: 30 TABLET | Refills: 1 | Status: SHIPPED | OUTPATIENT
Start: 2019-10-28 | End: 2020-01-08

## 2019-10-28 RX ORDER — FLUTICASONE PROPIONATE 50 MCG
1-2 SPRAY, SUSPENSION (ML) NASAL DAILY
Qty: 18.2 ML | Refills: 5 | Status: SHIPPED | OUTPATIENT
Start: 2019-10-28 | End: 2020-04-28

## 2019-10-28 RX ORDER — METHYLPREDNISOLONE 4 MG
TABLET, DOSE PACK ORAL
Qty: 21 TABLET | Refills: 0 | Status: SHIPPED | OUTPATIENT
Start: 2019-10-28 | End: 2020-03-09

## 2019-10-28 RX ORDER — IPRATROPIUM BROMIDE AND ALBUTEROL SULFATE 2.5; .5 MG/3ML; MG/3ML
1 SOLUTION RESPIRATORY (INHALATION) EVERY 4 HOURS PRN
Qty: 25 VIAL | Refills: 0 | Status: SHIPPED | OUTPATIENT
Start: 2019-10-28 | End: 2019-12-12

## 2019-10-28 RX ORDER — NAPROXEN 500 MG/1
500 TABLET ORAL 2 TIMES DAILY WITH MEALS
Qty: 60 TABLET | Refills: 1 | Status: SHIPPED | OUTPATIENT
Start: 2019-10-28 | End: 2020-01-08

## 2019-10-28 RX ORDER — HYDROXYZINE HYDROCHLORIDE 25 MG/1
25 TABLET, FILM COATED ORAL
Qty: 60 TABLET | Refills: 1 | Status: SHIPPED | OUTPATIENT
Start: 2019-10-28 | End: 2020-04-28

## 2019-10-28 RX ORDER — VALACYCLOVIR HYDROCHLORIDE 500 MG/1
500 TABLET, FILM COATED ORAL DAILY
Qty: 30 TABLET | Refills: 11 | Status: SHIPPED | OUTPATIENT
Start: 2019-10-28 | End: 2021-05-26

## 2019-10-28 SDOH — ECONOMIC STABILITY - HOUSING INSECURITY: HOMELESSNESS UNSPECIFIED: Z59.00

## 2019-10-28 ASSESSMENT — PAIN SCALES - GENERAL: PAINLEVEL: SEVERE PAIN (6)

## 2019-10-28 ASSESSMENT — MIFFLIN-ST. JEOR: SCORE: 1721.88
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
## 2019-10-28 NOTE — PROGRESS NOTES
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 for 5 seconds and release.)  3. Pelvic tilt: Lie on the floor on your back with your knees bent at 90 degrees. Your feet should be flat on the floor. Inhale, exhale, then slowly contract your abdominal muscles bringing your navel toward your spine. Let your pelvis rock back until your lower back is flat on the floor. Hold for 10 seconds while breathing smoothly.  4. Abdominal crunch: Perform a pelvic tilt (above) flattening your lower back against the floor. Holding the tension in your abdominal muscles, take another breath and raise your shoulder blades off the ground (this is not a full sit-up). Keep your head in line with your body (don t bend your neck forward). Hold for 2 seconds, then slowly lower.  Date Last Reviewed: 6/1/2016 2000-2017 [employer]. [address] [place] [address], [place] [id number]. All rights reserved. This information is not intended as a substitute for professional medical care. Always follow your healthcare professional's instructions.        Back Exercises: Back Press    Do this exercise on your hands and knees. Keep your knees under your hips and your hands under your shoulders. Keep your spine in a neutral position (not arched or sagging). Be sure to maintain your neck s natural curve:    Tighten your stomach and buttock muscles to press your back upward. Let your  head drop slightly.    Hold for 5 seconds. Return to starting position.    Repeat 5 times.  Date Last Reviewed: 10/11/2015    [id number] [employer]. [address] [place] [address]. All rights reserved. This information is not intended as a substitute for professional medical care. Always follow your healthcare professional's instructions.        Back Exercises: Back Release  Do this exercise on your hands and knees. Keep your knees under your hips and your hands under your shoulders.        Relax your abdominal and buttocks muscles, lift your head, and let your back sag. Be sure to keep your weight evenly distributed. Don t sit back on your hips.     Hold for 5 seconds.    Return to starting position.    Tuck your head and lift (arch) your back.    Hold for 5 seconds    Return to starting position.    Repeat 5 times.  Date Last Reviewed: 8/16/2015 2000-2017 [employer]. [address] [place] [address]. All rights reserved. This information is not intended as a substitute for professional medical care. Always follow your healthcare professional's instructions.        Back Exercises: Back Extension with Elbow Press    To start, lie face down on your stomach, feet slightly apart, forehead on the floor. Breathe deeply. You should feel comfortable and relaxed in this position.    Press up on your forearms. Keep your stomach and hips on the floor. Stay within your painfree range.    Hold for 20 seconds. Lower slowly.    Repeat 2 times.    Return to starting position.  Date Last Reviewed: 10/13/2015    [id number] [employer]. [address] [place] [address]. All rights reserved. This information is not intended as a substitute for professional medical care. Always follow your healthcare professional's instructions.        Back Exercises: Hip Rotator Stretch    To start, lie on your back with your knees bent and feet flat on the floor. Don t press  your neck or lower back to the floor. Breathe deeply. You should feel comfortable and relaxed in this position.    Rest your right ankle on your left knee.    Place a towel behind your left thigh, and use it to pull the knee toward your chest. Feel the stretch in your buttocks.    Hold for 30 to 60 seconds. Release.    Repeat 2 times.    Switch legs.     If there is any pain other than stretch in the knee or buttock, stop and contact your healthcare provider.  For your safety, check with your healthcare provider before starting an exercise program.   Date Last Reviewed: 8/16/2015 2000-2017 [employer]. [address] [place] [address]. All rights reserved. This information is not intended as a substitute for professional medical care. Always follow your healthcare professional's instructions.        Back Exercises: Knee Lift         To start, lie on your back with your knees bent and feet flat on the floor. Don t press your neck or lower back to the floor. Breathe deeply. You should feel comfortable and relaxed in this position:    Start by tightening your abdominal muscles.    Lift one bent knee off the floor 2 to 4 inches.    Hold for 10 seconds. Return to start position.    Repeat 3 times.    Switch legs.  Date Last Reviewed: 8/16/2015 2000-2017 [employer]. [address] [place] [address]. All rights reserved. This information is not intended as a substitute for professional medical care. Always follow your healthcare professional's instructions.        Back Exercises: Leg Pull    To start, lie on your back with your knees bent and feet flat on the floor. Don t press your neck or lower back to the floor. Breathe deeply. You should feel comfortable and relaxed in this position.    Pull one knee to your chest.    Hold for 30 to 60 seconds. Return to starting position.    Repeat 2 times.    Switch legs.    For a double leg pull, pull both legs to your chest at  the same time. Repeat 2 times.  For your safety, check with your healthcare provider before starting an exercise program.   Date Last Reviewed: 8/16/2015 2000-2017 [employer]. [address] [place] [address]. All rights reserved. This information is not intended as a substitute for professional medical care. Always follow your healthcare professional's instructions.        Back Exercises: Leg Reach         Do this exercise on your hands and knees. Keep your knees under your hips and your hands under your shoulders. Keep your spine in a neutral position (not arched or sagging). Be sure to maintain your neck s natural curve:    Extend one leg straight back. Don t arch your back or let your head or body sag.    Hold for 5 seconds. Return to starting position.    Repeat 5 times.    Switch legs.   Date Last Reviewed: 8/16/2015 2000-2017 [employer]. [address] [place] [address]. All rights reserved. This information is not intended as a substitute for professional medical care. Always follow your healthcare professional's instructions.        Back Exercises: Lower Back Rotation    To start, lie on your back with your knees bent and feet flat on the floor. Don t press your neck or lower back to the floor. Breathe deeply. You should feel comfortable and relaxed in this position.    Drop both knees to one side. Turn your head to the other side. Keep your shoulders flat on the floor.    Do not push through pain.    Hold for 20 seconds.    Slowly switch sides.    Repeat 2 to 5 times.  Date Last Reviewed: 10/11/2015    [id number] [employer]. [address] [place] [address]. All rights reserved. This information is not intended as a substitute for professional medical care. Always follow your healthcare professional's instructions.        Back Exercises: Lower Back Stretch    To start, sit in a chair with your feet flat on the floor. Shift your  weight slightly forward. Relax, and keep your ears, shoulders, and hips aligned.    Sit with your feet well apart.    Bend forward and touch the floor with the backs of your hands. Relax and let your body drop.    Hold for 20 seconds. Return to starting position.    Repeat 2 times.   Date Last Reviewed: 8/16/2015 2000-2017 [employer]. [address] [place] [address]. All rights reserved. This information is not intended as a substitute for professional medical care. Always follow your healthcare professional's instructions.        Back Exercises: Seated Rotation    To start, sit in a chair with your feet flat on the floor. Shift your weight slightly forward to avoid rounding your back. Relax, and keep your ears, shoulders, and hips aligned:    Fold your arms and elbows just below shoulder height.    Turn from the waist with hips forward. Turn your head last. Do not push through the pain.    Hold for a count of 10 to 30. Return to starting position.    Repeat 3 to 5 times on one side. Then switch sides.  Date Last Reviewed: 10/11/2015    [id number] [employer]. [address] [place] [address]. All rights reserved. This information is not intended as a substitute for professional medical care. Always follow your healthcare professional's instructions.        Back Exercises: Side Stretch      To start, sit in a chair with your feet flat on the floor. Shift your weight slightly forward to avoid rounding your back. Relax. Keep your ears, shoulders, and hips aligned:    Stretch your right arm overhead.    Slowly bend to the left. Don t twist your torso. Stay within your pain limits.    Hold for 20 seconds. Return to starting position.    Repeat 2 to 5 times. Then, switch to the other side.  Date Last Reviewed: 10/13/2015    [id number] [employer]. [address] [place] [address]. All rights reserved. This information is not intended as a substitute  for professional medical care. Always follow your healthcare professional's instructions.              Return in about 2 weeks (around 11/11/2019), or if symptoms worsen or fail to improve, for Follow Up.    SHERIE Bennett [facility]

## 2019-10-28 NOTE — PATIENT INSTRUCTIONS
Start Hydroxyzine as needed for sleep at night.  Do not take with Robaxin    For the back:  Take robaxin/methocarbamol (muscle relaxer) at night for next week for muscle spasm.  Do not drive while taking, take when you have 6-7 hours prior to waking as will cause drowsiness  Take Naproxen twice a day for the next 1-2 weeks for pain and inflammation  Do home exercises daily as well as long as they do not cause pain  Back Care Tips    Caring for your back  These are things you can do to prevent a recurrence of acute back pain and to reduce symptoms from chronic back pain:    Maintain a healthy weight. If you are overweight, losing weight will help most types of back pain.    Exercise is an important part of recovery from most types of back pain. The muscles behind and in front of the spine support the back. This means strengthening both the back muscles and the abdominal muscles will provide better support for your spine.     Swimming and brisk walking are good overall exercises to improve your fitness level.    Practice safe lifting methods (below).    Practice good posture when sitting, standing and walking. Avoid prolonged sitting. This puts more stress on the lower back than standing or walking.    Wear quality shoes with sufficient arch support. Foot and ankle alignment can affect back symptoms. Women should avoid wearing high heels.    Therapeutic massage can help relax the back muscles without stretching them.    During the first 24 to 72 hours after an acute injury or flare-up of chronic back pain, apply an ice pack to the painful area for 20 minutes and then remove it for 20 minutes, over a period of 60 to 90 minutes, or several times a day. As a safety precaution, do not use a heating pad at bedtime. Sleeping on a heating pad can lead to skin burns or tissue damage.    You can alternate ice and heat therapies.  Medications  Talk to your healthcare provider before using medicines, especially if you have other  medical problems or are taking other medicines.    You may use acetaminophen or ibuprofen to control pain, unless your healthcare provider prescribed other pain medicine. If you have chronic conditions like diabetes, liver or kidney disease, stomach ulcers, or gastrointestinal bleeding, or are taking blood thinners, talk with your healthcare provider before taking any medicines.    Be careful if you are given prescription pain medicines, narcotics, or medicine for muscle spasm. They can cause drowsiness, affect your coordination, reflexes, and judgment. Do not drive or operate heavy machinery while taking these types of medicines. Take prescription pain medicine only as prescribed by your healthcare provider.  Lumbar stretch  Here is a simple stretching exercise that will help relax muscle spasm and keep your back more limber. If exercise makes your back pain worse, don t do it.    Lie on your back with your knees bent and both feet on the ground.    Slowly raise your left knee to your chest as you flatten your lower back against the floor. Hold for 5 seconds.    Relax and repeat the exercise with your right knee.    Do 10 of these exercises for each leg.  Safe lifting method    Don t bend over at the waist to lift an object off the floor.  Instead, bend your knees and hips in a squat.     Keep your back and head upright    Hold the object close to your body, directly in front of you.    Straighten your legs to lift the object.     Lower the object to the floor in the reverse fashion.    If you must slide something across the floor, push it.  Posture tips  Sitting  Sit in chairs with straight backs or low-back support. Keep your knees lower than your hips, with your feet flat on the floor.  When driving, sit up straight. Adjust the seat forward so you are not leaning toward the steering wheel.  A small pillow or rolled towel behind your lower back may help if you are driving long distances.   Standing  When standing  for long periods, shift most of your weight to one leg at a time. Alternate legs every few minutes.   Sleeping  The best way to sleep is on your side with your knees bent. Put a low pillow under your head to support your neck in a neutral spine position. Avoid thick pillows that bend your neck to one side. Put a pillow between your legs to further relax your lower back. If you sleep on your back, put pillows under your knees to support your legs in a slightly flexed position. Use a firm mattress. If your mattress sags, replace it, or use a 1/2-inch plywood board under the mattress to add support.  Follow-up care  Follow up with your healthcare provider, or as advised.  If X-rays, a CT scan or an MRI scan were taken, they will be reviewed by a radiologist. You will be notified of any new findings that may affect your care.  Call 911  Seek emergency medical care if any of the following occur:    Trouble breathing    Confusion    Very drowsy    Fainting or loss of consciousness    Rapid or very slow heart rate    Loss of  bowel or bladder control  When to seek medical care  Call your healthcare provider if any of the following occur:    Pain becomes worse or spreads to your arms or legs    Weakness or numbness in one or both arms or legs    Numbness in the groin area  Date Last Reviewed: 6/1/2016 2000-2017 [employer]. [address] [place], [place] [id number]. All rights reserved. This information is not intended as a substitute for professional medical care. Always follow your healthcare professional's instructions.        Exercises to Strengthen Your Lower Back  Strong lower back and abdominal muscles work together to support your spine. The exercises below will help strengthen the lower back. It is important that you begin exercising slowly and increase levels gradually.  Always begin any exercise program with stretching. If you feel pain while doing any of these exercises, stop and talk to your  doctor about a more specific exercise program that better suits your condition.   Low back stretch  The point of stretching is to make you more flexible and increase your range of motion. Stretch only as much as you are able. Stretch slowly. Do not push your stretch to the limit. If at any point you feel pain while stretching, this is your (temporary) limit.    Lie on your back with your knees bent and both feet on the ground.    Slowly raise your left knee to your chest as you flatten your lower back against the floor. Hold for 5 seconds.    Relax and repeat the exercise with your right knee.    Do 10 of these exercises for each leg.    Repeat hugging both knees to your chest at the same time.  Building lower back strength  Start your exercise routine with 10 to 30 minutes a day, 1 to 3 times a day.  Initial exercises  Lying on your back:  1. Ankle pumps: Move your foot up and down, towards your head, and then away. Repeat 10 times with each foot.  2. Heel slides: Slowly bend your knee, drawing the heel of your foot towards you. Then slide your heel/foot from you, straightening your knee. Do not lift your foot off the floor (this is not a leg lift).  3. Abdominal contraction: Bend your knees and put your hands on your stomach. Tighten your stomach muscles. Hold for 5 seconds, then relax. Repeat 10 times.  4. Straight leg raise: Bend one leg at the knee and keep the other leg straight. Tighten your stomach muscles. Slowly lift your straight leg 6 to 12 inches off the floor and hold for up to 5 seconds. Repeat 10 times on each side.  Standin. Wall squats: Stand with your back against the wall. Move your feet about 12 inches away from the wall. Tighten your stomach muscles, and slowly bend your knees until they are at about a 45 degree angle. Do not go down too far. Hold about 5 seconds. Then slowly return to your starting position. Repeat 10 times.  2. Heel raises: Stand facing the wall. Slowly raise the heels  of your feet up and down, while keeping your toes on the floor. If you have trouble balancing, you can touch the wall with your hands. Repeat 10 times.  More advanced exercises  When you feel comfortable enough, try these exercises.  1. Kneeling lumbar extension: Begin on your hands and knees. At the same time, raise and straighten your right arm and left leg until they are parallel to the ground. Hold for 2 seconds and come back slowly to a starting position. Repeat with left arm and right leg, alternating 10 times.  2. Prone lumbar extension: Lie face down, arms extended overhead, palms on the floor. At the same time, raise your right arm and left leg as high as comfortably possible. Hold for 10 seconds and slowly return to start. Repeat with left arm and right leg, alternating 10 times. Gradually build up to 20 times. (Advanced: Repeat this exercise raising both arms and both legs a few inches off the floor at the same time. Hold for 5 seconds and release.)  3. Pelvic tilt: Lie on the floor on your back with your knees bent at 90 degrees. Your feet should be flat on the floor. Inhale, exhale, then slowly contract your abdominal muscles bringing your navel toward your spine. Let your pelvis rock back until your lower back is flat on the floor. Hold for 10 seconds while breathing smoothly.  4. Abdominal crunch: Perform a pelvic tilt (above) flattening your lower back against the floor. Holding the tension in your abdominal muscles, take another breath and raise your shoulder blades off the ground (this is not a full sit-up). Keep your head in line with your body (don t bend your neck forward). Hold for 2 seconds, then slowly lower.  Date Last Reviewed: 6/1/2016 2000-2017 [employer]. [address] [place] [address], [place] [address]. All rights reserved. This information is not intended as a substitute for professional medical care. Always follow your healthcare professional's instructions.        Back  Exercises: Back Press    Do this exercise on your hands and knees. Keep your knees under your hips and your hands under your shoulders. Keep your spine in a neutral position (not arched or sagging). Be sure to maintain your neck s natural curve:    Tighten your stomach and buttock muscles to press your back upward. Let your head drop slightly.    Hold for 5 seconds. Return to starting position.    Repeat 5 times.  Date Last Reviewed: 10/11/2015    4993-0064 [employer]. [address] [place] [address]. All rights reserved. This information is not intended as a substitute for professional medical care. Always follow your healthcare professional's instructions.        Back Exercises: Back Release  Do this exercise on your hands and knees. Keep your knees under your hips and your hands under your shoulders.        Relax your abdominal and buttocks muscles, lift your head, and let your back sag. Be sure to keep your weight evenly distributed. Don t sit back on your hips.     Hold for 5 seconds.    Return to starting position.    Tuck your head and lift (arch) your back.    Hold for 5 seconds    Return to starting position.    Repeat 5 times.  Date Last Reviewed: 8/16/2015 2000-2017 [employer]. [address] [place] [address]. All rights reserved. This information is not intended as a substitute for professional medical care. Always follow your healthcare professional's instructions.        Back Exercises: Back Extension with Elbow Press    To start, lie face down on your stomach, feet slightly apart, forehead on the floor. Breathe deeply. You should feel comfortable and relaxed in this position.    Press up on your forearms. Keep your stomach and hips on the floor. Stay within your painfree range.    Hold for 20 seconds. Lower slowly.    Repeat 2 times.    Return to starting position.  Date Last Reviewed: 10/13/2015    8840-9653 [employer]. [address] [place] [address]  [place] [address]. All rights reserved. This information is not intended as a substitute for professional medical care. Always follow your healthcare professional's instructions.        Back Exercises: Hip Rotator Stretch    To start, lie on your back with your knees bent and feet flat on the floor. Don t press your neck or lower back to the floor. Breathe deeply. You should feel comfortable and relaxed in this position.    Rest your right ankle on your left knee.    Place a towel behind your left thigh, and use it to pull the knee toward your chest. Feel the stretch in your buttocks.    Hold for 30 to 60 seconds. Release.    Repeat 2 times.    Switch legs.     If there is any pain other than stretch in the knee or buttock, stop and contact your healthcare provider.  For your safety, check with your healthcare provider before starting an exercise program.   Date Last Reviewed: 8/16/2015 2000-2017 [employer]. [address] [place] [address]. All rights reserved. This information is not intended as a substitute for professional medical care. Always follow your healthcare professional's instructions.        Back Exercises: Knee Lift         To start, lie on your back with your knees bent and feet flat on the floor. Don t press your neck or lower back to the floor. Breathe deeply. You should feel comfortable and relaxed in this position:    Start by tightening your abdominal muscles.    Lift one bent knee off the floor 2 to 4 inches.    Hold for 10 seconds. Return to start position.    Repeat 3 times.    Switch legs.  Date Last Reviewed: 8/16/2015 2000-2017 [employer]. [address] [place] [address]. All rights reserved. This information is not intended as a substitute for professional medical care. Always follow your healthcare professional's instructions.        Back Exercises: Leg Pull    To start, lie on your back with your knees bent and feet flat  on the floor. Don t press your neck or lower back to the floor. Breathe deeply. You should feel comfortable and relaxed in this position.    Pull one knee to your chest.    Hold for 30 to 60 seconds. Return to starting position.    Repeat 2 times.    Switch legs.    For a double leg pull, pull both legs to your chest at the same time. Repeat 2 times.  For your safety, check with your healthcare provider before starting an exercise program.   Date Last Reviewed: 8/16/2015 2000-2017 [employer]. [address] [place] [address]. All rights reserved. This information is not intended as a substitute for professional medical care. Always follow your healthcare professional's instructions.        Back Exercises: Leg Reach         Do this exercise on your hands and knees. Keep your knees under your hips and your hands under your shoulders. Keep your spine in a neutral position (not arched or sagging). Be sure to maintain your neck s natural curve:    Extend one leg straight back. Don t arch your back or let your head or body sag.    Hold for 5 seconds. Return to starting position.    Repeat 5 times.    Switch legs.   Date Last Reviewed: 8/16/2015 2000-2017 [employer]. [address] [place] [address]. All rights reserved. This information is not intended as a substitute for professional medical care. Always follow your healthcare professional's instructions.        Back Exercises: Lower Back Rotation    To start, lie on your back with your knees bent and feet flat on the floor. Don t press your neck or lower back to the floor. Breathe deeply. You should feel comfortable and relaxed in this position.    Drop both knees to one side. Turn your head to the other side. Keep your shoulders flat on the floor.    Do not push through pain.    Hold for 20 seconds.    Slowly switch sides.    Repeat 2 to 5 times.  Date Last Reviewed: 10/11/2015    6088-2896 [employer],  [employer]. [address] [place] [address]. All rights reserved. This information is not intended as a substitute for professional medical care. Always follow your healthcare professional's instructions.        Back Exercises: Lower Back Stretch    To start, sit in a chair with your feet flat on the floor. Shift your weight slightly forward. Relax, and keep your ears, shoulders, and hips aligned.    Sit with your feet well apart.    Bend forward and touch the floor with the backs of your hands. Relax and let your body drop.    Hold for 20 seconds. Return to starting position.    Repeat 2 times.   Date Last Reviewed: 8/16/2015 2000-2017 [employer]. [address] [place] [address]. All rights reserved. This information is not intended as a substitute for professional medical care. Always follow your healthcare professional's instructions.        Back Exercises: Seated Rotation    To start, sit in a chair with your feet flat on the floor. Shift your weight slightly forward to avoid rounding your back. Relax, and keep your ears, shoulders, and hips aligned:    Fold your arms and elbows just below shoulder height.    Turn from the waist with hips forward. Turn your head last. Do not push through the pain.    Hold for a count of 10 to 30. Return to starting position.    Repeat 3 to 5 times on one side. Then switch sides.  Date Last Reviewed: 10/11/2015    1375-8296 [employer]. [address] [place] [address]. All rights reserved. This information is not intended as a substitute for professional medical care. Always follow your healthcare professional's instructions.        Back Exercises: Side Stretch      To start, sit in a chair with your feet flat on the floor. Shift your weight slightly forward to avoid rounding your back. Relax. Keep your ears, shoulders, and hips aligned:    Stretch your right arm overhead.    Slowly bend to the left. Don t twist your torso.  Stay within your pain limits.    Hold for 20 seconds. Return to starting position.    Repeat 2 to 5 times. Then, switch to the other side.  Date Last Reviewed: 10/13/2015    5564-5590 [employer] [address] [place] [address], [place] [address]. All rights reserved. This information is not intended as a substitute for professional medical care. Always follow your healthcare professional's instructions.

## 2019-10-29 ENCOUNTER — PATIENT OUTREACH (OUTPATIENT)
Dept: CARE COORDINATION | Facility: CLINIC | Age: 39
End: 2019-10-29

## 2019-10-29 ENCOUNTER — TELEPHONE (OUTPATIENT)
Dept: FAMILY MEDICINE | Facility: CLINIC | Age: 39
End: 2019-10-29

## 2019-10-29 PROBLEM — F43.22 ADJUSTMENT DISORDER WITH ANXIOUS MOOD: Status: ACTIVE | Noted: 2019-10-29

## 2019-10-29 PROBLEM — J30.9 CHRONIC ALLERGIC RHINITIS: Status: ACTIVE | Noted: 2019-10-29

## 2019-10-29 PROBLEM — Z77.29 CARBON MONOXIDE EXPOSURE: Status: ACTIVE | Noted: 2019-10-29

## 2019-10-29 PROBLEM — M54.50 ACUTE BILATERAL LOW BACK PAIN WITHOUT SCIATICA: Status: ACTIVE | Noted: 2019-10-29

## 2019-10-29 PROBLEM — Z59.00 HOMELESS: Status: ACTIVE | Noted: 2019-10-29

## 2019-10-29 PROBLEM — R29.898 BILATERAL LEG WEAKNESS: Status: ACTIVE | Noted: 2019-10-29

## 2019-10-29 ASSESSMENT — ANXIETY QUESTIONNAIRES
3. WORRYING TOO MUCH ABOUT DIFFERENT THINGS: SEVERAL DAYS
7. FEELING AFRAID AS IF SOMETHING AWFUL MIGHT HAPPEN: SEVERAL DAYS
6. BECOMING EASILY ANNOYED OR IRRITABLE: SEVERAL DAYS
GAD7 TOTAL SCORE: 9
2. NOT BEING ABLE TO STOP OR CONTROL WORRYING: MORE THAN HALF THE DAYS
5. BEING SO RESTLESS THAT IT IS HARD TO SIT STILL: NOT AT ALL
1. FEELING NERVOUS, ANXIOUS, OR ON EDGE: SEVERAL DAYS
IF YOU CHECKED OFF ANY PROBLEMS ON THIS QUESTIONNAIRE, HOW DIFFICULT HAVE THESE PROBLEMS MADE IT FOR YOU TO DO YOUR WORK, TAKE CARE OF THINGS AT HOME, OR GET ALONG WITH OTHER PEOPLE: SOMEWHAT DIFFICULT

## 2019-10-29 ASSESSMENT — PATIENT HEALTH QUESTIONNAIRE - PHQ9
5. POOR APPETITE OR OVEREATING: NEARLY EVERY DAY
SUM OF ALL RESPONSES TO PHQ QUESTIONS 1-9: 8
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
## 2019-10-29 NOTE — LETTER
[facility]ARE [facility]ORD[facility] - [facility]  [address] [place] [address]    October 29, 2019    Crystal Rose  [address] [facility] [address]      Dear Crystal,    I am a clinic care coordinator who works with SHERIE Bennett CNP at [facility]. I have been trying to reach you recently to introduce Clinic Care Coordination and to see if there was anything I could assist you with.  I wanted to introduce myself and provide you with my contact information so that you can call me with questions or concerns about your health care. Below is a description of clinic care coordination and how I can further assist you.     The clinic care coordinator is a registered nurse and/or  who understand the health care system. The goal of clinic care coordination is to help you manage your health and improve access to the [facility] system in the most efficient manner. The registered nurse can assist you in meeting your health care goals by providing education, coordinating services, and strengthening the communication among your providers. The  can assist you with financial, behavioral, psychosocial, chemical dependency, counseling, and/or psychiatric resources.    Please feel free to contact me at [phone], with any questions or concerns. We at [facility] are focused on providing you with the highest-quality healthcare experience possible and that all starts with you.     Sincerely,     EFFIE Mc

## 2019-10-29 NOTE — TELEPHONE ENCOUNTER
"Received signed letter from Gregoria with a note attached, \" Pt currently living in hotel. Please call to confirm correct address, does not check My Chart\". Called and left a voicemail message to call my direct line to confirm address or if patient would like to pick this up.  Cari Rollins [facility]  2nd Floor  Primary Care    "

## 2019-10-30 ASSESSMENT — ASTHMA QUESTIONNAIRES: ACT_TOTALSCORE: 9

## 2019-10-30 ASSESSMENT — ANXIETY QUESTIONNAIRES: GAD7 TOTAL SCORE: 9
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
## 2019-10-30 NOTE — TELEPHONE ENCOUNTER
Patient not returning my call. Placing letter in TC's copy basket under today's date, 10/30/19.  Cari Rollins [facility]  2nd Floor  Primary Care

## 2019-11-02 ENCOUNTER — HEALTH MAINTENANCE LETTER (OUTPATIENT)
Age: 39
End: 2019-11-02

## 2019-11-05 ENCOUNTER — MYC MEDICAL ADVICE (OUTPATIENT)
Dept: SURGERY | Facility: CLINIC | Age: 39
End: 2019-11-05

## 2019-11-05 NOTE — PROGRESS NOTES
Clinic Care Coordination Contact    SW reviews chart. Patient has not returned calls from message left by this SW or responded to Care Coordination introduction letter with contact information sent to patient via [employer] on 10/29/19. No further outreaches at this time.    EFFIE Mc  [facility] Primary Care   Care Coordination  [facility]  [phone]

## 2019-11-05 NOTE — TELEPHONE ENCOUNTER
Called pt at [facility] request to update on new support group.  No answer; left message requesting call back.  Also sent information to patient MyChart.  Stephanie Gillette RN on 11/5/2019 at 10:16 AM

## 2019-12-05 ENCOUNTER — OFFICE VISIT (OUTPATIENT)
Dept: SURGERY | Facility: CLINIC | Age: 39
End: 2019-12-05
Payer: COMMERCIAL

## 2019-12-05 VITALS
SYSTOLIC BLOOD PRESSURE: 128 MMHG | HEIGHT: 69 IN | OXYGEN SATURATION: 99 % | WEIGHT: 219.9 LBS | BODY MASS INDEX: 32.57 KG/M2 | HEART RATE: 81 BPM | DIASTOLIC BLOOD PRESSURE: 82 MMHG

## 2019-12-05 DIAGNOSIS — K91.2 POSTSURGICAL MALABSORPTION: ICD-10-CM

## 2019-12-05 DIAGNOSIS — E66.9 OBESITY (BMI 30-39.9): Primary | ICD-10-CM

## 2019-12-05 PROCEDURE — 99214 OFFICE O/P EST MOD 30 MIN: CPT | Performed by: FAMILY MEDICINE

## 2019-12-05 RX ORDER — PHENTERMINE HYDROCHLORIDE 37.5 MG/1
TABLET ORAL
Qty: 90 TABLET | Refills: 0 | Status: SHIPPED | OUTPATIENT
Start: 2019-12-05 | End: 2020-03-09

## 2019-12-05 ASSESSMENT — MIFFLIN-ST. JEOR: SCORE: 1732.87

## 2019-12-05 NOTE — PATIENT INSTRUCTIONS
[facility] Bariatric Care  Nutritional Guidelines  Gastric Bypass 18 Months Post Op and Beyond    General Guidelines and Helpful Hints:    Eat 3 meals per day + protein supplement(s). No snacks between meals.  o Do not skip meals.  This can cause overeating at the next meal and will prevent adequate protein and nutritional intake.    Aim for 60-80 grams of protein per day.  o Always eat your protein first. This assists with optimal nutrition and helps you stay full longer.  o Depending on your portion size, you may need to drink approved protein supplement between meals to achieve protein goals. Follow recommendations of your Dietitian.     Eat your protein first, and then follow with fiber.   o It is not necessary to count your fiber, but 15-20 grams per day is recommended.    o Add fiber by including fruits, vegetables, whole grains, and beans.     Portions should remain about 1 cup per meal. Use measuring cups to be accurate.    Continue to use saucer/salad plates, infant/toddler silverware to keep portion sizes small and take small bites.    Eat S-L-O-W-L-Y to make each meal last 20-30 minutes. Always stop eating when satisfied.    Continue to use caution with foods containing skins, peels or membranes. Chew well!    Aim for 64 oz. of calorie-free fluids daily.  o Continue to avoid caffeine and carbonation. If you choose to drink alcohol, do so in moderation.   o Remember to avoid drinking during meals, 15-30 minutes before and 30 minutes after.    Exercise is martinez for continued weight loss and weight maintenance. Aim for 30-60 minutes of physical activity most days of the week. Include cardiovascular and strength training.    If having trouble tolerating meat, try using a crock-pot, tinfoil tent, steamer or other moist cooking method to create tender meats. Add broth or low-fat gravy to help meat stay moist.     Avoid high sugar and high fat foods to prevent dumping syndrome.  o Check nutrition labels for less  than 10 grams of sugar and less than 10 grams of fat per serving.    Continue Taking Vitamins/Minerals:  o 7761-8252 mcg of Sublingual B-12 daily  o 1 Multivitamin with Iron twice daily (chewable or swallow tabs)  o 500-600 mg Calcium Citrate twice daily (chewable or swallow tabs)  o 5000 IU Vitamin D3 daily    Sample Grocery List    Protein:    Fat free Greek or light yogurt (less than 10 grams sugar)    Fat free or low-fat cottage cheese    String cheese or reduced fat cheese slices    Tuna, salmon, crab, egg, or chicken salad made with light or fat free mayonnaise    Egg or Egg Substitute    Lean/extra lean turkey, beef, bison, venison (ground, sirloin, round, flank)    Pork loin or tenderloin (grilled, baked, broiled)    Fish such as salmon, tuna, trout, tilapia, etc. (grilled, baked, broiled)    Tender cuts of lean (skinless) turkey or chicken    Lean deli meats: turkey, lean ham, chicken, lean roast beef    Beans such as kidney, garbanzo, black, guajardo, or low-fat/fat free refried beans    Peanut butter (natural preferred). Limit to 1 Tbsp. per day.    Low-fat meatloaf (made with lean ground beef or turkey)    Sloppy Joes made with low-sugar ketchup and lean ground beef or turkey    Soy or vegetable protein (i.e. vegan crumbles, soy/veggie burger, tofu)    Hummus    Vegetables:    Fresh: cooked or raw (as tolerated)    Frozen vegetables    Canned vegetables (low sodium or no salt added, rinse before cooking/eating)    (Ok to have skins/peels/membranes/seeds - just chew well)    Fruits:    Fresh fruit    Frozen fruit (no sugar added)    Canned fruit (packed in its own juice, NOT syrup)    (Ok to have skins/peels/membranes/seeds - just chew well)    Starch:    Unsweetened whole-grain hot cereal (or high fiber cold cereal, dry)    Toasted whole wheat bread or [place] Thins    Whole grain crackers    Baked /boiled/mashed potato/sweet potato    Cooked whole grain pasta, brown rice, or other cooked whole  grains    Starchy vegetables: corn, peas, winter squash    Protein Supplement:     Ready to drink protein shake with:  o 15-30 grams protein per serving  o Less than 10 grams total carbohydrate per serving     Protein powder mixed with:  o  Skim or 1% milk  o Low fat or fat free Lactaid milk, plain or no sugar added soymilk  o Water     Fats: (use in moderation)    1 teaspoon of soft tub margarine    1 teaspoon olive oil, canola oil, or peanut oil    1 tablespoon of low-fat elkins or salad dressing     Sample Menu for 18+ months after Gastric Bypass    You do NOT need to eat/drink the full portion sizes listed below  Always stop when you are satisfied    Breakfast   cup 1% cottage cheese     cup mixed berries   Lunch 2 oz lean roast beef on   [place] Thin with 1 tsp. light elkins    small tomato, chopped, mixed with 1 tsp. light vinaigrette dressing   Supplement Approved protein supplement (if needed between meals)   Dinner 2 oz grilled salmon    cup salad greens with 1 tsp. light salad dressing and 1 tsp. ground flax seed    cup quinoa or brown rice     Breakfast   cup egg substitute with   cup sautéed chopped vegetables  2 light [place] Krisp crackers   Lunch Tuna Melt:   cup tuna mixed with 1 tsp. light elkins over   [place] Thin. Top with 2-3 slices cucumber and 1 oz slice of low fat cheese   Supplement 1 cup skim milk (if needed between meals)   Dinner 3 oz  grilled, broiled, or baked seasoned skinless chicken breast    cup asparagus     Breakfast   cup plain oatmeal made with skim or 1% milk with 1 Tbsp. flavored/unflavored protein powder added  1 mozzarella string cheese   Lunch 2 oz deli turkey breast  1/3 cup salad with 1 tsp. light salad dressing, 1/8 of a whole avocado and 1 Tbsp. sunflower seeds   Dinner 3 oz. pork loin made in a crock pot, seasoned with a spice rub    cup cooked carrots   Supplement Approved protein supplement (if needed between meals)     Breakfast 1 cup breakfast casserole made with egg  substitute, turkey sausage,  and steamed, chopped bell peppers   Supplement  1 cup light Greek yogurt (if needed between meals)   Lunch 2 oz. teriyaki turkey    cup mashed sweet potato with 1-2 spritzes of spray butter (like [place])    cup fresh pineapple   Dinner 3 oz low fat meatloaf    cup roasted garlic zucchini     Breakfast   cup leftover breakfast casserole    cup no sugar added applesauce with 1 Tbsp. unflavored protein powder and a sprinkle of cinnamon    Lunch 3 oz shrimp with 1-2 Tbsp. low-sugar cocktail sauce for dipping    c. whole wheat pasta drizzled with   tsp. olive oil   Supplement 1 cup skim/1% milk with scoop of protein powder (if needed between meals)   Dinner Grilled, seasoned kebob with 2 oz lean beef and   cup vegetables     Breakfast Breakfast pizza:   [place] Thin spread with 1 Tbsp. low sugar spaghetti sauce,   cup shredded low fat cheese, melted and 1 slice of [place]     cup fresh fruit mixed with chopped almonds   Lunch   cup black bean soup  4-5 whole grain crackers   Dinner 3 oz  tilapia with lemon pepper seasoning    cup stewed tomatoes   Supplement 1 string cheese (if needed between meals)     Breakfast 2 hard boiled eggs (discard 1 egg yolk)    whole wheat English Muffin with 1 tsp. low sugar jelly   Lunch   cup leftover black bean soup topped with 1-2 Tbsp. low fat cheese  2-3 light [place] Krisp crackers   Supplement Approved protein supplement (if needed between meals)   Dinner 3 oz sirloin steak    cup steamed broccoli
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
## 2019-12-09 DIAGNOSIS — J45.41 MODERATE PERSISTENT ASTHMA WITH ACUTE EXACERBATION: ICD-10-CM

## 2019-12-09 NOTE — TELEPHONE ENCOUNTER
"Requested Prescriptions   Pending Prescriptions Disp Refills     ipratropium - albuterol 0.5 mg/2.5 mg/3 mL (DUONEB) 0.5-2.5 (3) MG/3ML neb solution  Last Written Prescription Date:  10/28/19  Last Fill Quantity: 25vial,  # refills: 0   Last Office Visit with [facility] or [facility] prescribing provider:  10/28/19-Ton   Future Office Visit:    25 vial 0     Sig: Take 1 vial (3 mLs) by nebulization every 4 hours as needed for shortness of breath / dyspnea or wheezing       Short-Acting Beta Agonist Inhalers Protocol  Failed - 12/9/2019  9:36 AM        Failed - Asthma control assessment score within normal limits in last 6 months     Please review ACT score.           Passed - Patient is age 12 or older        Passed - Medication is active on med list        Passed - Recent (6 mo) or future (30 days) visit within the authorizing provider's specialty     Patient had office visit in the last 6 months or has a visit in the next 30 days with authorizing provider or within the authorizing provider's specialty.  See \"Patient Info\" tab in inbasket, or \"Choose Columns\" in Meds & Orders section of the refill encounter.            ACT Total Scores 9/11/2018 9/14/2018 10/29/2019   ACT TOTAL SCORE - - -   ASTHMA ER VISITS - - -   ASTHMA HOSPITALIZATIONS - - -   ACT TOTAL SCORE (Goal Greater than or Equal to 20) 11 11 9   In the past 12 months, how many times did you visit the emergency room for your asthma without being admitted to the hospital? 0 0 0   In the past 12 months, how many times were you hospitalized overnight because of your asthma? 0 0 1       "

## 2019-12-12 RX ORDER — IPRATROPIUM BROMIDE AND ALBUTEROL SULFATE 2.5; .5 MG/3ML; MG/3ML
1 SOLUTION RESPIRATORY (INHALATION) EVERY 4 HOURS PRN
Qty: 25 VIAL | Refills: 3 | Status: SHIPPED | OUTPATIENT
Start: 2019-12-12 | End: 2020-09-14

## 2019-12-12 NOTE — TELEPHONE ENCOUNTER
Routing refill request to provider for review/approval because:  ACT score is below 20 so RN cannot refill per [facility] protocol.        David Bone RN, BSN, PHN

## 2020-03-09 ENCOUNTER — OFFICE VISIT (OUTPATIENT)
Dept: SURGERY | Facility: CLINIC | Age: 40
End: 2020-03-09
Payer: COMMERCIAL

## 2020-03-09 VITALS
DIASTOLIC BLOOD PRESSURE: 84 MMHG | SYSTOLIC BLOOD PRESSURE: 127 MMHG | HEIGHT: 68 IN | OXYGEN SATURATION: 97 % | BODY MASS INDEX: 33.86 KG/M2 | HEART RATE: 87 BPM | WEIGHT: 223.4 LBS

## 2020-03-09 DIAGNOSIS — E66.9 OBESITY (BMI 30-39.9): Primary | ICD-10-CM

## 2020-03-09 DIAGNOSIS — Z98.84 S/P GASTRIC BYPASS: ICD-10-CM

## 2020-03-09 PROCEDURE — 99214 OFFICE O/P EST MOD 30 MIN: CPT | Performed by: FAMILY MEDICINE

## 2020-03-09 RX ORDER — MULTIVIT WITH IRON,MINERALS
TABLET,CHEWABLE ORAL
Qty: 200 TABLET | Refills: 4 | Status: SHIPPED | OUTPATIENT
Start: 2020-03-09

## 2020-03-09 RX ORDER — PHENTERMINE HYDROCHLORIDE 37.5 MG/1
TABLET ORAL
Qty: 90 TABLET | Refills: 0 | Status: SHIPPED | OUTPATIENT
Start: 2020-03-09 | End: 2020-03-09

## 2020-03-09 ASSESSMENT — MIFFLIN-ST. JEOR: SCORE: 1736.84
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
## 2020-03-09 NOTE — PATIENT INSTRUCTIONS
Start routine bariatric vitamins    2 multivitamins with iron every day  Calcium citrate twice a day  Vitamin B12 under your tongue once a day  Vitamin D3 5000 international unit(s) per day  vitaminC and iron once a day

## 2020-03-11 ENCOUNTER — MYC REFILL (OUTPATIENT)
Dept: FAMILY MEDICINE | Facility: CLINIC | Age: 40
End: 2020-03-11

## 2020-03-11 DIAGNOSIS — J45.40 MODERATE PERSISTENT ASTHMA, UNSPECIFIED WHETHER COMPLICATED: ICD-10-CM

## 2020-03-11 DIAGNOSIS — J45.41 MODERATE PERSISTENT ASTHMA WITH ACUTE EXACERBATION: ICD-10-CM

## 2020-03-11 NOTE — TELEPHONE ENCOUNTER
Requested Prescriptions   Pending Prescriptions Disp Refills     order for DME 1 each 0     Sig: Equipment being ordered: Nebulizer       There is no refill protocol information for this order        Nebulizer  Last Written Prescription Date:  10/28/19  Last Fill Quantity: 1,  # refills: 0   Last office visit: 10/28/2019 with prescribing provider:  Ton   Future Office Visit:

## 2020-03-13 NOTE — TELEPHONE ENCOUNTER
Routing refill request to provider for review/approval because:  Drug not on the [facility] refill protocol.    Siobhan Xiong RN, [facility] Triage

## 2020-03-16 NOTE — TELEPHONE ENCOUNTER
I do not see this in my basket.  Cari Rollins MA  [facility]  2nd Floor  Primary Care

## 2020-03-23 ENCOUNTER — TELEPHONE (OUTPATIENT)
Dept: SURGERY | Facility: CLINIC | Age: 40
End: 2020-03-23

## 2020-03-23 DIAGNOSIS — R21 RASH: Primary | ICD-10-CM

## 2020-03-23 RX ORDER — NYSTATIN 100000 U/G
OINTMENT TOPICAL 2 TIMES DAILY
Qty: 30 G | Refills: 3 | Status: SHIPPED | OUTPATIENT
Start: 2020-03-23 | End: 2020-04-22

## 2020-03-23 NOTE — TELEPHONE ENCOUNTER
Called patient and inform her that the Nystatin she requested was prescribed this morning by Dr. Tyson.  Jossie Riojas, MS, RD, RN

## 2020-03-23 NOTE — TELEPHONE ENCOUNTER
Medication; nystatin cream 15 gm    Last Written Prescription Date:  02/25/2019  Last Fill Quantity: 30g,  # refills: 3   Last office visit: 3/9/2020 with prescribing provider:  Dr vega   Future Office Visit:  06/08/2020    [facility]; [facility]  Phone; [phone]  Fax; [phone]

## 2020-04-07 DIAGNOSIS — R21 RASH: Primary | ICD-10-CM

## 2020-04-07 NOTE — TELEPHONE ENCOUNTER
Called pharmacy as this med was recently filled. Informed that med that is actually needed is refill of Mycolog II (nystatin + triamcinolone), not just nystatin.  States pt wants this instead.    Order for nystatin from 3/23/2020 is not the right product.    Will forward to MD. Stephanie Gillette RN on 4/7/2020 at 3:20 PM

## 2020-04-07 NOTE — TELEPHONE ENCOUNTER
Medication; nystatin 15 gm cream    Last Written Prescription Date:  03/23/2020  Last Fill Quantity: 30g,  # refills: 3   Last office visit: 3/9/2020 with prescribing provider:  Dr vega   Future Office Visit:  06/08/2020    [facility]; [facility]  Phone' [phone]  Fax; [phone]

## 2020-04-09 RX ORDER — NYSTATIN AND TRIAMCINOLONE ACETONIDE 100000; 1 [USP'U]/G; MG/G
CREAM TOPICAL
Qty: 30 G | Refills: 3 | Status: SHIPPED | OUTPATIENT
Start: 2020-04-09 | End: 2021-05-26

## 2020-04-28 ENCOUNTER — VIRTUAL VISIT (OUTPATIENT)
Dept: FAMILY MEDICINE | Facility: CLINIC | Age: 40
End: 2020-04-28
Payer: COMMERCIAL

## 2020-04-28 ENCOUNTER — MYC MEDICAL ADVICE (OUTPATIENT)
Dept: FAMILY MEDICINE | Facility: CLINIC | Age: 40
End: 2020-04-28

## 2020-04-28 VITALS — BODY MASS INDEX: 33.15 KG/M2 | TEMPERATURE: 99.1 F | WEIGHT: 218 LBS

## 2020-04-28 DIAGNOSIS — J30.9 CHRONIC ALLERGIC RHINITIS: ICD-10-CM

## 2020-04-28 DIAGNOSIS — J45.41 MODERATE PERSISTENT ASTHMA WITH ACUTE EXACERBATION: ICD-10-CM

## 2020-04-28 DIAGNOSIS — Z59.00 HOMELESS: ICD-10-CM

## 2020-04-28 DIAGNOSIS — F43.22 ADJUSTMENT DISORDER WITH ANXIOUS MOOD: Primary | ICD-10-CM

## 2020-04-28 PROCEDURE — 96127 BRIEF EMOTIONAL/BEHAV ASSMT: CPT | Performed by: NURSE PRACTITIONER

## 2020-04-28 PROCEDURE — 99215 OFFICE O/P EST HI 40 MIN: CPT | Mod: 95 | Performed by: NURSE PRACTITIONER

## 2020-04-28 RX ORDER — FLUTICASONE PROPIONATE 50 MCG
1-2 SPRAY, SUSPENSION (ML) NASAL DAILY
Qty: 18.2 ML | Refills: 5 | Status: SHIPPED | OUTPATIENT
Start: 2020-04-28 | End: 2022-05-11

## 2020-04-28 RX ORDER — ALBUTEROL SULFATE 90 UG/1
2-4 POWDER, METERED RESPIRATORY (INHALATION) EVERY 4 HOURS PRN
Qty: 3 INHALER | Refills: 3 | Status: SHIPPED | OUTPATIENT
Start: 2020-04-28 | End: 2020-06-25

## 2020-04-28 RX ORDER — LORAZEPAM 0.5 MG/1
0.5 TABLET ORAL EVERY 6 HOURS PRN
Qty: 45 TABLET | Refills: 1 | Status: SHIPPED | OUTPATIENT
Start: 2020-04-28 | End: 2020-06-25

## 2020-04-28 RX ORDER — HYDROXYZINE HYDROCHLORIDE 25 MG/1
25 TABLET, FILM COATED ORAL
Qty: 60 TABLET | Refills: 1 | Status: SHIPPED | OUTPATIENT
Start: 2020-04-28 | End: 2020-05-19

## 2020-04-28 RX ORDER — LORATADINE 10 MG/1
10 TABLET ORAL DAILY
Qty: 90 TABLET | Refills: 3 | Status: SHIPPED | OUTPATIENT
Start: 2020-04-28 | End: 2021-05-05

## 2020-04-28 SDOH — ECONOMIC STABILITY - HOUSING INSECURITY: HOMELESSNESS UNSPECIFIED: Z59.00

## 2020-04-28 ASSESSMENT — PAIN SCALES - GENERAL: PAINLEVEL: NO PAIN (0)

## 2020-04-28 ASSESSMENT — ANXIETY QUESTIONNAIRES
6. BECOMING EASILY ANNOYED OR IRRITABLE: SEVERAL DAYS
2. NOT BEING ABLE TO STOP OR CONTROL WORRYING: NEARLY EVERY DAY
7. FEELING AFRAID AS IF SOMETHING AWFUL MIGHT HAPPEN: MORE THAN HALF THE DAYS
IF YOU CHECKED OFF ANY PROBLEMS ON THIS QUESTIONNAIRE, HOW DIFFICULT HAVE THESE PROBLEMS MADE IT FOR YOU TO DO YOUR WORK, TAKE CARE OF THINGS AT HOME, OR GET ALONG WITH OTHER PEOPLE: EXTREMELY DIFFICULT
1. FEELING NERVOUS, ANXIOUS, OR ON EDGE: NEARLY EVERY DAY
3. WORRYING TOO MUCH ABOUT DIFFERENT THINGS: NEARLY EVERY DAY
GAD7 TOTAL SCORE: 17
5. BEING SO RESTLESS THAT IT IS HARD TO SIT STILL: MORE THAN HALF THE DAYS

## 2020-04-28 ASSESSMENT — PATIENT HEALTH QUESTIONNAIRE - PHQ9
5. POOR APPETITE OR OVEREATING: NEARLY EVERY DAY
SUM OF ALL RESPONSES TO PHQ QUESTIONS 1-9: 15

## 2020-04-28 NOTE — PROGRESS NOTES
"Crystal Rose is a 39 year old female who is being evaluated via a billable telephone visit.      The patient has been notified of following:     \"This telephone visit will be conducted via a call between you and your physician/provider. We have found that certain health care needs can be provided without the need for a physical exam.  This service lets us provide the care you need with a short phone conversation.  If a prescription is necessary we can send it directly to your pharmacy.  If lab work is needed we can place an order for that and you can then stop by our lab to have the test done at a later time.    Telephone visits are billed at different rates depending on your insurance coverage. During this emergency period, for some insurers they may be billed the same as an in-person visit.  Please reach out to your insurance provider with any questions.    If during the course of the call the physician/provider feels a telephone visit is not appropriate, you will not be charged for this service.\"    Patient has given verbal consent for Telephone visit?  Yes    How would you like to obtain your AVS? Mail a copy    Subjective     Crystal Rose is a 39 year old female who presents to clinic today for the following health issues:    HPI  Depression and Anxiety Follow-Up    How are you doing with your depression since your last visit? Worsened     How are you doing with your anxiety since your last visit?  Worsened     Are you having other symptoms that might be associated with depression or anxiety? Yes:  lack of motivation, insomnia, tearfulness, panic attacks    Have you had a significant life event? Job Concerns, Financial Concerns and Housing Concerns     Do you have any concerns with your use of alcohol or other drugs? No    Social History     Tobacco Use     Smoking status: Former Smoker     Last attempt to quit: 5/5/2009     Years since quitting: 10.9     Smokeless tobacco: Never Used " "  Substance Use Topics     Alcohol use: No     Drug use: No     PHQ 4/2/2018 10/29/2019 4/28/2020   PHQ-9 Total Score 7 8 15   Q9: Thoughts of better off dead/self-harm past 2 weeks Not at all Not at all Several days     TERESA-7 SCORE 4/2/2018 10/29/2019 4/28/2020   Total Score 3 9 17     Last PHQ-9 4/28/2020   1.  Little interest or pleasure in doing things 2   2.  Feeling down, depressed, or hopeless 2   3.  Trouble falling or staying asleep, or sleeping too much 2   4.  Feeling tired or having little energy 1   5.  Poor appetite or overeating 1   6.  Feeling bad about yourself 2   7.  Trouble concentrating 3   8.  Moving slowly or restless 1   Q9: Thoughts of better off dead/self-harm past 2 weeks 1   PHQ-9 Total Score 15   Difficulty at work, home, or with people Extremely dIfficult     TERESA-7  4/28/2020   1. Feeling nervous, anxious, or on edge 3   2. Not being able to stop or control worrying 3   3. Worrying too much about different things 3   4. Trouble relaxing 3   5. Being so restless that it is hard to sit still 2   6. Becoming easily annoyed or irritable 1   7. Feeling afraid, as if something awful might happen 2   TERESA-7 Total Score 17   If you checked any problems, how difficult have they made it for you to do your work, take care of things at home, or get along with other people? Extremely difficult         \"crippling\" anxiety attacks.  She has had these worsening symptoms for 1.5 months.  \"can't even get out of bed\". Symptoms worse over the last two weeks.  3-4 panic attacks in a day.  Crying a lot.  Going in to arbitration for her house.  Insurance not covering repairs on her home.  Was living in hotel. Now with COVID they have an apartment.  She is exhausted.  Her mind won't relax.  Feels like she has no control.  She works still but is having a hard time focusing.  Has depleted all of her PTO in the last two weeks.  States \"she couldn't clock in on time\".  She spoke with HR yesterday and they " encouraged her to apply for STD and FMLA.    Has some intrusive thoughts.  They are scaring her.  She is worried about her family and her ability to take good care of them.  Mostly thinking she would be better off dead.  She denies a plan.  Has passive thoughts about driving off a bridge but states she wouldn't do that.     Hydroxyzine- uses at night, helps, but causes drowsiness.  Notes if she doesn't take it, her panic attacks are worse.    Hasn't been able to work so making less than 1/2 of what she normally does.  Her Advair is $280.  She is going to the food shelf right now.    In the past two weeks have you had thoughts of suicide or self-harm?  Yes  In the past two weeks have you thought of a plan or intent to harm yourself? No.  Do you have concerns about your personal safety or the safety of others?   No    Suicide Assessment Five-step Evaluation and Treatment (SAFE-T)    Asthma Follow-Up    Was ACT completed today?    Yes    ACT Total Scores 4/28/2020   ACT TOTAL SCORE -   ASTHMA ER VISITS -   ASTHMA HOSPITALIZATIONS -   ACT TOTAL SCORE (Goal Greater than or Equal to 20) 8   In the past 12 months, how many times did you visit the emergency room for your asthma without being admitted to the hospital? 0   In the past 12 months, how many times were you hospitalized overnight because of your asthma? 1       How many days per week do you miss taking your asthma controller medication?  7    Please describe any recent triggers for your asthma: smoke, pollens and emotions    Have you had any Emergency Room Visits, Urgent Care Visits, or Hospital Admissions since your last office visit?  No      How many servings of fruits and vegetables do you eat daily?  0-1    On average, how many sweetened beverages do you drink each day (Examples: soda, juice, sweet tea, etc.  Do NOT count diet or artificially sweetened beverages)?   2    How many days per week do you exercise enough to make your heart beat faster? 3 or  less    How many minutes a day do you exercise enough to make your heart beat faster? 9 or less  How many days per week do you miss taking your medication? 7    What makes it hard for you to take your medications?  cost of medication  Cannot afford advair right now.     Patient Active Problem List   Diagnosis     S/P gastric bypass     Pernicious anemia     CARDIOVASCULAR SCREENING; LDL GOAL LESS THAN 160     Moderate persistent asthma     Environmental allergies     Abnormal Pap smear of cervix     Cervical dysplasia     Morbid obesity (H)     Iron deficiency anemia due to chronic blood loss     Menorrhagia with regular cycle     Postsurgical malabsorption     Intertrigo     Iron deficiency anemia following bariatric surgery     Homeless     Adjustment disorder with anxious mood     Bilateral leg weakness     Acute bilateral low back pain without sciatica     Chronic allergic rhinitis     Carbon monoxide exposure     Past Surgical History:   Procedure Laterality Date     ATTEMPTED LAPAROSCOPY  3/13/2014    Procedure: ATTEMPTED LAPAROSCOPY;;  Surgeon: Cee Garcia MD;  Location: [facility]     COLPOSCOPY CERVIX, BIOPSY CERVIX, ENDOCERVICAL CURETTAGE, COMBINED      1/6/2005:  SUMI I; 6/15/2009:  SUMI I.  Treated with cryo.  10/27/1997:  SUMI I-II     CONIZATION LEEP  3/13/2014    Procedure: CONIZATION LEEP;  LOOP ELECTROSURGICAL EXCISION PROCEDURE; LAPAROSCOPY ATTEMPTED;  Surgeon: Cee Garcia MD;  Location: [facility]     CRYOTHERAPY, CERVICAL  age 19    Pt reported     DILATION AND CURETTAGE, ABLATE ENDOMETRIUM NOVASURE, COMBINED N/A 6/14/2018    Procedure: COMBINED DILATION AND CURETTAGE, ABLATE ENDOMETRIUM NOVASURE;  HYSTEROSCOPY, DILATION AND CURETTAGE, ENDOMETRIAL ABLATION WITH NOVASURE, LAPAROSCOPIC BILATERAL TUBAL LIGATION ;  Surgeon: Franki Pinedo MD;  Location: [facility]     EXAM UNDER ANESTHESIA PELVIC  3/20/2014    Procedure: EXAM UNDER ANESTHESIA PELVIC;  EXAM UNDER ANESTHESIA, REMOVAL OF STICHES ;   Surgeon: Cee Garcia MD;  Location:  [facility]     GASTRIC BYPASS  2004    pre-bypass weight was 320#     HC NASAL/SINUS SCOPE W THER INSTILL       HC REMOVAL OF OVARIAN CYST(S)       LAPAROSCOPIC TUBAL LIGATION Bilateral 6/14/2018    Procedure: LAPAROSCOPIC TUBAL LIGATION;;  Surgeon: Franki Pinedo MD;  Location:  [facility]       Social History     Tobacco Use     Smoking status: Former Smoker     Last attempt to quit: 5/5/2009     Years since quitting: 10.9     Smokeless tobacco: Never Used   Substance Use Topics     Alcohol use: No     Family History   Problem Relation Age of Onset     Hypertension Mother      Allergies Mother      Obesity Mother      Asthma Father      Diabetes Father      Hypertension Father      Allergies Father      Cancer Father         Lymphoma d age 58     Asthma Brother      Allergies Sister      Depression Sister      Obesity Sister      Alzheimer Disease Maternal Grandmother      Arthritis Maternal Grandmother      Obesity Maternal Grandmother      Thyroid Disease Maternal Grandmother      Hypertension Maternal Grandfather      Cancer - colorectal Maternal Grandfather      Cerebrovascular Disease Maternal Grandfather      Diabetes Paternal Grandmother          Current Outpatient Medications   Medication Sig Dispense Refill     albuterol (PROAIR RESPICLICK) 108 (90 Base) MCG/ACT inhaler Inhale 2-4 puffs into the lungs every 4 hours as needed for shortness of breath / dyspnea or wheezing 3 Inhaler 3     Ascorbic Acid (VITAMIN C PO) Take 500 mg by mouth       Calcium Carb-Cholecalciferol (CALCIUM 500 + D) 500-400 MG-UNIT TABS Take 1 tablet by mouth 3 times daily 90 tablet 11     calcium carbonate-vitamin D (CALCIUM 600/VITAMIN D) 600-400 MG-UNIT chewable tablet Take one twice daily and at least 2 hours apart from iron. 180 tablet 3     childrens multivitamin w/iron (FLINTSTONES COMPLETE) chewable tablet Take one tablet twice a day. 200 tablet 4     Cholecalciferol (VITAMIN D-3) 5000 units  TABS Take 1 tablet by mouth 2 times daily 100 tablet 3     cholecalciferol 125 MCG (5000 UT) CAPS Take 1 capsule (5,000 Units) by mouth daily 30 capsule 11     cyanocobalamin (VITAMIN B-12) 1000 MCG SUBL sublingual tablet Place 1 tablet (1,000 mcg) under the tongue daily 100 tablet 3     cyanocobalamin (VITAMIN B-12) 500 MCG SUBL sublingual tablet Place 1 tablet (500 mcg) under the tongue daily 30 tablet 11     ferrous fumarate 65 mg, Mechoopda. FE,-Vitamin C 125 mg (VITRON C)  MG TABS tablet Take 2 tablets by mouth daily 180 tablet 3     fluticasone (FLONASE) 50 MCG/ACT nasal spray Spray 1-2 sprays into both nostrils daily 18.2 mL 5     hydrOXYzine (ATARAX) 25 MG tablet Take 1 tablet (25 mg) by mouth nightly as needed (sleep) 60 tablet 1     ipratropium - albuterol 0.5 mg/2.5 mg/3 mL (DUONEB) 0.5-2.5 (3) MG/3ML neb solution Take 1 vial (3 mLs) by nebulization every 4 hours as needed for shortness of breath / dyspnea or wheezing 25 vial 3     loratadine (CLARITIN) 10 MG tablet Take 1 tablet (10 mg) by mouth daily 90 tablet 3     LORazepam (ATIVAN) 0.5 MG tablet Take 1 tablet (0.5 mg) by mouth every 6 hours as needed for anxiety 45 tablet 1     methocarbamol (ROBAXIN) 500 MG tablet Take 1 tablet (500 mg) by mouth nightly as needed for muscle spasms 30 tablet 1     montelukast (SINGULAIR) 10 MG tablet Take 1 tablet (10 mg) by mouth At Bedtime 90 tablet 3     Multiple Vitamins-Iron (DAILY MARILYN MULTIVITAMIN/IRON) TABS Take 1 tablet by mouth 2 times daily 100 tablet 3     nystatin-triamcinolone (MYCOLOG II) 398282-3.1 UNIT/GM-% external cream Apply twice daily as needed. 30 g 3     order for DME Equipment being ordered: Nebulizer with tubing 1 each 0     order for DME Equipment being ordered: Nebulizer 1 each 0     sertraline (ZOLOFT) 50 MG tablet Take 1 tablet (50 mg) by mouth daily 30 tablet 3     valACYclovir (VALTREX) 500 MG tablet Take 1 tablet (500 mg) by mouth daily 30 tablet 11     fluticasone-salmeterol  (ADVAIR) 250-50 MCG/DOSE inhaler Inhale 1 puff into the lungs 2 times daily (Patient not taking: Reported on 3/9/2020) 3 Inhaler 3     naproxen (NAPROSYN) 500 MG tablet Take 1 tablet (500 mg) by mouth 2 times daily (with meals) (Patient not taking: Reported on 3/9/2020) 60 tablet 1     Allergies   Allergen Reactions     Cat Hair [Cats]      Dog Hair [Dogs]      Dust Mites      Ragweeds      Nsaids Other (See Comments)     Patient had bariatric surgery and should never take NSAIDs or ASA      BP Readings from Last 3 Encounters:   03/09/20 127/84   12/05/19 128/82   10/28/19 124/84    Wt Readings from Last 3 Encounters:   04/28/20 98.9 kg (218 lb)   03/09/20 101.3 kg (223 lb 6.4 oz)   12/05/19 99.7 kg (219 lb 14.4 oz)                    Reviewed and updated as needed this visit by Provider         Review of Systems   ROS COMP: Constitutional, HEENT, cardiovascular, pulmonary, GI, , musculoskeletal, neuro, skin, endocrine and psych systems are negative, except as otherwise noted.       Objective   Reported vitals:  There were no vitals taken for this visit.   healthy, alert and no distress  PSYCH: Alert and oriented times 3; coherent speech, normal   rate and volume, able to articulate logical thoughts, able   to abstract reason, no tangential thoughts, no hallucinations   or delusions  Her affect is tearful  RESP: No cough, no audible wheezing, able to talk in full sentences  Remainder of exam unable to be completed due to telephone visits    Diagnostic Test Results:  Labs reviewed in Epic        Assessment/Plan:  1. Adjustment disorder with anxious mood  See above.  Starting serotonin specific reuptake inhibitor. Continue hydroxyzine at night.  Panic attacks are severe and debilitating so will give short term dose of Ativan while we wait for serotonin specific reuptake inhibitor to take effect.    - MENTAL HEALTH REFERRAL  - Adult; Outpatient Treatment; Individual/Couples/Family/Group Therapy/Health Psychology;  [facility] [place]: [facility] [phone]; We will contact you to schedule the appointment or please call with any questions  - CARE COORDINATION REFERRAL  - sertraline (ZOLOFT) 50 MG tablet; Take 1 tablet (50 mg) by mouth daily  Dispense: 30 tablet; Refill: 3  - LORazepam (ATIVAN) 0.5 MG tablet; Take 1 tablet (0.5 mg) by mouth every 6 hours as needed for anxiety  Dispense: 45 tablet; Refill: 1  - hydrOXYzine (ATARAX) 25 MG tablet; Take 1 tablet (25 mg) by mouth nightly as needed (sleep)  Dispense: 60 tablet; Refill: 1    2. Homeless  - CARE COORDINATION REFERRAL    3. Moderate persistent asthma with acute exacerbation  Refills provided.  Discussed medication assistance program and patient will call.   - albuterol (PROAIR RESPICLICK) 108 (90 Base) MCG/ACT inhaler; Inhale 2-4 puffs into the lungs every 4 hours as needed for shortness of breath / dyspnea or wheezing  Dispense: 3 Inhaler; Refill: 3    4. Chronic allergic rhinitis  - loratadine (CLARITIN) 10 MG tablet; Take 1 tablet (10 mg) by mouth daily  Dispense: 90 tablet; Refill: 3  - fluticasone (FLONASE) 50 MCG/ACT nasal spray; Spray 1-2 sprays into both nostrils daily  Dispense: 18.2 mL; Refill: 5    Return in about 3 weeks (around 5/19/2020) for Depression Follow up, Anxiety Follow up, Asthma.      Phone call duration:  42 minutes    SHERIE Bennett CNP

## 2020-04-28 NOTE — PATIENT INSTRUCTIONS
At [facility], we strive to deliver an exceptional experience to you, every time we see you.  If you receive a survey in the mail, please send us back your thoughts. We really do value your feedback.    Based on your medical history, these are the current health maintenance/preventive care services that you are due for (some may have been done at this visit.)  Health Maintenance Due   Topic Date Due     PREVENTIVE CARE VISIT  01/24/2013     ASTHMA ACTION PLAN  02/04/2015     LIPID  01/24/2017     HPV TEST  04/30/2019     PAP  04/30/2019     INFLUENZA VACCINE (1) 09/01/2019     PHQ-2  01/01/2020     ASTHMA CONTROL TEST  04/28/2020         Suggested websites for health information:  Www.edjing.org : Up to date and easily searchable information on multiple topics.  Www.medlineplus.gov : medication info, interactive tutorials, watch real surgeries online  Www.familydoctor.org : good info from the Academy of Family Physicians  Www.cdc.gov : public health info, travel advisories, epidemics (H1N1)  Www.aap.org : children's health info, normal development, vaccinations  Www.health.state.[place].us : [place] dept of health, public health issues in [place], N1N1    Your care team:                            Family Medicine Internal Medicine   MD Kraig Samuel MD Shantel Branch-Fleming, MD Katya Georgiev PA-C Nam Ho, MD Pediatrics   RUTH Donato, RACHEAL Dunbar APRN MD Yenni Sarabia MD Deborah Mielke, MD Kim Thein, APRN CNP      Clinic hours: Monday - Thursday 7 am-7 pm; Fridays 7 am-5 pm.   Urgent care: Monday - Friday 11 am-9 pm; Saturday and Sunday 9 am-5 pm.  Pharmacy : Monday -Thursday 8 am-8 pm; Friday 8 am-6 pm; Saturday and Sunday 9 am-5 pm.     Clinic: [phone]   Pharmacy: [phone]'

## 2020-04-29 ENCOUNTER — PATIENT OUTREACH (OUTPATIENT)
Dept: CARE COORDINATION | Facility: CLINIC | Age: 40
End: 2020-04-29

## 2020-04-29 ASSESSMENT — ASTHMA QUESTIONNAIRES: ACT_TOTALSCORE: 8

## 2020-04-29 ASSESSMENT — ANXIETY QUESTIONNAIRES: GAD7 TOTAL SCORE: 17

## 2020-04-29 NOTE — PROGRESS NOTES
Clinic Care Coordination Contact  [facility]/Voicemail       Clinical Data: Care Coordinator Outreach  Outreach attempted x 1.  Left message on patient's voicemail with call back information and requested return call.  Plan: Care Coordinator will try to reach patient again in 1-2 business days.    EFFIE Denton  Primary Care Clinic- Social Work Care Coordinator  [facility] [place]  Ph: [phone]  4/29/2020 3:07 PM

## 2020-04-30 NOTE — TELEPHONE ENCOUNTER
Filled out paperwork and faxed via [employer] back to [facility] as well as to clinic fax ([phone]).  Please place in patient's record.  Thanks!  Gregoria

## 2020-05-01 NOTE — TELEPHONE ENCOUNTER
Received signed form and copy to TC and abstracting.  Cari Rollins MA  [facility]  2nd Floor  Primary Care

## 2020-05-01 NOTE — PROGRESS NOTES
Clinic Care Coordination Contact  [facility]/Voicemail       Clinical Data: Care Coordinator Outreach  Outreach attempted x 2.  Left message on patient's voicemail with call back information and requested return call.  Plan: Care Coordinator will send care coordination introduction letter with care coordinator contact information and explanation of care coordination services via [employer]. Care Coordinator will do no further outreaches at this time.    EFFIE Denton  Primary Care Clinic- Social Work Care Coordinator  [facility]  Ph: [phone]  5/1/2020 11:24 AM

## 2020-05-04 ENCOUNTER — MYC MEDICAL ADVICE (OUTPATIENT)
Dept: FAMILY MEDICINE | Facility: CLINIC | Age: 40
End: 2020-05-04

## 2020-05-04 NOTE — TELEPHONE ENCOUNTER
Received new form from the [facility] to follow up on the previous form. Placed in Gregoria's office for review.  Cari Rollins [facility]  2nd Floor  Primary Care

## 2020-05-05 NOTE — TELEPHONE ENCOUNTER
Received signed forms, confirmed return to work date with Gregoria and faxed to [employer], [phone], right fax confirmed at 8:33 am today, 5/5/2020. Copy to TC and abstracting. Sent patient a My Chart message that this was faxed.  Cari Rollins [facility]  2nd Floor  Primary Care

## 2020-05-14 ENCOUNTER — VIRTUAL VISIT (OUTPATIENT)
Dept: PSYCHOLOGY | Facility: CLINIC | Age: 40
End: 2020-05-14
Payer: COMMERCIAL

## 2020-05-14 DIAGNOSIS — F43.89 REACTION TO CHRONIC STRESS: Primary | ICD-10-CM

## 2020-05-14 PROCEDURE — 90791 PSYCH DIAGNOSTIC EVALUATION: CPT | Mod: 95 | Performed by: SOCIAL WORKER

## 2020-05-14 ASSESSMENT — COLUMBIA-SUICIDE SEVERITY RATING SCALE - C-SSRS
TOTAL  NUMBER OF ABORTED OR SELF INTERRUPTED ATTEMPTS PAST LIFETIME: NO
1. IN THE PAST MONTH, HAVE YOU WISHED YOU WERE DEAD OR WISHED YOU COULD GO TO SLEEP AND NOT WAKE UP?: YES
6. HAVE YOU EVER DONE ANYTHING, STARTED TO DO ANYTHING, OR PREPARED TO DO ANYTHING TO END YOUR LIFE?: YES
2. HAVE YOU ACTUALLY HAD ANY THOUGHTS OF KILLING YOURSELF LIFETIME?: YES
ATTEMPT LIFETIME: YES
ATTEMPT PAST THREE MONTHS: NO
4. HAVE YOU HAD THESE THOUGHTS AND HAD SOME INTENTION OF ACTING ON THEM?: NO
REASONS FOR IDEATION LIFETIME: COMPLETELY TO END OR STOP THE PAIN (YOU COULDN'T GO ON LIVING WITH THE PAIN OR HOW YOU WERE FEELING)
3. HAVE YOU BEEN THINKING ABOUT HOW YOU MIGHT KILL YOURSELF?: YES
1. IN THE PAST MONTH, HAVE YOU WISHED YOU WERE DEAD OR WISHED YOU COULD GO TO SLEEP AND NOT WAKE UP?: NO
TOTAL  NUMBER OF INTERRUPTED ATTEMPTS LIFETIME: YES
5. HAVE YOU STARTED TO WORK OUT OR WORKED OUT THE DETAILS OF HOW TO KILL YOURSELF? DO YOU INTEND TO CARRY OUT THIS PLAN?: YES
4. HAVE YOU HAD THESE THOUGHTS AND HAD SOME INTENTION OF ACTING ON THEM?: YES
5. HAVE YOU STARTED TO WORK OUT OR WORKED OUT THE DETAILS OF HOW TO KILL YOURSELF? DO YOU INTEND TO CARRY OUT THIS PLAN?: NO

## 2020-05-14 NOTE — Clinical Note
Hello,  Just passing this along to inform you this patient began therapy with me today.  Thanks! Lauren

## 2020-05-14 NOTE — Clinical Note
Leopoldo Kinney,    I am passing this along to inform you this patient began therapy with me yesterday to help manage emotional distress. Feel free to reach out to coordinate care at any time.    Thanks! Lauren
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
## 2020-05-19 ENCOUNTER — VIRTUAL VISIT (OUTPATIENT)
Dept: FAMILY MEDICINE | Facility: CLINIC | Age: 40
End: 2020-05-19
Payer: COMMERCIAL

## 2020-05-19 DIAGNOSIS — F43.22 ADJUSTMENT DISORDER WITH ANXIOUS MOOD: ICD-10-CM

## 2020-05-19 PROCEDURE — 99213 OFFICE O/P EST LOW 20 MIN: CPT | Mod: 95 | Performed by: NURSE PRACTITIONER

## 2020-05-19 PROCEDURE — 96127 BRIEF EMOTIONAL/BEHAV ASSMT: CPT | Performed by: NURSE PRACTITIONER

## 2020-05-19 RX ORDER — HYDROXYZINE HYDROCHLORIDE 25 MG/1
25 TABLET, FILM COATED ORAL
Qty: 60 TABLET | Refills: 1 | Status: SHIPPED | OUTPATIENT
Start: 2020-05-19 | End: 2020-09-11

## 2020-05-19 ASSESSMENT — PATIENT HEALTH QUESTIONNAIRE - PHQ9
SUM OF ALL RESPONSES TO PHQ QUESTIONS 1-9: 15
5. POOR APPETITE OR OVEREATING: NEARLY EVERY DAY

## 2020-05-19 ASSESSMENT — ANXIETY QUESTIONNAIRES
1. FEELING NERVOUS, ANXIOUS, OR ON EDGE: MORE THAN HALF THE DAYS
2. NOT BEING ABLE TO STOP OR CONTROL WORRYING: MORE THAN HALF THE DAYS
7. FEELING AFRAID AS IF SOMETHING AWFUL MIGHT HAPPEN: MORE THAN HALF THE DAYS
6. BECOMING EASILY ANNOYED OR IRRITABLE: MORE THAN HALF THE DAYS
5. BEING SO RESTLESS THAT IT IS HARD TO SIT STILL: MORE THAN HALF THE DAYS
3. WORRYING TOO MUCH ABOUT DIFFERENT THINGS: MORE THAN HALF THE DAYS
IF YOU CHECKED OFF ANY PROBLEMS ON THIS QUESTIONNAIRE, HOW DIFFICULT HAVE THESE PROBLEMS MADE IT FOR YOU TO DO YOUR WORK, TAKE CARE OF THINGS AT HOME, OR GET ALONG WITH OTHER PEOPLE: VERY DIFFICULT
GAD7 TOTAL SCORE: 15

## 2020-05-19 NOTE — PATIENT INSTRUCTIONS
Increase Sertraline (the one you take daily in the AM). To 100 mg ( 2 tablets) daily.    Can use Ativan/Lorazepam every 6 hours as needed for severe anxiety.  Continue hydroxyzine at night as needed for sleep.  Follow-up in 3-4 weeks.  Sooner if needed.

## 2020-05-19 NOTE — PROGRESS NOTES
"Crystal Rose is a 39 year old female who is being evaluated via a billable telephone visit.      The patient has been notified of following:     \"This telephone visit will be conducted via a call between you and your physician/provider. We have found that certain health care needs can be provided without the need for a physical exam.  This service lets us provide the care you need with a short phone conversation.  If a prescription is necessary we can send it directly to your pharmacy.  If lab work is needed we can place an order for that and you can then stop by our lab to have the test done at a later time.    Telephone visits are billed at different rates depending on your insurance coverage. During this emergency period, for some insurers they may be billed the same as an in-person visit.  Please reach out to your insurance provider with any questions.    If during the course of the call the physician/provider feels a telephone visit is not appropriate, you will not be charged for this service.\"    Patient has given verbal consent for Telephone visit?  Yes    What phone number would you like to be contacted at? [phone]    How would you like to obtain your AVS? Teresa Ricketts     Crystal Rose is a 39 year old female who presents via phone visit today for the following health issues:    HPI  Depression and Anxiety Follow-Up    How are you doing with your depression since your last visit? No change- feels more calm, but still feels depressed thought states has not had any thoughts of wishing she were dead in over a week    How are you doing with your anxiety since your last visit?  has seen a reduction in the panic attacks quite a bit.  She is taking sertraline 50 mg daily.  Did not know she had the lorazepam so she was not taking.  She is unsure if this is in the bag from the pharmacy but will look today.  Taking hydroxyzine at night for sleep which is helping.     Are you having " other symptoms that might be associated with depression or anxiety? No    Have you had a significant life event? No     Do you have any concerns with your use of alcohol or other drugs? No  She states being off work has been enormously helpful for her stress.  Started therapy last week and will have another appointment late this week.   Social History     Tobacco Use     Smoking status: Former Smoker     Last attempt to quit: 2009     Years since quittin.0     Smokeless tobacco: Never Used   Substance Use Topics     Alcohol use: No     Drug use: No     PHQ 2020   PHQ-9 Total Score 15 17 15   Q9: Thoughts of better off dead/self-harm past 2 weeks Several days Several days Several days   F/U: Thoughts of suicide or self-harm - Yes -   F/U: Self harm-plan - No -   F/U: Self-harm action - No -   F/U: Safety concerns - No -     TERESA-7 SCORE 2020   Total Score - 14 (moderate anxiety) -   Total Score 17 14 15     Last PHQ-9 2020   1.  Little interest or pleasure in doing things 2   2.  Feeling down, depressed, or hopeless 2   3.  Trouble falling or staying asleep, or sleeping too much 1   4.  Feeling tired or having little energy 2   5.  Poor appetite or overeating 2   6.  Feeling bad about yourself 2   7.  Trouble concentrating 2   8.  Moving slowly or restless 1   Q9: Thoughts of better off dead/self-harm past 2 weeks 1   PHQ-9 Total Score 15   Difficulty at work, home, or with people Very difficult   In the past two weeks have you had thoughts of suicide or self harm? -   Do you have concerns about your personal safety or the safety of others? -   In the past 2 weeks have you thought about a plan or had intention to harm yourself? -   In the past 2 weeks have you acted on these thoughts in any way? -     TERESA-7  2020   1. Feeling nervous, anxious, or on edge 2   2. Not being able to stop or control worrying 2   3. Worrying too much about different things  2   4. Trouble relaxing 3   5. Being so restless that it is hard to sit still 2   6. Becoming easily annoyed or irritable 2   7. Feeling afraid, as if something awful might happen 2   TERESA-7 Total Score 15   If you checked any problems, how difficult have they made it for you to do your work, take care of things at home, or get along with other people? Very difficult     In the past two weeks have you had thoughts of suicide or self-harm?  No.    Do you have concerns about your personal safety or the safety of others?   No    Suicide Assessment Five-step Evaluation and Treatment (SAFE-T)      How many servings of fruits and vegetables do you eat daily?  2-3    On average, how many sweetened beverages do you drink each day (Examples: soda, juice, sweet tea, etc.  Do NOT count diet or artificially sweetened beverages)?   0    How many days per week do you exercise enough to make your heart beat faster? 3 or less    How many minutes a day do you exercise enough to make your heart beat faster? 9 or less    How many days per week do you miss taking your medication? 0         Patient Active Problem List   Diagnosis     S/P gastric bypass     Pernicious anemia     CARDIOVASCULAR SCREENING; LDL GOAL LESS THAN 160     Moderate persistent asthma     Environmental allergies     Abnormal Pap smear of cervix     Cervical dysplasia     Morbid obesity (H)     Iron deficiency anemia due to chronic blood loss     Menorrhagia with regular cycle     Postsurgical malabsorption     Intertrigo     Iron deficiency anemia following bariatric surgery     Homeless     Adjustment disorder with anxious mood     Bilateral leg weakness     Acute bilateral low back pain without sciatica     Chronic allergic rhinitis     Carbon monoxide exposure     Past Surgical History:   Procedure Laterality Date     ATTEMPTED LAPAROSCOPY  3/13/2014    Procedure: ATTEMPTED LAPAROSCOPY;;  Surgeon: Cee Garcia MD;  Location: [facility]     COLPOSCOPY CERVIX,  BIOPSY CERVIX, ENDOCERVICAL CURETTAGE, COMBINED      2005:  SUMI I; 6/15/2009:  SUMI I.  Treated with cryo.  10/27/1997:  SUMI I-II     CONIZATION LEEP  3/13/2014    Procedure: CONIZATION LEEP;  LOOP ELECTROSURGICAL EXCISION PROCEDURE; LAPAROSCOPY ATTEMPTED;  Surgeon: Cee Garcia MD;  Location: [facility]     CRYOTHERAPY, CERVICAL  age 19    Pt reported     DILATION AND CURETTAGE, ABLATE ENDOMETRIUM NOVASURE, COMBINED N/A 2018    Procedure: COMBINED DILATION AND CURETTAGE, ABLATE ENDOMETRIUM NOVASURE;  HYSTEROSCOPY, DILATION AND CURETTAGE, ENDOMETRIAL ABLATION WITH NOVASURE, LAPAROSCOPIC BILATERAL TUBAL LIGATION ;  Surgeon: Franki Pinedo MD;  Location: [facility]     EXAM UNDER ANESTHESIA PELVIC  3/20/2014    Procedure: EXAM UNDER ANESTHESIA PELVIC;  EXAM UNDER ANESTHESIA, REMOVAL OF STICHES ;  Surgeon: Cee Garcia MD;  Location:  [facility]     GASTRIC BYPASS      pre-bypass weight was 320#     HC NASAL/SINUS SCOPE W THER INSTILL       HC REMOVAL OF OVARIAN CYST(S)       LAPAROSCOPIC TUBAL LIGATION Bilateral 2018    Procedure: LAPAROSCOPIC TUBAL LIGATION;;  Surgeon: Franki Pinedo MD;  Location: [facility]       Social History     Tobacco Use     Smoking status: Former Smoker     Last attempt to quit: 2009     Years since quittin.0     Smokeless tobacco: Never Used   Substance Use Topics     Alcohol use: No     Family History   Problem Relation Age of Onset     Hypertension Mother      Allergies Mother      Obesity Mother      Asthma Father      Diabetes Father      Hypertension Father      Allergies Father      Cancer Father         Lymphoma d age 58     Asthma Brother      Allergies Sister      Depression Sister      Obesity Sister      Alzheimer Disease Maternal Grandmother      Arthritis Maternal Grandmother      Obesity Maternal Grandmother      Thyroid Disease Maternal Grandmother      Hypertension Maternal Grandfather      Cancer - colorectal Maternal Grandfather      Cerebrovascular  Disease Maternal Grandfather      Diabetes Paternal Grandmother          Current Outpatient Medications   Medication Sig Dispense Refill     albuterol (PROAIR RESPICLICK) 108 (90 Base) MCG/ACT inhaler Inhale 2-4 puffs into the lungs every 4 hours as needed for shortness of breath / dyspnea or wheezing 3 Inhaler 3     Ascorbic Acid (VITAMIN C PO) Take 500 mg by mouth       Calcium Carb-Cholecalciferol (CALCIUM 500 + D) 500-400 MG-UNIT TABS Take 1 tablet by mouth 3 times daily 90 tablet 11     calcium carbonate-vitamin D (CALCIUM 600/VITAMIN D) 600-400 MG-UNIT chewable tablet Take one twice daily and at least 2 hours apart from iron. 180 tablet 3     childrens multivitamin w/iron (FLINTSTONES COMPLETE) chewable tablet Take one tablet twice a day. 200 tablet 4     Cholecalciferol (VITAMIN D-3) 5000 units TABS Take 1 tablet by mouth 2 times daily 100 tablet 3     cholecalciferol 125 MCG (5000 UT) CAPS Take 1 capsule (5,000 Units) by mouth daily 30 capsule 11     cyanocobalamin (VITAMIN B-12) 1000 MCG SUBL sublingual tablet Place 1 tablet (1,000 mcg) under the tongue daily 100 tablet 3     cyanocobalamin (VITAMIN B-12) 500 MCG SUBL sublingual tablet Place 1 tablet (500 mcg) under the tongue daily 30 tablet 11     ferrous fumarate 65 mg, [place]. FE,-Vitamin C 125 mg (VITRON C)  MG TABS tablet Take 2 tablets by mouth daily 180 tablet 3     fluticasone (FLONASE) 50 MCG/ACT nasal spray Spray 1-2 sprays into both nostrils daily 18.2 mL 5     fluticasone-salmeterol (ADVAIR) 250-50 MCG/DOSE inhaler Inhale 1 puff into the lungs 2 times daily 3 Inhaler 3     hydrOXYzine (ATARAX) 25 MG tablet Take 1 tablet (25 mg) by mouth nightly as needed (sleep) 60 tablet 1     ipratropium - albuterol 0.5 mg/2.5 mg/3 mL (DUONEB) 0.5-2.5 (3) MG/3ML neb solution Take 1 vial (3 mLs) by nebulization every 4 hours as needed for shortness of breath / dyspnea or wheezing 25 vial 3     loratadine (CLARITIN) 10 MG tablet Take 1 tablet (10 mg) by  mouth daily 90 tablet 3     LORazepam (ATIVAN) 0.5 MG tablet Take 1 tablet (0.5 mg) by mouth every 6 hours as needed for anxiety 45 tablet 1     methocarbamol (ROBAXIN) 500 MG tablet Take 1 tablet (500 mg) by mouth nightly as needed for muscle spasms 30 tablet 1     montelukast (SINGULAIR) 10 MG tablet Take 1 tablet (10 mg) by mouth At Bedtime 90 tablet 3     Multiple Vitamins-Iron (DAILY MARILYN MULTIVITAMIN/IRON) TABS Take 1 tablet by mouth 2 times daily 100 tablet 3     naproxen (NAPROSYN) 500 MG tablet Take 1 tablet (500 mg) by mouth 2 times daily (with meals) 60 tablet 1     nystatin-triamcinolone (MYCOLOG II) 483789-6.1 UNIT/GM-% external cream Apply twice daily as needed. 30 g 3     order for DME Equipment being ordered: Nebulizer with tubing 1 each 0     order for DME Equipment being ordered: Nebulizer 1 each 0     sertraline (ZOLOFT) 50 MG tablet Take 2 tablets (100 mg) by mouth daily 60 tablet 3     valACYclovir (VALTREX) 500 MG tablet Take 1 tablet (500 mg) by mouth daily 30 tablet 11     Allergies   Allergen Reactions     Cat Hair [Cats]      Dog Hair [Dogs]      Dust Mites      Ragweeds      Nsaids Other (See Comments)     Patient had bariatric surgery and should never take NSAIDs or ASA      BP Readings from Last 3 Encounters:   03/09/20 127/84   12/05/19 128/82   10/28/19 124/84    Wt Readings from Last 3 Encounters:   04/28/20 98.9 kg (218 lb)   03/09/20 101.3 kg (223 lb 6.4 oz)   12/05/19 99.7 kg (219 lb 14.4 oz)                    Reviewed and updated as needed this visit by Provider         Review of Systems   Constitutional, HEENT, cardiovascular, pulmonary, gi and gu systems are negative, except as otherwise noted.       Objective   Reported vitals:  LMP 04/25/2020 (Exact Date)    healthy, alert and no distress  PSYCH: Alert and oriented times 3; coherent speech, normal   rate and volume, able to articulate logical thoughts, able   to abstract reason, no tangential thoughts, no hallucinations   or  delusions  Her affect is flat  RESP: No cough, no audible wheezing, able to talk in full sentences  Remainder of exam unable to be completed due to telephone visits    Diagnostic Test Results:  Labs reviewed in Epic        Assessment/Plan:  1. Adjustment disorder with anxious mood  Improving.  Increase zoloft to 100 mg.  Reminded of lorazepam prescription if needed for severe anxiety.  Continue therapy.  Follow up in 3-4 weeks.    - sertraline (ZOLOFT) 50 MG tablet; Take 2 tablets (100 mg) by mouth daily  Dispense: 60 tablet; Refill: 3  - hydrOXYzine (ATARAX) 25 MG tablet; Take 1 tablet (25 mg) by mouth nightly as needed (sleep)  Dispense: 60 tablet; Refill: 1    Return in about 4 weeks (around 6/16/2020) for Depression Follow up, Anxiety Follow up.      Phone call duration:  13 minutes    SHERIE Bennett CNP

## 2020-05-19 NOTE — Clinical Note
Please call to schedule patient for anxiety and depression follow up in 3-4 weeks via virtual visit. Thanks!  Gregoria

## 2020-05-20 ASSESSMENT — ANXIETY QUESTIONNAIRES: GAD7 TOTAL SCORE: 15

## 2020-06-05 ENCOUNTER — VIRTUAL VISIT (OUTPATIENT)
Dept: PSYCHOLOGY | Facility: CLINIC | Age: 40
End: 2020-06-05
Payer: COMMERCIAL

## 2020-06-05 DIAGNOSIS — F43.89 REACTION TO CHRONIC STRESS: Primary | ICD-10-CM

## 2020-06-05 PROCEDURE — 90834 PSYTX W PT 45 MINUTES: CPT | Mod: 95 | Performed by: SOCIAL WORKER
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
## 2020-06-05 NOTE — Clinical Note
Leopoldo Kinney,  I met with this patient today for therapy and she is really struggling with anxiety, emotional distress and life stressors. This was just the second session, but we were able to explore some helpful emotional and cognitive strategies to work towards relieving and managing mental health distress. She is not able to see me again for a month, but said that she is needing to sign up for an extension of short term disability. I am not able to complete that paperwork, so she will be having that faxed to you- feel free to reach out with any questions to help get that completed for her.    Thanks!! Lauren

## 2020-06-08 ENCOUNTER — VIRTUAL VISIT (OUTPATIENT)
Dept: SURGERY | Facility: CLINIC | Age: 40
End: 2020-06-08
Payer: COMMERCIAL

## 2020-06-08 ENCOUNTER — MYC MEDICAL ADVICE (OUTPATIENT)
Dept: FAMILY MEDICINE | Facility: CLINIC | Age: 40
End: 2020-06-08

## 2020-06-08 VITALS — WEIGHT: 218 LBS | HEIGHT: 68 IN | BODY MASS INDEX: 33.04 KG/M2

## 2020-06-08 DIAGNOSIS — K91.2 POSTSURGICAL MALABSORPTION: ICD-10-CM

## 2020-06-08 DIAGNOSIS — E66.9 OBESITY (BMI 30-39.9): Primary | ICD-10-CM

## 2020-06-08 PROCEDURE — 99213 OFFICE O/P EST LOW 20 MIN: CPT | Mod: 95 | Performed by: FAMILY MEDICINE

## 2020-06-08 ASSESSMENT — MIFFLIN-ST. JEOR: SCORE: 1712.34

## 2020-06-08 NOTE — PATIENT INSTRUCTIONS
[facility]  Nutritional Guidelines  Gastric Bypass 18 Months Post Op and Beyond    General Guidelines and Helpful Hints:    Eat 3 meals per day + protein supplement(s). No snacks between meals.  o Do not skip meals.  This can cause overeating at the next meal and will prevent adequate protein and nutritional intake.    Aim for 60-80 grams of protein per day.  o Always eat your protein first. This assists with optimal nutrition and helps you stay full longer.  o Depending on your portion size, you may need to drink approved protein supplement between meals to achieve protein goals. Follow recommendations of your Dietitian.     Eat your protein first, and then follow with fiber.   o It is not necessary to count your fiber, but 15-20 grams per day is recommended.    o Add fiber by including fruits, vegetables, whole grains, and beans.     Portions should remain about 1 cup per meal. Use measuring cups to be accurate.    Continue to use saucer/salad plates, infant/toddler silverware to keep portion sizes small and take small bites.    Eat S-L-O-W-L-Y to make each meal last 20-30 minutes. Always stop eating when satisfied.    Continue to use caution with foods containing skins, peels or membranes. Chew well!    Aim for 64 oz. of calorie-free fluids daily.  o Continue to avoid caffeine and carbonation. If you choose to drink alcohol, do so in moderation.   o Remember to avoid drinking during meals, 15-30 minutes before and 30 minutes after.    Exercise is martinez for continued weight loss and weight maintenance. Aim for 30-60 minutes of physical activity most days of the week. Include cardiovascular and strength training.    If having trouble tolerating meat, try using a crock-pot, tinfoil tent, steamer or other moist cooking method to create tender meats. Add broth or low-fat gravy to help meat stay moist.     Avoid high sugar and high fat foods to prevent dumping syndrome.  o Check nutrition labels for less  than 10 grams of sugar and less than 10 grams of fat per serving.    Continue Taking Vitamins/Minerals:  o 6905-1959 mcg of Sublingual B-12 daily  o 1 Multivitamin with Iron twice daily (chewable or swallow tabs)  o 500-600 mg Calcium Citrate twice daily (chewable or swallow tabs)  o 5000 IU Vitamin D3 daily    Sample Grocery List    Protein:    Fat free Greek or light yogurt (less than 10 grams sugar)    Fat free or low-fat cottage cheese    String cheese or reduced fat cheese slices    Tuna, salmon, crab, egg, or chicken salad made with light or fat free mayonnaise    Egg or Egg Substitute    Lean/extra lean turkey, beef, bison, venison (ground, sirloin, round, flank)    Pork loin or tenderloin (grilled, baked, broiled)    Fish such as salmon, tuna, trout, tilapia, etc. (grilled, baked, broiled)    Tender cuts of lean (skinless) turkey or chicken    Lean deli meats: turkey, lean ham, chicken, lean roast beef    Beans such as kidney, garbanzo, black, guajardo, or low-fat/fat free refried beans    Peanut butter (natural preferred). Limit to 1 Tbsp. per day.    Low-fat meatloaf (made with lean ground beef or turkey)    Sloppy Joes made with low-sugar ketchup and lean ground beef or turkey    Soy or vegetable protein (i.e. vegan crumbles, soy/veggie burger, tofu)    Hummus    Vegetables:    Fresh: cooked or raw (as tolerated)    Frozen vegetables    Canned vegetables (low sodium or no salt added, rinse before cooking/eating)    (Ok to have skins/peels/membranes/seeds - just chew well)    Fruits:    Fresh fruit    Frozen fruit (no sugar added)    Canned fruit (packed in its own juice, NOT syrup)    (Ok to have skins/peels/membranes/seeds - just chew well)    Starch:    Unsweetened whole-grain hot cereal (or high fiber cold cereal, dry)    Toasted whole wheat bread or [place] Thins    Whole grain crackers    Baked /boiled/mashed potato/sweet potato    Cooked whole grain pasta, brown rice, or other cooked whole  grains    Starchy vegetables: corn, peas, winter squash    Protein Supplement:     Ready to drink protein shake with:  o 15-30 grams protein per serving  o Less than 10 grams total carbohydrate per serving     Protein powder mixed with:  o  Skim or 1% milk  o Low fat or fat free Lactaid milk, plain or no sugar added soymilk  o Water     Fats: (use in moderation)    1 teaspoon of soft tub margarine    1 teaspoon olive oil, canola oil, or peanut oil    1 tablespoon of low-fat elkins or salad dressing     Sample Menu for 18+ months after Gastric Bypass    You do NOT need to eat/drink the full portion sizes listed below  Always stop when you are satisfied    Breakfast   cup 1% cottage cheese     cup mixed berries   Lunch 2 oz lean roast beef on   [place] Thin with 1 tsp. light elkins    small tomato, chopped, mixed with 1 tsp. light vinaigrette dressing   Supplement Approved protein supplement (if needed between meals)   Dinner 2 oz grilled salmon    cup salad greens with 1 tsp. light salad dressing and 1 tsp. ground flax seed    cup quinoa or brown rice     Breakfast   cup egg substitute with   cup sautéed chopped vegetables  2 light [place] Krisp crackers   Lunch Tuna Melt:   cup tuna mixed with 1 tsp. light elkins over   [place] Thin. Top with 2-3 slices cucumber and 1 oz slice of low fat cheese   Supplement 1 cup skim milk (if needed between meals)   Dinner 3 oz  grilled, broiled, or baked seasoned skinless chicken breast    cup asparagus     Breakfast   cup plain oatmeal made with skim or 1% milk with 1 Tbsp. flavored/unflavored protein powder added  1 mozzarella string cheese   Lunch 2 oz deli turkey breast  1/3 cup salad with 1 tsp. light salad dressing, 1/8 of a whole avocado and 1 Tbsp. sunflower seeds   Dinner 3 oz. pork loin made in a crock pot, seasoned with a spice rub    cup cooked carrots   Supplement Approved protein supplement (if needed between meals)     Breakfast 1 cup breakfast casserole made with egg  substitute, turkey sausage,  and steamed, chopped bell peppers   Supplement  1 cup light Greek yogurt (if needed between meals)   Lunch 2 oz. teriyaki turkey    cup mashed sweet potato with 1-2 spritzes of spray butter (like [place])    cup fresh pineapple   Dinner 3 oz low fat meatloaf    cup roasted garlic zucchini     Breakfast   cup leftover breakfast casserole    cup no sugar added applesauce with 1 Tbsp. unflavored protein powder and a sprinkle of cinnamon    Lunch 3 oz shrimp with 1-2 Tbsp. low-sugar cocktail sauce for dipping    c. whole wheat pasta drizzled with   tsp. olive oil   Supplement 1 cup skim/1% milk with scoop of protein powder (if needed between meals)   Dinner Grilled, seasoned kebob with 2 oz lean beef and   cup vegetables     Breakfast Breakfast pizza:   [place] Thin spread with 1 Tbsp. low sugar spaghetti sauce,   cup shredded low fat cheese, melted and 1 slice of Portuguese [place]     cup fresh fruit mixed with chopped almonds   Lunch   cup black bean soup  4-5 whole grain crackers   Dinner 3 oz  tilapia with lemon pepper seasoning    cup stewed tomatoes   Supplement 1 string cheese (if needed between meals)     Breakfast 2 hard boiled eggs (discard 1 egg yolk)    whole wheat English Muffin with 1 tsp. low sugar jelly   Lunch   cup leftover black bean soup topped with 1-2 Tbsp. low fat cheese  2-3 light [place] Krisp crackers   Supplement Approved protein supplement (if needed between meals)   Dinner 3 oz sirloin steak    cup steamed broccoli

## 2020-06-08 NOTE — ASSESSMENT & PLAN NOTE
Patient is now taking all vitamins as recommended. Routine labs have been ordered and she will have them drawn.
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
## 2020-06-08 NOTE — ASSESSMENT & PLAN NOTE
Patient was congratulated on her success thus far. Healthy habits to assist with further weight loss were discussed. She will continue the phentermine.

## 2020-06-15 NOTE — PROGRESS NOTES
Sent Patient a My Chart message to schedule a follow up virtual visit with Gregoria for next week, and to fill out form.  Cari Rollins [facility]  2nd Floor  Primary Care

## 2020-06-16 ENCOUNTER — TELEPHONE (OUTPATIENT)
Dept: PSYCHOLOGY | Facility: CLINIC | Age: 40
End: 2020-06-16

## 2020-06-16 NOTE — TELEPHONE ENCOUNTER
Therapist attempted to contact client to inform her of opening due to her being on the wait list for an appointment. Client did not answer, so therapist LVM informing her that this appointment is first come first serve, and if she would like to meet she should contact [facility] to schedule it. Therapist also reminded her of next appointment scheduled.    YESI Grewal

## 2020-06-22 ENCOUNTER — TELEPHONE (OUTPATIENT)
Dept: FAMILY MEDICINE | Facility: CLINIC | Age: 40
End: 2020-06-22

## 2020-06-22 ENCOUNTER — MYC MEDICAL ADVICE (OUTPATIENT)
Dept: FAMILY MEDICINE | Facility: CLINIC | Age: 40
End: 2020-06-22

## 2020-06-22 NOTE — TELEPHONE ENCOUNTER
Faxed [employer] [place] form to provider's [employer]ximity fax, right fax confirmed at 9:28 am today, 6/22/2020.  Cari Rollins MA  [facility]  2nd Floor  Primary Care

## 2020-06-22 NOTE — TELEPHONE ENCOUNTER
Sent patient a My Chart response.  Cari Rollins MA  [facility]  2nd Floor  Primary Care

## 2020-06-23 NOTE — TELEPHONE ENCOUNTER
Faxed to [employer] at [phone].  Please inform patient I extended her time of until 7/28.  She needs to have a follow up with me this week.  Please call to schedule her.  Thanks!  Gregoria

## 2020-06-24 NOTE — TELEPHONE ENCOUNTER
Faxed signed form to [employer] [place], [phone], right fax confirmed at 9:48 am today, 6/24/2020. Copy to TC and abstracting. Called and spoke to the patient and scheduled the follow up appt for 6/25/2020 at 9:00.  Cari Rollins [facility]  2nd Floor  Primary Care  q

## 2020-06-25 ENCOUNTER — VIRTUAL VISIT (OUTPATIENT)
Dept: FAMILY MEDICINE | Facility: CLINIC | Age: 40
End: 2020-06-25
Payer: COMMERCIAL

## 2020-06-25 ENCOUNTER — MYC MEDICAL ADVICE (OUTPATIENT)
Dept: FAMILY MEDICINE | Facility: CLINIC | Age: 40
End: 2020-06-25

## 2020-06-25 DIAGNOSIS — F43.22 ADJUSTMENT DISORDER WITH ANXIOUS MOOD: Primary | ICD-10-CM

## 2020-06-25 DIAGNOSIS — Z98.84 BARIATRIC SURGERY STATUS: ICD-10-CM

## 2020-06-25 DIAGNOSIS — J45.40 MODERATE PERSISTENT ASTHMA WITHOUT COMPLICATION: ICD-10-CM

## 2020-06-25 DIAGNOSIS — E66.01 MORBID OBESITY (H): ICD-10-CM

## 2020-06-25 DIAGNOSIS — F43.10 PTSD (POST-TRAUMATIC STRESS DISORDER): ICD-10-CM

## 2020-06-25 DIAGNOSIS — K91.2 POSTSURGICAL MALABSORPTION: ICD-10-CM

## 2020-06-25 DIAGNOSIS — J45.40 MODERATE PERSISTENT ASTHMA WITHOUT COMPLICATION: Primary | ICD-10-CM

## 2020-06-25 PROCEDURE — 99215 OFFICE O/P EST HI 40 MIN: CPT | Mod: 95 | Performed by: NURSE PRACTITIONER

## 2020-06-25 PROCEDURE — 96127 BRIEF EMOTIONAL/BEHAV ASSMT: CPT | Mod: 59 | Performed by: NURSE PRACTITIONER

## 2020-06-25 RX ORDER — BUSPIRONE HYDROCHLORIDE 10 MG/1
10 TABLET ORAL 2 TIMES DAILY
Qty: 60 TABLET | Refills: 3 | Status: SHIPPED | OUTPATIENT
Start: 2020-06-25 | End: 2020-07-27

## 2020-06-25 RX ORDER — ALBUTEROL SULFATE 90 UG/1
2-4 POWDER, METERED RESPIRATORY (INHALATION) EVERY 4 HOURS PRN
Qty: 3 INHALER | Refills: 3 | Status: SHIPPED | OUTPATIENT
Start: 2020-06-25 | End: 2020-07-13

## 2020-06-25 RX ORDER — LORAZEPAM 0.5 MG/1
0.5 TABLET ORAL EVERY 6 HOURS PRN
Qty: 45 TABLET | Refills: 1 | Status: SHIPPED | OUTPATIENT
Start: 2020-06-25 | End: 2021-01-08

## 2020-06-25 RX ORDER — PRAZOSIN HYDROCHLORIDE 1 MG/1
1 CAPSULE ORAL AT BEDTIME
Qty: 30 CAPSULE | Refills: 3 | Status: SHIPPED | OUTPATIENT
Start: 2020-06-25 | End: 2020-07-27

## 2020-06-25 RX ORDER — ALBUTEROL SULFATE 90 UG/1
2 AEROSOL, METERED RESPIRATORY (INHALATION) EVERY 4 HOURS PRN
Qty: 3 INHALER | Refills: 3 | Status: SHIPPED | OUTPATIENT
Start: 2020-06-25 | End: 2020-07-13

## 2020-06-25 ASSESSMENT — PATIENT HEALTH QUESTIONNAIRE - PHQ9
SUM OF ALL RESPONSES TO PHQ QUESTIONS 1-9: 22
5. POOR APPETITE OR OVEREATING: MORE THAN HALF THE DAYS

## 2020-06-25 ASSESSMENT — ANXIETY QUESTIONNAIRES
6. BECOMING EASILY ANNOYED OR IRRITABLE: MORE THAN HALF THE DAYS
7. FEELING AFRAID AS IF SOMETHING AWFUL MIGHT HAPPEN: NOT AT ALL
1. FEELING NERVOUS, ANXIOUS, OR ON EDGE: NEARLY EVERY DAY
3. WORRYING TOO MUCH ABOUT DIFFERENT THINGS: NEARLY EVERY DAY
2. NOT BEING ABLE TO STOP OR CONTROL WORRYING: NEARLY EVERY DAY
5. BEING SO RESTLESS THAT IT IS HARD TO SIT STILL: SEVERAL DAYS
GAD7 TOTAL SCORE: 14

## 2020-06-25 NOTE — PATIENT INSTRUCTIONS
Stop sertraline.      Start Prozace/fluoxetine daily.    Start Buspar twice a day to help prevent anxiety.    Start Prazosin at night for PTSD related nightmares and flashbacks.    Continue Hydroxyzine as needed at night for sleep.  Continue Ativan as needed for severe anxiety.   Continue with therapy.
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
## 2020-06-26 ASSESSMENT — ANXIETY QUESTIONNAIRES: GAD7 TOTAL SCORE: 14

## 2020-07-07 ENCOUNTER — VIRTUAL VISIT (OUTPATIENT)
Dept: PSYCHOLOGY | Facility: CLINIC | Age: 40
End: 2020-07-07
Payer: COMMERCIAL

## 2020-07-07 DIAGNOSIS — F43.89 REACTION TO CHRONIC STRESS: Primary | ICD-10-CM

## 2020-07-07 PROCEDURE — 90834 PSYTX W PT 45 MINUTES: CPT | Mod: 95 | Performed by: SOCIAL WORKER
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
## 2020-07-07 NOTE — PATIENT INSTRUCTIONS
Client will return July 21 at 9am. She will continue to practice self-compassion to reduce guilt and depression.She will use deep breathing as needed and calming/positive self-talk to help reduce anxiety.

## 2020-07-13 ENCOUNTER — VIRTUAL VISIT (OUTPATIENT)
Dept: FAMILY MEDICINE | Facility: CLINIC | Age: 40
End: 2020-07-13
Payer: COMMERCIAL

## 2020-07-13 DIAGNOSIS — J01.90 ACUTE SINUSITIS WITH SYMPTOMS > 10 DAYS: ICD-10-CM

## 2020-07-13 DIAGNOSIS — F43.22 ADJUSTMENT DISORDER WITH ANXIOUS MOOD: Primary | ICD-10-CM

## 2020-07-13 DIAGNOSIS — M54.50 ACUTE BILATERAL LOW BACK PAIN WITHOUT SCIATICA: ICD-10-CM

## 2020-07-13 DIAGNOSIS — J45.40 MODERATE PERSISTENT ASTHMA WITHOUT COMPLICATION: ICD-10-CM

## 2020-07-13 PROCEDURE — 99214 OFFICE O/P EST MOD 30 MIN: CPT | Mod: 95 | Performed by: NURSE PRACTITIONER

## 2020-07-13 PROCEDURE — 96127 BRIEF EMOTIONAL/BEHAV ASSMT: CPT | Mod: 59 | Performed by: NURSE PRACTITIONER

## 2020-07-13 RX ORDER — ALBUTEROL SULFATE 90 UG/1
2 AEROSOL, METERED RESPIRATORY (INHALATION) EVERY 4 HOURS PRN
Qty: 3 INHALER | Refills: 3 | Status: SHIPPED | OUTPATIENT
Start: 2020-07-13 | End: 2020-07-27

## 2020-07-13 RX ORDER — METHOCARBAMOL 500 MG/1
500 TABLET, FILM COATED ORAL
Qty: 30 TABLET | Refills: 1 | Status: SHIPPED | OUTPATIENT
Start: 2020-07-13 | End: 2021-05-26

## 2020-07-13 ASSESSMENT — ANXIETY QUESTIONNAIRES
3. WORRYING TOO MUCH ABOUT DIFFERENT THINGS: SEVERAL DAYS
1. FEELING NERVOUS, ANXIOUS, OR ON EDGE: NEARLY EVERY DAY
2. NOT BEING ABLE TO STOP OR CONTROL WORRYING: MORE THAN HALF THE DAYS
5. BEING SO RESTLESS THAT IT IS HARD TO SIT STILL: SEVERAL DAYS
6. BECOMING EASILY ANNOYED OR IRRITABLE: MORE THAN HALF THE DAYS
IF YOU CHECKED OFF ANY PROBLEMS ON THIS QUESTIONNAIRE, HOW DIFFICULT HAVE THESE PROBLEMS MADE IT FOR YOU TO DO YOUR WORK, TAKE CARE OF THINGS AT HOME, OR GET ALONG WITH OTHER PEOPLE: EXTREMELY DIFFICULT
7. FEELING AFRAID AS IF SOMETHING AWFUL MIGHT HAPPEN: NEARLY EVERY DAY
GAD7 TOTAL SCORE: 15

## 2020-07-13 ASSESSMENT — PATIENT HEALTH QUESTIONNAIRE - PHQ9
SUM OF ALL RESPONSES TO PHQ QUESTIONS 1-9: 17
5. POOR APPETITE OR OVEREATING: NEARLY EVERY DAY

## 2020-07-13 NOTE — Clinical Note
Please call patient to schedule follow up in 2 weeks for anxiety/depression with me (telephone). Thanks!  Gregoria

## 2020-07-13 NOTE — PROGRESS NOTES
"Crystal Rose is a 39 year old female who is being evaluated via a billable telephone visit.      The patient has been notified of following:     \"This telephone visit will be conducted via a call between you and your physician/provider. We have found that certain health care needs can be provided without the need for a physical exam.  This service lets us provide the care you need with a short phone conversation.  If a prescription is necessary we can send it directly to your pharmacy.  If lab work is needed we can place an order for that and you can then stop by our lab to have the test done at a later time.    Telephone visits are billed at different rates depending on your insurance coverage. During this emergency period, for some insurers they may be billed the same as an in-person visit.  Please reach out to your insurance provider with any questions.    If during the course of the call the physician/provider feels a telephone visit is not appropriate, you will not be charged for this service.\"    Patient has given verbal consent for Telephone visit?  Yes    What phone number would you like to be contacted at? [phone]    How would you like to obtain your AVS? Teresa Ricketts     Crystal Rose is a 39 year old female who presents via phone visit today for the following health issues:    HPI  Depression and Anxiety Follow-Up    How are you doing with your depression since your last visit? Improved     How are you doing with your anxiety since your last visit?  Improved     Are you having other symptoms that might be associated with depression or anxiety? No    Have you had a significant life event? No     Do you have any concerns with your use of alcohol or other drugs? No  Patient thought Buspar was the Sertraline replacement instead of Prozac.  She is taking the Buspar twice a day.  Has not started Prozac.  She did stop the Sertraline 1.5 weeks ago  Prazosin has helped with " nightmares.  Feels more rested.  Sleeping for 6 hours at a time now.  She now has panic attacks once a day which she states is an improvement.  The episodes are less severe.    Has also had issues with back spasms again.  She is taking Methocarbamol which is helpful.  Taking it at night.  Back spasms worst during panic attack  Social History     Tobacco Use     Smoking status: Former Smoker     Last attempt to quit: 2009     Years since quittin.1     Smokeless tobacco: Never Used   Substance Use Topics     Alcohol use: No     Drug use: No     PHQ 2020   PHQ-9 Total Score 15 22 17   Q9: Thoughts of better off dead/self-harm past 2 weeks Several days More than half the days More than half the days   F/U: Thoughts of suicide or self-harm - - -   F/U: Self harm-plan - - -   F/U: Self-harm action - - -   F/U: Safety concerns - - -     TERESA-7 SCORE 2020   Total Score - - -   Total Score 15 14 15     Last PHQ-9 2020   1.  Little interest or pleasure in doing things 2   2.  Feeling down, depressed, or hopeless 2   3.  Trouble falling or staying asleep, or sleeping too much 3   4.  Feeling tired or having little energy 2   5.  Poor appetite or overeating 1   6.  Feeling bad about yourself 2   7.  Trouble concentrating 2   8.  Moving slowly or restless 1   Q9: Thoughts of better off dead/self-harm past 2 weeks 2   PHQ-9 Total Score 17   Difficulty at work, home, or with people Extremely dIfficult   In the past two weeks have you had thoughts of suicide or self harm? -   Do you have concerns about your personal safety or the safety of others? -   In the past 2 weeks have you thought about a plan or had intention to harm yourself? -   In the past 2 weeks have you acted on these thoughts in any way? -     TERESA-7  2020   1. Feeling nervous, anxious, or on edge 3   2. Not being able to stop or control worrying 2   3. Worrying too much about different things 1   4.  Trouble relaxing 3   5. Being so restless that it is hard to sit still 1   6. Becoming easily annoyed or irritable 2   7. Feeling afraid, as if something awful might happen 3   TERESA-7 Total Score 15   If you checked any problems, how difficult have they made it for you to do your work, take care of things at home, or get along with other people? Extremely difficult     In the past two weeks have you had thoughts of suicide or self-harm?  No.    Do you have concerns about your personal safety or the safety of others?   No    Suicide Assessment Five-step Evaluation and Treatment (SAFE-T)      How many servings of fruits and vegetables do you eat daily?  2-3    On average, how many sweetened beverages do you drink each day (Examples: soda, juice, sweet tea, etc.  Do NOT count diet or artificially sweetened beverages)?   0    How many days per week do you exercise enough to make your heart beat faster? 3 or less    How many minutes a day do you exercise enough to make your heart beat faster? 20 - 29    How many days per week do you miss taking your medication? 0         Patient Active Problem List   Diagnosis     S/P gastric bypass     Pernicious anemia     CARDIOVASCULAR SCREENING; LDL GOAL LESS THAN 160     Moderate persistent asthma     Environmental allergies     Abnormal Pap smear of cervix     Cervical dysplasia     Morbid obesity (H)     Iron deficiency anemia due to chronic blood loss     Menorrhagia with regular cycle     Postsurgical malabsorption     Intertrigo     Iron deficiency anemia following bariatric surgery     Homeless     Adjustment disorder with anxious mood     Bilateral leg weakness     Acute bilateral low back pain without sciatica     Chronic allergic rhinitis     Carbon monoxide exposure     Obesity (BMI 30-39.9)     Past Surgical History:   Procedure Laterality Date     ATTEMPTED LAPAROSCOPY  3/13/2014    Procedure: ATTEMPTED LAPAROSCOPY;;  Surgeon: Cee Garcia MD;  Location: [facility]      COLPOSCOPY CERVIX, BIOPSY CERVIX, ENDOCERVICAL CURETTAGE, COMBINED      2005:  SUMI I; 6/15/2009:  SUMI I.  Treated with cryo.  10/27/1997:  SUMI I-II     CONIZATION LEEP  3/13/2014    Procedure: CONIZATION LEEP;  LOOP ELECTROSURGICAL EXCISION PROCEDURE; LAPAROSCOPY ATTEMPTED;  Surgeon: Cee Garcia MD;  Location: [facility]     CRYOTHERAPY, CERVICAL  age 19    Pt reported     DILATION AND CURETTAGE, ABLATE ENDOMETRIUM NOVASURE, COMBINED N/A 2018    Procedure: COMBINED DILATION AND CURETTAGE, ABLATE ENDOMETRIUM NOVASURE;  HYSTEROSCOPY, DILATION AND CURETTAGE, ENDOMETRIAL ABLATION WITH NOVASURE, LAPAROSCOPIC BILATERAL TUBAL LIGATION ;  Surgeon: Franki Pinedo MD;  Location: [facility]     EXAM UNDER ANESTHESIA PELVIC  3/20/2014    Procedure: EXAM UNDER ANESTHESIA PELVIC;  EXAM UNDER ANESTHESIA, REMOVAL OF STICHES ;  Surgeon: Cee Garcia MD;  Location:  OR     GASTRIC BYPASS      pre-bypass weight was 320#     HC NASAL/SINUS SCOPE W THER INSTILL       HC REMOVAL OF OVARIAN CYST(S)       LAPAROSCOPIC TUBAL LIGATION Bilateral 2018    Procedure: LAPAROSCOPIC TUBAL LIGATION;;  Surgeon: Franki Pinedo MD;  Location: [facility]       Social History     Tobacco Use     Smoking status: Former Smoker     Last attempt to quit: 2009     Years since quittin.1     Smokeless tobacco: Never Used   Substance Use Topics     Alcohol use: No     Family History   Problem Relation Age of Onset     Hypertension Mother      Allergies Mother      Obesity Mother      Asthma Father      Diabetes Father      Hypertension Father      Allergies Father      Cancer Father         Lymphoma d age 58     Asthma Brother      Allergies Sister      Depression Sister      Obesity Sister      Alzheimer Disease Maternal Grandmother      Arthritis Maternal Grandmother      Obesity Maternal Grandmother      Thyroid Disease Maternal Grandmother      Hypertension Maternal Grandfather      Cancer - colorectal Maternal Grandfather       Cerebrovascular Disease Maternal Grandfather      Diabetes Paternal Grandmother          Current Outpatient Medications   Medication Sig Dispense Refill     albuterol (PROAIR HFA/PROVENTIL HFA/VENTOLIN HFA) 108 (90 Base) MCG/ACT inhaler Inhale 2 puffs into the lungs every 4 hours as needed for shortness of breath / dyspnea or wheezing 3 Inhaler 3     amoxicillin-clavulanate (AUGMENTIN) 875-125 MG tablet Take 1 tablet by mouth 2 times daily for 10 days 20 tablet 0     Ascorbic Acid (VITAMIN C PO) Take 500 mg by mouth       busPIRone (BUSPAR) 10 MG tablet Take 1 tablet (10 mg) by mouth 2 times daily 60 tablet 3     Calcium Carb-Cholecalciferol (CALCIUM 500 + D) 500-400 MG-UNIT TABS Take 1 tablet by mouth 3 times daily 90 tablet 11     calcium carbonate-vitamin D (CALCIUM 600/VITAMIN D) 600-400 MG-UNIT chewable tablet Take one twice daily and at least 2 hours apart from iron. 180 tablet 3     childrens multivitamin w/iron (FLINTSTONES COMPLETE) chewable tablet Take one tablet twice a day. 200 tablet 4     Cholecalciferol (VITAMIN D-3) 5000 units TABS Take 1 tablet by mouth 2 times daily 100 tablet 3     cholecalciferol 125 MCG (5000 UT) CAPS Take 1 capsule (5,000 Units) by mouth daily 30 capsule 11     cyanocobalamin (VITAMIN B-12) 1000 MCG SUBL sublingual tablet Place 1 tablet (1,000 mcg) under the tongue daily 100 tablet 3     cyanocobalamin (VITAMIN B-12) 500 MCG SUBL sublingual tablet Place 1 tablet (500 mcg) under the tongue daily 30 tablet 11     ferrous fumarate 65 mg, [place]. FE,-Vitamin C 125 mg (VITRON C)  MG TABS tablet Take 2 tablets by mouth daily 180 tablet 3     FLUoxetine (PROZAC) 20 MG capsule Take 1 capsule (20 mg) by mouth daily 30 capsule 3     fluticasone (FLONASE) 50 MCG/ACT nasal spray Spray 1-2 sprays into both nostrils daily 18.2 mL 5     fluticasone-salmeterol (ADVAIR) 500-50 MCG/DOSE inhaler Inhale 1 puff into the lungs every 12 hours 3 Inhaler 3     hydrOXYzine (ATARAX) 25 MG tablet Take  1 tablet (25 mg) by mouth nightly as needed (sleep) 60 tablet 1     ipratropium - albuterol 0.5 mg/2.5 mg/3 mL (DUONEB) 0.5-2.5 (3) MG/3ML neb solution Take 1 vial (3 mLs) by nebulization every 4 hours as needed for shortness of breath / dyspnea or wheezing 25 vial 3     loratadine (CLARITIN) 10 MG tablet Take 1 tablet (10 mg) by mouth daily 90 tablet 3     LORazepam (ATIVAN) 0.5 MG tablet Take 1 tablet (0.5 mg) by mouth every 6 hours as needed for anxiety 45 tablet 1     methocarbamol (ROBAXIN) 500 MG tablet Take 1 tablet (500 mg) by mouth nightly as needed for muscle spasms 30 tablet 1     montelukast (SINGULAIR) 10 MG tablet Take 1 tablet (10 mg) by mouth At Bedtime 90 tablet 3     Multiple Vitamins-Iron (DAILY MARILYN MULTIVITAMIN/IRON) TABS Take 1 tablet by mouth 2 times daily 100 tablet 3     nystatin-triamcinolone (MYCOLOG II) 120017-0.1 UNIT/GM-% external cream Apply twice daily as needed. 30 g 3     order for DME Equipment being ordered: Nebulizer with tubing 1 each 0     order for DME Equipment being ordered: Nebulizer 1 each 0     prazosin (MINIPRESS) 1 MG capsule Take 1 capsule (1 mg) by mouth At Bedtime 30 capsule 3     valACYclovir (VALTREX) 500 MG tablet Take 1 tablet (500 mg) by mouth daily 30 tablet 11     Allergies   Allergen Reactions     Cat Hair [Cats]      Dog Hair [Dogs]      Dust Mites      Ragweeds      Nsaids Other (See Comments)     Patient had bariatric surgery and should never take NSAIDs or ASA      BP Readings from Last 3 Encounters:   03/09/20 127/84   12/05/19 128/82   10/28/19 124/84    Wt Readings from Last 3 Encounters:   06/08/20 98.9 kg (218 lb)   04/28/20 98.9 kg (218 lb)   03/09/20 101.3 kg (223 lb 6.4 oz)                    Reviewed and updated as needed this visit by Provider         Review of Systems   Constitutional, HEENT, cardiovascular, pulmonary, GI, , musculoskeletal, neuro, skin, endocrine and psych systems are negative, except as otherwise noted.       Objective    Reported vitals:  There were no vitals taken for this visit.   healthy, alert and no distress  PSYCH: Alert and oriented times 3; coherent speech, normal   rate and volume, able to articulate logical thoughts, able   to abstract reason, no tangential thoughts, no hallucinations   or delusions  Her affect is normal  RESP: No cough, no audible wheezing, able to talk in full sentences  Remainder of exam unable to be completed due to telephone visits    Diagnostic Test Results:  Labs reviewed in Epic        Assessment/Plan:  1. Adjustment disorder with anxious mood  Discussed correct medication regimen.  Overall, anxiety improved with prazosin and buspar.  She will start Prozac and follow up in 2 weeks.     2. Acute bilateral low back pain without sciatica  Can continue below as well as heidi eexercises.  If pain continues, plan for physical therapy referral.   - methocarbamol (ROBAXIN) 500 MG tablet; Take 1 tablet (500 mg) by mouth nightly as needed for muscle spasms  Dispense: 30 tablet; Refill: 1    3. Moderate persistent asthma without complication  Patient states possible better coverage for inhalers with mail order pharmacy.  [facility]    - fluticasone-salmeterol (ADVAIR) 500-50 MCG/DOSE inhaler; Inhale 1 puff into the lungs every 12 hours  Dispense: 3 Inhaler; Refill: 3  - albuterol (PROAIR HFA/PROVENTIL HFA/VENTOLIN HFA) 108 (90 Base) MCG/ACT inhaler; Inhale 2 puffs into the lungs every 4 hours as needed for shortness of breath / dyspnea or wheezing  Dispense: 3 Inhaler; Refill: 3    4. Acute sinusitis with symptoms > 10 days  Symptoms of sinusitis similar to previous infections.  Plan for below.    - amoxicillin-clavulanate (AUGMENTIN) 875-125 MG tablet; Take 1 tablet by mouth 2 times daily for 10 days  Dispense: 20 tablet; Refill: 0    Return in about 2 weeks (around 7/27/2020) for Anxiety Follow up, Depression Follow up.      Phone call duration:  26 minutes    SHERIE Bennett  CNP    Patient Instructions     MEDICATIONS:  -Fluoxetine (Prozac) one capsule daily for depression (replaces Sertraline)  -Buspirone (Buspar) 1 tablet twice a day for anxiety  -Prazosin (Minipress) 1 tablet at night before sleep for nightmares/flashbacks  -Lorazepam (Ativan) 1 tablet as needed for acute anxiety/panic attacks    Back Care Tips    Caring for your back  These are things you can do to prevent a recurrence of acute back pain and to reduce symptoms from chronic back pain:    Maintain a healthy weight. If you are overweight, losing weight will help most types of back pain.    Exercise is an important part of recovery from most types of back pain. The muscles behind and in front of the spine support the back. This means strengthening both the back muscles and the abdominal muscles will provide better support for your spine.     Swimming and brisk walking are good overall exercises to improve your fitness level.    Practice safe lifting methods (below).    Practice good posture when sitting, standing and walking. Avoid prolonged sitting. This puts more stress on the lower back than standing or walking.    Wear quality shoes with sufficient arch support. Foot and ankle alignment can affect back symptoms. Women should avoid wearing high heels.    Therapeutic massage can help relax the back muscles without stretching them.    During the first 24 to 72 hours after an acute injury or flare-up of chronic back pain, apply an ice pack to the painful area for 20 minutes and then remove it for 20 minutes, over a period of 60 to 90 minutes, or several times a day. As a safety precaution, do not use a heating pad at bedtime. Sleeping on a heating pad can lead to skin burns or tissue damage.    You can alternate ice and heat therapies.  Medications  Talk to your healthcare provider before using medicines, especially if you have other medical problems or are taking other medicines.    You may use acetaminophen or  ibuprofen to control pain, unless your healthcare provider prescribed other pain medicine. If you have chronic conditions like diabetes, liver or kidney disease, stomach ulcers, or gastrointestinal bleeding, or are taking blood thinners, talk with your healthcare provider before taking any medicines.    Be careful if you are given prescription pain medicines, narcotics, or medicine for muscle spasm. They can cause drowsiness, affect your coordination, reflexes, and judgment. Do not drive or operate heavy machinery while taking these types of medicines. Take prescription pain medicine only as prescribed by your healthcare provider.  Lumbar stretch  Here is a simple stretching exercise that will help relax muscle spasm and keep your back more limber. If exercise makes your back pain worse, don t do it.    Lie on your back with your knees bent and both feet on the ground.    Slowly raise your left knee to your chest as you flatten your lower back against the floor. Hold for 5 seconds.    Relax and repeat the exercise with your right knee.    Do 10 of these exercises for each leg.  Safe lifting method    Don t bend over at the waist to lift an object off the floor.  Instead, bend your knees and hips in a squat.     Keep your back and head upright    Hold the object close to your body, directly in front of you.    Straighten your legs to lift the object.     Lower the object to the floor in the reverse fashion.    If you must slide something across the floor, push it.  Posture tips  Sitting  Sit in chairs with straight backs or low-back support. Keep your knees lower than your hips, with your feet flat on the floor.  When driving, sit up straight. Adjust the seat forward so you are not leaning toward the steering wheel.  A small pillow or rolled towel behind your lower back may help if you are driving long distances.   Standing  When standing for long periods, shift most of your weight to one leg at a time. Alternate  legs every few minutes.   Sleeping  The best way to sleep is on your side with your knees bent. Put a low pillow under your head to support your neck in a neutral spine position. Avoid thick pillows that bend your neck to one side. Put a pillow between your legs to further relax your lower back. If you sleep on your back, put pillows under your knees to support your legs in a slightly flexed position. Use a firm mattress. If your mattress sags, replace it, or use a 1/2-inch plywood board under the mattress to add support.  Follow-up care  Follow up with your healthcare provider, or as advised.  If X-rays, a CT scan or an MRI scan were taken, they will be reviewed by a radiologist. You will be notified of any new findings that may affect your care.  Call 911  Seek emergency medical care if any of the following occur:    Trouble breathing    Confusion    Very drowsy    Fainting or loss of consciousness    Rapid or very slow heart rate    Loss of  bowel or bladder control  When to seek medical care  Call your healthcare provider if any of the following occur:    Pain becomes worse or spreads to your arms or legs    Weakness or numbness in one or both arms or legs    Numbness in the groin area  Date Last Reviewed: 6/1/2016 2000-2017 [employer]. [address] [place] [address], [place] [address]. All rights reserved. This information is not intended as a substitute for professional medical care. Always follow your healthcare professional's instructions.        Exercises to Strengthen Your Lower Back  Strong lower back and abdominal muscles work together to support your spine. The exercises below will help strengthen the lower back. It is important that you begin exercising slowly and increase levels gradually.  Always begin any exercise program with stretching. If you feel pain while doing any of these exercises, stop and talk to your doctor about a more specific exercise program that better suits your  condition.   Low back stretch  The point of stretching is to make you more flexible and increase your range of motion. Stretch only as much as you are able. Stretch slowly. Do not push your stretch to the limit. If at any point you feel pain while stretching, this is your (temporary) limit.    Lie on your back with your knees bent and both feet on the ground.    Slowly raise your left knee to your chest as you flatten your lower back against the floor. Hold for 5 seconds.    Relax and repeat the exercise with your right knee.    Do 10 of these exercises for each leg.    Repeat hugging both knees to your chest at the same time.  Building lower back strength  Start your exercise routine with 10 to 30 minutes a day, 1 to 3 times a day.  Initial exercises  Lying on your back:  1. Ankle pumps: Move your foot up and down, towards your head, and then away. Repeat 10 times with each foot.  2. Heel slides: Slowly bend your knee, drawing the heel of your foot towards you. Then slide your heel/foot from you, straightening your knee. Do not lift your foot off the floor (this is not a leg lift).  3. Abdominal contraction: Bend your knees and put your hands on your stomach. Tighten your stomach muscles. Hold for 5 seconds, then relax. Repeat 10 times.  4. Straight leg raise: Bend one leg at the knee and keep the other leg straight. Tighten your stomach muscles. Slowly lift your straight leg 6 to 12 inches off the floor and hold for up to 5 seconds. Repeat 10 times on each side.  Standin. Wall squats: Stand with your back against the wall. Move your feet about 12 inches away from the wall. Tighten your stomach muscles, and slowly bend your knees until they are at about a 45 degree angle. Do not go down too far. Hold about 5 seconds. Then slowly return to your starting position. Repeat 10 times.  2. Heel raises: Stand facing the wall. Slowly raise the heels of your feet up and down, while keeping your toes on the floor. If you  have trouble balancing, you can touch the wall with your hands. Repeat 10 times.  More advanced exercises  When you feel comfortable enough, try these exercises.  1. Kneeling lumbar extension: Begin on your hands and knees. At the same time, raise and straighten your right arm and left leg until they are parallel to the ground. Hold for 2 seconds and come back slowly to a starting position. Repeat with left arm and right leg, alternating 10 times.  2. Prone lumbar extension: Lie face down, arms extended overhead, palms on the floor. At the same time, raise your right arm and left leg as high as comfortably possible. Hold for 10 seconds and slowly return to start. Repeat with left arm and right leg, alternating 10 times. Gradually build up to 20 times. (Advanced: Repeat this exercise raising both arms and both legs a few inches off the floor at the same time. Hold for 5 seconds and release.)  3. Pelvic tilt: Lie on the floor on your back with your knees bent at 90 degrees. Your feet should be flat on the floor. Inhale, exhale, then slowly contract your abdominal muscles bringing your navel toward your spine. Let your pelvis rock back until your lower back is flat on the floor. Hold for 10 seconds while breathing smoothly.  4. Abdominal crunch: Perform a pelvic tilt (above) flattening your lower back against the floor. Holding the tension in your abdominal muscles, take another breath and raise your shoulder blades off the ground (this is not a full sit-up). Keep your head in line with your body (don t bend your neck forward). Hold for 2 seconds, then slowly lower.  Date Last Reviewed: 6/1/2016 2000-2017 [employer]. [address] [place] [address], [place] [address]. All rights reserved. This information is not intended as a substitute for professional medical care. Always follow your healthcare professional's instructions.        Back Exercises: Back Press    Do this exercise on your hands and knees. Keep  your knees under your hips and your hands under your shoulders. Keep your spine in a neutral position (not arched or sagging). Be sure to maintain your neck s natural curve:    Tighten your stomach and buttock muscles to press your back upward. Let your head drop slightly.    Hold for 5 seconds. Return to starting position.    Repeat 5 times.  Date Last Reviewed: 10/11/2015    6538-5663 [employer]. [address] [place] [address]. All rights reserved. This information is not intended as a substitute for professional medical care. Always follow your healthcare professional's instructions.        Back Exercises: Back Release  Do this exercise on your hands and knees. Keep your knees under your hips and your hands under your shoulders.        Relax your abdominal and buttocks muscles, lift your head, and let your back sag. Be sure to keep your weight evenly distributed. Don t sit back on your hips.     Hold for 5 seconds.    Return to starting position.    Tuck your head and lift (arch) your back.    Hold for 5 seconds    Return to starting position.    Repeat 5 times.  Date Last Reviewed: 8/16/2015 2000-2017 [employer]. [address] [place] [id number]. All rights reserved. This information is not intended as a substitute for professional medical care. Always follow your healthcare professional's instructions.        Back Exercises: Back Extension with Elbow Press    To start, lie face down on your stomach, feet slightly apart, forehead on the floor. Breathe deeply. You should feel comfortable and relaxed in this position.    Press up on your forearms. Keep your stomach and hips on the floor. Stay within your painfree range.    Hold for 20 seconds. Lower slowly.    Repeat 2 times.    Return to starting position.  Date Last Reviewed: 10/13/2015    [id number] [employer]. [address] [place] [id number]. All rights reserved. This information is  not intended as a substitute for professional medical care. Always follow your healthcare professional's instructions.        Back Exercises: Hip Rotator Stretch    To start, lie on your back with your knees bent and feet flat on the floor. Don t press your neck or lower back to the floor. Breathe deeply. You should feel comfortable and relaxed in this position.    Rest your right ankle on your left knee.    Place a towel behind your left thigh, and use it to pull the knee toward your chest. Feel the stretch in your buttocks.    Hold for 30 to 60 seconds. Release.    Repeat 2 times.    Switch legs.     If there is any pain other than stretch in the knee or buttock, stop and contact your healthcare provider.  For your safety, check with your healthcare provider before starting an exercise program.   Date Last Reviewed: 8/16/2015 2000-2017 [employer]. [address] [place] [address]. All rights reserved. This information is not intended as a substitute for professional medical care. Always follow your healthcare professional's instructions.        Back Exercises: Knee Lift         To start, lie on your back with your knees bent and feet flat on the floor. Don t press your neck or lower back to the floor. Breathe deeply. You should feel comfortable and relaxed in this position:    Start by tightening your abdominal muscles.    Lift one bent knee off the floor 2 to 4 inches.    Hold for 10 seconds. Return to start position.    Repeat 3 times.    Switch legs.  Date Last Reviewed: 8/16/2015 2000-2017 [employer]. [address] [place] [address]. All rights reserved. This information is not intended as a substitute for professional medical care. Always follow your healthcare professional's instructions.        Back Exercises: Leg Pull    To start, lie on your back with your knees bent and feet flat on the floor. Don t press your neck or lower back to the floor. Breathe  deeply. You should feel comfortable and relaxed in this position.    Pull one knee to your chest.    Hold for 30 to 60 seconds. Return to starting position.    Repeat 2 times.    Switch legs.    For a double leg pull, pull both legs to your chest at the same time. Repeat 2 times.  For your safety, check with your healthcare provider before starting an exercise program.   Date Last Reviewed: 8/16/2015 2000-2017 [employer]. [address] [place] [address]. All rights reserved. This information is not intended as a substitute for professional medical care. Always follow your healthcare professional's instructions.        Back Exercises: Leg Reach         Do this exercise on your hands and knees. Keep your knees under your hips and your hands under your shoulders. Keep your spine in a neutral position (not arched or sagging). Be sure to maintain your neck s natural curve:    Extend one leg straight back. Don t arch your back or let your head or body sag.    Hold for 5 seconds. Return to starting position.    Repeat 5 times.    Switch legs.   Date Last Reviewed: 8/16/2015 2000-2017 [employer]. [address] [place] [address]. All rights reserved. This information is not intended as a substitute for professional medical care. Always follow your healthcare professional's instructions.        Back Exercises: Lower Back Rotation    To start, lie on your back with your knees bent and feet flat on the floor. Don t press your neck or lower back to the floor. Breathe deeply. You should feel comfortable and relaxed in this position.    Drop both knees to one side. Turn your head to the other side. Keep your shoulders flat on the floor.    Do not push through pain.    Hold for 20 seconds.    Slowly switch sides.    Repeat 2 to 5 times.  Date Last Reviewed: 10/11/2015    [id number] [employer]. [address] [place] [address]. All rights reserved. This  information is not intended as a substitute for professional medical care. Always follow your healthcare professional's instructions.        Back Exercises: Lower Back Stretch    To start, sit in a chair with your feet flat on the floor. Shift your weight slightly forward. Relax, and keep your ears, shoulders, and hips aligned.    Sit with your feet well apart.    Bend forward and touch the floor with the backs of your hands. Relax and let your body drop.    Hold for 20 seconds. Return to starting position.    Repeat 2 times.   Date Last Reviewed: 8/16/2015 2000-2017 [employer]. [address] [place] [address]. All rights reserved. This information is not intended as a substitute for professional medical care. Always follow your healthcare professional's instructions.        Back Exercises: Seated Rotation    To start, sit in a chair with your feet flat on the floor. Shift your weight slightly forward to avoid rounding your back. Relax, and keep your ears, shoulders, and hips aligned:    Fold your arms and elbows just below shoulder height.    Turn from the waist with hips forward. Turn your head last. Do not push through the pain.    Hold for a count of 10 to 30. Return to starting position.    Repeat 3 to 5 times on one side. Then switch sides.  Date Last Reviewed: 10/11/2015    [id number] [employer]. [address] [place] [address]. All rights reserved. This information is not intended as a substitute for professional medical care. Always follow your healthcare professional's instructions.        Back Exercises: Side Stretch      To start, sit in a chair with your feet flat on the floor. Shift your weight slightly forward to avoid rounding your back. Relax. Keep your ears, shoulders, and hips aligned:    Stretch your right arm overhead.    Slowly bend to the left. Don t twist your torso. Stay within your pain limits.    Hold for 20 seconds. Return to starting  position.    Repeat 2 to 5 times. Then, switch to the other side.  Date Last Reviewed: 10/13/2015    [id number] [employer]. [address] [place] [address], [place] [address]. All rights reserved. This information is not intended as a substitute for professional medical care. Always follow your healthcare professional's instructions.

## 2020-07-13 NOTE — PATIENT INSTRUCTIONS
MEDICATIONS:  -Fluoxetine (Prozac) one capsule daily for depression (replaces Sertraline)  -Buspirone (Buspar) 1 tablet twice a day for anxiety  -Prazosin (Minipress) 1 tablet at night before sleep for nightmares/flashbacks  -Lorazepam (Ativan) 1 tablet as needed for acute anxiety/panic attacks    Back Care Tips    Caring for your back  These are things you can do to prevent a recurrence of acute back pain and to reduce symptoms from chronic back pain:    Maintain a healthy weight. If you are overweight, losing weight will help most types of back pain.    Exercise is an important part of recovery from most types of back pain. The muscles behind and in front of the spine support the back. This means strengthening both the back muscles and the abdominal muscles will provide better support for your spine.     Swimming and brisk walking are good overall exercises to improve your fitness level.    Practice safe lifting methods (below).    Practice good posture when sitting, standing and walking. Avoid prolonged sitting. This puts more stress on the lower back than standing or walking.    Wear quality shoes with sufficient arch support. Foot and ankle alignment can affect back symptoms. Women should avoid wearing high heels.    Therapeutic massage can help relax the back muscles without stretching them.    During the first 24 to 72 hours after an acute injury or flare-up of chronic back pain, apply an ice pack to the painful area for 20 minutes and then remove it for 20 minutes, over a period of 60 to 90 minutes, or several times a day. As a safety precaution, do not use a heating pad at bedtime. Sleeping on a heating pad can lead to skin burns or tissue damage.    You can alternate ice and heat therapies.  Medications  Talk to your healthcare provider before using medicines, especially if you have other medical problems or are taking other medicines.    You may use acetaminophen or ibuprofen to control pain, unless your  healthcare provider prescribed other pain medicine. If you have chronic conditions like diabetes, liver or kidney disease, stomach ulcers, or gastrointestinal bleeding, or are taking blood thinners, talk with your healthcare provider before taking any medicines.    Be careful if you are given prescription pain medicines, narcotics, or medicine for muscle spasm. They can cause drowsiness, affect your coordination, reflexes, and judgment. Do not drive or operate heavy machinery while taking these types of medicines. Take prescription pain medicine only as prescribed by your healthcare provider.  Lumbar stretch  Here is a simple stretching exercise that will help relax muscle spasm and keep your back more limber. If exercise makes your back pain worse, don t do it.    Lie on your back with your knees bent and both feet on the ground.    Slowly raise your left knee to your chest as you flatten your lower back against the floor. Hold for 5 seconds.    Relax and repeat the exercise with your right knee.    Do 10 of these exercises for each leg.  Safe lifting method    Don t bend over at the waist to lift an object off the floor.  Instead, bend your knees and hips in a squat.     Keep your back and head upright    Hold the object close to your body, directly in front of you.    Straighten your legs to lift the object.     Lower the object to the floor in the reverse fashion.    If you must slide something across the floor, push it.  Posture tips  Sitting  Sit in chairs with straight backs or low-back support. Keep your knees lower than your hips, with your feet flat on the floor.  When driving, sit up straight. Adjust the seat forward so you are not leaning toward the steering wheel.  A small pillow or rolled towel behind your lower back may help if you are driving long distances.   Standing  When standing for long periods, shift most of your weight to one leg at a time. Alternate legs every few minutes.   Sleeping  The  best way to sleep is on your side with your knees bent. Put a low pillow under your head to support your neck in a neutral spine position. Avoid thick pillows that bend your neck to one side. Put a pillow between your legs to further relax your lower back. If you sleep on your back, put pillows under your knees to support your legs in a slightly flexed position. Use a firm mattress. If your mattress sags, replace it, or use a 1/2-inch plywood board under the mattress to add support.  Follow-up care  Follow up with your healthcare provider, or as advised.  If X-rays, a CT scan or an MRI scan were taken, they will be reviewed by a radiologist. You will be notified of any new findings that may affect your care.  Call 911  Seek emergency medical care if any of the following occur:    Trouble breathing    Confusion    Very drowsy    Fainting or loss of consciousness    Rapid or very slow heart rate    Loss of  bowel or bladder control  When to seek medical care  Call your healthcare provider if any of the following occur:    Pain becomes worse or spreads to your arms or legs    Weakness or numbness in one or both arms or legs    Numbness in the groin area  Date Last Reviewed: 6/1/2016 2000-2017 [employer]. [address] [place] [address], [place] [id number]. All rights reserved. This information is not intended as a substitute for professional medical care. Always follow your healthcare professional's instructions.        Exercises to Strengthen Your Lower Back  Strong lower back and abdominal muscles work together to support your spine. The exercises below will help strengthen the lower back. It is important that you begin exercising slowly and increase levels gradually.  Always begin any exercise program with stretching. If you feel pain while doing any of these exercises, stop and talk to your doctor about a more specific exercise program that better suits your condition.   Low back stretch  The point of  stretching is to make you more flexible and increase your range of motion. Stretch only as much as you are able. Stretch slowly. Do not push your stretch to the limit. If at any point you feel pain while stretching, this is your (temporary) limit.    Lie on your back with your knees bent and both feet on the ground.    Slowly raise your left knee to your chest as you flatten your lower back against the floor. Hold for 5 seconds.    Relax and repeat the exercise with your right knee.    Do 10 of these exercises for each leg.    Repeat hugging both knees to your chest at the same time.  Building lower back strength  Start your exercise routine with 10 to 30 minutes a day, 1 to 3 times a day.  Initial exercises  Lying on your back:  1. Ankle pumps: Move your foot up and down, towards your head, and then away. Repeat 10 times with each foot.  2. Heel slides: Slowly bend your knee, drawing the heel of your foot towards you. Then slide your heel/foot from you, straightening your knee. Do not lift your foot off the floor (this is not a leg lift).  3. Abdominal contraction: Bend your knees and put your hands on your stomach. Tighten your stomach muscles. Hold for 5 seconds, then relax. Repeat 10 times.  4. Straight leg raise: Bend one leg at the knee and keep the other leg straight. Tighten your stomach muscles. Slowly lift your straight leg 6 to 12 inches off the floor and hold for up to 5 seconds. Repeat 10 times on each side.  Standin. Wall squats: Stand with your back against the wall. Move your feet about 12 inches away from the wall. Tighten your stomach muscles, and slowly bend your knees until they are at about a 45 degree angle. Do not go down too far. Hold about 5 seconds. Then slowly return to your starting position. Repeat 10 times.  2. Heel raises: Stand facing the wall. Slowly raise the heels of your feet up and down, while keeping your toes on the floor. If you have trouble balancing, you can touch the  wall with your hands. Repeat 10 times.  More advanced exercises  When you feel comfortable enough, try these exercises.  1. Kneeling lumbar extension: Begin on your hands and knees. At the same time, raise and straighten your right arm and left leg until they are parallel to the ground. Hold for 2 seconds and come back slowly to a starting position. Repeat with left arm and right leg, alternating 10 times.  2. Prone lumbar extension: Lie face down, arms extended overhead, palms on the floor. At the same time, raise your right arm and left leg as high as comfortably possible. Hold for 10 seconds and slowly return to start. Repeat with left arm and right leg, alternating 10 times. Gradually build up to 20 times. (Advanced: Repeat this exercise raising both arms and both legs a few inches off the floor at the same time. Hold for 5 seconds and release.)  3. Pelvic tilt: Lie on the floor on your back with your knees bent at 90 degrees. Your feet should be flat on the floor. Inhale, exhale, then slowly contract your abdominal muscles bringing your navel toward your spine. Let your pelvis rock back until your lower back is flat on the floor. Hold for 10 seconds while breathing smoothly.  4. Abdominal crunch: Perform a pelvic tilt (above) flattening your lower back against the floor. Holding the tension in your abdominal muscles, take another breath and raise your shoulder blades off the ground (this is not a full sit-up). Keep your head in line with your body (don t bend your neck forward). Hold for 2 seconds, then slowly lower.  Date Last Reviewed: 6/1/2016 2000-2017 [employer]. [address] [place] [address], [place] [address]. All rights reserved. This information is not intended as a substitute for professional medical care. Always follow your healthcare professional's instructions.        Back Exercises: Back Press    Do this exercise on your hands and knees. Keep your knees under your hips and your hands  under your shoulders. Keep your spine in a neutral position (not arched or sagging). Be sure to maintain your neck s natural curve:    Tighten your stomach and buttock muscles to press your back upward. Let your head drop slightly.    Hold for 5 seconds. Return to starting position.    Repeat 5 times.  Date Last Reviewed: 10/11/2015    [id number] [employer]. [address] [place] [address]. All rights reserved. This information is not intended as a substitute for professional medical care. Always follow your healthcare professional's instructions.        Back Exercises: Back Release  Do this exercise on your hands and knees. Keep your knees under your hips and your hands under your shoulders.        Relax your abdominal and buttocks muscles, lift your head, and let your back sag. Be sure to keep your weight evenly distributed. Don t sit back on your hips.     Hold for 5 seconds.    Return to starting position.    Tuck your head and lift (arch) your back.    Hold for 5 seconds    Return to starting position.    Repeat 5 times.  Date Last Reviewed: 8/16/2015 2000-2017 [employer]. [address] [place] [address]. All rights reserved. This information is not intended as a substitute for professional medical care. Always follow your healthcare professional's instructions.        Back Exercises: Back Extension with Elbow Press    To start, lie face down on your stomach, feet slightly apart, forehead on the floor. Breathe deeply. You should feel comfortable and relaxed in this position.    Press up on your forearms. Keep your stomach and hips on the floor. Stay within your painfree range.    Hold for 20 seconds. Lower slowly.    Repeat 2 times.    Return to starting position.  Date Last Reviewed: 10/13/2015    [id number] [employer]. [address] [place] [address]. All rights reserved. This information is not intended as a substitute for  professional medical care. Always follow your healthcare professional's instructions.        Back Exercises: Hip Rotator Stretch    To start, lie on your back with your knees bent and feet flat on the floor. Don t press your neck or lower back to the floor. Breathe deeply. You should feel comfortable and relaxed in this position.    Rest your right ankle on your left knee.    Place a towel behind your left thigh, and use it to pull the knee toward your chest. Feel the stretch in your buttocks.    Hold for 30 to 60 seconds. Release.    Repeat 2 times.    Switch legs.     If there is any pain other than stretch in the knee or buttock, stop and contact your healthcare provider.  For your safety, check with your healthcare provider before starting an exercise program.   Date Last Reviewed: 8/16/2015 2000-2017 [employer]. [address] [place] [address]. All rights reserved. This information is not intended as a substitute for professional medical care. Always follow your healthcare professional's instructions.        Back Exercises: Knee Lift         To start, lie on your back with your knees bent and feet flat on the floor. Don t press your neck or lower back to the floor. Breathe deeply. You should feel comfortable and relaxed in this position:    Start by tightening your abdominal muscles.    Lift one bent knee off the floor 2 to 4 inches.    Hold for 10 seconds. Return to start position.    Repeat 3 times.    Switch legs.  Date Last Reviewed: 8/16/2015 2000-2017 [employer]. [address] [place] [address]. All rights reserved. This information is not intended as a substitute for professional medical care. Always follow your healthcare professional's instructions.        Back Exercises: Leg Pull    To start, lie on your back with your knees bent and feet flat on the floor. Don t press your neck or lower back to the floor. Breathe deeply. You should feel  comfortable and relaxed in this position.    Pull one knee to your chest.    Hold for 30 to 60 seconds. Return to starting position.    Repeat 2 times.    Switch legs.    For a double leg pull, pull both legs to your chest at the same time. Repeat 2 times.  For your safety, check with your healthcare provider before starting an exercise program.   Date Last Reviewed: 8/16/2015 2000-2017 [employer]. [address] [place] [address]. All rights reserved. This information is not intended as a substitute for professional medical care. Always follow your healthcare professional's instructions.        Back Exercises: Leg Reach         Do this exercise on your hands and knees. Keep your knees under your hips and your hands under your shoulders. Keep your spine in a neutral position (not arched or sagging). Be sure to maintain your neck s natural curve:    Extend one leg straight back. Don t arch your back or let your head or body sag.    Hold for 5 seconds. Return to starting position.    Repeat 5 times.    Switch legs.   Date Last Reviewed: 8/16/2015 2000-2017 [employer]. [address] [place] [address]. All rights reserved. This information is not intended as a substitute for professional medical care. Always follow your healthcare professional's instructions.        Back Exercises: Lower Back Rotation    To start, lie on your back with your knees bent and feet flat on the floor. Don t press your neck or lower back to the floor. Breathe deeply. You should feel comfortable and relaxed in this position.    Drop both knees to one side. Turn your head to the other side. Keep your shoulders flat on the floor.    Do not push through pain.    Hold for 20 seconds.    Slowly switch sides.    Repeat 2 to 5 times.  Date Last Reviewed: 10/11/2015    [id number] [employer]. [address] [place] [address]. All rights reserved. This information is not  intended as a substitute for professional medical care. Always follow your healthcare professional's instructions.        Back Exercises: Lower Back Stretch    To start, sit in a chair with your feet flat on the floor. Shift your weight slightly forward. Relax, and keep your ears, shoulders, and hips aligned.    Sit with your feet well apart.    Bend forward and touch the floor with the backs of your hands. Relax and let your body drop.    Hold for 20 seconds. Return to starting position.    Repeat 2 times.   Date Last Reviewed: 8/16/2015 2000-2017 [employer]. [address] [place] [address]. All rights reserved. This information is not intended as a substitute for professional medical care. Always follow your healthcare professional's instructions.        Back Exercises: Seated Rotation    To start, sit in a chair with your feet flat on the floor. Shift your weight slightly forward to avoid rounding your back. Relax, and keep your ears, shoulders, and hips aligned:    Fold your arms and elbows just below shoulder height.    Turn from the waist with hips forward. Turn your head last. Do not push through the pain.    Hold for a count of 10 to 30. Return to starting position.    Repeat 3 to 5 times on one side. Then switch sides.  Date Last Reviewed: 10/11/2015    [id number] [employer]. [address] [place] [address]. All rights reserved. This information is not intended as a substitute for professional medical care. Always follow your healthcare professional's instructions.        Back Exercises: Side Stretch      To start, sit in a chair with your feet flat on the floor. Shift your weight slightly forward to avoid rounding your back. Relax. Keep your ears, shoulders, and hips aligned:    Stretch your right arm overhead.    Slowly bend to the left. Don t twist your torso. Stay within your pain limits.    Hold for 20 seconds. Return to starting position.    Repeat 2  to 5 times. Then, switch to the other side.  Date Last Reviewed: 10/13/2015    [id number] [employer]. [address] [place] [address], [place] [address]. All rights reserved. This information is not intended as a substitute for professional medical care. Always follow your healthcare professional's instructions.

## 2020-07-14 ASSESSMENT — ASTHMA QUESTIONNAIRES: ACT_TOTALSCORE: 9

## 2020-07-14 ASSESSMENT — ANXIETY QUESTIONNAIRES: GAD7 TOTAL SCORE: 15

## 2020-07-20 ENCOUNTER — MYC MEDICAL ADVICE (OUTPATIENT)
Dept: FAMILY MEDICINE | Facility: CLINIC | Age: 40
End: 2020-07-20

## 2020-07-20 NOTE — TELEPHONE ENCOUNTER
Sent patient a My Chart message that we have not received these.  Cari Rollins MA  [facility]  2nd Floor  Primary Care

## 2020-07-20 NOTE — TELEPHONE ENCOUNTER
Cari have you seen these come through?  When you do, you can send to my [employer].  Thanks!  Gregoria

## 2020-07-21 ENCOUNTER — VIRTUAL VISIT (OUTPATIENT)
Dept: PSYCHOLOGY | Facility: CLINIC | Age: 40
End: 2020-07-21
Payer: COMMERCIAL

## 2020-07-21 DIAGNOSIS — F43.89 REACTION TO CHRONIC STRESS: Primary | ICD-10-CM

## 2020-07-21 PROCEDURE — 90834 PSYTX W PT 45 MINUTES: CPT | Mod: 95 | Performed by: SOCIAL WORKER

## 2020-07-21 NOTE — TELEPHONE ENCOUNTER
Received forms. Faxing to the provider's [employer] fax, right fax confirmed at 8:33 am today, 7/21/2020. Sent patient a My Chart message.  Cari Rollins [facility]  2nd Floor  Primary Care

## 2020-07-21 NOTE — LETTER
Client will return Aug 5 at 9am. She will continue to practice self-compassion to reduce guilt and depression.She will use deep breathing as needed and calming/positive self-talk to help reduce anxiety. She will use crisis number if needed.     Suicide Prevention Lifeline: 0-193-615-TALK (9006)    Crisis Text Line Service (available 24 hours a day, 7 days a week): Text [place] to 919162
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
## 2020-07-21 NOTE — TELEPHONE ENCOUNTER
Faxed signed forms to [employer], [phone], right fax confirmed at 12:33 pm today, 7/21/2020. Copy to Tc and abstracting.  Cari Rollins MA  [facility]  2nd Floor  Primary Care

## 2020-07-21 NOTE — PATIENT INSTRUCTIONS
Client will return Aug 5 at 9am. She will continue to practice self-compassion to reduce guilt and depression.She will use deep breathing as needed and calming/positive self-talk to help reduce anxiety. She will use crisis number if needed.     Suicide Prevention Lifeline: 3-417-050-TALK (0975)    Crisis Text Line Service (available 24 hours a day, 7 days a week): Text [place] to 839104

## 2020-07-27 ENCOUNTER — VIRTUAL VISIT (OUTPATIENT)
Dept: FAMILY MEDICINE | Facility: CLINIC | Age: 40
End: 2020-07-27
Payer: COMMERCIAL

## 2020-07-27 ENCOUNTER — MYC MEDICAL ADVICE (OUTPATIENT)
Dept: SURGERY | Facility: CLINIC | Age: 40
End: 2020-07-27

## 2020-07-27 DIAGNOSIS — F43.10 PTSD (POST-TRAUMATIC STRESS DISORDER): ICD-10-CM

## 2020-07-27 DIAGNOSIS — E66.01 MORBID OBESITY (H): Primary | ICD-10-CM

## 2020-07-27 DIAGNOSIS — F43.22 ADJUSTMENT DISORDER WITH ANXIOUS MOOD: ICD-10-CM

## 2020-07-27 DIAGNOSIS — G44.209 TENSION HEADACHE: Primary | ICD-10-CM

## 2020-07-27 DIAGNOSIS — J45.40 MODERATE PERSISTENT ASTHMA WITHOUT COMPLICATION: ICD-10-CM

## 2020-07-27 PROCEDURE — 96127 BRIEF EMOTIONAL/BEHAV ASSMT: CPT | Performed by: NURSE PRACTITIONER

## 2020-07-27 PROCEDURE — 99214 OFFICE O/P EST MOD 30 MIN: CPT | Mod: 95 | Performed by: NURSE PRACTITIONER

## 2020-07-27 RX ORDER — BUSPIRONE HYDROCHLORIDE 15 MG/1
15 TABLET ORAL 2 TIMES DAILY
Qty: 60 TABLET | Refills: 3 | Status: SHIPPED | OUTPATIENT
Start: 2020-07-27 | End: 2020-08-24

## 2020-07-27 RX ORDER — PRAZOSIN HYDROCHLORIDE 2 MG/1
2 CAPSULE ORAL AT BEDTIME
Qty: 30 CAPSULE | Refills: 3 | Status: SHIPPED | OUTPATIENT
Start: 2020-07-27 | End: 2020-08-24

## 2020-07-27 RX ORDER — BUTALBITAL, ACETAMINOPHEN AND CAFFEINE 50; 325; 40 MG/1; MG/1; MG/1
1 TABLET ORAL EVERY 4 HOURS PRN
Qty: 30 TABLET | Refills: 1 | Status: SHIPPED | OUTPATIENT
Start: 2020-07-27 | End: 2021-05-26

## 2020-07-27 RX ORDER — ALBUTEROL SULFATE 90 UG/1
2 AEROSOL, METERED RESPIRATORY (INHALATION) EVERY 4 HOURS PRN
Qty: 5 INHALER | Refills: 3 | Status: SHIPPED | OUTPATIENT
Start: 2020-07-27 | End: 2020-10-28

## 2020-07-27 ASSESSMENT — ANXIETY QUESTIONNAIRES
7. FEELING AFRAID AS IF SOMETHING AWFUL MIGHT HAPPEN: MORE THAN HALF THE DAYS
3. WORRYING TOO MUCH ABOUT DIFFERENT THINGS: SEVERAL DAYS
IF YOU CHECKED OFF ANY PROBLEMS ON THIS QUESTIONNAIRE, HOW DIFFICULT HAVE THESE PROBLEMS MADE IT FOR YOU TO DO YOUR WORK, TAKE CARE OF THINGS AT HOME, OR GET ALONG WITH OTHER PEOPLE: EXTREMELY DIFFICULT
6. BECOMING EASILY ANNOYED OR IRRITABLE: NEARLY EVERY DAY
1. FEELING NERVOUS, ANXIOUS, OR ON EDGE: NEARLY EVERY DAY
5. BEING SO RESTLESS THAT IT IS HARD TO SIT STILL: SEVERAL DAYS
GAD7 TOTAL SCORE: 13
2. NOT BEING ABLE TO STOP OR CONTROL WORRYING: MORE THAN HALF THE DAYS

## 2020-07-27 ASSESSMENT — PATIENT HEALTH QUESTIONNAIRE - PHQ9
5. POOR APPETITE OR OVEREATING: SEVERAL DAYS
SUM OF ALL RESPONSES TO PHQ QUESTIONS 1-9: 12

## 2020-07-27 NOTE — PROGRESS NOTES
"Crystal Rose is a 39 year old female who is being evaluated via a billable telephone visit.      The patient has been notified of following:     \"This telephone visit will be conducted via a call between you and your physician/provider. We have found that certain health care needs can be provided without the need for a physical exam.  This service lets us provide the care you need with a short phone conversation.  If a prescription is necessary we can send it directly to your pharmacy.  If lab work is needed we can place an order for that and you can then stop by our lab to have the test done at a later time.    Telephone visits are billed at different rates depending on your insurance coverage. During this emergency period, for some insurers they may be billed the same as an in-person visit.  Please reach out to your insurance provider with any questions.    If during the course of the call the physician/provider feels a telephone visit is not appropriate, you will not be charged for this service.\"    Patient has given verbal consent for Telephone visit?  Yes    What phone number would you like to be contacted at? [phone]    How would you like to obtain your AVS? Teresa Ricketts     Crystal Rose is a 39 year old female who presents via phone visit today for the following health issues:    HPI    Depression and Anxiety Follow-Up    How are you doing with your depression since your last visit? Improved     How are you doing with your anxiety since your last visit?  Improved but not much    Are you having other symptoms that might be associated with depression or anxiety? No    Have you had a significant life event? No     Do you have any concerns with your use of alcohol or other drugs? No    Depression is better since starting Prozac 3 weeks ago (switched from Sertraline).    Anxiety attacks still daily.  Improvement from multiple times a day.  Needing Lorazepam about every other " day.    Has been able to leave the house which is an improvement.  She is getting outside more on roof of apartment.  Still has significant anxiety about going anywhere else.  States she fears COVID and people can be very mean- yelling at her for her weight and saying racial slurs.      Fewer nightmares now- they're now 1-2 a week when they used to be multiple times a night.   They are not as disturbing either.    Sinusitis resolved since Augmentin at last visit.  Though still having headaches behind eyes.  Tylenol sometimes helps but sometimes needs to close eyes.  Happening since sinus infection.  Headaches are better since antibiotics but still there.  Denies vision changes but bright lights are bothersome.  Does not wear glasses or contacts but has been 5 years since eye doctor visit.  Bright lights and sounds make it worse.  Headaches occur almost every day but do not last all day.  Feels like it could be tension headache.  Sometimes has nausea with headache.   Denies vision changes, dizziness, falls, weakness.      Social History     Tobacco Use     Smoking status: Former Smoker     Last attempt to quit: 2009     Years since quittin.2     Smokeless tobacco: Never Used   Substance Use Topics     Alcohol use: No     Drug use: No     PHQ 2020   PHQ-9 Total Score 22 17 12   Q9: Thoughts of better off dead/self-harm past 2 weeks More than half the days More than half the days Not at all   F/U: Thoughts of suicide or self-harm - - -   F/U: Self harm-plan - - -   F/U: Self-harm action - - -   F/U: Safety concerns - - -     TERESA-7 SCORE 2020   Total Score - - -   Total Score 14 15 13     Last PHQ-9 2020   1.  Little interest or pleasure in doing things 2   2.  Feeling down, depressed, or hopeless 2   3.  Trouble falling or staying asleep, or sleeping too much 3   4.  Feeling tired or having little energy 1   5.  Poor appetite or overeating 1   6.  Feeling  bad about yourself 1   7.  Trouble concentrating 1   8.  Moving slowly or restless 1   Q9: Thoughts of better off dead/self-harm past 2 weeks 0   PHQ-9 Total Score 12   Difficulty at work, home, or with people Extremely dIfficult   In the past two weeks have you had thoughts of suicide or self harm? -   Do you have concerns about your personal safety or the safety of others? -   In the past 2 weeks have you thought about a plan or had intention to harm yourself? -   In the past 2 weeks have you acted on these thoughts in any way? -       Suicide Assessment Five-step Evaluation and Treatment (SAFE-T)     Patient Active Problem List   Diagnosis     S/P gastric bypass     Pernicious anemia     CARDIOVASCULAR SCREENING; LDL GOAL LESS THAN 160     Moderate persistent asthma     Environmental allergies     Abnormal Pap smear of cervix     Cervical dysplasia     Morbid obesity (H)     Iron deficiency anemia due to chronic blood loss     Menorrhagia with regular cycle     Postsurgical malabsorption     Intertrigo     Iron deficiency anemia following bariatric surgery     Homeless     Adjustment disorder with anxious mood     Bilateral leg weakness     Acute bilateral low back pain without sciatica     Chronic allergic rhinitis     Carbon monoxide exposure     Obesity (BMI 30-39.9)     Past Surgical History:   Procedure Laterality Date     ATTEMPTED LAPAROSCOPY  3/13/2014    Procedure: ATTEMPTED LAPAROSCOPY;;  Surgeon: Cee Garcia MD;  Location: [facility]     COLPOSCOPY CERVIX, BIOPSY CERVIX, ENDOCERVICAL CURETTAGE, COMBINED      1/6/2005:  SUMI I; 6/15/2009:  SUMI I.  Treated with cryo.  10/27/1997:  SUMI I-II     CONIZATION LEEP  3/13/2014    Procedure: CONIZATION LEEP;  LOOP ELECTROSURGICAL EXCISION PROCEDURE; LAPAROSCOPY ATTEMPTED;  Surgeon: Cee Garcia MD;  Location: [facility]     CRYOTHERAPY, CERVICAL  age 19    Pt reported     DILATION AND CURETTAGE, ABLATE ENDOMETRIUM NOVASURE, COMBINED N/A 6/14/2018    Procedure:  COMBINED DILATION AND CURETTAGE, ABLATE ENDOMETRIUM NOVASURE;  HYSTEROSCOPY, DILATION AND CURETTAGE, ENDOMETRIAL ABLATION WITH NOVASURE, LAPAROSCOPIC BILATERAL TUBAL LIGATION ;  Surgeon: Franki Pinedo MD;  Location: [facility]     EXAM UNDER ANESTHESIA PELVIC  3/20/2014    Procedure: EXAM UNDER ANESTHESIA PELVIC;  EXAM UNDER ANESTHESIA, REMOVAL OF STICHES ;  Surgeon: Cee Garcia MD;  Location:  OR     GASTRIC BYPASS      pre-bypass weight was 320#     HC NASAL/SINUS SCOPE W THER INSTILL       HC REMOVAL OF OVARIAN CYST(S)       LAPAROSCOPIC TUBAL LIGATION Bilateral 2018    Procedure: LAPAROSCOPIC TUBAL LIGATION;;  Surgeon: Franki Pinedo MD;  Location: [facility]       Social History     Tobacco Use     Smoking status: Former Smoker     Last attempt to quit: 2009     Years since quittin.2     Smokeless tobacco: Never Used   Substance Use Topics     Alcohol use: No     Family History   Problem Relation Age of Onset     Hypertension Mother      Allergies Mother      Obesity Mother      Asthma Father      Diabetes Father      Hypertension Father      Allergies Father      Cancer Father         Lymphoma d age 58     Asthma Brother      Allergies Sister      Depression Sister      Obesity Sister      Alzheimer Disease Maternal Grandmother      Arthritis Maternal Grandmother      Obesity Maternal Grandmother      Thyroid Disease Maternal Grandmother      Hypertension Maternal Grandfather      Cancer - colorectal Maternal Grandfather      Cerebrovascular Disease Maternal Grandfather      Diabetes Paternal Grandmother          Current Outpatient Medications   Medication Sig Dispense Refill     albuterol (PROAIR HFA/PROVENTIL HFA/VENTOLIN HFA) 108 (90 Base) MCG/ACT inhaler Inhale 2 puffs into the lungs every 4 hours as needed for shortness of breath / dyspnea or wheezing 5 Inhaler 3     Ascorbic Acid (VITAMIN C PO) Take 500 mg by mouth       busPIRone (BUSPAR) 15 MG tablet Take 1 tablet (15 mg) by  mouth 2 times daily 60 tablet 3     butalbital-acetaminophen-caffeine (ESGIC) -40 MG tablet Take 1 tablet by mouth every 4 hours as needed for headaches 30 tablet 1     Calcium Carb-Cholecalciferol (CALCIUM 500 + D) 500-400 MG-UNIT TABS Take 1 tablet by mouth 3 times daily 90 tablet 11     calcium carbonate-vitamin D (CALCIUM 600/VITAMIN D) 600-400 MG-UNIT chewable tablet Take one twice daily and at least 2 hours apart from iron. 180 tablet 3     childrens multivitamin w/iron (FLINTSTONES COMPLETE) chewable tablet Take one tablet twice a day. 200 tablet 4     Cholecalciferol (VITAMIN D-3) 5000 units TABS Take 1 tablet by mouth 2 times daily 100 tablet 3     cholecalciferol 125 MCG (5000 UT) CAPS Take 1 capsule (5,000 Units) by mouth daily 30 capsule 11     cyanocobalamin (VITAMIN B-12) 1000 MCG SUBL sublingual tablet Place 1 tablet (1,000 mcg) under the tongue daily 100 tablet 3     ferrous fumarate 65 mg, [place]. FE,-Vitamin C 125 mg (VITRON C)  MG TABS tablet Take 2 tablets by mouth daily 180 tablet 3     FLUoxetine (PROZAC) 20 MG capsule Take 1 capsule (20 mg) by mouth daily 30 capsule 3     fluticasone (FLONASE) 50 MCG/ACT nasal spray Spray 1-2 sprays into both nostrils daily 18.2 mL 5     fluticasone-salmeterol (ADVAIR) 500-50 MCG/DOSE inhaler Inhale 1 puff into the lungs every 12 hours 3 Inhaler 3     hydrOXYzine (ATARAX) 25 MG tablet Take 1 tablet (25 mg) by mouth nightly as needed (sleep) 60 tablet 1     ipratropium - albuterol 0.5 mg/2.5 mg/3 mL (DUONEB) 0.5-2.5 (3) MG/3ML neb solution Take 1 vial (3 mLs) by nebulization every 4 hours as needed for shortness of breath / dyspnea or wheezing 25 vial 3     loratadine (CLARITIN) 10 MG tablet Take 1 tablet (10 mg) by mouth daily 90 tablet 3     LORazepam (ATIVAN) 0.5 MG tablet Take 1 tablet (0.5 mg) by mouth every 6 hours as needed for anxiety 45 tablet 1     methocarbamol (ROBAXIN) 500 MG tablet Take 1 tablet (500 mg) by mouth nightly as needed for  muscle spasms 30 tablet 1     montelukast (SINGULAIR) 10 MG tablet Take 1 tablet (10 mg) by mouth At Bedtime 90 tablet 3     Multiple Vitamins-Iron (DAILY MARILYN MULTIVITAMIN/IRON) TABS Take 1 tablet by mouth 2 times daily 100 tablet 3     nystatin-triamcinolone (MYCOLOG II) 810717-7.1 UNIT/GM-% external cream Apply twice daily as needed. 30 g 3     order for DME Equipment being ordered: Nebulizer with tubing 1 each 0     order for DME Equipment being ordered: Nebulizer 1 each 0     prazosin (MINIPRESS) 2 MG capsule Take 1 capsule (2 mg) by mouth At Bedtime 30 capsule 3     valACYclovir (VALTREX) 500 MG tablet Take 1 tablet (500 mg) by mouth daily 30 tablet 11     Allergies   Allergen Reactions     Cat Hair [Cats]      Dog Hair [Dogs]      Dust Mites      Ragweeds      Nsaids Other (See Comments)     Patient had bariatric surgery and should never take NSAIDs or ASA      BP Readings from Last 3 Encounters:   03/09/20 127/84   12/05/19 128/82   10/28/19 124/84    Wt Readings from Last 3 Encounters:   06/08/20 98.9 kg (218 lb)   04/28/20 98.9 kg (218 lb)   03/09/20 101.3 kg (223 lb 6.4 oz)                    Reviewed and updated as needed this visit by Provider         Review of Systems   Constitutional, HEENT, cardiovascular, pulmonary, GI, , musculoskeletal, neuro, skin, endocrine and psych systems are negative, except as otherwise noted.       Objective   Reported vitals:  There were no vitals taken for this visit.   healthy, alert and no distress  PSYCH: Alert and oriented times 3; coherent speech, normal   rate and volume, able to articulate logical thoughts, able   to abstract reason, no tangential thoughts, no hallucinations   or delusions  Her affect is normal  RESP: No cough, no audible wheezing, able to talk in full sentences  Remainder of exam unable to be completed due to telephone visits    Diagnostic Test Results:  Labs reviewed in Epic        Assessment/Plan:    1. Adjustment disorder with anxious  mood  See above.  Increase Buspar to 15 mg twice a day (from 10 mg twice a day).  Increase Prazosin to 2 mg at night.  Will e-consult psych for some guidance as well.  [facility] with increased doses of both medications.    - busPIRone (BUSPAR) 15 MG tablet; Take 1 tablet (15 mg) by mouth 2 times daily  Dispense: 60 tablet; Refill: 3  - E-CONSULT TO BEHAVIORAL HEALTH    2. PTSD (post-traumatic stress disorder)  As above.   - busPIRone (BUSPAR) 15 MG tablet; Take 1 tablet (15 mg) by mouth 2 times daily  Dispense: 60 tablet; Refill: 3  - prazosin (MINIPRESS) 2 MG capsule; Take 1 capsule (2 mg) by mouth At Bedtime  Dispense: 30 capsule; Refill: 3    3. Moderate persistent asthma without complication  Patient needs about 5 inhalers every 3 months.  I had previously only sent 3 for 3 months  Updating prescription.  Hopefully use will decrease now that Advair is back on board.   - albuterol (PROAIR HFA/PROVENTIL HFA/VENTOLIN HFA) 108 (90 Base) MCG/ACT inhaler; Inhale 2 puffs into the lungs every 4 hours as needed for shortness of breath / dyspnea or wheezing  Dispense: 5 Inhaler; Refill: 3    4. Tension headache  Trial of below.  If continues, needs face to face visit for neuro exam.    - butalbital-acetaminophen-caffeine (ESGIC) -40 MG tablet; Take 1 tablet by mouth every 4 hours as needed for headaches  Dispense: 30 tablet; Refill: 1    Return in about 4 days (around 7/31/2020) for Follow Up.      Phone call duration:  29 minutes    SHERIE Bennett CNP

## 2020-07-28 ENCOUNTER — TELEPHONE (OUTPATIENT)
Dept: FAMILY MEDICINE | Facility: CLINIC | Age: 40
End: 2020-07-28

## 2020-07-28 ENCOUNTER — MYC MEDICAL ADVICE (OUTPATIENT)
Dept: FAMILY MEDICINE | Facility: CLINIC | Age: 40
End: 2020-07-28

## 2020-07-28 ENCOUNTER — E-CONSULT (OUTPATIENT)
Dept: ADDICTION MEDICINE | Facility: CLINIC | Age: 40
End: 2020-07-28

## 2020-07-28 DIAGNOSIS — F43.22 ADJUSTMENT DISORDER WITH ANXIOUS MOOD: ICD-10-CM

## 2020-07-28 ASSESSMENT — ANXIETY QUESTIONNAIRES: GAD7 TOTAL SCORE: 13

## 2020-07-28 NOTE — PROGRESS NOTES
7/28/2020     Interprofessional consultation requested by: SHERIE Abbasi CNP    Clinical Question/Purpose:  Advice on a patient with severe PTSD and anxiety with no substance abuse history and possible starting clonazepam.      Patient assessment and information reviewed: Reviewed chart and information given    Recommendations:  Thank you for the chance to assist you with Ms. Rose's care.  This sounds like a complicated case of a combination of anxiety and PTSD. My sense from what you have written is that her increased anxiety is more PTSD based and not strictly anxiety.  The best and most effective option, especially with some initial improvement with the prozac with her depression, is to increase that dose.  PTSD often needs higher doses (as does anxiety) and 20 mg is probably only partially treating the PTSD (the improvement in depression, but not the anxiety/other PTSD symptoms).  She may need to get to 60-80 mg of the prozac.  Adding any benzos will not improve her course and often will delay the more effective treatments from working (ie therapy).  I would not change the ativan at this time but would certainly not schedule the benzo.  Increasing the prozac by 20 mg (unless she was sensitive to it to start with, then increase by 10 mg over 2 weeks) would be the best course of action (so increase to 40 mg).  Continue to monitor and you may need to increase again in 4 weeks.      In the meantime, to help mitigate her struggles while the prozac is being increased, increase the buspar to 15 mg tid.  Also she can use the hydroxyzine liberally up to 4 times a day up to 50 mg each time.  Continue the prazosin to see if this helps with the nightmares.  This can be increased as well if tolerated to get nightmares down.  If this still does not seem to help, adding gabapentin 100 mg tid with the ability to increase up to 300 mg tid can be very helpful with the increase in anxiety.      Ultimately, the benzos  after a few weeks (which she has been on for the ativan) may decrease short term symptoms, but will not allow the proper treatment to take hold and even though she has no substance use history, pts struggle to get off due to the rebound anxiety they give.  It also can hinder therapy with a few studies stating that they even counteract what happens in therapy.      If these steps do not help, then making an official referral would be in order.    The recommendations provided in this eConsult are based on the clinical data available to me at this time, and are furnished without the benefit of a comprehensive in-person or virtual patient evaluation, Any new clinical issues or changes in patient status since the filing of this eConsult will need to be taken into account when assessing these recommendations. Please contact me if you have further questions.    My total time spent reviewing clinical information and formulating assessment was 15 minutes.    Report sent automatically to requesting provider once signed.     Charge code: [id number] (5+ minutes)     Bairon Sow MD

## 2020-07-30 ENCOUNTER — TELEPHONE (OUTPATIENT)
Dept: SURGERY | Facility: CLINIC | Age: 40
End: 2020-07-30

## 2020-07-30 RX ORDER — PHENTERMINE HYDROCHLORIDE 37.5 MG/1
37.5 TABLET ORAL
Qty: 90 TABLET | Refills: 0 | Status: SHIPPED | OUTPATIENT
Start: 2020-07-30 | End: 2021-05-26

## 2020-07-30 NOTE — TELEPHONE ENCOUNTER
Called in rx to pharmacy as directed.   TORB given to pharmacist Blake, read back for accuracy.   Stephanie Gillette RN on 7/30/2020 at 9:12 AM

## 2020-08-05 ENCOUNTER — VIRTUAL VISIT (OUTPATIENT)
Dept: PSYCHOLOGY | Facility: CLINIC | Age: 40
End: 2020-08-05
Payer: COMMERCIAL

## 2020-08-05 DIAGNOSIS — F43.89 REACTION TO CHRONIC STRESS: Primary | ICD-10-CM

## 2020-08-05 PROCEDURE — 90834 PSYTX W PT 45 MINUTES: CPT | Mod: 95 | Performed by: SOCIAL WORKER

## 2020-08-05 NOTE — PATIENT INSTRUCTIONS
Client will return Aug 19 at 9am. She will practice paying attention to self-talk and challenge critical self-talk. She will also practice grounding, deep breathing to manage and reduce anxiety.She may use journaling to process emotions.
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
## 2020-08-19 ENCOUNTER — VIRTUAL VISIT (OUTPATIENT)
Dept: PSYCHOLOGY | Facility: CLINIC | Age: 40
End: 2020-08-19
Payer: COMMERCIAL

## 2020-08-19 DIAGNOSIS — F43.89 REACTION TO CHRONIC STRESS: Primary | ICD-10-CM

## 2020-08-19 PROCEDURE — 90839 PSYTX CRISIS INITIAL 60 MIN: CPT | Mod: 95 | Performed by: SOCIAL WORKER

## 2020-08-19 NOTE — Clinical Note
Leopoldo Kinney,  I met with this patient today and it sounds like her anxiety has been better managed, but her depression has increased. She said she will be talking to you soon and evaluate medications again to see if something has impacted her depression. We also created a safety plan for her to have coping and strategies to manage depression. Let me know if you have any thoughts or questions- I just wanted to pass this info along.    Thanks! Lauren
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
## 2020-08-20 ENCOUNTER — TELEPHONE (OUTPATIENT)
Dept: FAMILY MEDICINE | Facility: CLINIC | Age: 40
End: 2020-08-20

## 2020-08-20 NOTE — TELEPHONE ENCOUNTER
Please call patient to schedule follow up for depression/anxiety for early next week.  SHERIE Bennett CNP

## 2020-08-20 NOTE — TELEPHONE ENCOUNTER
This writer attempted to contact Patient on 08/20/20      Reason for call needs visit and left message.      If patient calls back:   Schedule Virtual appointment within 1 week with PCP, postponing message.        Cari Rollins MA

## 2020-08-21 NOTE — TELEPHONE ENCOUNTER
Patient scheduled an appt for 8/24/2020.  Cari Rollins MA  [facility]  2nd Floor  Primary Care

## 2020-08-24 ENCOUNTER — VIRTUAL VISIT (OUTPATIENT)
Dept: FAMILY MEDICINE | Facility: CLINIC | Age: 40
End: 2020-08-24
Payer: COMMERCIAL

## 2020-08-24 ENCOUNTER — TELEPHONE (OUTPATIENT)
Dept: FAMILY MEDICINE | Facility: CLINIC | Age: 40
End: 2020-08-24

## 2020-08-24 DIAGNOSIS — F43.10 PTSD (POST-TRAUMATIC STRESS DISORDER): ICD-10-CM

## 2020-08-24 DIAGNOSIS — F43.22 ADJUSTMENT DISORDER WITH ANXIOUS MOOD: ICD-10-CM

## 2020-08-24 PROCEDURE — 99214 OFFICE O/P EST MOD 30 MIN: CPT | Mod: 95 | Performed by: NURSE PRACTITIONER

## 2020-08-24 PROCEDURE — 96127 BRIEF EMOTIONAL/BEHAV ASSMT: CPT | Mod: 95 | Performed by: NURSE PRACTITIONER

## 2020-08-24 RX ORDER — BUSPIRONE HYDROCHLORIDE 15 MG/1
15 TABLET ORAL 3 TIMES DAILY
Qty: 90 TABLET | Refills: 3 | Status: SHIPPED | OUTPATIENT
Start: 2020-08-24 | End: 2021-01-08

## 2020-08-24 RX ORDER — PRAZOSIN HYDROCHLORIDE 5 MG/1
5 CAPSULE ORAL AT BEDTIME
Qty: 30 CAPSULE | Refills: 3 | Status: SHIPPED | OUTPATIENT
Start: 2020-08-24 | End: 2021-01-08

## 2020-08-24 ASSESSMENT — ANXIETY QUESTIONNAIRES
5. BEING SO RESTLESS THAT IT IS HARD TO SIT STILL: MORE THAN HALF THE DAYS
2. NOT BEING ABLE TO STOP OR CONTROL WORRYING: NEARLY EVERY DAY
6. BECOMING EASILY ANNOYED OR IRRITABLE: SEVERAL DAYS
3. WORRYING TOO MUCH ABOUT DIFFERENT THINGS: NEARLY EVERY DAY
1. FEELING NERVOUS, ANXIOUS, OR ON EDGE: NEARLY EVERY DAY
GAD7 TOTAL SCORE: 16
7. FEELING AFRAID AS IF SOMETHING AWFUL MIGHT HAPPEN: MORE THAN HALF THE DAYS
IF YOU CHECKED OFF ANY PROBLEMS ON THIS QUESTIONNAIRE, HOW DIFFICULT HAVE THESE PROBLEMS MADE IT FOR YOU TO DO YOUR WORK, TAKE CARE OF THINGS AT HOME, OR GET ALONG WITH OTHER PEOPLE: EXTREMELY DIFFICULT

## 2020-08-24 ASSESSMENT — PATIENT HEALTH QUESTIONNAIRE - PHQ9
5. POOR APPETITE OR OVEREATING: MORE THAN HALF THE DAYS
SUM OF ALL RESPONSES TO PHQ QUESTIONS 1-9: 20

## 2020-08-24 NOTE — PROGRESS NOTES
"Crystal Rose is a 39 year old female who is being evaluated via a billable telephone visit.      The patient has been notified of following:     \"This telephone visit will be conducted via a call between you and your physician/provider. We have found that certain health care needs can be provided without the need for a physical exam.  This service lets us provide the care you need with a short phone conversation.  If a prescription is necessary we can send it directly to your pharmacy.  If lab work is needed we can place an order for that and you can then stop by our lab to have the test done at a later time.    Telephone visits are billed at different rates depending on your insurance coverage. During this emergency period, for some insurers they may be billed the same as an in-person visit.  Please reach out to your insurance provider with any questions.    If during the course of the call the physician/provider feels a telephone visit is not appropriate, you will not be charged for this service.\"    Patient has given verbal consent for Telephone visit?  Yes    What phone number would you like to be contacted at? [phone]    How would you like to obtain your AVS? Teresa Ricketts     Crystal Rose is a 39 year old female who presents via phone visit today for the following health issues:    HPI    Depression and Anxiety Follow-Up    How are you doing with your depression since your last visit? Worsened    How are you doing with your anxiety since your last visit?  Improved     Are you having other symptoms that might be associated with depression or anxiety? yes    Have you had a significant life event? No     Do you have any concerns with your use of alcohol or other drugs? No  Depression worsening, therapy is helping but she feels out of control.  Taking prazosin for sleep and sleep is \"terrible\".  Trouble falling asleep and staying asleep. Nightmares are terrifying.  No longer having " "suicidal dreams, but just \"bad things happening\". Nightmares had been improving but now worsening.    taking 2 mg prazosin   Not well rested  Still unable to leave house- having a hard time with this  On 40 mg Fluoxetine for last 3 weeks.  Improvement in anxiety but not depression.  Not having the panic attacks but still has anxiety about leaving the house  Increased sadness  Had a few suicidal thoughts last week- did talk to therapist about it.  They passed. No plan.  Now just feels a sense of hopelessness, nothing to look forward to, feeling like kids would be better without her.    Was prescribed phentermine 20.  Did not yet pick it up. Previously did not seem to affect her mental health    Social History     Tobacco Use     Smoking status: Former Smoker     Last attempt to quit: 2009     Years since quittin.3     Smokeless tobacco: Never Used   Substance Use Topics     Alcohol use: No     Drug use: No     PHQ 2020   PHQ-9 Total Score 17 12 20   Q9: Thoughts of better off dead/self-harm past 2 weeks More than half the days Not at all Several days   F/U: Thoughts of suicide or self-harm - - -   F/U: Self harm-plan - - -   F/U: Self-harm action - - -   F/U: Safety concerns - - -     TERESA-7 SCORE 2020   Total Score - - -   Total Score 15 13 16     Last PHQ-9 2020   1.  Little interest or pleasure in doing things 3   2.  Feeling down, depressed, or hopeless 2   3.  Trouble falling or staying asleep, or sleeping too much 3   4.  Feeling tired or having little energy 3   5.  Poor appetite or overeating 0   6.  Feeling bad about yourself 3   7.  Trouble concentrating 3   8.  Moving slowly or restless 2   Q9: Thoughts of better off dead/self-harm past 2 weeks 1   PHQ-9 Total Score 20   Difficulty at work, home, or with people Extremely dIfficult   In the past two weeks have you had thoughts of suicide or self harm? -   Do you have concerns about your " personal safety or the safety of others? -   In the past 2 weeks have you thought about a plan or had intention to harm yourself? -   In the past 2 weeks have you acted on these thoughts in any way? -     TERESA-7  8/24/2020   1. Feeling nervous, anxious, or on edge 3   2. Not being able to stop or control worrying 3   3. Worrying too much about different things 3   4. Trouble relaxing 2   5. Being so restless that it is hard to sit still 2   6. Becoming easily annoyed or irritable 1   7. Feeling afraid, as if something awful might happen 2   TERESA-7 Total Score 16   If you checked any problems, how difficult have they made it for you to do your work, take care of things at home, or get along with other people? Extremely difficult       Suicide Assessment Five-step Evaluation and Treatment (SAFE-T)      How many servings of fruits and vegetables do you eat daily?  1-2    On average, how many sweetened beverages do you drink each day (Examples: soda, juice, sweet tea, etc.  Do NOT count diet or artificially sweetened beverages)?   1    How many days per week do you exercise enough to make your heart beat faster? 3 or less    How many minutes a day do you exercise enough to make your heart beat faster? 9 or less    How many days per week do you miss taking your medication? 0      Review of Systems   Constitutional, HEENT, cardiovascular, pulmonary, GI, , musculoskeletal, neuro, skin, endocrine and psych systems are negative, except as otherwise noted.       Objective          Vitals:  No vitals were obtained today due to virtual visit.    healthy, alert and no distress  PSYCH: Alert and oriented times 3; coherent speech, normal   rate and volume, able to articulate logical thoughts, able   to abstract reason, no tangential thoughts, no hallucinations   or delusions  Her affect is normal  RESP: No cough, no audible wheezing, able to talk in full sentences  Remainder of exam unable to be completed due to telephone  "visits            Assessment/Plan:    Assessment & Plan     Adjustment disorder with anxious mood  Increase Buspar to three times a day.  Increase Prozac from 40 mg to 60 mg.  Increase Prazosin to 5 mg at bedtime to help with sleep.  Continue working with therapy.    - busPIRone (BUSPAR) 15 MG tablet; Take 1 tablet (15 mg) by mouth 3 times daily  - FLUoxetine (PROZAC) 20 MG capsule; Take 3 capsules (60 mg) by mouth daily    PTSD (post-traumatic stress disorder)  As above.   - busPIRone (BUSPAR) 15 MG tablet; Take 1 tablet (15 mg) by mouth 3 times daily  - prazosin (MINIPRESS) 5 MG capsule; Take 1 capsule (5 mg) by mouth At Bedtime     BMI:   Estimated body mass index is 33.15 kg/m  as calculated from the following:    Height as of 6/8/20: 1.727 m (5' 8\").    Weight as of 6/8/20: 98.9 kg (218 lb).   Weight management plan: Patient referred to endocrine and/or weight management specialty    Depression Screening Follow Up    PHQ 8/24/2020   PHQ-9 Total Score 20   Q9: Thoughts of better off dead/self-harm past 2 weeks Several days   F/U: Thoughts of suicide or self-harm -   F/U: Self harm-plan -   F/U: Self-harm action -   F/U: Safety concerns -     Last PHQ-9 8/24/2020   1.  Little interest or pleasure in doing things 3   2.  Feeling down, depressed, or hopeless 2   3.  Trouble falling or staying asleep, or sleeping too much 3   4.  Feeling tired or having little energy 3   5.  Poor appetite or overeating 0   6.  Feeling bad about yourself 3   7.  Trouble concentrating 3   8.  Moving slowly or restless 2   Q9: Thoughts of better off dead/self-harm past 2 weeks 1   PHQ-9 Total Score 20   Difficulty at work, home, or with people Extremely dIfficult   In the past two weeks have you had thoughts of suicide or self harm? -   Do you have concerns about your personal safety or the safety of others? -   In the past 2 weeks have you thought about a plan or had intention to harm yourself? -   In the past 2 weeks have you acted " on these thoughts in any way? -               Follow Up    Follow Up Actions Taken  Crisis resource information provided in the After Visit Summary  pt has f/u appt with therapist, medication changes made      Discussed the following ways the patient can remain in a safe environment:  be around others      Patient Instructions   Check in to [place] pharmacy for the Phentermine:  [phone]    Increase Fluoxetine to 60 mg (3 capsules) daily  Increase Buspar to 1 tablet (15 mg) three times a day   Increase Prazosin to the 5 mg tablet night for nightmares/sleep    Follow up with me in 2 weeks.        Return in about 2 weeks (around 9/7/2020) for Depression Follow up, Anxiety Follow up.    SHERIE Bennett [facility]    Phone call duration:  33 minutes

## 2020-08-24 NOTE — Clinical Note
OVIDIO with Crystal. We made some medication adjustments.  We will follow up in 2 weeks or sooner if needed.  Thank you!  SHERIE Bennett CNP

## 2020-08-24 NOTE — Clinical Note
Please schedule patient for 2 weeks from now for any time after 10 am then message patient with time.  Follow up on depression/anxiety.  Telephone visit ok.  SHERIE Bennett CNP

## 2020-08-24 NOTE — PATIENT INSTRUCTIONS
Check in to [place] pharmacy for the Phentermine:  [phone]    Increase Fluoxetine to 60 mg (3 capsules) daily  Increase Buspar to 1 tablet (15 mg) three times a day   Increase Prazosin to the 5 mg tablet night for nightmares/sleep    Follow up with me in 2 weeks.

## 2020-08-24 NOTE — PROGRESS NOTES
I scheduled her for 9/11/2020at 11:00. Monday is Labor day and Wednesday you are doing clinic visits.  Cari Rollins [facility]  2nd Floor  Primary Care

## 2020-08-24 NOTE — TELEPHONE ENCOUNTER
[facility] requesting notes from 7/27/2020 to present. Faxed notes to the [place], claim # [id number], [phone], right fax confirmed at 3:47 pm today, 8/24/2020.  Cari Rollins MA  [facility]  2nd Floor  Primary Care

## 2020-08-25 ASSESSMENT — ASTHMA QUESTIONNAIRES: ACT_TOTALSCORE: 8

## 2020-08-25 ASSESSMENT — ANXIETY QUESTIONNAIRES: GAD7 TOTAL SCORE: 16

## 2020-08-26 ENCOUNTER — DOCUMENTATION ONLY (OUTPATIENT)
Dept: PSYCHOLOGY | Facility: CLINIC | Age: 40
End: 2020-08-26

## 2020-08-26 DIAGNOSIS — F43.89 REACTION TO CHRONIC STRESS: Primary | ICD-10-CM

## 2020-08-26 NOTE — PROGRESS NOTES
Case Consultation Record       Client Name: Crystal Rose   Date:  8/26/20    Diagnosis: Reaction to chronic stress  (primary encounter diagnosis)    Therapist: Laurne Rudolph [facility]       Team Members Present:  Lauren Lane Matt, Ben, Debbie    Purpose:   General Review of Treatment and Risk Management   -consulting regarding risk and treatment planning    Recommendations:  - continue to utilize safety assessment and planning as needed  - continue to challenge shame  - explore nightmares/dreams of death  - process through past grief and loss, working towards acceptance and meaning making- use spiritual exploration  - education on cycles of life, normalizing change and loss  - can recommend books:    - When Thing Fall Apart   - When Bad Things Happen to Good People      Lauren Rudolph, [facility]  August 26, 2020

## 2020-09-02 ENCOUNTER — VIRTUAL VISIT (OUTPATIENT)
Dept: PSYCHOLOGY | Facility: CLINIC | Age: 40
End: 2020-09-02
Payer: COMMERCIAL

## 2020-09-02 DIAGNOSIS — F43.89 REACTION TO CHRONIC STRESS: Primary | ICD-10-CM

## 2020-09-02 PROCEDURE — 90832 PSYTX W PT 30 MINUTES: CPT | Mod: 95 | Performed by: SOCIAL WORKER
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
## 2020-09-02 NOTE — PATIENT INSTRUCTIONS
Client will return Sept 16 at 10am. She will continue to work on expressing her emotions and reducing shame about her emotional distress. She will also use coping and calming strategies to manage anxiety as she begins to implement exposure.

## 2020-09-11 ENCOUNTER — VIRTUAL VISIT (OUTPATIENT)
Dept: FAMILY MEDICINE | Facility: CLINIC | Age: 40
End: 2020-09-11
Payer: COMMERCIAL

## 2020-09-11 DIAGNOSIS — F43.22 ADJUSTMENT DISORDER WITH ANXIOUS MOOD: ICD-10-CM

## 2020-09-11 DIAGNOSIS — J45.41 MODERATE PERSISTENT ASTHMA WITH ACUTE EXACERBATION: ICD-10-CM

## 2020-09-11 PROCEDURE — 96127 BRIEF EMOTIONAL/BEHAV ASSMT: CPT | Mod: 59 | Performed by: NURSE PRACTITIONER

## 2020-09-11 PROCEDURE — 99213 OFFICE O/P EST LOW 20 MIN: CPT | Mod: 95 | Performed by: NURSE PRACTITIONER

## 2020-09-11 RX ORDER — HYDROXYZINE HYDROCHLORIDE 25 MG/1
25 TABLET, FILM COATED ORAL
Qty: 60 TABLET | Refills: 1 | Status: SHIPPED | OUTPATIENT
Start: 2020-09-11 | End: 2020-10-09

## 2020-09-11 ASSESSMENT — ANXIETY QUESTIONNAIRES
7. FEELING AFRAID AS IF SOMETHING AWFUL MIGHT HAPPEN: MORE THAN HALF THE DAYS
5. BEING SO RESTLESS THAT IT IS HARD TO SIT STILL: SEVERAL DAYS
2. NOT BEING ABLE TO STOP OR CONTROL WORRYING: MORE THAN HALF THE DAYS
GAD7 TOTAL SCORE: 13
1. FEELING NERVOUS, ANXIOUS, OR ON EDGE: MORE THAN HALF THE DAYS
IF YOU CHECKED OFF ANY PROBLEMS ON THIS QUESTIONNAIRE, HOW DIFFICULT HAVE THESE PROBLEMS MADE IT FOR YOU TO DO YOUR WORK, TAKE CARE OF THINGS AT HOME, OR GET ALONG WITH OTHER PEOPLE: SOMEWHAT DIFFICULT
3. WORRYING TOO MUCH ABOUT DIFFERENT THINGS: MORE THAN HALF THE DAYS
6. BECOMING EASILY ANNOYED OR IRRITABLE: SEVERAL DAYS

## 2020-09-11 ASSESSMENT — PATIENT HEALTH QUESTIONNAIRE - PHQ9
SUM OF ALL RESPONSES TO PHQ QUESTIONS 1-9: 11
5. POOR APPETITE OR OVEREATING: NEARLY EVERY DAY

## 2020-09-11 NOTE — TELEPHONE ENCOUNTER
Cari Rollins MA    Medical Assistant       Telephone Encounter    Signed    Encounter Date:  8/24/2020                     []Hide copied text    []Toya for details  [facility] [place] requesting notes from 7/27/2020 to present. Faxed notes to [facility] [place], claim # [id number], [phone], right fax confirmed at 3:47 pm today, 8/24/2020.  Cari GRAHAM [address]  Primary Care        Faxed notes to [employer] [facility], [phone], right fax confirmed at 7:41 am today, 9/11/2020. Faxed to [employer] [facility] [facility], [phone], right fax confirmed at 7:42 am today, 9/11/2020. Placed under today's date, 9/11/2020 in TC copy basket. Sent patient a My Chart message.  Cari GRAHAM [address]  Primary Care

## 2020-09-11 NOTE — PROGRESS NOTES
"Crystal Rose is a 39 year old female who is being evaluated via a billable telephone visit.      The patient has been notified of following:     \"This telephone visit will be conducted via a call between you and your physician/provider. We have found that certain health care needs can be provided without the need for a physical exam.  This service lets us provide the care you need with a short phone conversation.  If a prescription is necessary we can send it directly to your pharmacy.  If lab work is needed we can place an order for that and you can then stop by our lab to have the test done at a later time.    Telephone visits are billed at different rates depending on your insurance coverage. During this emergency period, for some insurers they may be billed the same as an in-person visit.  Please reach out to your insurance provider with any questions.    If during the course of the call the physician/provider feels a telephone visit is not appropriate, you will not be charged for this service.\"    Patient has given verbal consent for Telephone visit?  Yes    What phone number would you like to be contacted at? [phone]    How would you like to obtain your AVS? Teresa Ricketts     Crystal Rose is a 39 year old female who presents via phone visit today for the following health issues:    HPI    Depression and Anxiety Follow-Up    How are you doing with your depression since your last visit? No change    How are you doing with your anxiety since your last visit?  No change    Are you having other symptoms that might be associated with depression or anxiety? Yes:  night    Have you had a significant life event? Grief or Loss     Do you have any concerns with your use of alcohol or other drugs? No  From my last note on 8/24:  Increase Buspar to three times a day.  Increase Prozac from 40 mg to 60 mg.  Increase Prazosin to 5 mg at bedtime to help with sleep.  Continue working with " therapy.  _____________________________    Depression is better.  Anxiety still significant.  Bad dreams not as often and not keeping her awake.  Sleeping better.    Lorazepam a few times a week.  Has not tried hydroxyzine during daytime for anxiety yet.    Social History     Tobacco Use     Smoking status: Former Smoker     Last attempt to quit: 2009     Years since quittin.3     Smokeless tobacco: Never Used   Substance Use Topics     Alcohol use: No     Drug use: No     PHQ 2020   PHQ-9 Total Score 12 20 11   Q9: Thoughts of better off dead/self-harm past 2 weeks Not at all Several days Not at all   F/U: Thoughts of suicide or self-harm - - -   F/U: Self harm-plan - - -   F/U: Self-harm action - - -   F/U: Safety concerns - - -     TERESA-7 SCORE 2020   Total Score - - -   Total Score 13 16 13     Last PHQ-9 2020   1.  Little interest or pleasure in doing things 1   2.  Feeling down, depressed, or hopeless 1   3.  Trouble falling or staying asleep, or sleeping too much 2   4.  Feeling tired or having little energy 2   5.  Poor appetite or overeating 1   6.  Feeling bad about yourself 1   7.  Trouble concentrating 2   8.  Moving slowly or restless 1   Q9: Thoughts of better off dead/self-harm past 2 weeks 0   PHQ-9 Total Score 11   Difficulty at work, home, or with people Not difficult at all   In the past two weeks have you had thoughts of suicide or self harm? -   Do you have concerns about your personal safety or the safety of others? -   In the past 2 weeks have you thought about a plan or had intention to harm yourself? -   In the past 2 weeks have you acted on these thoughts in any way? -     TERESA-7  2020   1. Feeling nervous, anxious, or on edge 2   2. Not being able to stop or control worrying 2   3. Worrying too much about different things 2   4. Trouble relaxing 3   5. Being so restless that it is hard to sit still 1   6. Becoming easily  annoyed or irritable 1   7. Feeling afraid, as if something awful might happen 2   TERESA-7 Total Score 13   If you checked any problems, how difficult have they made it for you to do your work, take care of things at home, or get along with other people? Somewhat difficult       Suicide Assessment Five-step Evaluation and Treatment (SAFE-T)      How many servings of fruits and vegetables do you eat daily?  2-3    On average, how many sweetened beverages do you drink each day (Examples: soda, juice, sweet tea, etc.  Do NOT count diet or artificially sweetened beverages)?   1    How many days per week do you exercise enough to make your heart beat faster? 3 or less    How many minutes a day do you exercise enough to make your heart beat faster? 20 - 29  How many days per week do you miss taking your medication? 1    What makes it hard for you to take your medications?  remembering to take        Review of Systems   Constitutional, HEENT, cardiovascular, pulmonary, GI, , musculoskeletal, neuro, skin, endocrine and psych systems are negative, except as otherwise noted.       Objective          Vitals:  No vitals were obtained today due to virtual visit.    healthy, alert and no distress  PSYCH: Alert and oriented times 3; coherent speech, normal   rate and volume, able to articulate logical thoughts, able   to abstract reason, no tangential thoughts, no hallucinations   or delusions  Her affect is normal  RESP: No cough, no audible wheezing, able to talk in full sentences  Remainder of exam unable to be completed due to telephone visits            Assessment/Plan:    Assessment & Plan     Adjustment disorder with anxious mood  Continue current medications at current dosages except advised patient that she can use hydroxyzine every 4 hours as needed for panic attacks. Advised her to do this prior to taking the Lorazepam.  Follow-up in 4 weeks or sooner if needed.  - hydrOXYzine (ATARAX) 25 MG tablet; Take 1 tablet (25  mg) by mouth nightly as needed (sleep)     Depression Screening Follow Up    PHQ 9/11/2020   PHQ-9 Total Score 11   Q9: Thoughts of better off dead/self-harm past 2 weeks Not at all   F/U: Thoughts of suicide or self-harm -   F/U: Self harm-plan -   F/U: Self-harm action -   F/U: Safety concerns -     Last PHQ-9 9/11/2020   1.  Little interest or pleasure in doing things 1   2.  Feeling down, depressed, or hopeless 1   3.  Trouble falling or staying asleep, or sleeping too much 2   4.  Feeling tired or having little energy 2   5.  Poor appetite or overeating 1   6.  Feeling bad about yourself 1   7.  Trouble concentrating 2   8.  Moving slowly or restless 1   Q9: Thoughts of better off dead/self-harm past 2 weeks 0   PHQ-9 Total Score 11   Difficulty at work, home, or with people Not difficult at all   In the past two weeks have you had thoughts of suicide or self harm? -   Do you have concerns about your personal safety or the safety of others? -   In the past 2 weeks have you thought about a plan or had intention to harm yourself? -   In the past 2 weeks have you acted on these thoughts in any way? -       Follow Up Actions Taken  Crisis resource information provided in After Visit Summary  Follow up recommended: 4 weeks         Patient Instructions   Continue current medications.  Try Hydroxyzine every 4 hours as needed for anxiety.  Try this prior to using Lorazepam.        Return in about 4 weeks (around 10/9/2020) for Depression Follow up, Anxiety Follow up.    SHERIE Bennett [facility]    Phone call duration:  6 minutes

## 2020-09-11 NOTE — PATIENT INSTRUCTIONS
Continue current medications.  Try Hydroxyzine every 4 hours as needed for anxiety.  Try this prior to using Lorazepam.

## 2020-09-12 ASSESSMENT — ANXIETY QUESTIONNAIRES: GAD7 TOTAL SCORE: 13

## 2020-09-14 RX ORDER — IPRATROPIUM BROMIDE AND ALBUTEROL SULFATE 2.5; .5 MG/3ML; MG/3ML
1 SOLUTION RESPIRATORY (INHALATION) EVERY 4 HOURS PRN
Qty: 25 VIAL | Refills: 3 | Status: SHIPPED | OUTPATIENT
Start: 2020-09-14 | End: 2021-05-24

## 2020-09-14 NOTE — TELEPHONE ENCOUNTER
"Routing refill request to provider for review/approval because:  ACT score is below 20 so RN cannot refill per [facility] protocol.      Requested Prescriptions   Pending Prescriptions Disp Refills     ipratropium - albuterol 0.5 mg/2.5 mg/3 mL (DUONEB) 0.5-2.5 (3) MG/3ML neb solution 25 vial 3     Sig: Take 1 vial (3 mLs) by nebulization every 4 hours as needed for shortness of breath / dyspnea or wheezing       Short-Acting Beta Agonist Inhalers Protocol  Failed - 9/11/2020  7:10 AM        Failed - Asthma control assessment score within normal limits in last 6 months     Please review ACT score.           Passed - Patient is age 12 or older        Passed - Medication is active on med list        Passed - Recent (6 mo) or future (30 days) visit within the authorizing provider's specialty     Patient had office visit in the last 6 months or has a visit in the next 30 days with authorizing provider or within the authorizing provider's specialty.  See \"Patient Info\" tab in inbasket, or \"Choose Columns\" in Meds & Orders section of the refill encounter.           Asthma Nebs Protocol Failed - 9/11/2020  7:10 AM        Failed - Asthma control assessment score within normal limits in last 6 months     Please review ACT score.           Passed - Patient is age 4 years or older        Passed - Medication is active on med list        Passed - Recent (6 mo) or future (30 days) visit within the authorizing provider's specialty     Patient had office visit in the last 6 months or has a visit in the next 30 days with authorizing provider or within the authorizing provider's specialty.  See \"Patient Info\" tab in inbasket, or \"Choose Columns\" in Meds & Orders section of the refill encounter.               ACT Total Scores 4/28/2020 7/13/2020 8/24/2020   ACT TOTAL SCORE - - -   ASTHMA ER VISITS - - -   ASTHMA HOSPITALIZATIONS - - -   ACT TOTAL SCORE (Goal Greater than or Equal to 20) 8 9 8   In the past 12 months, how many times did you " visit the emergency room for your asthma without being admitted to the hospital? 0 0 0   In the past 12 months, how many times were you hospitalized overnight because of your asthma? 1 0 0         David Bone RN, BSN, PHN

## 2020-09-25 ENCOUNTER — VIRTUAL VISIT (OUTPATIENT)
Dept: PSYCHOLOGY | Facility: CLINIC | Age: 40
End: 2020-09-25
Payer: COMMERCIAL

## 2020-09-25 DIAGNOSIS — F43.89 REACTION TO CHRONIC STRESS: Primary | ICD-10-CM

## 2020-09-25 PROCEDURE — 90834 PSYTX W PT 45 MINUTES: CPT | Mod: 95 | Performed by: SOCIAL WORKER
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
## 2020-09-25 NOTE — PATIENT INSTRUCTIONS
Client will return Oct 9 at 9am. She will continue to practice self-compassion and kind self-talk to reduce shame and distress. She will also work on practicing self-care and relaxation, including allowing others to complete household tasks, to reduce anxiety and stress.

## 2020-09-30 ENCOUNTER — TELEPHONE (OUTPATIENT)
Dept: FAMILY MEDICINE | Facility: CLINIC | Age: 40
End: 2020-09-30

## 2020-09-30 NOTE — TELEPHONE ENCOUNTER
Received [employer] short term disability forms via fax. Placed in [facility]'s red folder for review.  Cari Rollins MA  [facility]  2nd Floor  Primary Care

## 2020-10-02 NOTE — TELEPHONE ENCOUNTER
Faxed [employer] forms to the provider's [employer], right fax confirmed at 8:17 am today, 10/2/2020.  Cari Rollins [facility]  2nd Floor  Primary Care

## 2020-10-07 NOTE — TELEPHONE ENCOUNTER
Please have patient schedule virtual visit to discuss forms and how she is doing.  If she needs forms completed prior to the visit, please ask her how far out she needs her time off extended.  SHERIE Bennett CNP

## 2020-10-07 NOTE — TELEPHONE ENCOUNTER
Called and spoke to the patient and scheduled a Telephone visit for 10/9/2020 at 11:00.  Cari Rollins [facility]  2nd Floor  Primary Care

## 2020-10-09 ENCOUNTER — VIRTUAL VISIT (OUTPATIENT)
Dept: FAMILY MEDICINE | Facility: CLINIC | Age: 40
End: 2020-10-09
Payer: COMMERCIAL

## 2020-10-09 ENCOUNTER — E-CONSULT (OUTPATIENT)
Dept: BEHAVIORAL HEALTH | Facility: CLINIC | Age: 40
End: 2020-10-09

## 2020-10-09 ENCOUNTER — VIRTUAL VISIT (OUTPATIENT)
Dept: PSYCHOLOGY | Facility: CLINIC | Age: 40
End: 2020-10-09
Payer: COMMERCIAL

## 2020-10-09 DIAGNOSIS — F43.22 ADJUSTMENT DISORDER WITH ANXIOUS MOOD: ICD-10-CM

## 2020-10-09 DIAGNOSIS — F43.89 REACTION TO CHRONIC STRESS: Primary | ICD-10-CM

## 2020-10-09 PROCEDURE — 90834 PSYTX W PT 45 MINUTES: CPT | Mod: 95 | Performed by: SOCIAL WORKER

## 2020-10-09 PROCEDURE — 99214 OFFICE O/P EST MOD 30 MIN: CPT | Mod: 95 | Performed by: NURSE PRACTITIONER

## 2020-10-09 RX ORDER — MIRTAZAPINE 15 MG/1
15 TABLET, FILM COATED ORAL AT BEDTIME
Qty: 30 TABLET | Refills: 1 | Status: CANCELLED | OUTPATIENT
Start: 2020-10-09

## 2020-10-09 RX ORDER — GABAPENTIN 100 MG/1
100 CAPSULE ORAL 2 TIMES DAILY
Qty: 60 CAPSULE | Refills: 3 | Status: SHIPPED | OUTPATIENT
Start: 2020-10-09 | End: 2021-02-10

## 2020-10-09 RX ORDER — HYDROXYZINE HYDROCHLORIDE 25 MG/1
25 TABLET, FILM COATED ORAL EVERY 4 HOURS PRN
Qty: 90 TABLET | Refills: 1 | Status: SHIPPED | OUTPATIENT
Start: 2020-10-09 | End: 2021-01-08

## 2020-10-09 RX ORDER — GABAPENTIN 300 MG/1
300 CAPSULE ORAL AT BEDTIME
Qty: 30 CAPSULE | Refills: 1 | Status: SHIPPED | OUTPATIENT
Start: 2020-10-09 | End: 2020-12-11

## 2020-10-09 ASSESSMENT — ANXIETY QUESTIONNAIRES
7. FEELING AFRAID AS IF SOMETHING AWFUL MIGHT HAPPEN: NEARLY EVERY DAY
3. WORRYING TOO MUCH ABOUT DIFFERENT THINGS: NEARLY EVERY DAY
4. TROUBLE RELAXING: NEARLY EVERY DAY
6. BECOMING EASILY ANNOYED OR IRRITABLE: MORE THAN HALF THE DAYS
1. FEELING NERVOUS, ANXIOUS, OR ON EDGE: NEARLY EVERY DAY
2. NOT BEING ABLE TO STOP OR CONTROL WORRYING: NEARLY EVERY DAY
GAD7 TOTAL SCORE: 19
5. BEING SO RESTLESS THAT IT IS HARD TO SIT STILL: MORE THAN HALF THE DAYS

## 2020-10-09 ASSESSMENT — PATIENT HEALTH QUESTIONNAIRE - PHQ9: SUM OF ALL RESPONSES TO PHQ QUESTIONS 1-9: 21

## 2020-10-09 NOTE — PATIENT INSTRUCTIONS
Client will practice deep breathing and relaxation to reduce anxiety and distress. She will also process through emotions as they arise, rather than suppressing them. She will return Oct 20 at 2pm.

## 2020-10-09 NOTE — TELEPHONE ENCOUNTER
Faxed forms from my [employer] to [facility] and directly to [facility] 10/9/2020 at 5:16 PM   SHERIE Bennett CNP

## 2020-10-09 NOTE — PATIENT INSTRUCTIONS
Increase prozac to 4 capsules (80 mg) daily.  Use Hydroxyzine every 4 hours as needed for anxiety.  Will reach out when I have a response from psychiatry about additional medications.
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
## 2020-10-09 NOTE — Clinical Note
OVIDIO.  Working on medication adjustments with her.  Hoping to get her to a better place soon.  Thanks.  SHERIE Bennett CNP

## 2020-10-09 NOTE — PROGRESS NOTES
Spoke to patient at 4:34 PM and informed her of medication changes as per psychiatric consult note.  Will start Gabapentin scheduled.  Patient will stop lorazepam.  Can use Hydroxyzine every 4 hours as needed for breakthrough anxiety.  Continue all other medications.  Follow up in 3 weeks.  Scheduled while talking to patient.    SHERIE Bennett CNP d
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
## 2020-10-10 ENCOUNTER — MYC MEDICAL ADVICE (OUTPATIENT)
Dept: FAMILY MEDICINE | Facility: CLINIC | Age: 40
End: 2020-10-10

## 2020-10-10 DIAGNOSIS — R07.0 THROAT PAIN: Primary | ICD-10-CM

## 2020-10-10 DIAGNOSIS — Z20.818 EXPOSURE TO STREP THROAT: ICD-10-CM

## 2020-10-10 ASSESSMENT — ANXIETY QUESTIONNAIRES: GAD7 TOTAL SCORE: 19

## 2020-10-12 RX ORDER — AMOXICILLIN 875 MG
875 TABLET ORAL 2 TIMES DAILY
Qty: 20 TABLET | Refills: 0 | Status: SHIPPED | OUTPATIENT
Start: 2020-10-12 | End: 2020-10-22

## 2020-10-13 ENCOUNTER — TELEPHONE (OUTPATIENT)
Dept: FAMILY MEDICINE | Facility: CLINIC | Age: 40
End: 2020-10-13

## 2020-10-13 NOTE — TELEPHONE ENCOUNTER
Faxed 9/11/2020 and 10/9/2020 visits per Gregoria to [facility], [phone], right fax confirmed at 7:48 am today, 10/13/2020.  Cari Rollins MA  [facility]  2nd Floor  Primary Care

## 2020-10-16 ENCOUNTER — MYC MEDICAL ADVICE (OUTPATIENT)
Dept: FAMILY MEDICINE | Facility: CLINIC | Age: 40
End: 2020-10-16
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
## 2020-10-16 NOTE — TELEPHONE ENCOUNTER
I am recommending patient go to emergency room.  Please call her and confirm she sees my message.  SHERIE Bennett CNP

## 2020-10-16 NOTE — TELEPHONE ENCOUNTER
Patient asked this morning about status of the notes or forms being sent to [facility].  Can we call them and see what is missing?  I sent her forms to [facility] on 10/9/2020.  I will fax those over to [facility] again.  SHERIE Bennett CNP

## 2020-10-17 NOTE — TELEPHONE ENCOUNTER
I faxed the request for office notes but never received the form that needed to be filled out after the Virtual visit.  Routing to Gregoria to re fax to me.  Cari Rollins [facility]  2nd Floor  Primary Care

## 2020-10-19 NOTE — TELEPHONE ENCOUNTER
Faxed forms to [facility], [phone], right fax confirmed at 10:23 am today, 10/19/2020. Copy to TC and abstracting.  Cari Rollins MA  [facility]  2nd Floor  Primary Care

## 2020-10-20 ENCOUNTER — VIRTUAL VISIT (OUTPATIENT)
Dept: PSYCHOLOGY | Facility: CLINIC | Age: 40
End: 2020-10-20
Payer: COMMERCIAL

## 2020-10-20 DIAGNOSIS — F43.89 REACTION TO CHRONIC STRESS: Primary | ICD-10-CM

## 2020-10-20 PROCEDURE — 90834 PSYTX W PT 45 MINUTES: CPT | Mod: 95 | Performed by: SOCIAL WORKER

## 2020-10-20 NOTE — PATIENT INSTRUCTIONS
Client will return Nov 6 at 12:30pm. She will continue to practice deep breathing and relaxation to reduce anxiety and stress. She will also practice kind, positive self-talk to reduce shame and depression.
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
## 2020-10-21 ENCOUNTER — TELEPHONE (OUTPATIENT)
Dept: FAMILY MEDICINE | Facility: CLINIC | Age: 40
End: 2020-10-21

## 2020-10-21 NOTE — TELEPHONE ENCOUNTER
Prior Auth Needed  Drug: Phentermine 37.5mg tabs  Insurance: Paid/[employer]  ID Number: [id number]  Phone Number: [phone]    Please do not close encounter until Prior Auth is approved or denied.    Thank You  Nallely Freed  Pharmacy Technician  [facility]

## 2020-10-21 NOTE — TELEPHONE ENCOUNTER
Central Prior Authorization Team   Phone: [phone]    PA Initiation    Medication: Phentermine 37.5mg tabs  Insurance Company: [employer] - Phone [phone] Fax [phone]  Pharmacy Filling the Rx: [facility] - [address] [facility]  Filling Pharmacy Phone: [phone]  Filling Pharmacy Fax: [phone]  Start Date: 10/21/2020

## 2020-10-22 NOTE — TELEPHONE ENCOUNTER
Prior Authorization Approval    Authorization Effective Date: 9/21/2020  Authorization Expiration Date: 10/21/2021  Medication: Phentermine 37.5mg-APPROVED  Approved Dose/Quantity:    Reference #:     Insurance Company: [employer] - Phone [phone] Fax [phone]  Expected CoPay:       CoPay Card Available:      Foundation Assistance Needed:    Which Pharmacy is filling the prescription (Not needed for infusion/clinic administered): [facility] - [address] [address]  Pharmacy Notified: Yes  Patient Notified: Yes  **Instructed pharmacy to notify patient when script is ready to /ship.**

## 2020-10-26 ENCOUNTER — MYC MEDICAL ADVICE (OUTPATIENT)
Dept: FAMILY MEDICINE | Facility: CLINIC | Age: 40
End: 2020-10-26

## 2020-10-27 ENCOUNTER — TELEPHONE (OUTPATIENT)
Dept: FAMILY MEDICINE | Facility: CLINIC | Age: 40
End: 2020-10-27

## 2020-10-27 DIAGNOSIS — J45.40 MODERATE PERSISTENT ASTHMA WITHOUT COMPLICATION: ICD-10-CM

## 2020-10-28 ENCOUNTER — VIRTUAL VISIT (OUTPATIENT)
Dept: FAMILY MEDICINE | Facility: CLINIC | Age: 40
End: 2020-10-28
Payer: COMMERCIAL

## 2020-10-28 DIAGNOSIS — K25.4 GASTROINTESTINAL HEMORRHAGE ASSOCIATED WITH GASTRIC ULCER: Primary | ICD-10-CM

## 2020-10-28 DIAGNOSIS — F43.22 ADJUSTMENT DISORDER WITH ANXIOUS MOOD: ICD-10-CM

## 2020-10-28 DIAGNOSIS — K91.2 POSTSURGICAL MALABSORPTION: ICD-10-CM

## 2020-10-28 PROBLEM — K92.2 GI BLEED: Status: ACTIVE | Noted: 2020-10-16

## 2020-10-28 PROCEDURE — 99214 OFFICE O/P EST MOD 30 MIN: CPT | Mod: 95 | Performed by: NURSE PRACTITIONER

## 2020-10-28 RX ORDER — SUCRALFATE ORAL 1 G/10ML
1 SUSPENSION ORAL
Qty: 1200 ML | Refills: 1 | Status: SHIPPED | OUTPATIENT
Start: 2020-10-28 | End: 2020-11-27

## 2020-10-28 RX ORDER — ALBUTEROL SULFATE 90 UG/1
2 AEROSOL, METERED RESPIRATORY (INHALATION) EVERY 4 HOURS PRN
Qty: 5 INHALER | Refills: 3 | Status: SHIPPED | OUTPATIENT
Start: 2020-10-28 | End: 2021-05-24

## 2020-10-28 RX ORDER — PANTOPRAZOLE SODIUM 40 MG/1
40 TABLET, DELAYED RELEASE ORAL 2 TIMES DAILY
COMMUNITY
Start: 2020-10-17 | End: 2021-01-08

## 2020-10-28 RX ORDER — NYSTATIN 100000 U/G
1 OINTMENT TOPICAL
COMMUNITY
End: 2021-05-26

## 2020-10-28 NOTE — TELEPHONE ENCOUNTER
Please fax visit notes with Lauren Rudolph in that time frame.  Patient states this is the one that [facility] needs.  Thaniks.  SHERIE Bennett CNP

## 2020-10-28 NOTE — TELEPHONE ENCOUNTER
Printed and faxed office notes from 10/20/2020 with Lauren Rudolph to [facility], [phone], right fax confirmed at 12:02 pm today, 10/28/2020.  Cari Rollins [facility]  2nd Floor  Primary Care

## 2020-10-28 NOTE — PATIENT INSTRUCTIONS
Continue your current medications but stop Lorazepam.  Take Hydroxyzine every 4 hours as needed for anxiety instead.  You're doing a great job- keep up the good work.  Things WILL get better.      For the stomach, continue Protonix twice a day as prescribed from hospital.  Add Carafate before meals and at bedtime to coat stomach and help with pain.

## 2020-11-06 ENCOUNTER — VIRTUAL VISIT (OUTPATIENT)
Dept: PSYCHOLOGY | Facility: CLINIC | Age: 40
End: 2020-11-06
Payer: COMMERCIAL

## 2020-11-06 ENCOUNTER — MYC MEDICAL ADVICE (OUTPATIENT)
Dept: FAMILY MEDICINE | Facility: CLINIC | Age: 40
End: 2020-11-06

## 2020-11-06 DIAGNOSIS — F43.89 REACTION TO CHRONIC STRESS: Primary | ICD-10-CM

## 2020-11-06 PROCEDURE — 90834 PSYTX W PT 45 MINUTES: CPT | Mod: 95 | Performed by: SOCIAL WORKER

## 2020-11-06 NOTE — TELEPHONE ENCOUNTER
Received [employer] FMLA/Short Term Disability Accommodation request forms and placed in Gregoria's red folder for review and signature. Sent a My Chart message to the patient explaining that we received the forms and that we gave them to her provider for her review.  Cari Rollins [facility]  2nd Floor  Primary Care
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
## 2020-11-06 NOTE — PATIENT INSTRUCTIONS
Client will return Nov 20 at 12:30pm. She will practice deep breathing, calming self-talk, and relaxation to reduce anxiety and stress. She will also continue to validate her emotions and practice kind self-talk to reduce shame and depression.

## 2020-11-06 NOTE — TELEPHONE ENCOUNTER
Sent patient a My Chart response.  Cari Rollins MA  [facility]  2nd Floor  Primary Care

## 2020-11-16 ENCOUNTER — HEALTH MAINTENANCE LETTER (OUTPATIENT)
Age: 40
End: 2020-11-16

## 2020-11-20 ENCOUNTER — VIRTUAL VISIT (OUTPATIENT)
Dept: PSYCHOLOGY | Facility: CLINIC | Age: 40
End: 2020-11-20
Payer: COMMERCIAL

## 2020-11-20 DIAGNOSIS — F43.89 REACTION TO CHRONIC STRESS: Primary | ICD-10-CM

## 2020-11-20 PROCEDURE — 90834 PSYTX W PT 45 MINUTES: CPT | Mod: 95 | Performed by: SOCIAL WORKER

## 2020-11-20 NOTE — PATIENT INSTRUCTIONS
Client will return Dec 4 at 8am.. She will practice deep breathing, calming self-talk, and relaxation to reduce anxiety and stress. She will also continue to validate her emotions and practice kind self-talk to reduce shame and depression.
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
## 2020-12-04 ENCOUNTER — VIRTUAL VISIT (OUTPATIENT)
Dept: PSYCHOLOGY | Facility: CLINIC | Age: 40
End: 2020-12-04
Payer: COMMERCIAL

## 2020-12-04 DIAGNOSIS — F43.89 REACTION TO CHRONIC STRESS: Primary | ICD-10-CM

## 2020-12-04 PROCEDURE — 90834 PSYTX W PT 45 MINUTES: CPT | Mod: 95 | Performed by: SOCIAL WORKER

## 2020-12-04 NOTE — PATIENT INSTRUCTIONS
Client will return Dec 18 at 9am. She will use validating and positive self-talk to manage anxiety and depression. She will also use calming skills, de-stress breaks, and self-talk to reduce anxiety.
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
## 2020-12-09 DIAGNOSIS — F43.22 ADJUSTMENT DISORDER WITH ANXIOUS MOOD: ICD-10-CM

## 2020-12-10 NOTE — TELEPHONE ENCOUNTER
Routing refill request to provider for review/approval because:  Drug not on the [facility] refill protocol       David Bone RN, BSN, PHN

## 2020-12-11 ENCOUNTER — TELEPHONE (OUTPATIENT)
Dept: FAMILY MEDICINE | Facility: CLINIC | Age: 40
End: 2020-12-11

## 2020-12-11 RX ORDER — GABAPENTIN 300 MG/1
300 CAPSULE ORAL AT BEDTIME
Qty: 30 CAPSULE | Refills: 1 | Status: SHIPPED | OUTPATIENT
Start: 2020-12-11 | End: 2021-02-10

## 2020-12-11 NOTE — TELEPHONE ENCOUNTER
Received signed DIANE and request to send records to [facility]. Faxed Visit notes from 10/28/2020, 11/6/2020, 11/20/2020 and 12/4/2020 to [phone], right fax confirmed at 11:01 am today, 12/11/2020. Sent DIANE to  and abstracting.  Cari Rollins MA  [facility]  2nd Floor  Primary Care

## 2020-12-18 ENCOUNTER — VIRTUAL VISIT (OUTPATIENT)
Dept: PSYCHOLOGY | Facility: CLINIC | Age: 40
End: 2020-12-18
Payer: COMMERCIAL

## 2020-12-18 DIAGNOSIS — F43.89 REACTION TO CHRONIC STRESS: Primary | ICD-10-CM

## 2020-12-18 PROCEDURE — 90834 PSYTX W PT 45 MINUTES: CPT | Mod: 95 | Performed by: SOCIAL WORKER

## 2020-12-18 ASSESSMENT — ANXIETY QUESTIONNAIRES
6. BECOMING EASILY ANNOYED OR IRRITABLE: MORE THAN HALF THE DAYS
5. BEING SO RESTLESS THAT IT IS HARD TO SIT STILL: MORE THAN HALF THE DAYS
2. NOT BEING ABLE TO STOP OR CONTROL WORRYING: MORE THAN HALF THE DAYS
7. FEELING AFRAID AS IF SOMETHING AWFUL MIGHT HAPPEN: MORE THAN HALF THE DAYS
3. WORRYING TOO MUCH ABOUT DIFFERENT THINGS: MORE THAN HALF THE DAYS
1. FEELING NERVOUS, ANXIOUS, OR ON EDGE: MORE THAN HALF THE DAYS
4. TROUBLE RELAXING: NEARLY EVERY DAY
GAD7 TOTAL SCORE: 15

## 2020-12-18 ASSESSMENT — PATIENT HEALTH QUESTIONNAIRE - PHQ9: SUM OF ALL RESPONSES TO PHQ QUESTIONS 1-9: 11

## 2020-12-18 NOTE — PATIENT INSTRUCTIONS
Client will return Jan 5 at 9am. She will practice accepting her anxiety and use calming self-talk and deep breathing to manage it. She will also continue practicing positive and kind self-talk to reduce shame and distress.
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
## 2020-12-19 ASSESSMENT — ANXIETY QUESTIONNAIRES: GAD7 TOTAL SCORE: 15

## 2020-12-29 ENCOUNTER — TRANSFERRED RECORDS (OUTPATIENT)
Dept: HEALTH INFORMATION MANAGEMENT | Facility: CLINIC | Age: 40
End: 2020-12-29

## 2021-01-05 ENCOUNTER — VIRTUAL VISIT (OUTPATIENT)
Dept: PSYCHOLOGY | Facility: CLINIC | Age: 41
End: 2021-01-05
Payer: COMMERCIAL

## 2021-01-05 DIAGNOSIS — F43.89 REACTION TO CHRONIC STRESS: Primary | ICD-10-CM

## 2021-01-05 PROCEDURE — 90834 PSYTX W PT 45 MINUTES: CPT | Mod: 95 | Performed by: SOCIAL WORKER
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
## 2021-01-05 NOTE — PATIENT INSTRUCTIONS
Client will return Jan 20 at 1:30pm. She will practice accepting her anxiety and use calming self-talk and deep breathing to manage it. She will also continue practicing positive and kind self-talk to reduce shame and distress.

## 2021-01-07 ENCOUNTER — TELEPHONE (OUTPATIENT)
Dept: FAMILY MEDICINE | Facility: CLINIC | Age: 41
End: 2021-01-07

## 2021-01-07 DIAGNOSIS — F43.22 ADJUSTMENT DISORDER WITH ANXIOUS MOOD: ICD-10-CM

## 2021-01-07 DIAGNOSIS — F43.10 PTSD (POST-TRAUMATIC STRESS DISORDER): ICD-10-CM

## 2021-01-07 RX ORDER — BUSPIRONE HYDROCHLORIDE 15 MG/1
15 TABLET ORAL 3 TIMES DAILY
Qty: 90 TABLET | Refills: 3 | Status: CANCELLED | OUTPATIENT
Start: 2021-01-07

## 2021-01-07 NOTE — TELEPHONE ENCOUNTER
Also requesting :    sucralfate (CARAFATE) 1 GM/10ML suspension 1200 mL 1 10/28/2020 11/27/2020

## 2021-01-18 ENCOUNTER — TELEPHONE (OUTPATIENT)
Dept: FAMILY MEDICINE | Facility: CLINIC | Age: 41
End: 2021-01-18

## 2021-01-18 NOTE — TELEPHONE ENCOUNTER
Tablets sent instead of suspension on 1/8/21 for 120 tablets with 1 refill.           David Bone RN, BSN, PHN

## 2021-01-18 NOTE — TELEPHONE ENCOUNTER
Gregoria completed form for patient and this form has been faxed to fax # [phone].  Heath Riggs,  For [facility]

## 2021-01-20 ENCOUNTER — VIRTUAL VISIT (OUTPATIENT)
Dept: PSYCHOLOGY | Facility: CLINIC | Age: 41
End: 2021-01-20
Payer: COMMERCIAL

## 2021-01-20 ENCOUNTER — TELEPHONE (OUTPATIENT)
Dept: PSYCHOLOGY | Facility: CLINIC | Age: 41
End: 2021-01-20

## 2021-01-20 DIAGNOSIS — F43.89 REACTION TO CHRONIC STRESS: Primary | ICD-10-CM

## 2021-01-20 PROCEDURE — 90834 PSYTX W PT 45 MINUTES: CPT | Mod: 95 | Performed by: SOCIAL WORKER
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
## 2021-01-20 NOTE — TELEPHONE ENCOUNTER
Therapist attempted to contact patient due to her not joining video visit. Therapist informed her the video would remain up for 15 more minutes for her to join, provided phone number to call therapist if any issues, and provided FCC phone number for any cancellations or scheduling purposes. Therapist also reminded her of next scheduled appointment.    Lauren Rudolph, [facility] 1/20/2021

## 2021-01-20 NOTE — PATIENT INSTRUCTIONS
Client will return Feb 3 at 9am.  She will work on expressing/releasing her emotions/grief to relieve physical and mental distress. She will also continue to use coping and calming strategies to manage anxiety, including deep breathing and calming self-talk.

## 2021-02-03 ENCOUNTER — VIRTUAL VISIT (OUTPATIENT)
Dept: PSYCHOLOGY | Facility: CLINIC | Age: 41
End: 2021-02-03
Payer: COMMERCIAL

## 2021-02-03 DIAGNOSIS — F43.89 REACTION TO CHRONIC STRESS: Primary | ICD-10-CM

## 2021-02-03 PROCEDURE — 90834 PSYTX W PT 45 MINUTES: CPT | Mod: 95 | Performed by: SOCIAL WORKER

## 2021-02-03 NOTE — PATIENT INSTRUCTIONS
Client will return Feb 17 at 9am. She will work on practicing self-compassion and validation to self for level of stress. She will also work on accepting moments of anxiety, rather than fighting them, and use deep breathing and calming self talk to manage/reduce anxiety.
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
## 2021-02-10 DIAGNOSIS — F43.22 ADJUSTMENT DISORDER WITH ANXIOUS MOOD: ICD-10-CM

## 2021-02-10 RX ORDER — GABAPENTIN 300 MG/1
300 CAPSULE ORAL AT BEDTIME
Qty: 30 CAPSULE | Refills: 1 | Status: SHIPPED | OUTPATIENT
Start: 2021-02-10 | End: 2021-03-03

## 2021-02-10 RX ORDER — GABAPENTIN 100 MG/1
100 CAPSULE ORAL 2 TIMES DAILY
Qty: 60 CAPSULE | Refills: 3 | Status: SHIPPED | OUTPATIENT
Start: 2021-02-10 | End: 2021-03-03

## 2021-02-10 NOTE — TELEPHONE ENCOUNTER
Routing refill request to provider for review/approval because:  Drug not on the [facility] refill protocol   gabapentin    Amy Sheppard BSN, RN

## 2021-02-17 ENCOUNTER — VIRTUAL VISIT (OUTPATIENT)
Dept: PSYCHOLOGY | Facility: CLINIC | Age: 41
End: 2021-02-17
Payer: COMMERCIAL

## 2021-02-17 DIAGNOSIS — F43.89 REACTION TO CHRONIC STRESS: Primary | ICD-10-CM

## 2021-02-17 PROCEDURE — 90834 PSYTX W PT 45 MINUTES: CPT | Mod: 95 | Performed by: SOCIAL WORKER

## 2021-02-17 NOTE — PATIENT INSTRUCTIONS
Client will return March 3 at 8am. She will work on regulation and deep breathing daily to reduce anxiety and stress. She will also work on self-compassion to reduce emotional distress.
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
## 2021-03-03 ENCOUNTER — TELEPHONE (OUTPATIENT)
Dept: FAMILY MEDICINE | Facility: CLINIC | Age: 41
End: 2021-03-03

## 2021-03-03 ENCOUNTER — VIRTUAL VISIT (OUTPATIENT)
Dept: PSYCHOLOGY | Facility: CLINIC | Age: 41
End: 2021-03-03
Payer: COMMERCIAL

## 2021-03-03 DIAGNOSIS — F43.89 REACTION TO CHRONIC STRESS: Primary | ICD-10-CM

## 2021-03-03 PROBLEM — Z59.00 HOMELESS: Status: RESOLVED | Noted: 2019-10-29 | Resolved: 2021-03-03

## 2021-03-03 PROCEDURE — 90834 PSYTX W PT 45 MINUTES: CPT | Mod: 95 | Performed by: SOCIAL WORKER
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
## 2021-03-03 NOTE — TELEPHONE ENCOUNTER
Received [facility] Accommodation Extension request form(s)  via fax and faxed to Xavi finley, right fax confirmed at 7:40 am today, 3/3/2021.  Cari Rollins MA  [facility]  2nd Floor  Primary Care

## 2021-03-03 NOTE — PATIENT INSTRUCTIONS
Client will return March 17 at 11am. She will write out her to-do lists as well as stressors to help process them and reduce emotional distress in her mind. She will also use helpful affirmations to reduce stress and anxiety: One day at a time, This is stressful, but I will get through it, I am doing my best.

## 2021-03-05 NOTE — TELEPHONE ENCOUNTER
Faxed completed forms to [phone], [facility] as well as to [employer] for abstraction of completed forms to chart.   SHERIE Bennett CNP

## 2021-03-09 DIAGNOSIS — K26.4 GASTROINTESTINAL HEMORRHAGE ASSOCIATED WITH DUODENAL ULCER: ICD-10-CM

## 2021-03-09 RX ORDER — SUCRALFATE 1 G/1
1 TABLET ORAL 4 TIMES DAILY
Qty: 120 TABLET | Refills: 0 | Status: SHIPPED | OUTPATIENT
Start: 2021-03-09 | End: 2021-04-07

## 2021-03-09 NOTE — TELEPHONE ENCOUNTER
Prescription approved per [facility] Refill Protocol.  APPT NEEDED FOR FURTHER REFILLS  [facility] refill given per RN protocol.   Due for office visit  Pharmacy note to inform pt of office visit needed for continued medication use  Karley Javier RN

## 2021-03-17 ENCOUNTER — VIRTUAL VISIT (OUTPATIENT)
Dept: PSYCHOLOGY | Facility: CLINIC | Age: 41
End: 2021-03-17
Payer: COMMERCIAL

## 2021-03-17 DIAGNOSIS — F43.89 REACTION TO CHRONIC STRESS: Primary | ICD-10-CM

## 2021-03-17 PROCEDURE — 90834 PSYTX W PT 45 MINUTES: CPT | Mod: 95 | Performed by: SOCIAL WORKER

## 2021-03-17 NOTE — PATIENT INSTRUCTIONS
Client will return April 1 at 10am.  She will work on practicing relaxation/mindfulness daily to reduce stress and anxiety, including deep breathing and meditation sonny. She will also continue to practice kind self-talk to reduce criticism to self.
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
## 2021-03-24 ENCOUNTER — TELEPHONE (OUTPATIENT)
Dept: FAMILY MEDICINE | Facility: CLINIC | Age: 41
End: 2021-03-24

## 2021-03-29 NOTE — TELEPHONE ENCOUNTER
Hi there, it appears we did this already as well.  Can we refax the form under media tab from 3/15/21?    SHERIE Benntet CNP

## 2021-03-29 NOTE — TELEPHONE ENCOUNTER
Printed form and re faxed to [facility], [phone], right fax confirmed at 1:01 pm today, 3/29/2021. Placed in T[facility] copy basket. Sent patient a My Chart message.  Cari Rollins MA  [facility]  2nd Floor  Primary Care

## 2021-04-01 ENCOUNTER — VIRTUAL VISIT (OUTPATIENT)
Dept: PSYCHOLOGY | Facility: CLINIC | Age: 41
End: 2021-04-01
Payer: COMMERCIAL

## 2021-04-01 DIAGNOSIS — F43.89 REACTION TO CHRONIC STRESS: Primary | ICD-10-CM

## 2021-04-01 PROCEDURE — 90834 PSYTX W PT 45 MINUTES: CPT | Mod: 95 | Performed by: SOCIAL WORKER

## 2021-04-01 NOTE — Clinical Note
Leopoldo Kinney,  I met with this patient today and she continues to really struggle. We've worked on helpful coping and calming strategies to address her anxiety and stress, but it does not feel as though the medication is giving her enough relief. She said that she believes you put a referral in for psychiatry, but she lost the number to schedule. I gave her the [facility] to call to try to get that scheduled. She is quite the complex case, so hopefully that could help?!  Thanks so much for all your work and support for her! Lauren

## 2021-04-01 NOTE — PATIENT INSTRUCTIONS
Client will return April 14 at 9am.  She will continue to work on practicing relaxation/mindfulness daily to reduce stress and anxiety, including deep breathing and meditation sonny. She will also continue to practice kind self-talk to reduce criticism to self.   Client will contact [facility] to schedule with psychiatry.
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
## 2021-04-06 DIAGNOSIS — K26.4 GASTROINTESTINAL HEMORRHAGE ASSOCIATED WITH DUODENAL ULCER: ICD-10-CM

## 2021-04-07 RX ORDER — SUCRALFATE 1 G/1
1 TABLET ORAL 4 TIMES DAILY
Qty: 120 TABLET | Refills: 0 | Status: SHIPPED | OUTPATIENT
Start: 2021-04-07 | End: 2021-10-27

## 2021-04-07 NOTE — TELEPHONE ENCOUNTER
Routing refill request to provider for review/approval because:  [facility] given x1 and patient did not follow up, please advise    Amy Sheppard BSN, RN

## 2021-04-14 ENCOUNTER — VIRTUAL VISIT (OUTPATIENT)
Dept: PSYCHOLOGY | Facility: CLINIC | Age: 41
End: 2021-04-14
Payer: COMMERCIAL

## 2021-04-14 DIAGNOSIS — F43.89 REACTION TO CHRONIC STRESS: Primary | ICD-10-CM

## 2021-04-14 PROCEDURE — 90834 PSYTX W PT 45 MINUTES: CPT | Mod: 95 | Performed by: SOCIAL WORKER

## 2021-04-14 NOTE — PATIENT INSTRUCTIONS
Client will return April 28 at 9am. She will use supports, coping and calming skills to manage emotional distress during this time, including meditation/mindfulness, deep breathing and calming/positive self-talk. She will also follow up on referrals when ready to do so: mental health case management for her son and psychiatry for self.
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
## 2021-04-27 ENCOUNTER — IMMUNIZATION (OUTPATIENT)
Dept: NURSING | Facility: CLINIC | Age: 41
End: 2021-04-27
Payer: COMMERCIAL

## 2021-04-27 PROCEDURE — 0001A PR COVID VAC PFIZER DIL RECON 30 MCG/0.3 ML IM: CPT

## 2021-04-27 PROCEDURE — 91300 PR COVID VAC PFIZER DIL RECON 30 MCG/0.3 ML IM: CPT

## 2021-04-28 ENCOUNTER — VIRTUAL VISIT (OUTPATIENT)
Dept: PSYCHOLOGY | Facility: CLINIC | Age: 41
End: 2021-04-28
Payer: COMMERCIAL

## 2021-04-28 ENCOUNTER — TELEPHONE (OUTPATIENT)
Dept: FAMILY MEDICINE | Facility: CLINIC | Age: 41
End: 2021-04-28

## 2021-04-28 DIAGNOSIS — F43.89 REACTION TO CHRONIC STRESS: Primary | ICD-10-CM

## 2021-04-28 PROCEDURE — 90834 PSYTX W PT 45 MINUTES: CPT | Mod: 95 | Performed by: SOCIAL WORKER

## 2021-04-28 RX ORDER — ONDANSETRON 8 MG/1
TABLET, FILM COATED ORAL
Qty: 15 TABLET | Refills: 2 | OUTPATIENT
Start: 2021-04-28

## 2021-04-28 NOTE — TELEPHONE ENCOUNTER
"Requested Prescriptions   Pending Prescriptions Disp Refills     ondansetron (ZOFRAN) 8 MG tablet [Pharmacy Med Name: ONDANSETRON HCL 8MG TABS] 15 tablet 2     Sig: TAKE 1 TABLET BY MOUTH EVERY 6 HOURS AS NEEDED FOR NAUSEA OR VOMITING        Antivertigo/Antiemetic Agents Failed - 4/28/2021  5:43 AM        Failed - Recent (12 mo) or future (30 days) visit within the authorizing provider's specialty     Patient has had an office visit with the authorizing provider or a provider within the authorizing providers department within the previous 12 mos or has a future within next 30 days. See \"Patient Info\" tab in inbasket, or \"Choose Columns\" in Meds & Orders section of the refill encounter.              Failed - Medication is active on med list        Passed - Patient is 18 years of age or older           Last Written Prescription Date:  3/22/14 for 4 mg tab  Last Fill Quantity: 30,  # refills: 2   Last office visit: 4/30/18 with:  Dr Pinedo   Future Office Visit:  None    Needs an office visit.  Not seen in clinic since 2018.  Rx denied.    Bambi Ahmadi RN on 4/28/2021 at 11:20 AM                "

## 2021-04-28 NOTE — TELEPHONE ENCOUNTER
Received the Second Accomodation Extension Request form(s)  via fax and placed in Gregoria's Red folder for signature.  Cari Rollins MA  [facility]  2nd Floor  Primary Care

## 2021-04-28 NOTE — PATIENT INSTRUCTIONS
Client will return May 12 at 10am. She will explore her triggers/fears regarding leaving the home and work to challenge irrational fears. She will use calming self-talk to take baby steps to expose self to fears, as ready to do so.   She will schedule psychiatry appointment.
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
## 2021-05-04 NOTE — TELEPHONE ENCOUNTER
Spoke to patient and scheduled a Telephone visit for 5/5/2021 at 9:00 am  Cari Rollins MA  [facility]  2nd Floor  Primary Care

## 2021-05-05 ENCOUNTER — VIRTUAL VISIT (OUTPATIENT)
Dept: FAMILY MEDICINE | Facility: CLINIC | Age: 41
End: 2021-05-05
Payer: COMMERCIAL

## 2021-05-05 ENCOUNTER — DOCUMENTATION ONLY (OUTPATIENT)
Dept: PSYCHOLOGY | Facility: CLINIC | Age: 41
End: 2021-05-05

## 2021-05-05 DIAGNOSIS — J30.9 CHRONIC ALLERGIC RHINITIS: ICD-10-CM

## 2021-05-05 DIAGNOSIS — F43.10 PTSD (POST-TRAUMATIC STRESS DISORDER): ICD-10-CM

## 2021-05-05 DIAGNOSIS — F43.22 ADJUSTMENT DISORDER WITH ANXIOUS MOOD: Primary | ICD-10-CM

## 2021-05-05 DIAGNOSIS — F43.89 REACTION TO CHRONIC STRESS: Primary | ICD-10-CM

## 2021-05-05 DIAGNOSIS — J45.40 MODERATE PERSISTENT ASTHMA, UNSPECIFIED WHETHER COMPLICATED: ICD-10-CM

## 2021-05-05 DIAGNOSIS — F43.89 REACTION TO CHRONIC STRESS: ICD-10-CM

## 2021-05-05 PROCEDURE — 99214 OFFICE O/P EST MOD 30 MIN: CPT | Mod: 95 | Performed by: NURSE PRACTITIONER

## 2021-05-05 RX ORDER — MONTELUKAST SODIUM 10 MG/1
10 TABLET ORAL AT BEDTIME
Qty: 90 TABLET | Refills: 3 | Status: SHIPPED | OUTPATIENT
Start: 2021-05-05 | End: 2022-05-11

## 2021-05-05 RX ORDER — LORATADINE 10 MG/1
10 TABLET ORAL DAILY
Qty: 90 TABLET | Refills: 3 | Status: SHIPPED | OUTPATIENT
Start: 2021-05-05 | End: 2022-05-11

## 2021-05-05 NOTE — PROGRESS NOTES
Case Consultation Record       Client Name: Crystal Rose   Date:  5/5/2021      Diagnosis: Reaction to chronic stress  (primary encounter diagnosis)    Therapist: Lauren Rudolph [facility]       Team Members Present:  Sherley Navas Ellen, Kristin, Rebecca, Rita, Jacob    Purpose:   General Review of Treatment    Recommendations:  -structure in the morning  - focus on regulation during session   -CBT, DBT  -encourage grounding each morning  -meditation, stretching, deep breathing, sensory soothing  -[facility]      Lauren Rudolph [facility]  May 5, 2021
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
## 2021-05-05 NOTE — PROGRESS NOTES
Case Consultation Record       Client Name: Crystal Rose   Date:  5/5/2021      Diagnosis: Reaction to chronic stress  (primary encounter diagnosis)    Therapist: Lauren Rudolph      Team Members Present:  Yosef Levine Rebecca, Rita, Kristin, Jacob, Ellen    Purpose:   General Review of Treatment    Recommendations:  -structure in the morning  - focus on regulation during session   -encourage grounding each morning, deep breathing, stretchging, meditation, sensory soothing  CBT, DBT  -[facility]      Lauren Rudolph, [facility]  May 5, 2021

## 2021-05-09 ENCOUNTER — E-CONSULT (OUTPATIENT)
Dept: BEHAVIORAL HEALTH | Facility: CLINIC | Age: 41
End: 2021-05-09
Payer: COMMERCIAL

## 2021-05-09 PROCEDURE — 99451 NTRPROF PH1/NTRNET/EHR 5/>: CPT | Performed by: PSYCHIATRY & NEUROLOGY
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
## 2021-05-10 ENCOUNTER — TELEPHONE (OUTPATIENT)
Dept: FAMILY MEDICINE | Facility: CLINIC | Age: 41
End: 2021-05-10

## 2021-05-10 DIAGNOSIS — K26.4 GASTROINTESTINAL HEMORRHAGE ASSOCIATED WITH DUODENAL ULCER: ICD-10-CM

## 2021-05-10 DIAGNOSIS — F43.22 ADJUSTMENT DISORDER WITH ANXIOUS MOOD: ICD-10-CM

## 2021-05-10 DIAGNOSIS — F43.10 PTSD (POST-TRAUMATIC STRESS DISORDER): ICD-10-CM

## 2021-05-10 DIAGNOSIS — K91.2 POSTSURGICAL MALABSORPTION: ICD-10-CM

## 2021-05-10 DIAGNOSIS — Z98.84 S/P GASTRIC BYPASS: Primary | ICD-10-CM

## 2021-05-10 RX ORDER — GABAPENTIN 300 MG/1
CAPSULE ORAL
Qty: 210 CAPSULE | Refills: 11 | Status: SHIPPED | OUTPATIENT
Start: 2021-05-10 | End: 2022-07-11 | Stop reason: ALTCHOICE

## 2021-05-10 RX ORDER — ARIPIPRAZOLE 2 MG/1
TABLET ORAL
Qty: 60 TABLET | Refills: 1 | Status: SHIPPED | OUTPATIENT
Start: 2021-05-10 | End: 2021-07-14

## 2021-05-10 NOTE — TELEPHONE ENCOUNTER
Spoke to Crystal and gave provider message as written below, also discussed home remedies for anxiety such as 30 min walk/day Crystal will take her 5 kids to the park today and will call the psychiatric clinic for appointment- she tried a few months ago but was told no referral (was placed 03/03/21) instructed her to have them call us if they give her same reply again as we have both referrals in the system. Appointments set for lab and virtual follow-up with Gregoria Camilo NP. Teresa message also sent with provider instructions for referral.    Radha Sloan RN, BSN, CMSRN  [facility]

## 2021-05-10 NOTE — TELEPHONE ENCOUNTER
Please call patient as does not always check mychart.  -heard back from psychiatry econsult and here are the recommendations:  1) increase her Gabapentin to 600 mg (two capsules) in the morning, 600 mg (two capsules) in the afternoon and 900 mg (3 capsules) in the evening before bed.  This can help with the anxiety.  It does not have to be a permanent increase; we can decrease it as the anxiety calms down.  2) also to help with the anxiety, we can add Abilify.  It can help amplify the antidepressant effects as well as the antianxiety effects of her Prozac. She will take 2 mg (one tablet) daily for one week THEN increase to 4 mg (two tablets) daily after that.      I see she has not yet scheduled with psychiatry so please encourage her to do so.  Here is the number:  Psychiatry Clinic [phone];     Please schedule her with me in two weeks for a virtual follow up.  Please also schedule her for a lab only appointment to complete her blood work for cholesterol, kidney function, anemia, vitamin deficiency which has not been done in several years.      Thanks!  Gregoria

## 2021-05-12 ENCOUNTER — VIRTUAL VISIT (OUTPATIENT)
Dept: PSYCHOLOGY | Facility: CLINIC | Age: 41
End: 2021-05-12
Payer: COMMERCIAL

## 2021-05-12 DIAGNOSIS — F43.89 REACTION TO CHRONIC STRESS: Primary | ICD-10-CM

## 2021-05-12 PROCEDURE — 90834 PSYTX W PT 45 MINUTES: CPT | Mod: 95 | Performed by: SOCIAL WORKER

## 2021-05-12 NOTE — Clinical Note
Hi Dr. Camilo,  I wanted to let you know I met with Crystal today and we were able to discuss some options. Sounds like her anxiety persists and she needs to  her Abilify prescription yet. We talked about PHP and made the phone call together to learn more about it and also inquire with her insurance. She sounds interested, but wanted to speak with her employer and said she will let us know what she decides. Wanted to keep you in the loop.  Thanks for all your support with her; really hoping we can find her some relief! Lauren
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
## 2021-05-12 NOTE — PATIENT INSTRUCTIONS
Client will return May 26 at 10am. She will follow up with her employer about time off for PHP or day treatment. Therapist will coordinate care with PCP. Client will continue to utilize calming strategies to manage anxiety.   Client working to get set up with psychiatry.

## 2021-05-18 ENCOUNTER — IMMUNIZATION (OUTPATIENT)
Dept: NURSING | Facility: CLINIC | Age: 41
End: 2021-05-18
Attending: INTERNAL MEDICINE
Payer: COMMERCIAL

## 2021-05-18 PROCEDURE — 0002A PR COVID VAC PFIZER DIL RECON 30 MCG/0.3 ML IM: CPT

## 2021-05-18 PROCEDURE — 91300 PR COVID VAC PFIZER DIL RECON 30 MCG/0.3 ML IM: CPT

## 2021-05-24 ENCOUNTER — VIRTUAL VISIT (OUTPATIENT)
Dept: FAMILY MEDICINE | Facility: CLINIC | Age: 41
End: 2021-05-24
Payer: COMMERCIAL

## 2021-05-24 DIAGNOSIS — F43.10 PTSD (POST-TRAUMATIC STRESS DISORDER): ICD-10-CM

## 2021-05-24 DIAGNOSIS — F43.22 ADJUSTMENT DISORDER WITH ANXIOUS MOOD: Primary | ICD-10-CM

## 2021-05-24 DIAGNOSIS — F43.89 REACTION TO CHRONIC STRESS: ICD-10-CM

## 2021-05-24 DIAGNOSIS — Z98.84 S/P GASTRIC BYPASS: ICD-10-CM

## 2021-05-24 DIAGNOSIS — K25.4 GASTROINTESTINAL HEMORRHAGE ASSOCIATED WITH GASTRIC ULCER: ICD-10-CM

## 2021-05-24 DIAGNOSIS — J45.41 MODERATE PERSISTENT ASTHMA WITH ACUTE EXACERBATION: ICD-10-CM

## 2021-05-24 DIAGNOSIS — J45.40 MODERATE PERSISTENT ASTHMA WITHOUT COMPLICATION: ICD-10-CM

## 2021-05-24 DIAGNOSIS — K26.4 GASTROINTESTINAL HEMORRHAGE ASSOCIATED WITH DUODENAL ULCER: ICD-10-CM

## 2021-05-24 DIAGNOSIS — K91.2 POSTSURGICAL MALABSORPTION: ICD-10-CM

## 2021-05-24 LAB
ALBUMIN SERPL-MCNC: 3.4 G/DL (ref 3.4–5)
ALP SERPL-CCNC: 86 U/L (ref 40–150)
ALT SERPL W P-5'-P-CCNC: 22 U/L (ref 0–50)
ANION GAP SERPL CALCULATED.3IONS-SCNC: 6 MMOL/L (ref 3–14)
AST SERPL W P-5'-P-CCNC: 13 U/L (ref 0–45)
BASOPHILS # BLD AUTO: 0 10E9/L (ref 0–0.2)
BASOPHILS NFR BLD AUTO: 0.1 %
BILIRUB SERPL-MCNC: 0.2 MG/DL (ref 0.2–1.3)
BUN SERPL-MCNC: 15 MG/DL (ref 7–30)
CALCIUM SERPL-MCNC: 8.2 MG/DL (ref 8.5–10.1)
CHLORIDE SERPL-SCNC: 110 MMOL/L (ref 94–109)
CHOLEST SERPL-MCNC: 170 MG/DL
CO2 SERPL-SCNC: 23 MMOL/L (ref 20–32)
CREAT SERPL-MCNC: 0.63 MG/DL (ref 0.52–1.04)
DIFFERENTIAL METHOD BLD: ABNORMAL
EOSINOPHIL # BLD AUTO: 0.2 10E9/L (ref 0–0.7)
EOSINOPHIL NFR BLD AUTO: 2.2 %
ERYTHROCYTE [DISTWIDTH] IN BLOOD BY AUTOMATED COUNT: 17.8 % (ref 10–15)
FERRITIN SERPL-MCNC: 4 NG/ML (ref 12–150)
GFR SERPL CREATININE-BSD FRML MDRD: >90 ML/MIN/{1.73_M2}
GLUCOSE SERPL-MCNC: 94 MG/DL (ref 70–99)
HCT VFR BLD AUTO: 29.3 % (ref 35–47)
HDLC SERPL-MCNC: 59 MG/DL
HGB BLD-MCNC: 9.2 G/DL (ref 11.7–15.7)
IRON SATN MFR SERPL: 4 % (ref 15–46)
IRON SERPL-MCNC: 18 UG/DL (ref 35–180)
LDLC SERPL CALC-MCNC: 65 MG/DL
LYMPHOCYTES # BLD AUTO: 1.4 10E9/L (ref 0.8–5.3)
LYMPHOCYTES NFR BLD AUTO: 16.1 %
MCH RBC QN AUTO: 25 PG (ref 26.5–33)
MCHC RBC AUTO-ENTMCNC: 31.4 G/DL (ref 31.5–36.5)
MCV RBC AUTO: 80 FL (ref 78–100)
MONOCYTES # BLD AUTO: 0.6 10E9/L (ref 0–1.3)
MONOCYTES NFR BLD AUTO: 6.4 %
NEUTROPHILS # BLD AUTO: 6.4 10E9/L (ref 1.6–8.3)
NEUTROPHILS NFR BLD AUTO: 75.2 %
NONHDLC SERPL-MCNC: 111 MG/DL
PLATELET # BLD AUTO: 331 10E9/L (ref 150–450)
POTASSIUM SERPL-SCNC: 4.2 MMOL/L (ref 3.4–5.3)
PROT SERPL-MCNC: 7.1 G/DL (ref 6.8–8.8)
PTH-INTACT SERPL-MCNC: 161 PG/ML (ref 18–80)
RBC # BLD AUTO: 3.68 10E12/L (ref 3.8–5.2)
SODIUM SERPL-SCNC: 139 MMOL/L (ref 133–144)
TIBC SERPL-MCNC: 496 UG/DL (ref 240–430)
TRIGL SERPL-MCNC: 231 MG/DL
TSH SERPL DL<=0.005 MIU/L-ACNC: 0.96 MU/L (ref 0.4–4)
WBC # BLD AUTO: 8.6 10E9/L (ref 4–11)

## 2021-05-24 PROCEDURE — 82306 VITAMIN D 25 HYDROXY: CPT | Performed by: NURSE PRACTITIONER

## 2021-05-24 PROCEDURE — 83550 IRON BINDING TEST: CPT | Performed by: NURSE PRACTITIONER

## 2021-05-24 PROCEDURE — 80050 GENERAL HEALTH PANEL: CPT | Performed by: NURSE PRACTITIONER

## 2021-05-24 PROCEDURE — 99000 SPECIMEN HANDLING OFFICE-LAB: CPT | Performed by: NURSE PRACTITIONER

## 2021-05-24 PROCEDURE — 82607 VITAMIN B-12: CPT | Performed by: NURSE PRACTITIONER

## 2021-05-24 PROCEDURE — 84630 ASSAY OF ZINC: CPT | Mod: 90 | Performed by: NURSE PRACTITIONER

## 2021-05-24 PROCEDURE — 99214 OFFICE O/P EST MOD 30 MIN: CPT | Mod: 95 | Performed by: NURSE PRACTITIONER

## 2021-05-24 PROCEDURE — 84425 ASSAY OF VITAMIN B-1: CPT | Mod: 90 | Performed by: NURSE PRACTITIONER

## 2021-05-24 PROCEDURE — 83970 ASSAY OF PARATHORMONE: CPT | Performed by: NURSE PRACTITIONER

## 2021-05-24 PROCEDURE — 83540 ASSAY OF IRON: CPT | Performed by: NURSE PRACTITIONER

## 2021-05-24 PROCEDURE — 82728 ASSAY OF FERRITIN: CPT | Performed by: NURSE PRACTITIONER

## 2021-05-24 PROCEDURE — 80061 LIPID PANEL: CPT | Performed by: NURSE PRACTITIONER

## 2021-05-24 PROCEDURE — 36415 COLL VENOUS BLD VENIPUNCTURE: CPT | Performed by: NURSE PRACTITIONER

## 2021-05-24 RX ORDER — ALBUTEROL SULFATE 90 UG/1
2 AEROSOL, METERED RESPIRATORY (INHALATION) EVERY 4 HOURS PRN
Qty: 18 G | Refills: 3 | Status: SHIPPED | OUTPATIENT
Start: 2021-05-24 | End: 2021-11-17

## 2021-05-24 RX ORDER — IPRATROPIUM BROMIDE AND ALBUTEROL SULFATE 2.5; .5 MG/3ML; MG/3ML
1 SOLUTION RESPIRATORY (INHALATION) EVERY 4 HOURS PRN
Qty: 75 ML | Refills: 3 | Status: SHIPPED | OUTPATIENT
Start: 2021-05-24 | End: 2024-05-03

## 2021-05-24 NOTE — PROGRESS NOTES
Crystal is a 40 year old who is being evaluated via a billable telephone visit.      What phone number would you like to be contacted at? Per chart.  How would you like to obtain your AVS? MyChart    Assessment & Plan     Adjustment disorder with anxious mood  Overall some improvement with anxiety with increasing Gabapentin dose.  Depression stable.  Was just able to  Abilify yesterday due to issues with anxiety with leaving her house and going in to places.  Has received second COVID vaccine which also helps her anxiety.  She has decided she'd like to do the day treatment program.  Gave number during today's call and encouraged patient to call today.  Will continue current work restrictions of part time, 20 hurs per week until day treatment program is complete.  At that time patient would like to increase to 25 hours.  Paperwork filled out today and faxed.   - MENTAL HEALTH REFERRAL  - Adult; Outpatient Treatment; Day Treatment/Dual Disorder/Partial Hospitalization Program - Assess & Treat; [facility]: Day Treatment (MH / Dual / 55+) [phone]; We will contact you to schedule the appointment or please call...    PTSD (post-traumatic stress disorder)  As above.   - MENTAL HEALTH REFERRAL  - Adult; Outpatient Treatment; Day Treatment/Dual Disorder/Partial Hospitalization Program - Assess & Treat; [facility]: Day Treatment (MH / Dual / 55+) [phone]; We will contact you to schedule the appointment or please call...    Reaction to chronic stress  - MENTAL HEALTH REFERRAL  - Adult; Outpatient Treatment; Day Treatment/Dual Disorder/Partial Hospitalization Program - Assess & Treat; [facility]: Day Treatment (MH / Dual / 55+) [phone]; We will contact you to schedule the appointment or please call...    Moderate persistent asthma with acute exacerbation  Refills provided  - ipratropium - albuterol 0.5 mg/2.5 mg/3 mL (DUONEB) 0.5-2.5 (3) MG/3ML neb solution; Take 1 vial (3 mLs) by nebulization every 4 hours as needed for  shortness of breath / dyspnea or wheezing    Moderate persistent asthma without complication  Refilled.    - fluticasone-salmeterol (ADVAIR) 500-50 MCG/DOSE inhaler; Inhale 1 puff into the lungs every 12 hours  - albuterol (PROAIR HFA/PROVENTIL HFA/VENTOLIN HFA) 108 (90 Base) MCG/ACT inhaler; Inhale 2 puffs into the lungs every 4 hours as needed for shortness of breath / dyspnea or wheezing    Ordering of each unique test  Prescription drug management  30 minutes spent on the date of the encounter doing chart review, history and exam, documentation and further activities per the note       There are no Patient Instructions on file for this visit.    Return in about 1 week (around 5/31/2021) for message me on [employer] to see how the abilify is going..    SHERIE [facility]    Jamarcus Rojas is a 40 year old who presents for the following health issues     HPI     Depression and Anxiety Follow-Up    How are you doing with your depression since your last visit? Improved     How are you doing with your anxiety since your last visit?  Improved     Are you having other symptoms that might be associated with depression or anxiety? No    Have you had a significant life event? OTHER: see prev notes     Do you have any concerns with your use of alcohol or other drugs? No  Was able to get her prescription for Abilify only yesterday.  Did increase gabapentin after our visit. Having fewer anxiety attacks (they are less severe, having 2-3 a day versus previously 6-7 a day.    Looked into PHP and day program. Prefers to do day program for time commitment reasons.  She is ready to move forward with this.     Work note: updated her paperwork.      Depression is stable.  She is doing free yoga, meditation, group therapy and individual therapy.    Mom is in stage 4 cancer/ affecting her function, breathing, QOL.  Social History     Tobacco Use     Smoking status: Former Smoker      Quit date: 2009     Years since quittin.0     Smokeless tobacco: Never Used   Substance Use Topics     Alcohol use: No     Drug use: No     PHQ 2020 2020 3/3/2021   PHQ-9 Total Score 11 11 10   Q9: Thoughts of better off dead/self-harm past 2 weeks Several days Not at all Not at all   F/U: Thoughts of suicide or self-harm No - -   F/U: Self harm-plan - - -   F/U: Self-harm action - - -   F/U: Safety concerns No - -     TERESA-7 SCORE 2020 2020 3/3/2021   Total Score 9 (mild anxiety) - 10 (moderate anxiety)   Total Score 9 15 10           Review of Systems   Constitutional, HEENT, cardiovascular, pulmonary, GI, , musculoskeletal, neuro, skin, endocrine and psych systems are negative, except as otherwise noted.      Objective           Vitals:  No vitals were obtained today due to virtual visit.    Physical Exam   healthy, alert and no distress  PSYCH: Alert and oriented times 3; coherent speech, normal   rate and volume, able to articulate logical thoughts, able   to abstract reason, no tangential thoughts, no hallucinations   or delusions  Her affect is anxious  RESP: No cough, no audible wheezing, able to talk in full sentences  Remainder of exam unable to be completed due to telephone visits                Phone call duration: 25 minutes

## 2021-05-25 LAB
DEPRECATED CALCIDIOL+CALCIFEROL SERPL-MC: 4 UG/L (ref 20–75)
VIT B12 SERPL-MCNC: 176 PG/ML (ref 193–986)

## 2021-05-25 NOTE — TELEPHONE ENCOUNTER
Received signed forms and faxed to [facility], [phone], right fax confirmed at 8:02 am today, 5/25/2021. Copy to TC and abstracting.  Cari Rollins MA  [facility]  2nd Floor  Primary Care

## 2021-05-26 ENCOUNTER — VIRTUAL VISIT (OUTPATIENT)
Dept: PSYCHOLOGY | Facility: CLINIC | Age: 41
End: 2021-05-26
Payer: COMMERCIAL

## 2021-05-26 DIAGNOSIS — D50.8 OTHER IRON DEFICIENCY ANEMIA: ICD-10-CM

## 2021-05-26 DIAGNOSIS — K91.2 POSTSURGICAL MALABSORPTION: ICD-10-CM

## 2021-05-26 DIAGNOSIS — Z98.84 BARIATRIC SURGERY STATUS: ICD-10-CM

## 2021-05-26 DIAGNOSIS — E66.9 OBESITY (BMI 30-39.9): ICD-10-CM

## 2021-05-26 DIAGNOSIS — E53.8 VITAMIN B 12 DEFICIENCY: ICD-10-CM

## 2021-05-26 DIAGNOSIS — Z98.84 S/P GASTRIC BYPASS: ICD-10-CM

## 2021-05-26 DIAGNOSIS — E55.9 VITAMIN D DEFICIENCY: Primary | ICD-10-CM

## 2021-05-26 DIAGNOSIS — F43.89 REACTION TO CHRONIC STRESS: Primary | ICD-10-CM

## 2021-05-26 LAB — ZINC SERPL-MCNC: 58.1 UG/DL (ref 60–120)

## 2021-05-26 PROCEDURE — 90834 PSYTX W PT 45 MINUTES: CPT | Mod: 95 | Performed by: SOCIAL WORKER

## 2021-05-26 ASSESSMENT — PATIENT HEALTH QUESTIONNAIRE - PHQ9
SUM OF ALL RESPONSES TO PHQ QUESTIONS 1-9: 10
SUM OF ALL RESPONSES TO PHQ QUESTIONS 1-9: 10
10. IF YOU CHECKED OFF ANY PROBLEMS, HOW DIFFICULT HAVE THESE PROBLEMS MADE IT FOR YOU TO DO YOUR WORK, TAKE CARE OF THINGS AT HOME, OR GET ALONG WITH OTHER PEOPLE: VERY DIFFICULT

## 2021-05-26 ASSESSMENT — ANXIETY QUESTIONNAIRES
2. NOT BEING ABLE TO STOP OR CONTROL WORRYING: SEVERAL DAYS
GAD7 TOTAL SCORE: 11
GAD7 TOTAL SCORE: 11
5. BEING SO RESTLESS THAT IT IS HARD TO SIT STILL: MORE THAN HALF THE DAYS
7. FEELING AFRAID AS IF SOMETHING AWFUL MIGHT HAPPEN: MORE THAN HALF THE DAYS
4. TROUBLE RELAXING: NEARLY EVERY DAY
7. FEELING AFRAID AS IF SOMETHING AWFUL MIGHT HAPPEN: MORE THAN HALF THE DAYS
GAD7 TOTAL SCORE: 11
3. WORRYING TOO MUCH ABOUT DIFFERENT THINGS: SEVERAL DAYS
1. FEELING NERVOUS, ANXIOUS, OR ON EDGE: SEVERAL DAYS
6. BECOMING EASILY ANNOYED OR IRRITABLE: SEVERAL DAYS

## 2021-05-26 NOTE — TELEPHONE ENCOUNTER
Please call patient.  Her labs have come back.    Her levels are very low for zinc, vitamin D, vitamin B 12, and iron.      Please ask if she is taking any of her vitamin supplements?  Per last note from bariatric surgeon she should be takin Complete multivitamins with minerals (at different times than calcium) (ie: Flinstones)  5000 International Units of Vitamin D daily  5775-7594 mg of Calcium daily in divided doses  1000 mcg of Vitamin B12 sl daily  1 Iron/Vit C. daily  B complex/Thiamine    Also her calcium is low and parathyroid hormone is elevated.  This is likely related to not being on the calcium supplement but warrants further workup IF she has been taking calcium    Please let me know.  Refills are pended and I can sign them if needed.    SHERIE Bennett CNP
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
## 2021-05-26 NOTE — PATIENT INSTRUCTIONS
Client will return June 9 at 10am. She will work on being mindfully aware of her emotions/grief and allow self to process/experience them as they arise. She will continue to practice calming and relaxation strategies to manage anxiety.   She will attend mental health evaluation next week to decipher treatment needs.

## 2021-05-27 RX ORDER — PEDI MULTIVIT NO.25/FOLIC ACID 300 MCG
1 TABLET,CHEWABLE ORAL 2 TIMES DAILY
Qty: 180 TABLET | Refills: 3 | Status: SHIPPED | OUTPATIENT
Start: 2021-05-27

## 2021-05-27 RX ORDER — CYANOCOBALAMIN (VITAMIN B-12) 2500 MCG
2500 TABLET, SUBLINGUAL SUBLINGUAL DAILY
Qty: 90 TABLET | Refills: 1 | Status: SHIPPED | OUTPATIENT
Start: 2021-05-27

## 2021-05-27 RX ORDER — MAGNESIUM 200 MG
1000 TABLET ORAL DAILY
Qty: 100 TABLET | Refills: 3 | Status: CANCELLED | OUTPATIENT
Start: 2021-05-27

## 2021-05-27 ASSESSMENT — ANXIETY QUESTIONNAIRES: GAD7 TOTAL SCORE: 11

## 2021-05-27 ASSESSMENT — PATIENT HEALTH QUESTIONNAIRE - PHQ9: SUM OF ALL RESPONSES TO PHQ QUESTIONS 1-9: 10

## 2021-05-27 NOTE — TELEPHONE ENCOUNTER
Called patient, left message with phone number to call back.       If patient calls back, please direct call to the [place]  RN team.    Will need to relay the message response from SHERIE Abbasi CNP, RN, [facility]

## 2021-05-27 NOTE — TELEPHONE ENCOUNTER
Pt given provider message as written. She states she has not see bariatric provider since 2019 due to covid. Pt states currently she is only taking the vitamin c with iron,  Vitamin d 1000 units, and vitamin b12 1000.    Advised provider will review what she is taking and send prescription's to your pharmacy and you should pick them up and start them in a few days.  Gita SAMN, RN

## 2021-05-27 NOTE — TELEPHONE ENCOUNTER
Left message on answering machine for patient to call back to [phone].    RN please give patient provider message as written.  Gita SAMN, RN

## 2021-05-27 NOTE — TELEPHONE ENCOUNTER
I filled the following supplements:  -calcium/vitamin D  -multivitamin  -iron + vitamin C  -Vitamin D 5000 international unit(s) (replace current vitamin d with this)  -Vitamin B 12 2500 mcg (should replace the 1000 mcg tablets)    However, she is not absorbing the oral supplementation well so she will need iron infusions and vitamin B 12 injections.  I am referring her to hematology to set this up.  They will call her to schedule.      Thanks,  SHERIE Bennett CNP

## 2021-05-28 LAB — VIT B1 BLD-MCNC: 106 NMOL/L (ref 70–180)

## 2021-05-28 NOTE — TELEPHONE ENCOUNTER
Called patient and gave message per SHERIE Abbasi CNP.  Patient verbalized understanding and agreement to plan.     Sent message over [employer] with phone number for Hematology in case she does not get a call.    Stefanie Bean RN, [facility]

## 2021-06-01 ENCOUNTER — HOSPITAL ENCOUNTER (OUTPATIENT)
Dept: BEHAVIORAL HEALTH | Facility: CLINIC | Age: 41
Discharge: HOME | End: 2021-06-01
Attending: NURSE PRACTITIONER | Admitting: NURSE PRACTITIONER
Payer: COMMERCIAL

## 2021-06-01 PROCEDURE — 90791 PSYCH DIAGNOSTIC EVALUATION: CPT | Mod: 95 | Performed by: SOCIAL WORKER

## 2021-06-01 NOTE — PROGRESS NOTES
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 and Numbness  Post Traumatic Stress Disorder:  Experienced traumatic event endorsed, Reexperiencing of trauma, Avoids traumatic stimuli, Hypervigilance, Increased arousal, Impaired functioning and Nightmares   Eating Disorder: No Symptoms  ADD / ADHD:  No symptoms  Conduct Disorder: No symptoms  Autism Spectrum Disorder: No symptoms  Obsessive Compulsive Disorder: No Symptoms    Patient reports the following compulsive behaviors and treatment history: NA.      Diagnostic Criteria:   A. Excessive anxiety and worry about a number of events or activities (such as work or school performance).   B. The person finds it difficult to control the worry.  C. Select 3 or more symptoms (required for diagnosis). Only one item is required in children.   - Restlessness or feeling keyed up or on edge.    - Being easily fatigued.    - Difficulty concentrating or mind going blank.    - Irritability.    - Muscle tension.    - Sleep disturbance (difficulty falling or staying  asleep, or restless unsatisfying sleep).   D. The focus of the anxiety and worry is not confined to features of an Axis I disorder.  E. The anxiety, worry, or physical symptoms cause clinically significant distress or impairment in social, occupational, or other important areas of functioning.   F. The disturbance is not due to the direct physiological effects of a substance (e.g., a drug of abuse, a medication) or a general medical condition (e.g., hyperthyroidism) and does not occur exclusively during a Mood Disorder, a Psychotic Disorder, or a Pervasive Developmental Disorder.  A) Recurrent episode(s) - symptoms have been present during the same 2-week period and represent a change from previous functioning 5 or more symptoms (required for diagnosis)   - Depressed mood. Note: In children and adolescents, can be irritable mood.     - Diminished interest or pleasure in all, or almost all, activities.    - Fatigue or loss of energy.    - Feelings of worthlessness or excessive guilt.    - Diminished ability to think or concentrate, or indecisiveness.    - Recurrent thoughts of death (not just fear of dying), recurrent suicidal ideation without a specific plan, or a suicide attempt or a specific plan for committing suicide.   B) The symptoms cause clinically significant distress or impairment in social, occupational, or other important areas of functioning  C) The episode is not attributable to the physiological effects of a substance or to another medical condition  D) The occurence of major depressive episode is not better explained by other thought / psychotic disorders  E) There has never been a manic episode or hypomanic episode    Functional Status:  Patient reports the following functional impairments: health maintenance, relationship(s), self-care, social interactions and work / vocational responsibilities.     WHODAS:   WHODAS 2.0 Total Score 5/14/2020 6/1/2021   Total Score 34 31   Total Score [facility] 34 -      Programmatic care:  Current LOCUS was assessed and patient needs the following level of care based on score PHP  .    Clinical Summary:  1. Reason for assessment: referred by her PCP for higher level of support for anxiety, depression with suicidal ideation.  .  2. Psychosocial, Cultural and Contextual Factors: severe stressors, hx of trauma, financial stress, single parent, losses.  .  3. Principal DSM5 Diagnoses  (Sustained by DSM5 Criteria Listed Above):   296.32 (F33.1) Major Depressive Disorder, Recurrent Episode, Moderate _ and With anxious distress  300.02 (F41.1) Generalized Anxiety Disorder.  4. Other Diagnoses that is relevant to services:   309.81 (F43.10) Posttraumatic Stress Disorder (includes Posttraumatic Stress Disorder for Children 6 Years and Younger)  Without dissociative symptoms.  5. Provisional Diagnosis: No other symptoms were reported during the assessment that would indicate alternate diagnoses.  Should symptoms arise during the course of treatment the diagnoses can be updated at that time.  6. Prognosis: Expect Improvement.  7. Likely consequences of symptoms if not treated: Without treatment patient more than likely will experience a continuation of symptoms with decreased daily functioning, requiring an increased level of care.  .  8. Client strengths include:  committed to sobriety, empathetic, employed, has a previous history of therapy, intelligent, motivated, open to learning, open to suggestions / feedback, responsible parent, wants to learn, willing to ask questions, willing to relate to others and work history .     Recommendations:     1. Plan for Safety and Risk Management:   A safety and risk management plan has been developed including: Patient consented to co-developed safety plan.  Safety and risk management plan was completed.  Patient agreed to use safety plan should any safety concerns arise.  A copy was given to the patient..          Report to child / adult  protection services was NA.     2. Patient's identified mental health concerns with a cultural influence will be addressed by trauma informed care..     3. Initial Treatment will focus on:    Depressed Mood - Provide psycho-education, structure, and support to improve functioning and manage symptoms. Assess for safety as necessary  Anxiety -Provide psycho-education, structure, and support to improve functioning and manage symptoms. Assess for safety as necessary.     4. Resources/Service Plan:    services are not indicated.   Modifications to assist communication are not indicated.   Additional disability accommodations are not indicated.      5. Collaboration:   Collaboration / coordination of treatment will be initiated with the following  support professionals: [facility].      6.  Referrals:   The following referral(s) will be initiated: Partial Hospitalization Program. Next Scheduled Appointment: client must get leave from work and clarify financial concerns before moving forward. She will call this writer.     A Release of Information has been obtained for the following: outpatient therapist and emergency contact- son Flavio Higgins at [phone].    7. RUPAL:    RUPAL:  Discussed the general effects of drugs and alcohol on health and well-being. Provider gave patient printed information about the effects of chemical use on their health and well being. Recommendations:  NA .     8. Records:   These were reviewed at time of assessment.   Information in this assessment was obtained from the medical record and  provided by patient who is a good historian.    Patient will have open access to their mental health medical record.      Provider Name/ Credentials:  Altagracia Brewer [facility], [phone]    June 1, 2021              Answers for HPI/ROS submitted by the patient on 5/26/2021   If you checked off any problems, how difficult have these problems made it for you to do your work, take care of things at home,  or get along with other people?: Very difficult  PHQ9 TOTAL SCORE: 10  TERESA 7 TOTAL SCORE: 11

## 2021-06-09 ENCOUNTER — VIRTUAL VISIT (OUTPATIENT)
Dept: PSYCHOLOGY | Facility: CLINIC | Age: 41
End: 2021-06-09
Payer: COMMERCIAL

## 2021-06-09 ENCOUNTER — MYC MEDICAL ADVICE (OUTPATIENT)
Dept: FAMILY MEDICINE | Facility: CLINIC | Age: 41
End: 2021-06-09

## 2021-06-09 DIAGNOSIS — F43.89 REACTION TO CHRONIC STRESS: Primary | ICD-10-CM

## 2021-06-09 PROCEDURE — 90834 PSYTX W PT 45 MINUTES: CPT | Mod: 95 | Performed by: SOCIAL WORKER

## 2021-06-09 NOTE — PATIENT INSTRUCTIONS
Client will return June 23 at 10am. She will continue to work on using grounding and calming self talk (I will survive this) to manage anxiety. She will use safety plan as needed. She will call to schedule PHP.
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
## 2021-06-11 NOTE — TELEPHONE ENCOUNTER
[facility], [place]   ED Nurse to Floor Handoff     Jorge Rosales is a 63 year old male who speaks English and lives unknown,  in a home  They arrived in the ED by unknown from home    ED Chief Complaint: Alcohol Problem (Normally not a drinker. Has been drinking for 3 mos, since having left shoulder surgery. Drinking a quart of whiskey a day. Last drink about 0930. History of COPD. Takes normal valium for depression and anxiety. Also takes suboxone after opiate abuse 2 years ago from back pain after surgery. Has brace on left leg since that surgery. Left arm also weaker. )    ED Dx;   Final diagnoses:   Acute alcoholic intoxication in alcoholism with complication (H)   Hypoxia   COPD exacerbation (H)         Needed?: No    Allergies: No Known Allergies.  Past Medical Hx:   Past Medical History:   Diagnosis Date     Chronic back pain     Methadone program     CTS (carpal tunnel syndrome)      Low testosterone     managed by endocrinology     Obesity      Panic attacks      PTSD (post-traumatic stress disorder) 7/8/2011     Rotator cuff injury      Substance abuse     alcohol abuse      Baseline Mental status: WDL  Current Mental Status changes: at basesline    Infection: No  Sepsis suspected: No  Isolation type: No active isolations     Activity level - Baseline/Home:  Independent  Activity Level - Current:   Stand with Assist    Bariatric equipment needed?: No    In the ED these meds were given:   Medications   dextrose 5% and 0.45% NaCl 1,000 mL with INFUVITE 10 mL, thiamine 100 mg, folic acid 1 mg infusion ( Intravenous Stopped 12/14/17 1320)   ipratropium - albuterol 0.5 mg/2.5 mg/3 mL (DUONEB) neb solution 3 mL (3 mLs Nebulization Given 12/14/17 1205)   ipratropium - albuterol 0.5 mg/2.5 mg/3 mL (DUONEB) neb solution 3 mL (3 mLs Nebulization Given 12/14/17 1137)   albuterol neb solution 2.5 mg (2.5 mg Nebulization Given 12/14/17 1345)   methylPREDNISolone sodium  Received forms and placed in Gregoria's red folder.  Cari Rollins MA  [facility]  2nd Floor  Primary Care     succinate (solu-MEDROL) injection 125 mg (125 mg Intravenous Given 12/14/17 1557)   NaCl 0.9 % infusion (1,000 mLs  New Bag 12/14/17 1557)   diazepam (VALIUM) tablet 5 mg (5 mg Oral Given 12/14/17 1617)       Drips running?  No    Home pump or pre-existing LDA's present? No    Labs results:   Labs Ordered and Resulted from Time of ED Arrival Up to the Time of Departure from the ED   CBC WITH PLATELETS DIFFERENTIAL - Abnormal; Notable for the following:        Result Value    WBC 17.1 (*)     RDW 15.6 (*)     Platelet Count 126 (*)     Absolute Neutrophil 15.0 (*)     All other components within normal limits   COMPREHENSIVE METABOLIC PANEL - Abnormal; Notable for the following:     Anion Gap 16 (*)     Glucose 155 (*)     Creatinine 0.61 (*)     Calcium 7.7 (*)     Albumin 2.9 (*)     ALT 85 (*)     AST 93 (*)     All other components within normal limits   DRUG ABUSE SCREEN 6 CHEM DEP URINE ([facility]) - Abnormal; Notable for the following:     Benzodiazepine Qual Urine Positive (*)     Ethanol Qual Urine Positive (*)     All other components within normal limits   ALCOHOL BREATH TEST POCT - Abnormal; Notable for the following:     Alcohol Breath Test 0.166 (*)     All other components within normal limits   LIPASE       Imaging Studies:   Recent Results (from the past 24 hour(s))   XR Chest 2 Views    Narrative    XR CHEST 2 VW 12/14/2017 3:20 PM    COMPARISON: 12/4/2017.    HISTORY: Cough.      Impression    IMPRESSION: Platelike atelectasis at the right base. Lungs otherwise  clear. No pleural effusion or pneumothorax. Cardiac silhouette within  normal limits.    ARABELLA MADSEN MD       Recent vital signs:   /57  Pulse 100  Temp 98.1  F (36.7  C) (Oral)  Resp 16  SpO2 (!) 88%    Cardiac Rhythm: Normal Sinus  Pt needs tele? Yes  Skin/wound Issues: None    Code Status: Full Code    Pain control: pt had none    Nausea control: pt had none    Abnormal labs/tests/findings requiring intervention: ETOH  0.166    Family present during ED course? No   Family Comments/Social Situation comments: NA    Tasks needing completion: None    Amanda Preston RN  ascom-- none  [phone] [place] ED  [phone] [facility] ED

## 2021-06-11 NOTE — TELEPHONE ENCOUNTER
Sent a My Chart message to the patient that we received her forms and gave to the provider.  Cari Rollins MA  [facility]  2nd Floor  Primary Care

## 2021-06-15 ENCOUNTER — TELEPHONE (OUTPATIENT)
Dept: FAMILY MEDICINE | Facility: CLINIC | Age: 41
End: 2021-06-15

## 2021-06-15 NOTE — TELEPHONE ENCOUNTER
Received forms. Faxed to [facility], [phone], right fax confirmed at 10:14 am today, 6/15/2021. Copy to TC and abstracting. Sent patient a my Chart message.  Cari Rollins MA  [facility]  2nd Floor  Primary Care

## 2021-06-23 ENCOUNTER — VIRTUAL VISIT (OUTPATIENT)
Dept: PSYCHOLOGY | Facility: CLINIC | Age: 41
End: 2021-06-23
Payer: COMMERCIAL

## 2021-06-23 DIAGNOSIS — F43.89 REACTION TO CHRONIC STRESS: Primary | ICD-10-CM

## 2021-06-23 PROCEDURE — 90834 PSYTX W PT 45 MINUTES: CPT | Mod: 95 | Performed by: SOCIAL WORKER

## 2021-06-23 NOTE — PATIENT INSTRUCTIONS
Client will return July 7 at 1:30pm. She will use calming and relaxation strategies, including movement, exercise, meditation and deep breathing to reduce anxiety and stress, especially before bed. She will also work on focusing on her children, noting a positive and life worth living.   She will continue to follow up on PHP.

## 2021-06-24 NOTE — PROGRESS NOTES
"Crystal Rose is a 39 year old female who is being evaluated via a billable telephone visit.      The patient has been notified of following:     \"This telephone visit will be conducted via a call between you and your physician/provider. We have found that certain health care needs can be provided without the need for a physical exam.  This service lets us provide the care you need with a short phone conversation.  If a prescription is necessary we can send it directly to your pharmacy.  If lab work is needed we can place an order for that and you can then stop by our lab to have the test done at a later time.    Telephone visits are billed at different rates depending on your insurance coverage. During this emergency period, for some insurers they may be billed the same as an in-person visit.  Please reach out to your insurance provider with any questions.    If during the course of the call the physician/provider feels a telephone visit is not appropriate, you will not be charged for this service.\"    Patient has given verbal consent for Telephone visit?  Yes    What phone number would you like to be contacted at? Per chart    How would you like to obtain your AVS? Teresa    Subjective     Crystal Rose is a 39 year old female who presents via phone visit today for the following health issues:    HPI        Last time we spoke, I increased her Prozac to 80 mg.  Added Gabapentin scheduled three times a day (with 300 at night, 100 mg during day) and advised her to stop the Lorazepam, use Hydroxyzine up to every 4 hours as needed.     States has still been taking the Lorazepam almost daily, 1-2 times a day, has 8 left.      Gabapentin has helped with sleep.  Now can get 5.5 hours at a time.  Has way fewer nightmares.  Some grogginess when she wakes up.  She is taking the 300 mg at night and the 100 mg during the daytime as prescribed.     Hydroxyzine helps with anxiety but does take longer to work " than the Lorazepam.  She did not know Lorazepam has addictive properties.  She will stop using it.    GI bleed 10/16/2020:  Still has pain in her stomach.  Has had a hard time with nausea and vomiting since then.  Gags often so has a hard time keeping down food or pills.  States she is eating and drinking.  On protonix x 2 months then will do follow up endoscopy with [facility].  Has not had any blood in stool for the last 3 days  She can keep down plain food (noodles, bread).  Pain is not made worse by food.  States pain is improved since hospital.      Review of Systems   Constitutional, HEENT, cardiovascular, pulmonary, GI, , musculoskeletal, neuro, skin, endocrine and psych systems are negative, except as otherwise noted.       Objective          Vitals:  No vitals were obtained today due to virtual visit.    healthy, alert and no distress  PSYCH: Alert and oriented times 3; coherent speech, normal   rate and volume, able to articulate logical thoughts, able   to abstract reason, no tangential thoughts, no hallucinations   or delusions  Her affect is flat  RESP: No cough, no audible wheezing, able to talk in full sentences  Remainder of exam unable to be completed due to telephone visits    No results found for any visits on 10/28/20.        Assessment/Plan:    Assessment & Plan     Gastrointestinal hemorrhage associated with gastric ulcer  Still in pain.  No further blood in stool.  Will check labs to ensure anemia is stable or improving.  Add carafate to hopefully reduce pain.  Follow up based on labs and as planned with [facility].  - Ferritin; Future  - sucralfate (CARAFATE) 1 GM/10ML suspension; Take 10 mLs (1 g) by mouth 4 times daily (before meals and nightly)  - Iron and iron binding capacity; Future    Postsurgical malabsorption  Will come in for labs as per surgeon but also those below.   - Zinc; Future  - Parathyroid Hormone Intact; Future    Adjustment disorder with anxious mood  Overall improving, keep  medications the same and patient will stop Lorazepam.  Follow-up 4 weeks.            Patient Instructions   Continue your current medications but stop Lorazepam.  Take Hydroxyzine every 4 hours as needed for anxiety instead.  You're doing a great job- keep up the good work.  Things WILL get better.      For the stomach, continue Protonix twice a day as prescribed from hospital.  Add Carafate before meals and at bedtime to coat stomach and help with pain.            Return in about 4 weeks (around 11/25/2020) for Depression, Anxiety.    SHERIE [facility]    Phone call duration:  24 minutes               done

## 2021-06-24 NOTE — TELEPHONE ENCOUNTER
RECORDS STATUS - ALL OTHER DIAGNOSIS      RECORDS RECEIVED FROM: [facility]   DATE RECEIVED: 7/6/2021   NOTES STATUS DETAILS   OFFICE NOTE from referring provider Complete Epic  Gregoria Camilo APRN CNP   OFFICE NOTE from medical oncologist N/A    DISCHARGE SUMMARY from hospital N/A    DISCHARGE REPORT from the ER     OPERATIVE REPORT Complete Upper GI Endoscopy    MEDICATION LIST Complete [facility]   CLINICAL TRIAL TREATMENTS TO DATE     LABS     PATHOLOGY REPORTS     ANYTHING RELATED TO DIAGNOSIS Complete Labs last updated on 5/24/2021    GENONOMIC TESTING     TYPE:     IMAGING (NEED IMAGES & REPORT)     CT SCANS N/A    MRI     MAMMO     ULTRASOUND     PET

## 2021-07-06 ENCOUNTER — VIRTUAL VISIT (OUTPATIENT)
Dept: ONCOLOGY | Facility: CLINIC | Age: 41
End: 2021-07-06
Attending: NURSE PRACTITIONER
Payer: COMMERCIAL

## 2021-07-06 ENCOUNTER — PRE VISIT (OUTPATIENT)
Dept: ONCOLOGY | Facility: CLINIC | Age: 41
End: 2021-07-06

## 2021-07-06 VITALS — WEIGHT: 214 LBS | HEIGHT: 68 IN | BODY MASS INDEX: 32.43 KG/M2

## 2021-07-06 DIAGNOSIS — D50.8 OTHER IRON DEFICIENCY ANEMIA: ICD-10-CM

## 2021-07-06 DIAGNOSIS — Z98.84 S/P GASTRIC BYPASS: Primary | ICD-10-CM

## 2021-07-06 DIAGNOSIS — D50.8 IRON DEFICIENCY ANEMIA FOLLOWING BARIATRIC SURGERY: ICD-10-CM

## 2021-07-06 DIAGNOSIS — E55.9 VITAMIN D DEFICIENCY: ICD-10-CM

## 2021-07-06 DIAGNOSIS — K91.2 POSTSURGICAL MALABSORPTION: ICD-10-CM

## 2021-07-06 DIAGNOSIS — E53.8 VITAMIN B 12 DEFICIENCY: ICD-10-CM

## 2021-07-06 DIAGNOSIS — K95.89 IRON DEFICIENCY ANEMIA FOLLOWING BARIATRIC SURGERY: ICD-10-CM

## 2021-07-06 PROCEDURE — 99204 OFFICE O/P NEW MOD 45 MIN: CPT | Mod: 95 | Performed by: INTERNAL MEDICINE

## 2021-07-06 RX ORDER — HEPARIN SODIUM,PORCINE 10 UNIT/ML
5 VIAL (ML) INTRAVENOUS
Status: CANCELLED | OUTPATIENT
Start: 2021-07-13

## 2021-07-06 RX ORDER — EPINEPHRINE 1 MG/ML
0.3 INJECTION, SOLUTION INTRAMUSCULAR; SUBCUTANEOUS EVERY 5 MIN PRN
Status: CANCELLED | OUTPATIENT
Start: 2021-07-13

## 2021-07-06 RX ORDER — METHYLPREDNISOLONE SODIUM SUCCINATE 125 MG/2ML
125 INJECTION, POWDER, LYOPHILIZED, FOR SOLUTION INTRAMUSCULAR; INTRAVENOUS
Status: CANCELLED
Start: 2021-07-13

## 2021-07-06 RX ORDER — ALBUTEROL SULFATE 0.83 MG/ML
2.5 SOLUTION RESPIRATORY (INHALATION)
Status: CANCELLED | OUTPATIENT
Start: 2021-07-13

## 2021-07-06 RX ORDER — ALBUTEROL SULFATE 90 UG/1
1-2 AEROSOL, METERED RESPIRATORY (INHALATION)
Status: CANCELLED
Start: 2021-07-13

## 2021-07-06 RX ORDER — MEPERIDINE HYDROCHLORIDE 25 MG/ML
25 INJECTION INTRAMUSCULAR; INTRAVENOUS; SUBCUTANEOUS EVERY 30 MIN PRN
Status: CANCELLED | OUTPATIENT
Start: 2021-07-13

## 2021-07-06 RX ORDER — DIPHENHYDRAMINE HYDROCHLORIDE 50 MG/ML
50 INJECTION INTRAMUSCULAR; INTRAVENOUS
Status: CANCELLED
Start: 2021-07-13

## 2021-07-06 RX ORDER — HEPARIN SODIUM (PORCINE) LOCK FLUSH IV SOLN 100 UNIT/ML 100 UNIT/ML
5 SOLUTION INTRAVENOUS
Status: CANCELLED | OUTPATIENT
Start: 2021-07-13

## 2021-07-06 RX ORDER — NALOXONE HYDROCHLORIDE 0.4 MG/ML
0.2 INJECTION, SOLUTION INTRAMUSCULAR; INTRAVENOUS; SUBCUTANEOUS
Status: CANCELLED | OUTPATIENT
Start: 2021-07-13

## 2021-07-06 ASSESSMENT — PAIN SCALES - GENERAL: PAINLEVEL: SEVERE PAIN (6)

## 2021-07-06 ASSESSMENT — MIFFLIN-ST. JEOR: SCORE: 1689.2

## 2021-07-06 NOTE — LETTER
7/6/2021         RE: Crystal Rose  [address] [address]        Dear Colleague,    Thank you for referring your patient, Crystal Rose, to the [facility]. Please see a copy of my visit note below.    Hematology initial visit:  Date on this visit: 7/6/2021    Crystal Rose  is referred by Dr.Brianna Lauren Camilo for a hematology consultation. She requires evaluation for anemia.    Primary Physician: Gregoria Camilo     History Of Present Illness:  Ms. Rose is a 40 year old female who presents with anemia.    This is a video visit through [employer].  She has a history of gastric bypass surgery.  She also has history of iron deficiency anemia and has received iron sucrose in 2014. She received IV in 2017 as well.  She feels very fatigued. She has dyspnea on exertion but she also has asthma. Has cravings for ice chips. She has restless legs. previously she had stomach ulcers but no recent bleeding. Had EGD on 12/29/2020 which showed improving stomach ulcer at the anastomosis site. She is going to have EGD probably next month. She had endometrial ablation in 2018 so does not have heavy bleeding any more but previously she had menorrhagia. She also has anxiety issues and she is following closely with PCP. She is taking oral iron and other vitamins including B12 and Vit D and zinc. Was off these for quite sometime but restarted in May 2021. BMs are Ok with mild constipation.      ROS:  A comprehensive ROS was otherwise neg      Past Medical/Surgical History:  Past Medical History:   Diagnosis Date     Abnormal Pap smear     see problem list     Allergic rhinitis      Anemia     iron infusions     Asthma     Advair, Albuterol     Cervical dysplasia     SUMI I, SUMI II, treated with Leep   later had ASCUS+HPV sev times 2009     Cervical high risk HPV (human papillomavirus) test positive     see problem list     Chlamydial cervicitis  10/2005, 3/2009    Treated both times and had neg JADEN both times     Diabetes mellitus (H)     GDDC with first pregnacy     Environmental allergies      Herpes simplex without mention of complication     valtrex at 36 weeks     History of chlamydia      Morbid obesity (H)      Postoperative hematoma involving genitourinary system 2014    large rectus sheath hematoma, after failed attempt at laparoscopy, hospitalized for pain control      Trichomonal vulvovaginitis 11/16/06     Vaginal discharge      Past Surgical History:   Procedure Laterality Date     ATTEMPTED LAPAROSCOPY  3/13/2014    Procedure: ATTEMPTED LAPAROSCOPY;;  Surgeon: Cee Garcia MD;  Location: [facility]     COLPOSCOPY CERVIX, BIOPSY CERVIX, ENDOCERVICAL CURETTAGE, COMBINED      1/6/2005:  SUMI I; 6/15/2009:  SUMI I.  Treated with cryo.  10/27/1997:  SUMI I-II     CONIZATION LEEP  3/13/2014    Procedure: CONIZATION LEEP;  LOOP ELECTROSURGICAL EXCISION PROCEDURE; LAPAROSCOPY ATTEMPTED;  Surgeon: Cee Garcia MD;  Location: [facility]     CRYOTHERAPY, CERVICAL  age 19    Pt reported     DILATION AND CURETTAGE, ABLATE ENDOMETRIUM NOVASURE, COMBINED N/A 6/14/2018    Procedure: COMBINED DILATION AND CURETTAGE, ABLATE ENDOMETRIUM NOVASURE;  HYSTEROSCOPY, DILATION AND CURETTAGE, ENDOMETRIAL ABLATION WITH NOVASURE, LAPAROSCOPIC BILATERAL TUBAL LIGATION ;  Surgeon: Franki Pinedo MD;  Location: [facility]     EXAM UNDER ANESTHESIA PELVIC  3/20/2014    Procedure: EXAM UNDER ANESTHESIA PELVIC;  EXAM UNDER ANESTHESIA, REMOVAL OF STICHES ;  Surgeon: Cee Garcia MD;  Location:  OR     GASTRIC BYPASS  2004    pre-bypass weight was 320#     HC NASAL/SINUS SCOPE W THER INSTILL       HC REMOVAL OF OVARIAN CYST(S)       LAPAROSCOPIC TUBAL LIGATION Bilateral 6/14/2018    Procedure: LAPAROSCOPIC TUBAL LIGATION;;  Surgeon: Franki Pinedo MD;  Location: [facility]     Allergies:  Allergies as of 07/06/2021 - Reviewed 10/28/2020   Allergen Reaction Noted     Cat hair  [cats]  02/23/2010     Dog hair [dogs]  02/23/2010     Dust mites  02/23/2010     Ragweeds  02/23/2010     Nsaids Other (See Comments) 03/09/2020     Current Medications:  Current Outpatient Medications   Medication Sig Dispense Refill     albuterol (PROAIR HFA/PROVENTIL HFA/VENTOLIN HFA) 108 (90 Base) MCG/ACT inhaler Inhale 2 puffs into the lungs every 4 hours as needed for shortness of breath / dyspnea or wheezing 18 g 3     albuterol (PROVENTIL) (2.5 MG/3ML) 0.083% neb solution Take 1 vial (2.5 mg) by nebulization every 4 hours as needed for shortness of breath / dyspnea or wheezing 25 vial 3     ARIPiprazole (ABILIFY) 2 MG tablet Take 1 tablet (2 mg) by mouth daily for 7 days, THEN 2 tablets (4 mg) daily. 60 tablet 1     Ascorbic Acid (VITAMIN C PO) Take 500 mg by mouth       busPIRone (BUSPAR) 15 MG tablet Take 1 tablet (15 mg) by mouth 3 times daily 270 tablet 3     Calcium Carb-Cholecalciferol (CALCIUM 500 + D) 500-400 MG-UNIT TABS Take 1 tablet by mouth 3 times daily 90 tablet 11     calcium carbonate-vitamin D (CALCIUM 600/VITAMIN D) 600-400 MG-UNIT chewable tablet Take one twice daily and at least 2 hours apart from iron. 180 tablet 3     childrens multivitamin w/iron (FLINTSTONES COMPLETE) 18 MG chewable tablet Take 1 tablet by mouth 2 times daily 180 tablet 3     childrens multivitamin w/iron (FLINTSTONES COMPLETE) chewable tablet Take one tablet twice a day. 200 tablet 4     cholecalciferol 125 MCG (5000 UT) CAPS Take 1 capsule (5,000 Units) by mouth daily 90 capsule 3     cyanocobalamin (VITAMIN B-12) 1000 MCG SUBL sublingual tablet Place 1 tablet (1,000 mcg) under the tongue daily 100 tablet 3     ferrous fumarate 65 mg, [place]. FE,-Vitamin C 125 mg (VITRON C)  MG TABS tablet Take 2 tablets by mouth daily 180 tablet 3     FLUoxetine (PROZAC) 20 MG capsule Take 4 capsules (80 mg) by mouth daily 120 capsule 3     fluticasone (FLONASE) 50 MCG/ACT nasal spray Spray 1-2 sprays into both nostrils daily  18.2 mL 5     fluticasone-salmeterol (ADVAIR) 500-50 MCG/DOSE inhaler Inhale 1 puff into the lungs every 12 hours 3 each 3     gabapentin (NEURONTIN) 300 MG capsule Take two capsules in the morning and two capsules in the afternoon.  Take three capsules at bedtime. 210 capsule 11     hydrOXYzine (ATARAX) 25 MG tablet Take 1 tablet (25 mg) by mouth every 4 hours as needed for anxiety (sleep) 120 tablet 3     ipratropium - albuterol 0.5 mg/2.5 mg/3 mL (DUONEB) 0.5-2.5 (3) MG/3ML neb solution Take 1 vial (3 mLs) by nebulization every 4 hours as needed for shortness of breath / dyspnea or wheezing 75 mL 3     loratadine (CLARITIN) 10 MG tablet Take 1 tablet (10 mg) by mouth daily 90 tablet 3     montelukast (SINGULAIR) 10 MG tablet Take 1 tablet (10 mg) by mouth At Bedtime 90 tablet 3     Multiple Vitamins-Iron (DAILY MARILYN MULTIVITAMIN/IRON) TABS Take 1 tablet by mouth 2 times daily 100 tablet 3     omeprazole (PRILOSEC) 20 MG DR capsule TAKE 1 CAPSULE BY MOUTH ONCE EVERY MORNING - TAKE 30 MINUTES BEFORE MEAL       order for DME Equipment being ordered: Nebulizer with tubing 1 each 0     order for DME Equipment being ordered: Nebulizer 1 each 0     sucralfate (CARAFATE) 1 GM tablet Take 1 tablet (1 g) by mouth 4 times daily 120 tablet 0     vitamin B-12 (CYANOCOBALAMIN) 2500 MCG sublingual tablet Take 1 tablet (2,500 mcg) by mouth daily 90 tablet 1      Family History:  Family History   Problem Relation Age of Onset     Hypertension Mother      Allergies Mother      Obesity Mother      Asthma Father      Diabetes Father      Hypertension Father      Allergies Father      Cancer Father         Lymphoma d age 58     Asthma Brother      Allergies Sister      Depression Sister      Obesity Sister      Alzheimer Disease Maternal Grandmother      Arthritis Maternal Grandmother      Obesity Maternal Grandmother      Thyroid Disease Maternal Grandmother      Hypertension Maternal Grandfather      Cancer - colorectal Maternal  Grandfather      Cerebrovascular Disease Maternal Grandfather      Diabetes Paternal Grandmother      No family history of bleeding or clotting disorder.  Social History:  Social History     Socioeconomic History     Marital status: Single     Spouse name: Not on file     Number of children: 4     Years of education: Not on file     Highest education level: Not on file   Occupational History     Employer: [employer]   Social Needs     Financial resource strain: Not on file     Food insecurity     Worry: Not on file     Inability: Not on file     Transportation needs     Medical: Not on file     Non-medical: Not on file   Tobacco Use     Smoking status: Former Smoker     Quit date: 2009     Years since quittin.1     Smokeless tobacco: Never Used   Substance and Sexual Activity     Alcohol use: No     Drug use: No     Sexual activity: Yes     Partners: Male     Birth control/protection: None   Lifestyle     Physical activity     Days per week: Not on file     Minutes per session: Not on file     Stress: Not on file   Relationships     Social connections     Talks on phone: Not on file     Gets together: Not on file     Attends Islam service: Not on file     Active member of club or organization: Not on file     Attends meetings of clubs or organizations: Not on file     Relationship status: Not on file     Intimate partner violence     Fear of current or ex partner: Not on file     Emotionally abused: Not on file     Physically abused: Not on file     Forced sexual activity: Not on file   Other Topics Concern     Parent/sibling w/ CABG, MI or angioplasty before 65F 55M? Not Asked   Social History Narrative     Not on file     Quit smoking in 2017. Occasional etoh use.     Physical Exam:  There were no vitals taken for this visit.   Wt Readings from Last 4 Encounters:   20 98.9 kg (218 lb)   20 98.9 kg (218 lb)   20 101.3 kg (223 lb 6.4 oz)   19 99.7 kg (219 lb 14.4 oz)        Constitutional.  Does not seem to be in any acute distress.  Eyes.  No redness or discharge noted.  Respiratory.  Speaking in full sentences.  Breathing seems comfortable without any accessory use of muscles.    Skin.  Visualized his skin does not show any obvious rashes.  Musculoskeletal.  Range of motion for visualized areas is intact.  Neurological.  Alert and oriented x3.  Psychiatric.  Mood, mentation and affect are normal.  Decision making capacity is intact.      The rest of a comprehensive physical examination is deferred due to Public Health Emergency video visit restrictions.    Laboratory/Imaging Studies      Reviewed  5/24/2021  CBC showed WBC 8.6.  Hemoglobin 9.2.  Platelets 331.  MCV 80.  CMP unremarkable except calcium 8.2.  Iron studies show ferritin 4, iron 18, TIBC 496, iron saturation index 4%.  TSH 0.96.  Vitamin .  Vitamin D 4  Zinc 58    ASSESSMENT/PLAN:    Anemia.  In a patient with previous gastric bypass surgery.  She has evidence of iron deficiency.  She also has evidence of vitamin B12 deficiency.  She is very symptomatic from the anemia.  She has severe fatigue, pica symptoms as well as restless legs.    I would recommend repeating CBC/differential and repeating iron studies for a new baseline and give her INFeD as she is unable to absorb oral iron as well.  We discussed the rational schedule and potential side effects of INFeD in detail.  6 weeks after INFeD we will repeat blood work.  She had stomach ulcer which was improving on omeprazole. She will follow with GI for another EGD.    Vitamin B12 deficiency.  Check antiparietal cell antibody and antiintrinsic factor antibody.  She has been taking sublingual vitamin B12 1000 mcg daily so we will repeat the B12 levels.  This is certainly a possibility that B12 deficiency is due to gastric bypass surgery and poor absorption from it.  Cont vitamin B12 1000 mcg sublingually orally.  Repeat labs in 6 weeks.    She also has  evidence of vitamin D deficiency and zinc deficiency. She is taking supplements.  Follow with PCP.    I will see her back in about 6 to 8 weeks with repeat labs prior.    All questions answered to her satisfaction.  She is agreeable and comfortable with the plan.    Rufino Yepez MD     Video start time.10:52 AM  Video stop time.11:12 AM            Again, thank you for allowing me to participate in the care of your patient.        Sincerely,        Rufino Yepez MD

## 2021-07-06 NOTE — NURSING NOTE
Crystal is a 40 year old who is being evaluated via a billable video visit.      How would you like to obtain your AVS? MyChart  If the video visit is dropped, the invitation should be resent by: Text to cell phone: [phone]  Will anyone else be joining your video visit? No      Video-Visit Details    Type of service:  Video Visit      Originating Location (pt. Location): Home in [place]    Distant Location (provider location):  [facility]     Platform used for Video Visit: Rashmi     Per patient no concerns    Elysia Mann CMA

## 2021-07-06 NOTE — PROGRESS NOTES
Hematology initial visit:  Date on this visit: 7/6/2021    Crystal Rose  is referred by Dr.Brianna Lauren Camilo for a hematology consultation. She requires evaluation for anemia.    Primary Physician: Gregoria Camilo     History Of Present Illness:  Ms. Rose is a 40 year old female who presents with anemia.    This is a video visit through [employer].  She has a history of gastric bypass surgery.  She also has history of iron deficiency anemia and has received iron sucrose in 2014. She received IV in 2017 as well.  She feels very fatigued. She has dyspnea on exertion but she also has asthma. Has cravings for ice chips. She has restless legs. previously she had stomach ulcers but no recent bleeding. Had EGD on 12/29/2020 which showed improving stomach ulcer at the anastomosis site. She is going to have EGD probably next month. She had endometrial ablation in 2018 so does not have heavy bleeding any more but previously she had menorrhagia. She also has anxiety issues and she is following closely with PCP. She is taking oral iron and other vitamins including B12 and Vit D and zinc. Was off these for quite sometime but restarted in May 2021. BMs are Ok with mild constipation.      ROS:  A comprehensive ROS was otherwise neg      Past Medical/Surgical History:  Past Medical History:   Diagnosis Date     Abnormal Pap smear     see problem list     Allergic rhinitis      Anemia     iron infusions     Asthma     Advair, Albuterol     Cervical dysplasia     SUMI I, SUMI II, treated with Leep   later had ASCUS+HPV sev times 2009     Cervical high risk HPV (human papillomavirus) test positive     see problem list     Chlamydial cervicitis 10/2005, 3/2009    Treated both times and had neg JADEN both times     Diabetes mellitus (H)     GDDC with first pregnacy     Environmental allergies      Herpes simplex without mention of complication     valtrex at 36 weeks     History of chlamydia      Morbid obesity (H)       Postoperative hematoma involving genitourinary system 2014    large rectus sheath hematoma, after failed attempt at laparoscopy, hospitalized for pain control      Trichomonal vulvovaginitis 11/16/06     Vaginal discharge      Past Surgical History:   Procedure Laterality Date     ATTEMPTED LAPAROSCOPY  3/13/2014    Procedure: ATTEMPTED LAPAROSCOPY;;  Surgeon: Cee Garcia MD;  Location: [facility]     COLPOSCOPY CERVIX, BIOPSY CERVIX, ENDOCERVICAL CURETTAGE, COMBINED      1/6/2005:  SUMI I; 6/15/2009:  SUMI I.  Treated with cryo.  10/27/1997:  SUMI I-II     CONIZATION LEEP  3/13/2014    Procedure: CONIZATION LEEP;  LOOP ELECTROSURGICAL EXCISION PROCEDURE; LAPAROSCOPY ATTEMPTED;  Surgeon: Cee Garcia MD;  Location: [facility]     CRYOTHERAPY, CERVICAL  age 19    Pt reported     DILATION AND CURETTAGE, ABLATE ENDOMETRIUM NOVASURE, COMBINED N/A 6/14/2018    Procedure: COMBINED DILATION AND CURETTAGE, ABLATE ENDOMETRIUM NOVASURE;  HYSTEROSCOPY, DILATION AND CURETTAGE, ENDOMETRIAL ABLATION WITH NOVASURE, LAPAROSCOPIC BILATERAL TUBAL LIGATION ;  Surgeon: Franki Pinedo MD;  Location: [facility]     EXAM UNDER ANESTHESIA PELVIC  3/20/2014    Procedure: EXAM UNDER ANESTHESIA PELVIC;  EXAM UNDER ANESTHESIA, REMOVAL OF STICHES ;  Surgeon: Cee Garcia MD;  Location:  OR     GASTRIC BYPASS  2004    pre-bypass weight was 320#     HC NASAL/SINUS SCOPE W THER INSTILL       HC REMOVAL OF OVARIAN CYST(S)       LAPAROSCOPIC TUBAL LIGATION Bilateral 6/14/2018    Procedure: LAPAROSCOPIC TUBAL LIGATION;;  Surgeon: Franki Pinedo MD;  Location: [facility]     Allergies:  Allergies as of 07/06/2021 - Reviewed 10/28/2020   Allergen Reaction Noted     Cat hair [cats]  02/23/2010     Dog hair [dogs]  02/23/2010     Dust mites  02/23/2010     Ragweeds  02/23/2010     Nsaids Other (See Comments) 03/09/2020     Current Medications:  Current Outpatient Medications   Medication Sig Dispense Refill     albuterol (PROAIR HFA/PROVENTIL  HFA/VENTOLIN HFA) 108 (90 Base) MCG/ACT inhaler Inhale 2 puffs into the lungs every 4 hours as needed for shortness of breath / dyspnea or wheezing 18 g 3     albuterol (PROVENTIL) (2.5 MG/3ML) 0.083% neb solution Take 1 vial (2.5 mg) by nebulization every 4 hours as needed for shortness of breath / dyspnea or wheezing 25 vial 3     ARIPiprazole (ABILIFY) 2 MG tablet Take 1 tablet (2 mg) by mouth daily for 7 days, THEN 2 tablets (4 mg) daily. 60 tablet 1     Ascorbic Acid (VITAMIN C PO) Take 500 mg by mouth       busPIRone (BUSPAR) 15 MG tablet Take 1 tablet (15 mg) by mouth 3 times daily 270 tablet 3     Calcium Carb-Cholecalciferol (CALCIUM 500 + D) 500-400 MG-UNIT TABS Take 1 tablet by mouth 3 times daily 90 tablet 11     calcium carbonate-vitamin D (CALCIUM 600/VITAMIN D) 600-400 MG-UNIT chewable tablet Take one twice daily and at least 2 hours apart from iron. 180 tablet 3     childrens multivitamin w/iron (FLINTSTONES COMPLETE) 18 MG chewable tablet Take 1 tablet by mouth 2 times daily 180 tablet 3     childrens multivitamin w/iron (FLINTSTONES COMPLETE) chewable tablet Take one tablet twice a day. 200 tablet 4     cholecalciferol 125 MCG (5000 UT) CAPS Take 1 capsule (5,000 Units) by mouth daily 90 capsule 3     cyanocobalamin (VITAMIN B-12) 1000 MCG SUBL sublingual tablet Place 1 tablet (1,000 mcg) under the tongue daily 100 tablet 3     ferrous fumarate 65 mg, [place]. FE,-Vitamin C 125 mg (VITRON C)  MG TABS tablet Take 2 tablets by mouth daily 180 tablet 3     FLUoxetine (PROZAC) 20 MG capsule Take 4 capsules (80 mg) by mouth daily 120 capsule 3     fluticasone (FLONASE) 50 MCG/ACT nasal spray Spray 1-2 sprays into both nostrils daily 18.2 mL 5     fluticasone-salmeterol (ADVAIR) 500-50 MCG/DOSE inhaler Inhale 1 puff into the lungs every 12 hours 3 each 3     gabapentin (NEURONTIN) 300 MG capsule Take two capsules in the morning and two capsules in the afternoon.  Take three capsules at bedtime. 210  capsule 11     hydrOXYzine (ATARAX) 25 MG tablet Take 1 tablet (25 mg) by mouth every 4 hours as needed for anxiety (sleep) 120 tablet 3     ipratropium - albuterol 0.5 mg/2.5 mg/3 mL (DUONEB) 0.5-2.5 (3) MG/3ML neb solution Take 1 vial (3 mLs) by nebulization every 4 hours as needed for shortness of breath / dyspnea or wheezing 75 mL 3     loratadine (CLARITIN) 10 MG tablet Take 1 tablet (10 mg) by mouth daily 90 tablet 3     montelukast (SINGULAIR) 10 MG tablet Take 1 tablet (10 mg) by mouth At Bedtime 90 tablet 3     Multiple Vitamins-Iron (DAILY MARILYN MULTIVITAMIN/IRON) TABS Take 1 tablet by mouth 2 times daily 100 tablet 3     omeprazole (PRILOSEC) 20 MG DR capsule TAKE 1 CAPSULE BY MOUTH ONCE EVERY MORNING - TAKE 30 MINUTES BEFORE MEAL       order for DME Equipment being ordered: Nebulizer with tubing 1 each 0     order for DME Equipment being ordered: Nebulizer 1 each 0     sucralfate (CARAFATE) 1 GM tablet Take 1 tablet (1 g) by mouth 4 times daily 120 tablet 0     vitamin B-12 (CYANOCOBALAMIN) 2500 MCG sublingual tablet Take 1 tablet (2,500 mcg) by mouth daily 90 tablet 1      Family History:  Family History   Problem Relation Age of Onset     Hypertension Mother      Allergies Mother      Obesity Mother      Asthma Father      Diabetes Father      Hypertension Father      Allergies Father      Cancer Father         Lymphoma d age 58     Asthma Brother      Allergies Sister      Depression Sister      Obesity Sister      Alzheimer Disease Maternal Grandmother      Arthritis Maternal Grandmother      Obesity Maternal Grandmother      Thyroid Disease Maternal Grandmother      Hypertension Maternal Grandfather      Cancer - colorectal Maternal Grandfather      Cerebrovascular Disease Maternal Grandfather      Diabetes Paternal Grandmother      No family history of bleeding or clotting disorder.  Social History:  Social History     Socioeconomic History     Marital status: Single     Spouse name: Not on file      Number of children: 4     Years of education: Not on file     Highest education level: Not on file   Occupational History     Employer: [employer]   Social Needs     Financial resource strain: Not on file     Food insecurity     Worry: Not on file     Inability: Not on file     Transportation needs     Medical: Not on file     Non-medical: Not on file   Tobacco Use     Smoking status: Former Smoker     Quit date: 2009     Years since quittin.1     Smokeless tobacco: Never Used   Substance and Sexual Activity     Alcohol use: No     Drug use: No     Sexual activity: Yes     Partners: Male     Birth control/protection: None   Lifestyle     Physical activity     Days per week: Not on file     Minutes per session: Not on file     Stress: Not on file   Relationships     Social connections     Talks on phone: Not on file     Gets together: Not on file     Attends [employer] service: Not on file     Active member of club or organization: Not on file     Attends meetings of clubs or organizations: Not on file     Relationship status: Not on file     Intimate partner violence     Fear of current or ex partner: Not on file     Emotionally abused: Not on file     Physically abused: Not on file     Forced sexual activity: Not on file   Other Topics Concern     Parent/sibling w/ CABG, MI or angioplasty before 65F 55M? Not Asked   Social History Narrative     Not on file     Quit smoking in 2017. Occasional etoh use.     Physical Exam:  There were no vitals taken for this visit.   Wt Readings from Last 4 Encounters:   20 98.9 kg (218 lb)   20 98.9 kg (218 lb)   20 101.3 kg (223 lb 6.4 oz)   19 99.7 kg (219 lb 14.4 oz)       Constitutional.  Does not seem to be in any acute distress.  Eyes.  No redness or discharge noted.  Respiratory.  Speaking in full sentences.  Breathing seems comfortable without any accessory use of muscles.    Skin.  Visualized his skin does not show any obvious  rashes.  Musculoskeletal.  Range of motion for visualized areas is intact.  Neurological.  Alert and oriented x3.  Psychiatric.  Mood, mentation and affect are normal.  Decision making capacity is intact.      The rest of a comprehensive physical examination is deferred due to Public Health Emergency video visit restrictions.    Laboratory/Imaging Studies      Reviewed  5/24/2021  CBC showed WBC 8.6.  Hemoglobin 9.2.  Platelets 331.  MCV 80.  CMP unremarkable except calcium 8.2.  Iron studies show ferritin 4, iron 18, TIBC 496, iron saturation index 4%.  TSH 0.96.  Vitamin .  Vitamin D 4  Zinc 58    ASSESSMENT/PLAN:    Anemia.  In a patient with previous gastric bypass surgery.  She has evidence of iron deficiency.  She also has evidence of vitamin B12 deficiency.  She is very symptomatic from the anemia.  She has severe fatigue, pica symptoms as well as restless legs.    I would recommend repeating CBC/differential and repeating iron studies for a new baseline and give her INFeD as she is unable to absorb oral iron as well.  We discussed the rational schedule and potential side effects of INFeD in detail.  6 weeks after INFeD we will repeat blood work.  She had stomach ulcer which was improving on omeprazole. She will follow with GI for another EGD.    Vitamin B12 deficiency.  Check antiparietal cell antibody and antiintrinsic factor antibody.  She has been taking sublingual vitamin B12 1000 mcg daily so we will repeat the B12 levels.  This is certainly a possibility that B12 deficiency is due to gastric bypass surgery and poor absorption from it.  Cont vitamin B12 1000 mcg sublingually orally.  Repeat labs in 6 weeks.    She also has evidence of vitamin D deficiency and zinc deficiency. She is taking supplements.  Follow with PCP.    I will see her back in about 6 to 8 weeks with repeat labs prior.    All questions answered to her satisfaction.  She is agreeable and comfortable with the plan.    Rufino VILLEDA  MD [place]     Video start time.10:52 AM  Video stop time.11:12 AM

## 2021-07-07 ENCOUNTER — VIRTUAL VISIT (OUTPATIENT)
Dept: PSYCHOLOGY | Facility: CLINIC | Age: 41
End: 2021-07-07
Payer: COMMERCIAL

## 2021-07-07 DIAGNOSIS — F43.89 REACTION TO CHRONIC STRESS: Primary | ICD-10-CM

## 2021-07-07 PROCEDURE — 90834 PSYTX W PT 45 MINUTES: CPT | Mod: 95 | Performed by: SOCIAL WORKER

## 2021-07-07 NOTE — PATIENT INSTRUCTIONS
Client will return July 28 at 10am. She will follow up on next steps for medical treatment. She will also contact PHP to inquire about opening.  She will use calming, self-care, and relaxation strategies, including movement, exercise, meditation and deep breathing to reduce anxiety and stress, especially before bed.
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
## 2021-07-13 ENCOUNTER — TELEPHONE (OUTPATIENT)
Dept: FAMILY MEDICINE | Facility: CLINIC | Age: 41
End: 2021-07-13

## 2021-07-13 DIAGNOSIS — F43.10 PTSD (POST-TRAUMATIC STRESS DISORDER): ICD-10-CM

## 2021-07-13 DIAGNOSIS — F43.22 ADJUSTMENT DISORDER WITH ANXIOUS MOOD: ICD-10-CM

## 2021-07-14 RX ORDER — ARIPIPRAZOLE 2 MG/1
4 TABLET ORAL DAILY
Qty: 60 TABLET | Refills: 1 | Status: SHIPPED | OUTPATIENT
Start: 2021-07-14 | End: 2021-08-30

## 2021-07-14 NOTE — TELEPHONE ENCOUNTER
Routing refill request to provider for review/approval because:  BP and heart rate fail protocol.    Siobhan Xiong RN, [facility]

## 2021-07-30 ENCOUNTER — VIRTUAL VISIT (OUTPATIENT)
Dept: FAMILY MEDICINE | Facility: CLINIC | Age: 41
End: 2021-07-30
Payer: COMMERCIAL

## 2021-07-30 DIAGNOSIS — N76.0 BACTERIAL VAGINOSIS: Primary | ICD-10-CM

## 2021-07-30 DIAGNOSIS — B96.89 BACTERIAL VAGINOSIS: Primary | ICD-10-CM

## 2021-07-30 PROCEDURE — 99213 OFFICE O/P EST LOW 20 MIN: CPT | Mod: 95 | Performed by: PHYSICIAN ASSISTANT

## 2021-07-30 RX ORDER — METRONIDAZOLE 500 MG/1
500 TABLET ORAL 2 TIMES DAILY
Qty: 14 TABLET | Refills: 0 | Status: SHIPPED | OUTPATIENT
Start: 2021-07-30 | End: 2021-08-06

## 2021-07-30 RX ORDER — METRONIDAZOLE 7.5 MG/G
1 GEL VAGINAL DAILY
Qty: 70 G | Refills: 0 | Status: SHIPPED | OUTPATIENT
Start: 2021-07-30 | End: 2021-08-06

## 2021-07-30 NOTE — PROGRESS NOTES
Crystal is a 40 year old who is being evaluated via a billable telephone visit.      What phone number would you like to be contacted at?   How would you like to obtain your AVS? MyChart    Assessment & Plan     Bacterial vaginosis  History of bacterial vaginosis -classic symptoms for her - will treat and follow up with us in clinic if symptoms not improving over the next few days - advised no alcohol   - metroNIDAZOLE (METROGEL) 0.75 % vaginal gel  Dispense: 70 g; Refill: 0  - metroNIDAZOLE (FLAGYL) 500 MG tablet  Dispense: 14 tablet; Refill: 0      Prescription drug management         There are no Patient Instructions on file for this visit.    Return in about 3 days (around 8/2/2021), or if symptoms worsen or fail to improve, for in person.    Zenia Valente PA-C  [facility]   Crystal is a 40 year old who presents for the following health issues     HPI       History of recurrent bacterial vaginosis.  Went to water park with kids.  Classic symptoms for her - frequency of urination.  Positive not a bladder infection.  Has seen gyn in past - not had for a year- went to [facility] with kids and in 24 hours had symptoms. Symptoms for last 3 days.  No abdominal pain.  No nausea or vomiting.  No fever, sweats, chills   metrogel and flagyl in past and relieved   Not had for a year- went to [facility] and in 24 hours felt different  Have to urinate all the time.  Sure doesn't have bladder      Review of Systems   Constitutional, HEENT, cardiovascular, pulmonary, gi and gu systems are negative, except as otherwise noted.      Objective           Vitals:  No vitals were obtained today due to virtual visit.    Physical Exam   healthy, alert and no distress  PSYCH: Alert and oriented times 3; coherent speech, normal   rate and volume, able to articulate logical thoughts, able   to abstract reason, no tangential thoughts, no hallucinations   or delusions  Her affect is normal and  pleasant  RESP: No cough, no audible wheezing, able to talk in full sentences  Remainder of exam unable to be completed due to telephone visits                Phone call duration: 5 minutes

## 2021-08-01 NOTE — PATIENT INSTRUCTIONS
Take flagyl twice a day for 7 days - follow up with us in clinic if symptoms not improving.  Use metrogel vaginally for 7 days  Return urgently if any change in symptoms.

## 2021-08-03 ENCOUNTER — INFUSION THERAPY VISIT (OUTPATIENT)
Dept: INFUSION THERAPY | Facility: CLINIC | Age: 41
End: 2021-08-03
Payer: COMMERCIAL

## 2021-08-03 ENCOUNTER — LAB (OUTPATIENT)
Dept: LAB | Facility: CLINIC | Age: 41
End: 2021-08-03
Payer: COMMERCIAL

## 2021-08-03 VITALS
WEIGHT: 216.6 LBS | SYSTOLIC BLOOD PRESSURE: 123 MMHG | HEART RATE: 76 BPM | RESPIRATION RATE: 16 BRPM | BODY MASS INDEX: 32.93 KG/M2 | OXYGEN SATURATION: 97 % | DIASTOLIC BLOOD PRESSURE: 81 MMHG | TEMPERATURE: 98.3 F

## 2021-08-03 DIAGNOSIS — D50.8 IRON DEFICIENCY ANEMIA FOLLOWING BARIATRIC SURGERY: Primary | ICD-10-CM

## 2021-08-03 DIAGNOSIS — K95.89 IRON DEFICIENCY ANEMIA FOLLOWING BARIATRIC SURGERY: Primary | ICD-10-CM

## 2021-08-03 DIAGNOSIS — Z98.84 S/P GASTRIC BYPASS: ICD-10-CM

## 2021-08-03 DIAGNOSIS — D50.8 OTHER IRON DEFICIENCY ANEMIA: ICD-10-CM

## 2021-08-03 DIAGNOSIS — E53.8 VITAMIN B 12 DEFICIENCY: ICD-10-CM

## 2021-08-03 LAB
BASOPHILS # BLD AUTO: 0 10E3/UL (ref 0–0.2)
BASOPHILS NFR BLD AUTO: 1 %
EOSINOPHIL # BLD AUTO: 0.5 10E3/UL (ref 0–0.7)
EOSINOPHIL NFR BLD AUTO: 6 %
ERYTHROCYTE [DISTWIDTH] IN BLOOD BY AUTOMATED COUNT: 17.9 % (ref 10–15)
FERRITIN SERPL-MCNC: 4 NG/ML (ref 12–150)
HCT VFR BLD AUTO: 31.9 % (ref 35–47)
HGB BLD-MCNC: 9.9 G/DL (ref 11.7–15.7)
IMM GRANULOCYTES # BLD: 0 10E3/UL
IMM GRANULOCYTES NFR BLD: 0 %
IRON SATN MFR SERPL: 6 % (ref 15–46)
IRON SERPL-MCNC: 27 UG/DL (ref 35–180)
LYMPHOCYTES # BLD AUTO: 1.5 10E3/UL (ref 0.8–5.3)
LYMPHOCYTES NFR BLD AUTO: 17 %
MCH RBC QN AUTO: 24.8 PG (ref 26.5–33)
MCHC RBC AUTO-ENTMCNC: 31 G/DL (ref 31.5–36.5)
MCV RBC AUTO: 80 FL (ref 78–100)
MONOCYTES # BLD AUTO: 0.6 10E3/UL (ref 0–1.3)
MONOCYTES NFR BLD AUTO: 7 %
NEUTROPHILS # BLD AUTO: 6 10E3/UL (ref 1.6–8.3)
NEUTROPHILS NFR BLD AUTO: 69 %
NRBC # BLD AUTO: 0 10E3/UL
NRBC BLD AUTO-RTO: 0 /100
PLATELET # BLD AUTO: 309 10E3/UL (ref 150–450)
RBC # BLD AUTO: 3.99 10E6/UL (ref 3.8–5.2)
RETICS # AUTO: 0.06 10E6/UL (ref 0.03–0.1)
RETICS/RBC NFR AUTO: 1.5 % (ref 0.5–2)
TIBC SERPL-MCNC: 476 UG/DL (ref 240–430)
VIT B12 SERPL-MCNC: 223 PG/ML (ref 193–986)
WBC # BLD AUTO: 8.6 10E3/UL (ref 4–11)

## 2021-08-03 PROCEDURE — 82728 ASSAY OF FERRITIN: CPT

## 2021-08-03 PROCEDURE — 96365 THER/PROPH/DIAG IV INF INIT: CPT | Performed by: NURSE PRACTITIONER

## 2021-08-03 PROCEDURE — 82607 VITAMIN B-12: CPT

## 2021-08-03 PROCEDURE — 86340 INTRINSIC FACTOR ANTIBODY: CPT | Mod: 90

## 2021-08-03 PROCEDURE — 85045 AUTOMATED RETICULOCYTE COUNT: CPT

## 2021-08-03 PROCEDURE — 99207 PR NO CHARGE LOS: CPT

## 2021-08-03 PROCEDURE — 83516 IMMUNOASSAY NONANTIBODY: CPT | Mod: 90

## 2021-08-03 PROCEDURE — 85060 BLOOD SMEAR INTERPRETATION: CPT | Performed by: PATHOLOGY

## 2021-08-03 PROCEDURE — 85025 COMPLETE CBC W/AUTO DIFF WBC: CPT

## 2021-08-03 PROCEDURE — 36415 COLL VENOUS BLD VENIPUNCTURE: CPT

## 2021-08-03 PROCEDURE — 96366 THER/PROPH/DIAG IV INF ADDON: CPT | Performed by: NURSE PRACTITIONER

## 2021-08-03 PROCEDURE — 83550 IRON BINDING TEST: CPT

## 2021-08-03 RX ORDER — HEPARIN SODIUM (PORCINE) LOCK FLUSH IV SOLN 100 UNIT/ML 100 UNIT/ML
5 SOLUTION INTRAVENOUS
Status: CANCELLED | OUTPATIENT
Start: 2021-08-03

## 2021-08-03 RX ORDER — EPINEPHRINE 1 MG/ML
0.3 INJECTION, SOLUTION INTRAMUSCULAR; SUBCUTANEOUS EVERY 5 MIN PRN
Status: CANCELLED | OUTPATIENT
Start: 2021-08-03

## 2021-08-03 RX ORDER — NALOXONE HYDROCHLORIDE 0.4 MG/ML
0.2 INJECTION, SOLUTION INTRAMUSCULAR; INTRAVENOUS; SUBCUTANEOUS
Status: CANCELLED | OUTPATIENT
Start: 2021-08-03

## 2021-08-03 RX ORDER — HEPARIN SODIUM,PORCINE 10 UNIT/ML
5 VIAL (ML) INTRAVENOUS
Status: CANCELLED | OUTPATIENT
Start: 2021-08-03

## 2021-08-03 RX ORDER — DIPHENHYDRAMINE HYDROCHLORIDE 50 MG/ML
50 INJECTION INTRAMUSCULAR; INTRAVENOUS
Status: CANCELLED
Start: 2021-08-03

## 2021-08-03 RX ORDER — MEPERIDINE HYDROCHLORIDE 25 MG/ML
25 INJECTION INTRAMUSCULAR; INTRAVENOUS; SUBCUTANEOUS EVERY 30 MIN PRN
Status: CANCELLED | OUTPATIENT
Start: 2021-08-03

## 2021-08-03 RX ORDER — ALBUTEROL SULFATE 90 UG/1
1-2 AEROSOL, METERED RESPIRATORY (INHALATION)
Status: CANCELLED
Start: 2021-08-03

## 2021-08-03 RX ORDER — ALBUTEROL SULFATE 0.83 MG/ML
2.5 SOLUTION RESPIRATORY (INHALATION)
Status: CANCELLED | OUTPATIENT
Start: 2021-08-03

## 2021-08-03 RX ORDER — METHYLPREDNISOLONE SODIUM SUCCINATE 125 MG/2ML
125 INJECTION, POWDER, LYOPHILIZED, FOR SOLUTION INTRAMUSCULAR; INTRAVENOUS
Status: CANCELLED
Start: 2021-08-03

## 2021-08-03 RX ADMIN — Medication 250 ML: at 09:01

## 2021-08-03 ASSESSMENT — PAIN SCALES - GENERAL: PAINLEVEL: NO PAIN (0)

## 2021-08-03 NOTE — LETTER
August 3, 2021      Re: Crystal Rose  [address] [id number]         To Whom it May Concern:    The person above was attending a medical appointment at the clinic listed above all day on 8/3/2021.  Please excuse her from work for this day.  If you have any questions, please contact us at the number listed above.       Sincerely,        Rebecca Riddle RN-BSN, PHN, OCN

## 2021-08-04 LAB
PATH REPORT.COMMENTS IMP SPEC: NORMAL
PATH REPORT.COMMENTS IMP SPEC: NORMAL
PATH REPORT.FINAL DX SPEC: NORMAL
PATH REPORT.MICROSCOPIC SPEC OTHER STN: NORMAL
PATH REPORT.MICROSCOPIC SPEC OTHER STN: NORMAL
PATH REPORT.RELEVANT HX SPEC: NORMAL

## 2021-08-05 LAB
IF BLOCK AB SER QL RIA: NEGATIVE
PCA IGG SER-ACNC: 1.1 UNITS

## 2021-08-11 ENCOUNTER — VIRTUAL VISIT (OUTPATIENT)
Dept: PSYCHOLOGY | Facility: CLINIC | Age: 41
End: 2021-08-11
Payer: COMMERCIAL

## 2021-08-11 DIAGNOSIS — F43.89 REACTION TO CHRONIC STRESS: Primary | ICD-10-CM

## 2021-08-11 PROCEDURE — 90834 PSYTX W PT 45 MINUTES: CPT | Mod: 95 | Performed by: SOCIAL WORKER

## 2021-08-11 NOTE — Clinical Note
Dayday,  I know Gregoria is out, but this patient is in need of a med check. Is there someone who she can meet with virtually to touch base on medications while Gregoria is out? Let us know.  Thanks! Lauren
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
## 2021-08-11 NOTE — PATIENT INSTRUCTIONS
Client will return Aug 25 at 10am. She will work on increasing relaxation and mindfulness to manage anxiety and stress. She will continue to practice self compassion to reduce criticism to self. Therapist will inquire about medication check with a provider while client's PCP is out.   Client to attend psychiatry appointment in Sept.

## 2021-08-16 NOTE — TELEPHONE ENCOUNTER
Talked to patient about a office visit. Patient does not come into clinic unless it is needed, patients mom has cancer and does not like to be out do to covid. If she could schedule a video visit patient would appreciate it.

## 2021-08-17 NOTE — TELEPHONE ENCOUNTER
Okay to do virtual visit as long as patient has home bp and heart rate values.    Jennifer Fortune M.D.

## 2021-08-25 ENCOUNTER — VIRTUAL VISIT (OUTPATIENT)
Dept: PSYCHOLOGY | Facility: CLINIC | Age: 41
End: 2021-08-25
Payer: COMMERCIAL

## 2021-08-25 DIAGNOSIS — F43.89 REACTION TO CHRONIC STRESS: Primary | ICD-10-CM

## 2021-08-25 PROCEDURE — 90834 PSYTX W PT 45 MINUTES: CPT | Mod: 95 | Performed by: SOCIAL WORKER
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
## 2021-08-27 ENCOUNTER — TELEPHONE (OUTPATIENT)
Dept: FAMILY MEDICINE | Facility: CLINIC | Age: 41
End: 2021-08-27

## 2021-08-27 NOTE — TELEPHONE ENCOUNTER
Form received from [facility] for an extension. Forms placed in provider's bin to address. Pt has virtual visit scheduled for 8/30/21

## 2021-08-30 ENCOUNTER — VIRTUAL VISIT (OUTPATIENT)
Dept: FAMILY MEDICINE | Facility: CLINIC | Age: 41
End: 2021-08-30
Payer: COMMERCIAL

## 2021-08-30 DIAGNOSIS — F43.10 PTSD (POST-TRAUMATIC STRESS DISORDER): ICD-10-CM

## 2021-08-30 DIAGNOSIS — F43.22 ADJUSTMENT DISORDER WITH ANXIOUS MOOD: ICD-10-CM

## 2021-08-30 PROCEDURE — 96127 BRIEF EMOTIONAL/BEHAV ASSMT: CPT | Mod: 95 | Performed by: NURSE PRACTITIONER

## 2021-08-30 PROCEDURE — 99215 OFFICE O/P EST HI 40 MIN: CPT | Mod: 95 | Performed by: NURSE PRACTITIONER

## 2021-08-30 RX ORDER — HYDROXYZINE HYDROCHLORIDE 25 MG/1
50 TABLET, FILM COATED ORAL 3 TIMES DAILY PRN
Qty: 180 TABLET | Refills: 2 | Status: SHIPPED | OUTPATIENT
Start: 2021-08-30 | End: 2021-08-30

## 2021-08-30 RX ORDER — ARIPIPRAZOLE 2 MG/1
4 TABLET ORAL DAILY
Qty: 60 TABLET | Refills: 1 | Status: SHIPPED | OUTPATIENT
Start: 2021-08-30 | End: 2021-10-27

## 2021-08-30 RX ORDER — HYDROXYZINE HYDROCHLORIDE 25 MG/1
50 TABLET, FILM COATED ORAL EVERY 6 HOURS PRN
Qty: 120 TABLET | Refills: 2 | Status: SHIPPED | OUTPATIENT
Start: 2021-08-30 | End: 2022-05-11

## 2021-08-30 ASSESSMENT — PATIENT HEALTH QUESTIONNAIRE - PHQ9: SUM OF ALL RESPONSES TO PHQ QUESTIONS 1-9: 12

## 2021-08-30 NOTE — TELEPHONE ENCOUNTER
Received signed form. Faxed to [facility] and the [facility] atten Liz Arreola, [phone], right fax confirmed at 2:41 pm today. Copy to TC and abstracting.  Cari Rollins MA  [facility]  2nd Floor  Primary Care

## 2021-08-30 NOTE — PATIENT INSTRUCTIONS
Patient Education     Depression: Tips to Help Yourself    As your healthcare providers help treat your depression, you can also help yourself. Keep in mind that your illness affects you emotionally, physically, mentally, and socially. So full recovery will take time. Take care of your body and your soul, and be patient with yourself as you get better.  Self-care    Educate yourself. Read about treatment and medicine options. If you have the energy, attend local conferences or support groups. Keep a list of useful websites and helpful books and use them as needed. This illness is not your fault. Don t blame yourself for your depression.    Manage early symptoms. If you notice symptoms returning, experience triggers, or identify other factors that may lead to a depressive episode, get help as soon as possible. Ask trusted friends and family to monitor your behavior and let you know if they see anything of concern.    Work with your provider. Find a provider you can trust. Communicate honestly with that person and share information on your treatment for depression and your reaction to medicines.    Be prepared for a crisis. Know what to do if you experience a crisis. Keep the phone number of a crisis hotline and know the location of your community's urgent care centers and the closest emergency department.    Hold off on big decisions. Depression can cloud your judgment. So wait until you feel better before making major life decisions, such as changing jobs, moving, or getting  or .    Be patient. Recovering from depression is a process. Don t be discouraged if it takes some time to feel better.    Keep it simple. Depression saps your energy and concentration. So you won t be able to do all the things you used to do. Set small goals and do what you can.    Be with others. Don t isolate yourself--you ll only feel worse. Try to be with other people. And take part in fun activities when you can. Go to a  movie, ballgame, [employer] service, or social event. Talk openly with people you can trust. And accept help when it s offered.    Take care of your body  People with depression often lose the desire to take care of themselves. That only makes their problems worse. During treatment and afterward, make a point to:    Exercise. It s a great way to take care of your body. And studies have shown that exercise helps fight depression. Aim for 30 minutes of moderate activity a day. Walking in small blocks of time (5-10 minutes) is a good way to start, but anything that gets you moving (gardening, house cleaning) counts.    Don't use drugs and alcohol. These may ease the pain in the short term. But they ll only make your problems worse in the long run.    Get relief from stress. Ask your healthcare provider for relaxation exercises and techniques to help relieve stress. Consider activities like meditation, yoga, or Kiko Chi.    Eat right. A balanced and healthy diet helps keep your body healthy.    Get adequate sleep. Aim for 8 hours per night. Too much or too little sleep can cause other physical and emotional problems.  The [employer] last reviewed this educational content on 12/1/2019 2000-2021 [employer]. All rights reserved. This information is not intended as a substitute for professional medical care. Always follow your healthcare professional's instructions.           Patient Education     Treating Anxiety Disorders with Therapy    If you have an anxiety disorder, you don t have to suffer needlessly. Treatment is available. Therapy (also called counseling) is often a helpful treatment for anxiety disorders. With therapy, a trained professional (therapist) helps you face and learn to manage your anxiety. Therapy can be short-term or long-term based on your needs. In some cases, medicine may also be prescribed with therapy. It may take time before you notice how much therapy is helping, but stick with it.  With therapy, you can feel better.   Cognitive behavioral therapy (CBT)  Cognitive behavioral therapy (CBT) teaches you to manage anxiety. It does this by helping you understand how you think and act when you re anxious. Research has shown CBT to be a very effective treatment for anxiety disorders. CBT involves homework and activities to build skills that teach you to cope with anxiety step by step. It can be done in a group or 1-on-1, and often takes place for a set number of sessions. CBT has 2 main parts:     Cognitive therapy. This helps you identify the negative, irrational thoughts that occur with your anxiety. You ll learn to replace these with more positive, realistic thoughts.    Behavioral therapy. This helps you change how you react to anxiety. You ll learn coping skills and methods for relaxing to help you better deal with anxiety.  Other forms of therapy  Other therapy methods may work better for you than CBT. Or, you may move from CBT to another form of therapy as your treatment needs change. This may mean meeting with a therapist by yourself or in a group. Therapy can also help you work through problems in your life, such as drug or alcohol dependence, that may be making your anxiety worse.   Getting better takes time  Therapy will help you feel better and teach you skills to help manage anxiety long term. But change doesn t happen right away. It takes a commitment from you. And treatment only works if you learn to face the causes of your anxiety. So, you might feel worse before you feel better. This can sometimes make it hard to stick with it. But remember: Therapy is a very effective treatment. The results will be well worth it.   Helping yourself  If anxiety is wearing you down, here are some things you can do to cope:    Check with your healthcare provider and rule out any physical problems that may be causing the anxiety symptoms.    If you are diagnosed with an anxiety disorder, seek mental  healthcare. This is an illness and it can respond to treatment. Most types of anxiety disorders will respond to talk therapy and medicine.    Educate yourself about anxiety disorders. Keep track of helpful online resources and books you can use during stressful periods.    Try stress management methods such as meditation.    Consider online or in-person support groups.    Don t fight your feelings. Anxiety feeds itself. The more you worry about it, the worse it gets. Instead, try to identify what might have triggered your anxiety. Then try to put this threat in perspective.    Keep in mind that you can t control everything about a situation. Change what you can and let the rest take its course.    Exercise -- it s a great way to relieve tension and help your body feel relaxed.    Examine your life for stress, and try to find ways to reduce it.    Avoid caffeine and nicotine. These can make anxiety symptoms worse.    Don't turn to alcohol or unprescribed medicines for relief. They only make things worse in the long run.  [employer] last reviewed this educational content on 5/1/2020 2000-2021 [employer]. All rights reserved. This information is not intended as a substitute for professional medical care. Always follow your healthcare professional's instructions.

## 2021-08-30 NOTE — PROGRESS NOTES
Crystal is a 40 year old who is being evaluated via a billable telephone visit.      What phone number would you like to be contacted at? .  How would you like to obtain your AVS? Merissat    Assessment & Plan     Adjustment disorder with anxious mood  She's waiting for PHP opening and would benefit from this.  Refilled her prescription and increased Atarax dose to 50 mg QID prn panic attacks, she will have Psychiatry appt toward the end of September at which time medication changes could be considered. FMLA ectension paperwork completed.  - ARIPiprazole (ABILIFY) 2 MG tablet  Dispense: 60 tablet; Refill: 1  - FLUoxetine (PROZAC) 20 MG capsule  Dispense: 120 capsule; Refill: 3  - hydrOXYzine (ATARAX) 25 MG tablet  Dispense: 120 tablet; Refill: 2    PTSD (post-traumatic stress disorder)  Refilled medications, continue with weekly counseling.  - ARIPiprazole (ABILIFY) 2 MG tablet  Dispense: 60 tablet; Refill: 1  - hydrOXYzine (ATARAX) 25 MG tablet  Dispense: 120 tablet; Refill: 2      Prescription drug management  40  minutes spent on the date of the encounter doing chart review, history and exam, documentation and further activities per the note       Depression Screening Follow Up    PHQ 8/30/2021   PHQ-9 Total Score 12   Q9: Thoughts of better off dead/self-harm past 2 weeks Not at all   F/U: Thoughts of suicide or self-harm -   F/U: Self harm-plan -   F/U: Self-harm action -   F/U: Safety concerns -   Some encounter information is confidential and restricted. Go to Review Flowsheets activity to see all data.     Last PHQ-9 8/30/2021   1.  Little interest or pleasure in doing things 1   2.  Feeling down, depressed, or hopeless 1   3.  Trouble falling or staying asleep, or sleeping too much 1   4.  Feeling tired or having little energy 3   5.  Poor appetite or overeating 1   6.  Feeling bad about yourself 1   7.  Trouble concentrating 3   8.  Moving slowly or restless 1   Q9: Thoughts of better off dead/self-harm  "past 2 weeks 0   PHQ-9 Total Score 12   Difficulty at work, home, or with people -   In the past two weeks have you had thoughts of suicide or self harm? -   Do you have concerns about your personal safety or the safety of others? -   In the past 2 weeks have you thought about a plan or had intention to harm yourself? -   In the past 2 weeks have you acted on these thoughts in any way? -   Some encounter information is confidential and restricted. Go to Review Flowsheets activity to see all data.       Follow Up Actions Taken  Depression Action Plan reviewed with patient.     See Patient Instructions    No follow-ups on file.    [facility] is a 40 year old who presents for the following health issues  HPI     Depression and Anxiety Follow-Up    How are you doing with your depression since your last visit? No change    How are you doing with your anxiety since your last visit?  Worsened -work is wanting her to work full time and she feels she cannot do so.  She's been taking vacation and hasn't been working 25 hours/week due to stress/anxiety, having more frequent panic attacks occurring 3-4 times/day while at work an dlsting 30\"-1 hour at a time, Atarax 25 mg not helping, requesting Lorazepam prescription.    Are you having other symptoms that might be associated with depression or anxiety? Yes:  .    Have you had a significant life event? Financial Concerns and Grief or Loss-Mother recently diagnosed with stage 4 lung cancer    Do you have any concerns with your use of alcohol or other drugs? No    Social History     Tobacco Use     Smoking status: Former Smoker     Quit date: 2009     Years since quittin.3     Smokeless tobacco: Never Used   Substance Use Topics     Alcohol use: No     Drug use: No     PHQ 2021   PHQ-9 Total Score 12 14 12   Q9: Thoughts of better off dead/self-harm past 2 weeks " Several days Several days Not at all   F/U: Thoughts of suicide or self-harm No No -   F/U: Self harm-plan - - -   F/U: Self-harm action - - -   F/U: Safety concerns No No -   Some encounter information is confidential and restricted. Go to Review [employer] activity to see all data.     TERESA-7 SCORE 5/26/2021 6/23/2021 8/11/2021   Total Score 11 (moderate anxiety) 13 (moderate anxiety) 18 (severe anxiety)   Total Score 11 13 18   Some encounter information is confidential and restricted. Go to Review Flowsheets activity to see all data.     Last PHQ-9 8/30/2021   1.  Little interest or pleasure in doing things 1   2.  Feeling down, depressed, or hopeless 1   3.  Trouble falling or staying asleep, or sleeping too much 1   4.  Feeling tired or having little energy 3   5.  Poor appetite or overeating 1   6.  Feeling bad about yourself 1   7.  Trouble concentrating 3   8.  Moving slowly or restless 1   Q9: Thoughts of better off dead/self-harm past 2 weeks 0   PHQ-9 Total Score 12   Difficulty at work, home, or with people -   In the past two weeks have you had thoughts of suicide or self harm? -   Do you have concerns about your personal safety or the safety of others? -   In the past 2 weeks have you thought about a plan or had intention to harm yourself? -   In the past 2 weeks have you acted on these thoughts in any way? -   Some encounter information is confidential and restricted. Go to Review [employer] activity to see all data.     TERESA-7  8/11/2021   1. Feeling nervous, anxious, or on edge 3   2. Not being able to stop or control worrying 3   3. Worrying too much about different things 3   4. Trouble relaxing 3   5. Being so restless that it is hard to sit still 2   6. Becoming easily annoyed or irritable 1   7. Feeling afraid, as if something awful might happen 3   TERESA-7 Total Score 18   If you checked any problems, how difficult have they made it for you to do your work, take care of things at home, or get  along with other people? -   Some encounter information is confidential and restricted. Go to Review Flowsheets activity to see all data.       Suicide Assessment Five-step Evaluation and Treatment (SAFE-T)      How many servings of fruits and vegetables do you eat daily?  2-3    On average, how many sweetened beverages do you drink each day (Examples: soda, juice, sweet tea, etc.  Do NOT count diet or artificially sweetened beverages)?   0    How many days per week do you exercise enough to make your heart beat faster? 3 or less    How many minutes a day do you exercise enough to make your heart beat faster? 10 - 19    How many days per week do you miss taking your medication? 0        Review of Systems   Constitutional, HEENT, cardiovascular, pulmonary, gi and gu systems are negative, except as otherwise noted.      Objective           Vitals:  No vitals were obtained today due to virtual visit.    Physical Exam   alert and no distress  PSYCH: Alert and oriented times 3; coherent speech, normal   rate and volume, able to articulate logical thoughts, able   to abstract reason, no tangential thoughts, no hallucinations   or delusions  Her affect is pleasant and anxious  RESP: No cough, no audible wheezing, able to talk in full sentences  Remainder of exam unable to be completed due to telephone visits          Phone call duration: 40 minutes

## 2021-09-08 ENCOUNTER — VIRTUAL VISIT (OUTPATIENT)
Dept: PSYCHOLOGY | Facility: CLINIC | Age: 41
End: 2021-09-08
Payer: COMMERCIAL

## 2021-09-08 DIAGNOSIS — F43.89 REACTION TO CHRONIC STRESS: Primary | ICD-10-CM

## 2021-09-08 PROCEDURE — 90834 PSYTX W PT 45 MINUTES: CPT | Mod: 95 | Performed by: SOCIAL WORKER
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
## 2021-09-08 NOTE — PATIENT INSTRUCTIONS
Client will return Sept 22 at 9am. She will work on implementing relaxation, mindfulness, deep breathing and calming self-talk each day to manage stress and anxiety. She will allow self to experience emotions and validate them, rather than judging or suppressing them.    Client to attend psychiatry appointment in Sept.

## 2021-09-12 ENCOUNTER — HEALTH MAINTENANCE LETTER (OUTPATIENT)
Age: 41
End: 2021-09-12

## 2021-09-13 ENCOUNTER — LAB (OUTPATIENT)
Dept: LAB | Facility: CLINIC | Age: 41
End: 2021-09-13
Payer: COMMERCIAL

## 2021-09-13 DIAGNOSIS — D50.8 OTHER IRON DEFICIENCY ANEMIA: ICD-10-CM

## 2021-09-13 DIAGNOSIS — E53.8 VITAMIN B 12 DEFICIENCY: ICD-10-CM

## 2021-09-13 LAB
BASOPHILS # BLD AUTO: 0 10E3/UL (ref 0–0.2)
BASOPHILS NFR BLD AUTO: 0 %
EOSINOPHIL # BLD AUTO: 0.3 10E3/UL (ref 0–0.7)
EOSINOPHIL NFR BLD AUTO: 4 %
ERYTHROCYTE [DISTWIDTH] IN BLOOD BY AUTOMATED COUNT: 21.5 % (ref 10–15)
FERRITIN SERPL-MCNC: 38 NG/ML (ref 12–150)
HCT VFR BLD AUTO: 37.5 % (ref 35–47)
HGB BLD-MCNC: 11.9 G/DL (ref 11.7–15.7)
HOLD SPECIMEN: NORMAL
IMM GRANULOCYTES # BLD: 0 10E3/UL
IMM GRANULOCYTES NFR BLD: 0 %
IRON SATN MFR SERPL: 24 % (ref 15–46)
IRON SERPL-MCNC: 86 UG/DL (ref 35–180)
LYMPHOCYTES # BLD AUTO: 1.4 10E3/UL (ref 0.8–5.3)
LYMPHOCYTES NFR BLD AUTO: 18 %
MCH RBC QN AUTO: 28 PG (ref 26.5–33)
MCHC RBC AUTO-ENTMCNC: 31.7 G/DL (ref 31.5–36.5)
MCV RBC AUTO: 88 FL (ref 78–100)
MONOCYTES # BLD AUTO: 0.5 10E3/UL (ref 0–1.3)
MONOCYTES NFR BLD AUTO: 7 %
NEUTROPHILS # BLD AUTO: 5.7 10E3/UL (ref 1.6–8.3)
NEUTROPHILS NFR BLD AUTO: 71 %
NRBC # BLD AUTO: 0 10E3/UL
NRBC BLD AUTO-RTO: 0 /100
PLATELET # BLD AUTO: 304 10E3/UL (ref 150–450)
RBC # BLD AUTO: 4.25 10E6/UL (ref 3.8–5.2)
RETICS # AUTO: 0.06 10E6/UL (ref 0.03–0.1)
RETICS/RBC NFR AUTO: 1.4 % (ref 0.5–2)
TIBC SERPL-MCNC: 354 UG/DL (ref 240–430)
VIT B12 SERPL-MCNC: 179 PG/ML (ref 193–986)
WBC # BLD AUTO: 8 10E3/UL (ref 4–11)

## 2021-09-13 PROCEDURE — 82728 ASSAY OF FERRITIN: CPT

## 2021-09-13 PROCEDURE — 36415 COLL VENOUS BLD VENIPUNCTURE: CPT

## 2021-09-13 PROCEDURE — 82607 VITAMIN B-12: CPT

## 2021-09-13 PROCEDURE — 83550 IRON BINDING TEST: CPT

## 2021-09-13 PROCEDURE — 85045 AUTOMATED RETICULOCYTE COUNT: CPT

## 2021-09-13 PROCEDURE — 85025 COMPLETE CBC W/AUTO DIFF WBC: CPT

## 2021-09-14 ENCOUNTER — PATIENT OUTREACH (OUTPATIENT)
Dept: ONCOLOGY | Facility: CLINIC | Age: 41
End: 2021-09-14

## 2021-09-14 NOTE — PROGRESS NOTES
Attempted to reach patient but was unable to leave voicemail as her box was full.  Will attempt to keep trying and will send mychart message re:  Per Dr. Yepez -    She should be taking 1000 mcg daily sublingually.    She should increase her vitamin B12 to now 3000 mcg daily sublingually.    Repeat labs before seeing me next month.

## 2021-09-15 NOTE — PROGRESS NOTES
Attempted to contact patient, was able to speak to her son.  He states that she probably hasn't called back because her grandma is in the hospital and in comfort care.  Son will give her the message to look at mychart with medication instructions.  Also provided with call back number.

## 2021-09-20 ENCOUNTER — TELEPHONE (OUTPATIENT)
Dept: FAMILY MEDICINE | Facility: CLINIC | Age: 41
End: 2021-09-20

## 2021-09-20 NOTE — TELEPHONE ENCOUNTER
Received [facility] Recommended Reduced Working Schedule form via fax and placed in Rosa Rueda's red folder.  Cari Rollins MA  [facility]  2nd Floor  Primary Care

## 2021-09-22 ENCOUNTER — PATIENT OUTREACH (OUTPATIENT)
Dept: CARE COORDINATION | Facility: CLINIC | Age: 41
End: 2021-09-22

## 2021-09-22 ENCOUNTER — VIRTUAL VISIT (OUTPATIENT)
Dept: PSYCHOLOGY | Facility: CLINIC | Age: 41
End: 2021-09-22
Payer: COMMERCIAL

## 2021-09-22 DIAGNOSIS — F41.9 ANXIETY: ICD-10-CM

## 2021-09-22 DIAGNOSIS — F43.89 REACTION TO CHRONIC STRESS: Primary | ICD-10-CM

## 2021-09-22 DIAGNOSIS — F43.21 GRIEF: ICD-10-CM

## 2021-09-22 PROCEDURE — 90834 PSYTX W PT 45 MINUTES: CPT | Mod: 95 | Performed by: SOCIAL WORKER

## 2021-09-22 NOTE — LETTER
[facility] CARE COORDINATION  [address]  [place] [address]    September 28, 2021    Crystal Rose  [address] [facility] [address]      Dear Crystal,    I am a clinic care coordinator who works with SHERIE Bennett CNP at [facility]. I have been trying to reach you recently to introduce Clinic Care Coordination and to see if there was anything I could assist you with.  Below is a description of clinic care coordination and how I can further assist you.      The clinic care coordination team is made up of a registered nurse,  and community health worker who understand the health care system. The goal of clinic care coordination is to help you manage your health and improve access to the health care system in the most efficient manner. The team can assist you in meeting your health care goals by providing education, coordinating services, strengthening the communication among your providers and supporting you with any resource needs.    Please feel free to contact me at [phone] with any questions or concerns. We are focused on providing you with the highest-quality healthcare experience possible and that all starts with you.     Sincerely,     EFFIE Denton  Primary Care Clinic- Social Work Care Coordinator  [facility] [place]  Ph: [phone]

## 2021-09-22 NOTE — PATIENT INSTRUCTIONS
Client will return Oct 6 at 9am. She will work on taking it one hour/day at a time to manage grief. She will use resources and coping to manage emotional distress. Therapist put in referral for care coordination and client agreed to follow up.
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
## 2021-09-22 NOTE — PROGRESS NOTES
Clinic Care Coordination Contact  [facility]/Voicemail       Clinical Data: Care Coordinator Outreach  Outreach attempted x 1.  Left message on patient's voicemail with call back information and requested return call.  Plan: Care Coordinator will try to reach patient again in 1-2 business days.    EFFIE Denton  Primary Care Clinic- Social Work Care Coordinator  [facility]  Ph: [phone]  9/22/2021 10:54 AM

## 2021-09-28 ENCOUNTER — TELEPHONE (OUTPATIENT)
Dept: PSYCHOLOGY | Facility: CLINIC | Age: 41
End: 2021-09-28

## 2021-09-28 NOTE — PROGRESS NOTES
Clinic Care Coordination Contact  [facility]/Voicemail       Clinical Data: Care Coordinator Outreach  Outreach attempted x 3.  Left message on patient's voicemail with call back information and requested return call.  Plan: Care Coordinator will send care coordination introduction letter with care coordinator contact information and explanation of care coordination services via [employer]. Care Coordinator will do no further outreaches at this time.    EFFIE Denton  Primary Care Clinic- Social Work Care Coordinator  [facility] [place]  Ph: [phone]  9/28/2021 1:12 PM

## 2021-09-28 NOTE — PROGRESS NOTES
Clinic Care Coordination Contact  [facility]/Voicemail       Clinical Data: Care Coordinator Outreach  Outreach attempted x 2.  Left message on patient's voicemail with call back information and requested return call. This VM occurred on 9/24/2021  Plan:  Care Coordinator will try to reach patient again in 1-2 business days.    EFFIE Denton  Primary Care Clinic- Social Work Care Coordinator  [facility]  Ph: [phone]  9/28/2021 1:11 PM

## 2021-09-28 NOTE — TELEPHONE ENCOUNTER
Therapist contacted pt for a same day session due to her being on the waitlist. She stated that she is unable to meet due to another obligation, but that she would like to be informed of any other openings.    [facility] Rudolph, [facility] 9[facility]28/2021

## 2021-10-06 ENCOUNTER — VIRTUAL VISIT (OUTPATIENT)
Dept: PSYCHOLOGY | Facility: CLINIC | Age: 41
End: 2021-10-06
Payer: COMMERCIAL

## 2021-10-06 DIAGNOSIS — F43.89 REACTION TO CHRONIC STRESS: Primary | ICD-10-CM

## 2021-10-06 PROCEDURE — 90832 PSYTX W PT 30 MINUTES: CPT | Mod: 95 | Performed by: SOCIAL WORKER

## 2021-10-06 NOTE — PATIENT INSTRUCTIONS
Client will return Oct 12 at 10am. She will use her supports to help manage distress/grief during this time. She will follow up on psychiatry and care coordination to help manage mental health and distress.
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
## 2021-10-07 ENCOUNTER — VIRTUAL VISIT (OUTPATIENT)
Dept: ONCOLOGY | Facility: CLINIC | Age: 41
End: 2021-10-07
Attending: INTERNAL MEDICINE
Payer: COMMERCIAL

## 2021-10-07 DIAGNOSIS — Z98.84 S/P GASTRIC BYPASS: ICD-10-CM

## 2021-10-07 DIAGNOSIS — E53.8 VITAMIN B 12 DEFICIENCY: Primary | ICD-10-CM

## 2021-10-07 DIAGNOSIS — E66.9 OBESITY (BMI 30-39.9): ICD-10-CM

## 2021-10-07 DIAGNOSIS — K95.89 IRON DEFICIENCY ANEMIA FOLLOWING BARIATRIC SURGERY: ICD-10-CM

## 2021-10-07 DIAGNOSIS — D50.8 IRON DEFICIENCY ANEMIA FOLLOWING BARIATRIC SURGERY: ICD-10-CM

## 2021-10-07 PROCEDURE — 999N001193 HC VIDEO/TELEPHONE VISIT; NO CHARGE

## 2021-10-07 PROCEDURE — 99214 OFFICE O/P EST MOD 30 MIN: CPT | Mod: 95 | Performed by: INTERNAL MEDICINE

## 2021-10-07 RX ORDER — CYANOCOBALAMIN (VITAMIN B-12) 2500 MCG
2500 TABLET, SUBLINGUAL SUBLINGUAL DAILY
Qty: 30 TABLET | Refills: 4 | Status: SHIPPED | OUTPATIENT
Start: 2021-10-07 | End: 2021-11-06

## 2021-10-07 NOTE — PROGRESS NOTES
Crystal is a 40 year old who is being evaluated via a billable video visit.      How would you like to obtain your AVS? Rostelecomhart  If the video visit is dropped, the invitation should be resent by: Text to cell phone: [phone]  Will anyone else be joining your video visit? No    Video Start Time: 1:16 PM  Video-Visit Details    Type of service:  Video Visit    Video End Time:1:23 PM    Originating Location (pt. Location): Home    Distant Location (provider location):  [facility]     Platform used for Video Visit: AnyPerk

## 2021-10-07 NOTE — LETTER
10/7/2021         RE: Crystal Rose  [address] [facility] [address]        Dear Colleague,    Thank you for referring your patient, Crystal Rose, to the [facility]. Please see a copy of my visit note below.    Crystal is a 40 year old who is being evaluated via a billable video visit.      How would you like to obtain your AVS? MyChart  If the video visit is dropped, the invitation should be resent by: Text to cell phone: [phone]  Will anyone else be joining your video visit? No    Video Start Time: 1:16 PM  Video-Visit Details    Type of service:  Video Visit    Video End Time:1:23 PM    Originating Location (pt. Location): Home    Distant Location (provider location):  [facility]     Platform used for Video Visit: [employer]    Hematology follow up visit:  Date on this visit: 10/07/21     Crystal Rose  is referred by Dr.Brianna Lauren Camilo for a hematology consultation. She requires evaluation for anemia.    Primary Physician: Gregoria Camilo     History Of Present Illness:  Ms. Rose is a 40 year old female who presents with anemia.    Please see my previous note for details.  I have copied and updated from prior note.    She has a history of gastric bypass surgery.  She also has history of iron deficiency anemia and has received iron sucrose in 2014. She received IV iron in 2017 as well.  She feels very fatigued. She has dyspnea on exertion but she also has asthma. Has cravings for ice chips. She has restless legs. previously she had stomach ulcers but no recent bleeding. Had EGD on 12/29/2020 which showed improving stomach ulcer at the anastomosis site.  She had endometrial ablation in 2018 so does not have heavy bleeding any more but previously she had menorrhagia. She also has anxiety issues and she is following closely with PCP. She is taking oral iron and other vitamins including B12 and Vit D  and zinc. Was off these for quite sometime but restarted in May 2021. BMs are Ok with mild constipation.  I have recommended giving her INFeD.    Interval history.  This is a video visit.  She received INFeD on 8/3/2021.  She tells me that overall she has been feeling better with better energy.  She still has asthma issues some breathing is the same.  Cravings for ice chips have resolved.  She still has restless legs.  Does not report any bleeding.  She is supposed to get a repeat EGD in November 2021.  She has been taking vitamin B12 1000 mcg sublingually on most of the days but sometimes misses them.        ROS:  Rest of the ROS was otherwise neg    I reviewed other history in epic as below.      Past Medical/Surgical History:  Past Medical History:   Diagnosis Date     Abnormal Pap smear     see problem list     Allergic rhinitis      Anemia     iron infusions     Asthma     Advair, Albuterol     Cervical dysplasia     SUMI I, SUMI II, treated with Leep   later had ASCUS+HPV sev times 2009     Cervical high risk HPV (human papillomavirus) test positive     see problem list     Chlamydial cervicitis 10/2005, 3/2009    Treated both times and had neg JADEN both times     Diabetes mellitus (H)     GDDC with first pregnacy     Environmental allergies      Herpes simplex without mention of complication     valtrex at 36 weeks     History of chlamydia      Morbid obesity (H)      Postoperative hematoma involving genitourinary system 2014    large rectus sheath hematoma, after failed attempt at laparoscopy, hospitalized for pain control      Trichomonal vulvovaginitis 11/16/06     Vaginal discharge      Past Surgical History:   Procedure Laterality Date     ATTEMPTED LAPAROSCOPY  3/13/2014    Procedure: ATTEMPTED LAPAROSCOPY;;  Surgeon: Cee Garcia MD;  Location: [facility]     COLPOSCOPY CERVIX, BIOPSY CERVIX, ENDOCERVICAL CURETTAGE, COMBINED      1/6/2005:  SUMI I; 6/15/2009:  SUMI I.  Treated with cryo.  10/27/1997:  SUMI  I-II     CONIZATION LEEP  3/13/2014    Procedure: CONIZATION LEEP;  LOOP ELECTROSURGICAL EXCISION PROCEDURE; LAPAROSCOPY ATTEMPTED;  Surgeon: Cee Garcia MD;  Location: [facility]     CRYOTHERAPY, CERVICAL  age 19    Pt reported     DILATION AND CURETTAGE, ABLATE ENDOMETRIUM NOVASURE, COMBINED N/A 6/14/2018    Procedure: COMBINED DILATION AND CURETTAGE, ABLATE ENDOMETRIUM NOVASURE;  HYSTEROSCOPY, DILATION AND CURETTAGE, ENDOMETRIAL ABLATION WITH NOVASURE, LAPAROSCOPIC BILATERAL TUBAL LIGATION ;  Surgeon: Franki Pinedo MD;  Location: [facility]     EXAM UNDER ANESTHESIA PELVIC  3/20/2014    Procedure: EXAM UNDER ANESTHESIA PELVIC;  EXAM UNDER ANESTHESIA, REMOVAL OF STICHES ;  Surgeon: Cee Garcia MD;  Location:  OR     GASTRIC BYPASS  2004    pre-bypass weight was 320#     HC NASAL/SINUS SCOPE W THER INSTILL       HC REMOVAL OF OVARIAN CYST(S)       LAPAROSCOPIC TUBAL LIGATION Bilateral 6/14/2018    Procedure: LAPAROSCOPIC TUBAL LIGATION;;  Surgeon: Franki Pinedo MD;  Location: [facility]     Allergies:  Allergies as of 10/07/2021 - Reviewed 10/07/2021   Allergen Reaction Noted     Cat hair [cats]  02/23/2010     Dog hair [dogs]  02/23/2010     Dust mites  02/23/2010     Ragweeds  02/23/2010     Nsaids Other (See Comments) 03/09/2020     Current Medications:  Current Outpatient Medications   Medication Sig Dispense Refill     albuterol (PROAIR HFA/PROVENTIL HFA/VENTOLIN HFA) 108 (90 Base) MCG/ACT inhaler Inhale 2 puffs into the lungs every 4 hours as needed for shortness of breath / dyspnea or wheezing 18 g 3     albuterol (PROVENTIL) (2.5 MG/3ML) 0.083% neb solution Take 1 vial (2.5 mg) by nebulization every 4 hours as needed for shortness of breath / dyspnea or wheezing 25 vial 3     ARIPiprazole (ABILIFY) 2 MG tablet Take 2 tablets (4 mg) by mouth daily 60 tablet 1     Ascorbic Acid (VITAMIN C PO) Take 500 mg by mouth       busPIRone (BUSPAR) 15 MG tablet Take 1 tablet (15 mg) by mouth 3 times daily 270  tablet 3     Calcium Carb-Cholecalciferol (CALCIUM 500 + D) 500-400 MG-UNIT TABS Take 1 tablet by mouth 3 times daily 90 tablet 11     calcium carbonate-vitamin D (CALCIUM 600/VITAMIN D) 600-400 MG-UNIT chewable tablet Take one twice daily and at least 2 hours apart from iron. 180 tablet 3     childrens multivitamin w/iron (FLINTSTONES COMPLETE) 18 MG chewable tablet Take 1 tablet by mouth 2 times daily 180 tablet 3     childrens multivitamin w/iron (FLINTSTONES COMPLETE) chewable tablet Take one tablet twice a day. 200 tablet 4     cholecalciferol 125 MCG (5000 UT) CAPS Take 1 capsule (5,000 Units) by mouth daily 90 capsule 3     cyanocobalamin (VITAMIN B-12) 1000 MCG SUBL sublingual tablet Place 1 tablet (1,000 mcg) under the tongue daily 100 tablet 3     ferrous fumarate 65 mg, [employer] [place]. FE,-Vitamin C 125 mg (VITRON C)  MG TABS tablet Take 2 tablets by mouth daily 180 tablet 3     FLUoxetine (PROZAC) 20 MG capsule Take 4 capsules (80 mg) by mouth daily 120 capsule 3     fluticasone (FLONASE) 50 MCG/ACT nasal spray Spray 1-2 sprays into both nostrils daily 18.2 mL 5     fluticasone-salmeterol (ADVAIR) 500-50 MCG/DOSE inhaler Inhale 1 puff into the lungs every 12 hours 3 each 3     gabapentin (NEURONTIN) 300 MG capsule Take two capsules in the morning and two capsules in the afternoon.  Take three capsules at bedtime. 210 capsule 11     hydrOXYzine (ATARAX) 25 MG tablet Take 2 tablets (50 mg) by mouth every 6 hours as needed for anxiety 120 tablet 2     hydrOXYzine (ATARAX) 25 MG tablet Take 1 tablet (25 mg) by mouth every 4 hours as needed for anxiety (sleep) 120 tablet 3     ipratropium - albuterol 0.5 mg/2.5 mg/3 mL (DUONEB) 0.5-2.5 (3) MG/3ML neb solution Take 1 vial (3 mLs) by nebulization every 4 hours as needed for shortness of breath / dyspnea or wheezing 75 mL 3     loratadine (CLARITIN) 10 MG tablet Take 1 tablet (10 mg) by mouth daily 90 tablet 3     montelukast (SINGULAIR) 10 MG tablet Take 1 tablet  (10 mg) by mouth At Bedtime 90 tablet 3     Multiple Vitamins-Iron (DAILY MARILYN MULTIVITAMIN/IRON) TABS Take 1 tablet by mouth 2 times daily 100 tablet 3     omeprazole (PRILOSEC) 20 MG DR capsule TAKE 1 CAPSULE BY MOUTH ONCE EVERY MORNING - TAKE 30 MINUTES BEFORE MEAL       order for DME Equipment being ordered: Nebulizer with tubing 1 each 0     order for DME Equipment being ordered: Nebulizer 1 each 0     sucralfate (CARAFATE) 1 GM tablet Take 1 tablet (1 g) by mouth 4 times daily 120 tablet 0     vitamin B-12 (CYANOCOBALAMIN) 2500 MCG sublingual tablet Take 1 tablet (2,500 mcg) by mouth daily 90 tablet 1      Family History:  Family History   Problem Relation Age of Onset     Hypertension Mother      Allergies Mother      Obesity Mother      Asthma Father      Diabetes Father      Hypertension Father      Allergies Father      Cancer Father         Lymphoma d age 58     Asthma Brother      Allergies Sister      Depression Sister      Obesity Sister      Alzheimer Disease Maternal Grandmother      Arthritis Maternal Grandmother      Obesity Maternal Grandmother      Thyroid Disease Maternal Grandmother      Hypertension Maternal Grandfather      Cancer - colorectal Maternal Grandfather      Cerebrovascular Disease Maternal Grandfather      Diabetes Paternal Grandmother      No family history of bleeding or clotting disorder.  Social History:  Social History     Socioeconomic History     Marital status: Single     Spouse name: Not on file     Number of children: 4     Years of education: Not on file     Highest education level: Not on file   Occupational History     Employer: [employer]   Tobacco Use     Smoking status: Former Smoker     Quit date: 2009     Years since quittin.4     Smokeless tobacco: Never Used   Substance and Sexual Activity     Alcohol use: No     Drug use: No     Sexual activity: Yes     Partners: Male     Birth control/protection: None   Other Topics Concern     Parent/sibling w/ CABG,  MI or angioplasty before 65F 55M? Not Asked   Social History Narrative     Not on file     Social Determinants of Health     Financial Resource Strain:      Difficulty of Paying Living Expenses:    Food Insecurity:      Worried About Running Out of Food in the Last Year:      Ran Out of Food in the Last Year:    Transportation Needs:      Lack of Transportation (Medical):      Lack of Transportation (Non-Medical):    Physical Activity:      Days of Exercise per Week:      Minutes of Exercise per Session:    Stress:      Feeling of Stress :    Social Connections:      Frequency of Communication with Friends and Family:      Frequency of Social Gatherings with Friends and Family:      Attends Sabianist Services:      Active Member of Clubs or Organizations:      Attends Club or Organization Meetings:      Marital Status:    Intimate Partner Violence:      Fear of Current or Ex-Partner:      Emotionally Abused:      Physically Abused:      Sexually Abused:      Quit smoking in 2017. Occasional etoh use.     Physical Exam:  There were no vitals taken for this visit.   Wt Readings from Last 4 Encounters:   08/03/21 98.2 kg (216 lb 9.6 oz)   07/06/21 97.1 kg (214 lb)   06/08/20 98.9 kg (218 lb)   04/28/20 98.9 kg (218 lb)       Constitutional.  Looks well and in no apparent distress.   Eyes.  Without eye redness or apparent jaundice.   Respiratory.  Non labored breathing. Speaking in full sentences.    Skin.  No concerning skin rashes on the skin visualized.   Neurological.  Is alert and oriented.  Psychiatric.  Mood and affect seem appropriate.      The rest of a comprehensive physical examination is deferred due to Public Health Emergency video visit restrictions.    Laboratory/Imaging Studies      Reviewed  9/13/2021.    CBC shows hemoglobin 11.9.  MCV 88.  Rest is unremarkable.  Ferritin 38, TIBC 354, iron 86, iron saturation index 24%.  B12 179.    8/3/2021.  Antiintrinsic factor antibody and antiparietal cell  antibody were negative.    5/24/2021  CBC showed WBC 8.6.  Hemoglobin 9.2.  Platelets 331.  MCV 80.  CMP unremarkable except calcium 8.2.  Iron studies show ferritin 4, iron 18, TIBC 496, iron saturation index 4%.  TSH 0.96.  Vitamin .  Vitamin D 4  Zinc 58    ASSESSMENT/PLAN:    Anemia.  In a patient with previous gastric bypass surgery.  She has evidence of iron deficiency.  She also has evidence of vitamin B12 deficiency.  As she had very symptomatic anemia, I recommended giving her INFeD which she received on 8/3/2021.  She is doing much better now.  She still feels somewhat fatigued but it is better than before.  Hemoglobin is  now low normal.  Iron studies are adequate.  I recommend continued serial monitoring with checks every 3 months.    She will follow up with GI for repeat endoscopy due to stomach ulcer.  She is on omeprazole.    Vitamin B12 deficiency.  Antiparietal cell antibody and antiintrinsic factor antibody were negative.  She has history of gastric bypass surgery so she does not absorb B12 well.  I recommend increasing vitamin B12 to now 2500 mcg daily because she continues to be B12 deficient despite taking 1000 mcg sublingually daily.  We will repeat labs in a couple of months.    Restless legs.  She still has restless leg especially on the left side.  I advised her to follow with PCP since now I cannot blame it on iron deficiency as this has been corrected.      She also has evidence of vitamin D deficiency and zinc deficiency. She is taking supplements.  Follow with PCP.    See me back in a couple of months.    All questions answered to her satisfaction.  She is agreeable and comfortable with the plan.    Rufino Yepez MD             Again, thank you for allowing me to participate in the care of your patient.        Sincerely,        Rufino Yepez MD

## 2021-10-07 NOTE — PROGRESS NOTES
Hematology follow up visit:  Date on this visit: 10/07/21     Crystal Rose  is referred by Dr.Brianna Lauren Camilo for a hematology consultation. She requires evaluation for anemia.    Primary Physician: Gregoria Camilo     History Of Present Illness:  Ms. Rose is a 40 year old female who presents with anemia.    Please see my previous note for details.  I have copied and updated from prior note.    She has a history of gastric bypass surgery.  She also has history of iron deficiency anemia and has received iron sucrose in 2014. She received IV iron in 2017 as well.  She feels very fatigued. She has dyspnea on exertion but she also has asthma. Has cravings for ice chips. She has restless legs. previously she had stomach ulcers but no recent bleeding. Had EGD on 12/29/2020 which showed improving stomach ulcer at the anastomosis site.  She had endometrial ablation in 2018 so does not have heavy bleeding any more but previously she had menorrhagia. She also has anxiety issues and she is following closely with PCP. She is taking oral iron and other vitamins including B12 and Vit D and zinc. Was off these for quite sometime but restarted in May 2021. BMs are Ok with mild constipation.  I have recommended giving her INFeD.    Interval history.  This is a video visit.  She received INFeD on 8/3/2021.  She tells me that overall she has been feeling better with better energy.  She still has asthma issues some breathing is the same.  Cravings for ice chips have resolved.  She still has restless legs.  Does not report any bleeding.  She is supposed to get a repeat EGD in November 2021.  She has been taking vitamin B12 1000 mcg sublingually on most of the days but sometimes misses them.        ROS:  Rest of the ROS was otherwise neg    I reviewed other history in epic as below.      Past Medical/Surgical History:  Past Medical History:   Diagnosis Date     Abnormal Pap smear     see problem list      Allergic rhinitis      Anemia     iron infusions     Asthma     Advair, Albuterol     Cervical dysplasia     SUMI I, SUMI II, treated with Leep   later had ASCUS+HPV sev times 2009     Cervical high risk HPV (human papillomavirus) test positive     see problem list     Chlamydial cervicitis 10/2005, 3/2009    Treated both times and had neg JADEN both times     Diabetes mellitus (H)     GDDC with first pregnacy     Environmental allergies      Herpes simplex without mention of complication     valtrex at 36 weeks     History of chlamydia      Morbid obesity (H)      Postoperative hematoma involving genitourinary system 2014    large rectus sheath hematoma, after failed attempt at laparoscopy, hospitalized for pain control      Trichomonal vulvovaginitis 11/16/06     Vaginal discharge      Past Surgical History:   Procedure Laterality Date     ATTEMPTED LAPAROSCOPY  3/13/2014    Procedure: ATTEMPTED LAPAROSCOPY;;  Surgeon: Cee Garcia MD;  Location: [facility]     COLPOSCOPY CERVIX, BIOPSY CERVIX, ENDOCERVICAL CURETTAGE, COMBINED      1/6/2005:  SUMI I; 6/15/2009:  SUMI I.  Treated with cryo.  10/27/1997:  SUMI I-II     CONIZATION LEEP  3/13/2014    Procedure: CONIZATION LEEP;  LOOP ELECTROSURGICAL EXCISION PROCEDURE; LAPAROSCOPY ATTEMPTED;  Surgeon: Cee Garcia MD;  Location: [facility]     CRYOTHERAPY, CERVICAL  age 19    Pt reported     DILATION AND CURETTAGE, ABLATE ENDOMETRIUM NOVASURE, COMBINED N/A 6/14/2018    Procedure: COMBINED DILATION AND CURETTAGE, ABLATE ENDOMETRIUM NOVASURE;  HYSTEROSCOPY, DILATION AND CURETTAGE, ENDOMETRIAL ABLATION WITH NOVASURE, LAPAROSCOPIC BILATERAL TUBAL LIGATION ;  Surgeon: Franki Pinedo MD;  Location: [facility]     EXAM UNDER ANESTHESIA PELVIC  3/20/2014    Procedure: EXAM UNDER ANESTHESIA PELVIC;  EXAM UNDER ANESTHESIA, REMOVAL OF STICHES ;  Surgeon: Cee Garcia MD;  Location:  OR     GASTRIC BYPASS  2004    pre-bypass weight was 320#     HC NASAL/SINUS SCOPE W THER INSTILL        HC REMOVAL OF OVARIAN CYST(S)       LAPAROSCOPIC TUBAL LIGATION Bilateral 6/14/2018    Procedure: LAPAROSCOPIC TUBAL LIGATION;;  Surgeon: Franki Pinedo MD;  Location: [facility]     Allergies:  Allergies as of 10/07/2021 - Reviewed 10/07/2021   Allergen Reaction Noted     Cat hair [cats]  02/23/2010     Dog hair [dogs]  02/23/2010     Dust mites  02/23/2010     Ragweeds  02/23/2010     Nsaids Other (See Comments) 03/09/2020     Current Medications:  Current Outpatient Medications   Medication Sig Dispense Refill     albuterol (PROAIR HFA/PROVENTIL HFA/VENTOLIN HFA) 108 (90 Base) MCG/ACT inhaler Inhale 2 puffs into the lungs every 4 hours as needed for shortness of breath / dyspnea or wheezing 18 g 3     albuterol (PROVENTIL) (2.5 MG/3ML) 0.083% neb solution Take 1 vial (2.5 mg) by nebulization every 4 hours as needed for shortness of breath / dyspnea or wheezing 25 vial 3     ARIPiprazole (ABILIFY) 2 MG tablet Take 2 tablets (4 mg) by mouth daily 60 tablet 1     Ascorbic Acid (VITAMIN C PO) Take 500 mg by mouth       busPIRone (BUSPAR) 15 MG tablet Take 1 tablet (15 mg) by mouth 3 times daily 270 tablet 3     Calcium Carb-Cholecalciferol (CALCIUM 500 + D) 500-400 MG-UNIT TABS Take 1 tablet by mouth 3 times daily 90 tablet 11     calcium carbonate-vitamin D (CALCIUM 600/VITAMIN D) 600-400 MG-UNIT chewable tablet Take one twice daily and at least 2 hours apart from iron. 180 tablet 3     childrens multivitamin w/iron (FLINTSTONES COMPLETE) 18 MG chewable tablet Take 1 tablet by mouth 2 times daily 180 tablet 3     childrens multivitamin w/iron (FLINTSTONES COMPLETE) chewable tablet Take one tablet twice a day. 200 tablet 4     cholecalciferol 125 MCG (5000 UT) CAPS Take 1 capsule (5,000 Units) by mouth daily 90 capsule 3     cyanocobalamin (VITAMIN B-12) 1000 MCG SUBL sublingual tablet Place 1 tablet (1,000 mcg) under the tongue daily 100 tablet 3     ferrous fumarate 65 mg, [place]. FE,-Vitamin C 125 mg (VITRON C)   MG TABS tablet Take 2 tablets by mouth daily 180 tablet 3     FLUoxetine (PROZAC) 20 MG capsule Take 4 capsules (80 mg) by mouth daily 120 capsule 3     fluticasone (FLONASE) 50 MCG/ACT nasal spray Spray 1-2 sprays into both nostrils daily 18.2 mL 5     fluticasone-salmeterol (ADVAIR) 500-50 MCG/DOSE inhaler Inhale 1 puff into the lungs every 12 hours 3 each 3     gabapentin (NEURONTIN) 300 MG capsule Take two capsules in the morning and two capsules in the afternoon.  Take three capsules at bedtime. 210 capsule 11     hydrOXYzine (ATARAX) 25 MG tablet Take 2 tablets (50 mg) by mouth every 6 hours as needed for anxiety 120 tablet 2     hydrOXYzine (ATARAX) 25 MG tablet Take 1 tablet (25 mg) by mouth every 4 hours as needed for anxiety (sleep) 120 tablet 3     ipratropium - albuterol 0.5 mg/2.5 mg/3 mL (DUONEB) 0.5-2.5 (3) MG/3ML neb solution Take 1 vial (3 mLs) by nebulization every 4 hours as needed for shortness of breath / dyspnea or wheezing 75 mL 3     loratadine (CLARITIN) 10 MG tablet Take 1 tablet (10 mg) by mouth daily 90 tablet 3     montelukast (SINGULAIR) 10 MG tablet Take 1 tablet (10 mg) by mouth At Bedtime 90 tablet 3     Multiple Vitamins-Iron (DAILY MARILYN MULTIVITAMIN/IRON) TABS Take 1 tablet by mouth 2 times daily 100 tablet 3     omeprazole (PRILOSEC) 20 MG DR capsule TAKE 1 CAPSULE BY MOUTH ONCE EVERY MORNING - TAKE 30 MINUTES BEFORE MEAL       order for DME Equipment being ordered: Nebulizer with tubing 1 each 0     order for DME Equipment being ordered: Nebulizer 1 each 0     sucralfate (CARAFATE) 1 GM tablet Take 1 tablet (1 g) by mouth 4 times daily 120 tablet 0     vitamin B-12 (CYANOCOBALAMIN) 2500 MCG sublingual tablet Take 1 tablet (2,500 mcg) by mouth daily 90 tablet 1      Family History:  Family History   Problem Relation Age of Onset     Hypertension Mother      Allergies Mother      Obesity Mother      Asthma Father      Diabetes Father      Hypertension Father      Allergies  Father      Cancer Father         Lymphoma d age 58     Asthma Brother      Allergies Sister      Depression Sister      Obesity Sister      Alzheimer Disease Maternal Grandmother      Arthritis Maternal Grandmother      Obesity Maternal Grandmother      Thyroid Disease Maternal Grandmother      Hypertension Maternal Grandfather      Cancer - colorectal Maternal Grandfather      Cerebrovascular Disease Maternal Grandfather      Diabetes Paternal Grandmother      No family history of bleeding or clotting disorder.  Social History:  Social History     Socioeconomic History     Marital status: Single     Spouse name: Not on file     Number of children: 4     Years of education: Not on file     Highest education level: Not on file   Occupational History     Employer: [employer]   Tobacco Use     Smoking status: Former Smoker     Quit date: 2009     Years since quittin.4     Smokeless tobacco: Never Used   Substance and Sexual Activity     Alcohol use: No     Drug use: No     Sexual activity: Yes     Partners: Male     Birth control/protection: None   Other Topics Concern     Parent/sibling w/ CABG, MI or angioplasty before 65F 55M? Not Asked   Social History Narrative     Not on file     Social Determinants of Health     Financial Resource Strain:      Difficulty of Paying Living Expenses:    Food Insecurity:      Worried About Running Out of Food in the Last Year:      Ran Out of Food in the Last Year:    Transportation Needs:      Lack of Transportation (Medical):      Lack of Transportation (Non-Medical):    Physical Activity:      Days of Exercise per Week:      Minutes of Exercise per Session:    Stress:      Feeling of Stress :    Social Connections:      Frequency of Communication with Friends and Family:      Frequency of Social Gatherings with Friends and Family:      Attends Tenriism Services:      Active Member of Clubs or Organizations:      Attends Club or Organization Meetings:      Marital Status:     Intimate Partner Violence:      Fear of Current or Ex-Partner:      Emotionally Abused:      Physically Abused:      Sexually Abused:      Quit smoking in 2017. Occasional etoh use.     Physical Exam:  There were no vitals taken for this visit.   Wt Readings from Last 4 Encounters:   08/03/21 98.2 kg (216 lb 9.6 oz)   07/06/21 97.1 kg (214 lb)   06/08/20 98.9 kg (218 lb)   04/28/20 98.9 kg (218 lb)       Constitutional.  Looks well and in no apparent distress.   Eyes.  Without eye redness or apparent jaundice.   Respiratory.  Non labored breathing. Speaking in full sentences.    Skin.  No concerning skin rashes on the skin visualized.   Neurological.  Is alert and oriented.  Psychiatric.  Mood and affect seem appropriate.      The rest of a comprehensive physical examination is deferred due to [facility] video visit restrictions.    Laboratory/Imaging Studies      Reviewed  9/13/2021.    CBC shows hemoglobin 11.9.  MCV 88.  Rest is unremarkable.  Ferritin 38, TIBC 354, iron 86, iron saturation index 24%.  B12 179.    8/3/2021.  Antiintrinsic factor antibody and antiparietal cell antibody were negative.    5/24/2021  CBC showed WBC 8.6.  Hemoglobin 9.2.  Platelets 331.  MCV 80.  CMP unremarkable except calcium 8.2.  Iron studies show ferritin 4, iron 18, TIBC 496, iron saturation index 4%.  TSH 0.96.  Vitamin .  Vitamin D 4  Zinc 58    ASSESSMENT/PLAN:    Anemia.  In a patient with previous gastric bypass surgery.  She has evidence of iron deficiency.  She also has evidence of vitamin B12 deficiency.  As she had very symptomatic anemia, I recommended giving her INFeD which she received on 8/3/2021.  She is doing much better now.  She still feels somewhat fatigued but it is better than before.  Hemoglobin is  now low normal.  Iron studies are adequate.  I recommend continued serial monitoring with checks every 3 months.    She will follow up with GI for repeat endoscopy due to stomach ulcer.   She is on omeprazole.    Vitamin B12 deficiency.  Antiparietal cell antibody and antiintrinsic factor antibody were negative.  She has history of gastric bypass surgery so she does not absorb B12 well.  I recommend increasing vitamin B12 to now 2500 mcg daily because she continues to be B12 deficient despite taking 1000 mcg sublingually daily.  We will repeat labs in a couple of months.    Restless legs.  She still has restless leg especially on the left side.  I advised her to follow with PCP since now I cannot blame it on iron deficiency as this has been corrected.      She also has evidence of vitamin D deficiency and zinc deficiency. She is taking supplements.  Follow with PCP.    See me back in a couple of months.    All questions answered to her satisfaction.  She is agreeable and comfortable with the plan.    Rufino Yepez MD

## 2021-10-12 ENCOUNTER — VIRTUAL VISIT (OUTPATIENT)
Dept: PSYCHOLOGY | Facility: CLINIC | Age: 41
End: 2021-10-12
Payer: COMMERCIAL

## 2021-10-12 DIAGNOSIS — F43.89 REACTION TO CHRONIC STRESS: Primary | ICD-10-CM

## 2021-10-12 PROCEDURE — 90834 PSYTX W PT 45 MINUTES: CPT | Mod: 95 | Performed by: SOCIAL WORKER
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
## 2021-10-12 NOTE — PATIENT INSTRUCTIONS
Client will return Oct 20 at 9am. She will express and validate emotions/grief, while using coping and supports to manage emotional distress. She will take one step at a time to accomplish tasks. She will continue to coordinate medical and mental health care with medical team.

## 2021-10-20 ENCOUNTER — VIRTUAL VISIT (OUTPATIENT)
Dept: PSYCHOLOGY | Facility: CLINIC | Age: 41
End: 2021-10-20
Payer: COMMERCIAL

## 2021-10-20 DIAGNOSIS — F43.89 REACTION TO CHRONIC STRESS: Primary | ICD-10-CM

## 2021-10-20 PROCEDURE — 90834 PSYTX W PT 45 MINUTES: CPT | Mod: 95 | Performed by: SOCIAL WORKER
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
## 2021-10-20 NOTE — PATIENT INSTRUCTIONS
Client will return Nov 3 at 8am. She will work on using relaxation and calming strategies to help manage stress and anxiety. She will take it one day at a time to manage her grief and stress related to work.   Therapist to follow up on mental health resources and supports to help manage.

## 2021-10-20 NOTE — Clinical Note
Leopoldo Orozco,    I wanted to follow up regarding the referral I put in for this pt. She has felt as though there isn't much that anyone can help with without a cost, so she was hesitant to call you back. I am not sure what your role can all support, but she has a LOT on her plate. She struggles with high anxiety and panic, just lost her mom to COVID, and is struggling to function at work, and therefore concerned about her job and finances. She is the mother of 5 kids, including a special needs child. I have talked to her about psychiatry, but she missed her last appointment. I have also discussed an [facility] worker, but she believes that will cost her money. I know that is billed through insurance, but I am not sure if it is fully covered. If you have any thoughts or ideas to help support her mental health, parenting, and financial stress, that would be great! Thanks so much,  Lauren

## 2021-10-26 ENCOUNTER — OFFICE VISIT (OUTPATIENT)
Dept: FAMILY MEDICINE | Facility: CLINIC | Age: 41
End: 2021-10-26
Payer: COMMERCIAL

## 2021-10-26 ENCOUNTER — TELEPHONE (OUTPATIENT)
Dept: FAMILY MEDICINE | Facility: CLINIC | Age: 41
End: 2021-10-26

## 2021-10-26 VITALS
HEART RATE: 88 BPM | TEMPERATURE: 98.5 F | SYSTOLIC BLOOD PRESSURE: 144 MMHG | DIASTOLIC BLOOD PRESSURE: 90 MMHG | WEIGHT: 261.6 LBS | OXYGEN SATURATION: 95 % | BODY MASS INDEX: 39.78 KG/M2

## 2021-10-26 DIAGNOSIS — F43.21 GRIEF REACTION: ICD-10-CM

## 2021-10-26 DIAGNOSIS — R20.2 NUMBNESS AND TINGLING OF LEFT LEG: Primary | ICD-10-CM

## 2021-10-26 DIAGNOSIS — M79.18 LEFT BUTTOCK PAIN: ICD-10-CM

## 2021-10-26 DIAGNOSIS — R20.0 NUMBNESS AND TINGLING OF LEFT LEG: Primary | ICD-10-CM

## 2021-10-26 DIAGNOSIS — F43.22 ADJUSTMENT DISORDER WITH ANXIOUS MOOD: ICD-10-CM

## 2021-10-26 DIAGNOSIS — F43.10 PTSD (POST-TRAUMATIC STRESS DISORDER): ICD-10-CM

## 2021-10-26 DIAGNOSIS — M79.662 PAIN OF LEFT LOWER LEG: ICD-10-CM

## 2021-10-26 DIAGNOSIS — R29.898 WEAKNESS OF BOTH LOWER EXTREMITIES: ICD-10-CM

## 2021-10-26 PROCEDURE — 99215 OFFICE O/P EST HI 40 MIN: CPT | Performed by: NURSE PRACTITIONER

## 2021-10-26 RX ORDER — METHYLPREDNISOLONE 4 MG
TABLET, DOSE PACK ORAL
Qty: 21 TABLET | Refills: 0 | Status: SHIPPED | OUTPATIENT
Start: 2021-10-26 | End: 2022-05-11

## 2021-10-26 ASSESSMENT — PAIN SCALES - GENERAL: PAINLEVEL: EXTREME PAIN (8)

## 2021-10-26 NOTE — PROGRESS NOTES
Assessment & Plan     Numbness and tingling of left leg  With cold left lower extremity.  Appearance not consistent with DVT but did rule out regardless given sudden onset of pain.  Ultrasounds were negative for vascular abnormality.  Will check lumbar MRI as is likely related to nerve compression.  Trial of Medrol dosepak.  Given tramadol for pain as is unable to take NSAIDs and patient is in extreme pain. Follow up according to results.    - US Lower Extremity Venous Duplex Bilateral; Future  - US Lower Extremity Arterial Duplex Bilateral; Future  - methylPREDNISolone (MEDROL DOSEPAK) 4 MG tablet therapy pack; Follow Package Directions  - traMADol (ULTRAM) 50 MG tablet; Take 1-2 tablets ( mg) by mouth every 6 hours as needed for severe pain  - MR Lumbar Spine w/o Contrast; Future    Pain of left lower leg  - US Lower Extremity Venous Duplex Bilateral; Future  - US Lower Extremity Arterial Duplex Bilateral; Future  - MR Lumbar Spine w/o Contrast; Future    Left buttock pain  As above.   - methylPREDNISolone (MEDROL DOSEPAK) 4 MG tablet therapy pack; Follow Package Directions  - traMADol (ULTRAM) 50 MG tablet; Take 1-2 tablets ( mg) by mouth every 6 hours as needed for severe pain  - MR Lumbar Spine w/o Contrast; Future    Adjustment disorder with anxious mood  Refilled.  Patient still with significant anxiety.  Multiple panic attacks a day.  Was to see psychiatry in September but she missed the appointment as it was the day after her mother's death.  She cannot remember where the appointment was and does not have the contact information.  Will refer her again.  Encouraged her to continue looking into the partial hospitalization program as well.  She will need to establish with a new therapist also since previous one is leaving [place].  Will involve [facility] for support for patient in meantime.   - ARIPiprazole (ABILIFY) 2 MG tablet; Take 2 tablets (4 mg) by mouth daily    PTSD (post-traumatic stress  "disorder)  - ARIPiprazole (ABILIFY) 2 MG tablet; Take 2 tablets (4 mg) by mouth daily    Grief reaction  Mother passed away due to COVID on 9/15/21.  She was also fighting stage 4 cancer at the time.        Ordering of each unique test  Prescription drug management  25 minutes spent on the date of the encounter doing chart review, history and exam, documentation and further activities per the note       BMI:   Estimated body mass index is 39.78 kg/m  as calculated from the following:    Height as of 7/6/21: 1.727 m (5' 8\").    Weight as of this encounter: 118.7 kg (261 lb 9.6 oz).   Weight management plan: Discussed healthy diet and exercise guidelines    Patient Instructions   CALL [phone] TO SCHEDULE THE ULTRASOUNDS      Return in about 1 week (around 11/2/2021), or if symptoms worsen or fail to improve.    SHERIE Bennett CNP  [facility]LA[facility] Rojas is a 40 year old who presents for the following health issues     HPI     Musculoskeletal problem/pain  Onset/Duration: 2 weeks ago  Description  Location: left lower extremiy -   Joint Swelling: no  Redness: no  Pain: YES  Warmth: no  Intensity:  severe  Progression of Symptoms:  worsening  Accompanying signs and symptoms:   Fevers: no  Numbness/tingling/weakness: YES  History  Trauma to the area: no  Recent illness:  no  Previous similar problem: YES- has history of LBP with sciatica  Previous evaluation:  no  Precipitating or alleviating factors:  Aggravating factors include: sitting  Therapies tried and outcome: nothing  Left leg pain, lower leg.  spasms into left buttocks.  States her lower left leg turns red/white and gets cold.  No swelling or erythema.    The pain causes falls (9 in last two weeks).  Pain is continuous and worse when laying down.  Impacting sleep.        Review of Systems   Constitutional, HEENT, cardiovascular, pulmonary, GI, , musculoskeletal, neuro, skin, endocrine and psych systems " are negative, except as otherwise noted.      Objective    BP (!) 144/90 (BP Location: Left arm, Patient Position: Sitting, Cuff Size: Adult Large)   Pulse 88   Temp 98.5  F (36.9  C) (Tympanic)   Wt 118.7 kg (261 lb 9.6 oz)   SpO2 95%   BMI 39.78 kg/m    Body mass index is 39.78 kg/m .  Physical Exam   GENERAL: healthy, alert and no distress  NECK: no adenopathy, no asymmetry, masses, or scars and thyroid normal to palpation  RESP: lungs clear to auscultation - no rales, rhonchi or wheezes  CV: regular rate and rhythm, normal S1 S2, no S3 or S4, no murmur, click or rub, no peripheral edema and peripheral pulses strong  MS: LLE normal in size,no redness or induration or warmth.  Markedly tender to the touch in piriformis and posterior lower leg. Pulses, capillary refill, sensation intact though sensation is subjectively less on dorsal aspect of left foot.   NEURO: weakness of bilateral lower extremities:  Right = 3/5, Left = 2/5, sensory exam grossly normal, mentation intact, DTR's abnormal: 3/5 and gait abnormal: unable to heel toe walk due to imbalance.  Comprehensive back pain exam:  Tenderness of left lumbar paraspinals, Pain limits the following motions: all planes, unable to heel walk on both sides, indicating possible S1 nerve involvement, unable to toe walk on both sides, indicating possible L4 nerve involvement, Demonstrates weakness in both great toe extensors, indication possible L5 nerve involvement, Demonstrates weakness in both knee extensors, indicating possible L3 nerve involvement and Demonstrates weakness in  both hip flexors, indicating possible L2 nerve involvement, Diminished paterllar reflex on  left side relative to contralateral side; possible L4 spinal nerve root involvement, Lower extremity sensation normal and equal on both sides and unable to complete SLR because of pain  PSYCH: concentration poor, inattentive, tearful, anxious, fatigued, judgement and insight impaired and appearance  disheveled    US Lower Extremity Venous Duplex Bilateral    Result Date: 10/27/2021  EXAMINATION: US LOWER EXTREMITY VENOUS DUPLEX BILATERAL, 10/27/2021 10:51 AM COMPARISON: None. HISTORY: Numbness and tingling of left leg; Numbness and tingling of left leg; Pain of left lower leg TECHNIQUE:  Gray-scale evaluation with compression, spectral flow and color Doppler assessment of the deep venous system of both legs from groin to knee, and then at the ankles. FINDINGS: In the both lower extremities, the common femoral, superficial femoral, popliteal and posterior tibial veins demonstrate normal compressibility and blood flow.     IMPRESSION: 1.  No evidence of deep venous thrombosis in either lower extremity. MARI HUTCHINSON MD   SYSTEM ID:  [id number]    US Lower Extremity Arterial Duplex Bilateral    Result Date: 10/27/2021  BILATERAL LOWER EXTREMITY DUPLEX ARTERIAL ULTRASOUND 10/27/2021 10:51 AM CLINICAL HISTORY: Numbness and tingling of left leg. COMPARISONS: None available.. REFERRING PROVIDER: AURA [facility] TECHNIQUE: Grayscale, color Doppler, Doppler waveform ultrasound evaluation of bilateral external iliac arteries through anterior and posterior tibial arteries. FINDINGS: RIGHT:      EXTERNAL ILIAC ARTERY: 99 cm/s, triphasic      COMMON FEMORAL ARTERY: 78 cm/s, triphasic      PROFUNDUS FEMORAL ARTERY: 82 cm/s, triphasic      SUPERFICIAL FEMORAL ARTERY, proximal: 58 cm/s, triphasic      SUPERFICIAL FEMORAL ARTERY, mid: 110 cm/s, triphasic      SUPERFICIAL FEMORAL ARTERY, distal: 118 cm/s, triphasic      POPLITEAL ARTERY, proximal: 45 cm/s, triphasic      POPLITEAL ARTERY, distal: 59 cm/s, triphasic      POSTERIOR TIBIAL ARTERY, ankle: 70 cm/s, triphasic      ANTERIOR TIBIAL ARTERY, ankle: 63 cm/s, triphasic Right brachial: 134 mmHg Right PTA: 160 mmHg Right DPA: 144 mmHg Right LUIS: 1.18 LEFT:      EXTERNAL ILIAC ARTERY: 144 cm/s, triphasic      COMMON FEMORAL ARTERY: 119 cm/s, triphasic      PROFUNDUS  FEMORAL ARTERY: 75 cm/s, triphasic      SUPERFICIAL FEMORAL ARTERY, proximal: 96 cm/s, triphasic       SUPERFICIAL FEMORAL ARTERY, mid: 112 cm/s, triphasic      SUPERFICIAL FEMORAL ARTERY, distal: 100 cm/s, triphasic      POPLITEAL ARTERY, proximal: 46 cm/s, triphasic      POPLITEAL ARTERY, distal: 50 cm/s, triphasic      POSTERIOR TIBIAL ARTERY, ankle: 60 cm/s, triphasic      ANTERIOR TIBIAL ARTERY, ankle: 53 cm/s, triphasic Left brachial: 136 mmHg Left PTA: 146 mmHg Left DPA: 140 mmHg Left LUIS: 1.07     IMPRESSION: 1. RIGHT: Normal lower extremity duplex arterial ultrasound. Normal right LUIS. 2. LEFT: Normal lower extremity duplex arterial ultrasound. Normal left LUIS. Guidelines: [facility] duplex criteria for lower limb arterial occlusive disease -Percent stenosis- Normal (1-19%): Peak systolic velocity (cm/s): <150, End-diastolic velocity (cm/s): <40, Velocity ratio (Vr): <1.5, Distal arterial waveform: Triphasic -Percent stenosis- 20-49%: Peak systolic velocity (cm/s): 150-200, End-diastolic velocity (cm/s): <40, Velocity ratio (Vr): 1.5-2.0, Distal arterial waveform: Triphasic -Percent stenosis- 50-75%: Peak systolic velocity (cm/s): 200-300, End-diastolic velocity (cm/s): <90, Velocity ratio (Vr): 2.0-3.9, Distal arterial waveform: Poststenotic turbulence distal to stenosis, monophasic distal waveform -Percent stenosis- >75%: Peak systolic velocity (cm/s): >300, End-diastolic velocity (cm/s): <90, Velocity ratio (Vr): >4.0, Distal arterial waveform: Dampened distal waveform and low PSV/EDV* in the stenosis -Percent stenosis- Occlusion: Absent flow by color Doppler/pulsed Doppler spectral analysis; length of occlusion estimated from distance between exit and reentry collateral arteries *PSV = peak systolic velocity, EDV = end-diastolic velocity http://link.moore.com/chapter/10.1007/741-9-8197-4005-4_23/fulltext html I have personally reviewed the examination and initial interpretation and I  agree with the findings. ISELA MYERS MD   SYSTEM ID:  [id number]     LUIS Doppler No Exercise    Result Date: 10/27/2021  BILATERAL LOWER EXTREMITY DUPLEX ARTERIAL ULTRASOUND 10/27/2021 10:51 AM CLINICAL HISTORY: Numbness and tingling of left leg. COMPARISONS: None available.. REFERRING PROVIDER: AURA CARLISLE TECHNIQUE: Grayscale, color Doppler, Doppler waveform ultrasound evaluation of bilateral external iliac arteries through anterior and posterior tibial arteries. FINDINGS: RIGHT:      EXTERNAL ILIAC ARTERY: 99 cm/s, triphasic      COMMON FEMORAL ARTERY: 78 cm/s, triphasic      PROFUNDUS FEMORAL ARTERY: 82 cm/s, triphasic      SUPERFICIAL FEMORAL ARTERY, proximal: 58 cm/s, triphasic      SUPERFICIAL FEMORAL ARTERY, mid: 110 cm/s, triphasic      SUPERFICIAL FEMORAL ARTERY, distal: 118 cm/s, triphasic      POPLITEAL ARTERY, proximal: 45 cm/s, triphasic      POPLITEAL ARTERY, distal: 59 cm/s, triphasic      POSTERIOR TIBIAL ARTERY, ankle: 70 cm/s, triphasic      ANTERIOR TIBIAL ARTERY, ankle: 63 cm/s, triphasic Right brachial: 134 mmHg Right PTA: 160 mmHg Right DPA: 144 mmHg Right LUIS: 1.18 LEFT:      EXTERNAL ILIAC ARTERY: 144 cm/s, triphasic      COMMON FEMORAL ARTERY: 119 cm/s, triphasic      PROFUNDUS FEMORAL ARTERY: 75 cm/s, triphasic      SUPERFICIAL FEMORAL ARTERY, proximal: 96 cm/s, triphasic       SUPERFICIAL FEMORAL ARTERY, mid: 112 cm/s, triphasic      SUPERFICIAL FEMORAL ARTERY, distal: 100 cm/s, triphasic      POPLITEAL ARTERY, proximal: 46 cm/s, triphasic      POPLITEAL ARTERY, distal: 50 cm/s, triphasic      POSTERIOR TIBIAL ARTERY, ankle: 60 cm/s, triphasic      ANTERIOR TIBIAL ARTERY, ankle: 53 cm/s, triphasic Left brachial: 136 mmHg Left PTA: 146 mmHg Left DPA: 140 mmHg Left LUIS: 1.07     IMPRESSION: 1. RIGHT: Normal lower extremity duplex arterial ultrasound. Normal right LUIS. 2. LEFT: Normal lower extremity duplex arterial ultrasound. Normal left LUIS. Guidelines: [facility]  [place] duplex criteria for lower limb arterial occlusive disease -Percent stenosis- Normal (1-19%): Peak systolic velocity (cm/s): <150, End-diastolic velocity (cm/s): <40, Velocity ratio (Vr): <1.5, Distal arterial waveform: Triphasic -Percent stenosis- 20-49%: Peak systolic velocity (cm/s): 150-200, End-diastolic velocity (cm/s): <40, Velocity ratio (Vr): 1.5-2.0, Distal arterial waveform: Triphasic -Percent stenosis- 50-75%: Peak systolic velocity (cm/s): 200-300, End-diastolic velocity (cm/s): <90, Velocity ratio (Vr): 2.0-3.9, Distal arterial waveform: Poststenotic turbulence distal to stenosis, monophasic distal waveform -Percent stenosis- >75%: Peak systolic velocity (cm/s): >300, End-diastolic velocity (cm/s): <90, Velocity ratio (Vr): >4.0, Distal arterial waveform: Dampened distal waveform and low PSV/EDV* in the stenosis -Percent stenosis- Occlusion: Absent flow by color Doppler/pulsed Doppler spectral analysis; length of occlusion estimated from distance between exit and reentry collateral arteries *PSV = peak systolic velocity, EDV = end-diastolic velocity http://link.moore.com/chapter/10.1007/[email or web address]_23/fulltext html I have personally reviewed the examination and initial interpretation and I agree with the findings. ISEAL MYERS MD   SYSTEM ID:  [id number]

## 2021-10-26 NOTE — Clinical Note
Leopoldo Simmons,  This is a very complex patient who has a lot of trouble navigating her own care.  Lauren Rudolph is leaving so she needs a new therapist.  She would likely not be able to call and schedule on her own.  Lauren had to do a fair amount of hand holding with her for different things.  Are you able to reach out and help her establish with a new therapist?  Lauren suggested two from our system.  She also needs to get in with psychiatry. I have referred her over and over again and she has trouble making/coordinating these appointments.  Any suggestions there?  The last thing I would note is she is having a hard time thinking about trusting a new therapist and does not want to rehash her whole history. So I would maybe peek through her notes before reaching out if possible.  Thanks,  SHERIE Bennett CNP

## 2021-10-26 NOTE — TELEPHONE ENCOUNTER
Forms received for pt via fax. Placed in provider's red folder to address.    Claire Liu,   [facility]

## 2021-10-27 ENCOUNTER — ANCILLARY PROCEDURE (OUTPATIENT)
Dept: ULTRASOUND IMAGING | Facility: CLINIC | Age: 41
End: 2021-10-27
Attending: NURSE PRACTITIONER
Payer: COMMERCIAL

## 2021-10-27 ENCOUNTER — MYC MEDICAL ADVICE (OUTPATIENT)
Dept: FAMILY MEDICINE | Facility: CLINIC | Age: 41
End: 2021-10-27

## 2021-10-27 DIAGNOSIS — J45.40 MODERATE PERSISTENT ASTHMA WITHOUT COMPLICATION: ICD-10-CM

## 2021-10-27 DIAGNOSIS — R20.0 NUMBNESS AND TINGLING OF LEFT LEG: ICD-10-CM

## 2021-10-27 DIAGNOSIS — M79.662 PAIN OF LEFT LOWER LEG: ICD-10-CM

## 2021-10-27 DIAGNOSIS — R20.2 NUMBNESS AND TINGLING OF LEFT LEG: ICD-10-CM

## 2021-10-27 PROCEDURE — 93970 EXTREMITY STUDY: CPT | Performed by: RADIOLOGY

## 2021-10-27 PROCEDURE — 93922 UPR/L XTREMITY ART 2 LEVELS: CPT | Mod: GC | Performed by: RADIOLOGY

## 2021-10-27 PROCEDURE — 93925 LOWER EXTREMITY STUDY: CPT | Mod: GC | Performed by: RADIOLOGY

## 2021-10-27 RX ORDER — ARIPIPRAZOLE 2 MG/1
4 TABLET ORAL DAILY
Qty: 60 TABLET | Refills: 11 | Status: SHIPPED | OUTPATIENT
Start: 2021-10-27 | End: 2022-07-11 | Stop reason: ALTCHOICE

## 2021-10-27 RX ORDER — TRAMADOL HYDROCHLORIDE 50 MG/1
50-100 TABLET ORAL EVERY 6 HOURS PRN
Qty: 20 TABLET | Refills: 0 | Status: SHIPPED | OUTPATIENT
Start: 2021-10-27 | End: 2022-05-11

## 2021-10-28 ENCOUNTER — DOCUMENTATION ONLY (OUTPATIENT)
Dept: PSYCHOLOGY | Facility: CLINIC | Age: 41
End: 2021-10-28
Payer: COMMERCIAL

## 2021-10-28 DIAGNOSIS — F43.21 GRIEF: ICD-10-CM

## 2021-10-28 DIAGNOSIS — F41.1 GAD (GENERALIZED ANXIETY DISORDER): ICD-10-CM

## 2021-10-28 DIAGNOSIS — F43.89 REACTION TO CHRONIC STRESS: Primary | ICD-10-CM
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
## 2021-10-29 NOTE — TELEPHONE ENCOUNTER
Another set of forms received. Please advise on forms. Another copy placed in provider's bin if she does not have them

## 2021-11-01 ENCOUNTER — TELEPHONE (OUTPATIENT)
Dept: BEHAVIORAL HEALTH | Facility: CLINIC | Age: 41
End: 2021-11-01

## 2021-11-01 NOTE — TELEPHONE ENCOUNTER
Reached out to pt to offer [facility] appt per the request of SHERIE Abbasi . Called [facility] with patient and had her schedule   Three outpatient counseling sessions with her new outpatient provider, Leigh Ann Abarca. First appt is 11/3/21 Pt expressed thankfulness for the help.

## 2021-11-03 ENCOUNTER — VIRTUAL VISIT (OUTPATIENT)
Dept: PSYCHOLOGY | Facility: CLINIC | Age: 41
End: 2021-11-03
Payer: COMMERCIAL

## 2021-11-03 ENCOUNTER — FCC EXTENDED DOCUMENTATION (OUTPATIENT)
Dept: PSYCHOLOGY | Facility: CLINIC | Age: 41
End: 2021-11-03
Payer: COMMERCIAL

## 2021-11-03 DIAGNOSIS — F43.89 REACTION TO CHRONIC STRESS: Primary | ICD-10-CM

## 2021-11-03 PROCEDURE — 90834 PSYTX W PT 45 MINUTES: CPT | Mod: 95 | Performed by: COUNSELOR
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
## 2021-11-04 NOTE — TELEPHONE ENCOUNTER
Form faxed back to [facility] [phone], copy placed in abstract and original placed in tc bin

## 2021-11-17 RX ORDER — ALBUTEROL SULFATE 0.83 MG/ML
2.5 SOLUTION RESPIRATORY (INHALATION) EVERY 4 HOURS PRN
Qty: 3 ML | Refills: 3 | Status: SHIPPED | OUTPATIENT
Start: 2021-11-17 | End: 2022-05-11

## 2021-11-17 RX ORDER — ALBUTEROL SULFATE 90 UG/1
2 AEROSOL, METERED RESPIRATORY (INHALATION) EVERY 4 HOURS PRN
Qty: 18 G | Refills: 3 | Status: SHIPPED | OUTPATIENT
Start: 2021-11-17 | End: 2022-02-17

## 2021-11-17 NOTE — TELEPHONE ENCOUNTER
Pt requesting refill on inhalers. As she is out of them.    Routing to refill  Pool.    Christa Vance RN  [facility]

## 2021-11-17 NOTE — TELEPHONE ENCOUNTER
Routing refill request to provider for review/approval because:  ACT not current.    Refer to mychart encounter from 10/27/21. Pt requesting albuterol refill as she is out and needs them ASAP.      Christa Vance RN  [facility]

## 2021-11-29 ENCOUNTER — VIRTUAL VISIT (OUTPATIENT)
Dept: PSYCHOLOGY | Facility: CLINIC | Age: 41
End: 2021-11-29
Payer: COMMERCIAL

## 2021-11-29 DIAGNOSIS — F41.1 GAD (GENERALIZED ANXIETY DISORDER): Primary | ICD-10-CM

## 2021-11-29 DIAGNOSIS — F43.10 PTSD (POST-TRAUMATIC STRESS DISORDER): ICD-10-CM

## 2021-11-29 PROCEDURE — 90834 PSYTX W PT 45 MINUTES: CPT | Mod: 95 | Performed by: COUNSELOR

## 2021-11-29 ASSESSMENT — ANXIETY QUESTIONNAIRES
GAD7 TOTAL SCORE: 15
1. FEELING NERVOUS, ANXIOUS, OR ON EDGE: MORE THAN HALF THE DAYS
5. BEING SO RESTLESS THAT IT IS HARD TO SIT STILL: MORE THAN HALF THE DAYS
7. FEELING AFRAID AS IF SOMETHING AWFUL MIGHT HAPPEN: MORE THAN HALF THE DAYS
3. WORRYING TOO MUCH ABOUT DIFFERENT THINGS: MORE THAN HALF THE DAYS
6. BECOMING EASILY ANNOYED OR IRRITABLE: MORE THAN HALF THE DAYS
2. NOT BEING ABLE TO STOP OR CONTROL WORRYING: MORE THAN HALF THE DAYS
4. TROUBLE RELAXING: NEARLY EVERY DAY
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
## 2021-11-30 ASSESSMENT — PATIENT HEALTH QUESTIONNAIRE - PHQ9: SUM OF ALL RESPONSES TO PHQ QUESTIONS 1-9: 13

## 2021-11-30 ASSESSMENT — ANXIETY QUESTIONNAIRES: GAD7 TOTAL SCORE: 15

## 2021-12-13 ENCOUNTER — VIRTUAL VISIT (OUTPATIENT)
Dept: PSYCHOLOGY | Facility: CLINIC | Age: 41
End: 2021-12-13
Payer: COMMERCIAL

## 2021-12-13 DIAGNOSIS — F41.1 GAD (GENERALIZED ANXIETY DISORDER): Primary | ICD-10-CM

## 2021-12-13 DIAGNOSIS — F43.10 PTSD (POST-TRAUMATIC STRESS DISORDER): ICD-10-CM

## 2021-12-13 PROCEDURE — 90834 PSYTX W PT 45 MINUTES: CPT | Mod: 95 | Performed by: COUNSELOR
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
## 2021-12-16 ENCOUNTER — VIRTUAL VISIT (OUTPATIENT)
Dept: ONCOLOGY | Facility: CLINIC | Age: 41
End: 2021-12-16
Attending: INTERNAL MEDICINE
Payer: COMMERCIAL

## 2021-12-16 DIAGNOSIS — E53.8 VITAMIN B 12 DEFICIENCY: Primary | ICD-10-CM

## 2021-12-16 DIAGNOSIS — K95.89 IRON DEFICIENCY ANEMIA FOLLOWING BARIATRIC SURGERY: ICD-10-CM

## 2021-12-16 DIAGNOSIS — D50.8 IRON DEFICIENCY ANEMIA FOLLOWING BARIATRIC SURGERY: ICD-10-CM

## 2021-12-16 PROCEDURE — 99215 OFFICE O/P EST HI 40 MIN: CPT | Mod: 95 | Performed by: INTERNAL MEDICINE

## 2021-12-16 PROCEDURE — 999N001193 HC VIDEO/TELEPHONE VISIT; NO CHARGE

## 2021-12-16 NOTE — LETTER
12/16/2021         RE: Crystal Rose  [address] [facility] [address]        Dear Colleague,    Thank you for referring your patient, Crystal Rose, to the [facility]. Please see a copy of my visit note below.    Hematology follow up visit:  Date on this visit: 12/16/21     Crystal Rose  is referred by Dr.Brianna Lauren Camilo for a hematology consultation. She requires evaluation for anemia.    Primary Physician: Gregoria Camilo     History Of Present Illness:  Ms. Rose is a 41 year old female who presents with anemia.    Please see my previous note for details.  I have copied and updated from prior note.    She has a history of gastric bypass surgery.  She also has history of iron deficiency anemia and has received iron sucrose in 2014. She received IV iron in 2017 as well.  She feels very fatigued. She has dyspnea on exertion but she also has asthma. Has cravings for ice chips. She has restless legs. previously she had stomach ulcers but no recent bleeding. Had EGD on 12/29/2020 which showed improving stomach ulcer at the anastomosis site.  She had endometrial ablation in 2018 so does not have heavy bleeding any more but previously she had menorrhagia. She also has anxiety issues and she is following closely with PCP. She is taking oral iron and other vitamins including B12 and Vit D and zinc. Was off these for quite sometime but restarted in May 2021. BMs are Ok with mild constipation.  I have recommended giving her INFeD.  She received INFeD on 8/3/2021.  She felt better after that.      Interval history.  This is a video visit.  She is tolerating Vitron-C and vitamin B12 sublingually well.  She is taking Vitron-C twice daily.  Denies any pica symptoms.  She has been having more pain in the left lower extremity and had several tests done for it but no specific cause has been determined.  She continues to have issues with the left  leg.  She mentions this is different from the restless legs.  Overall energy has been stable.  No abnormal bleeding.  Menstrual bleeding has not been heavy.  No nausea vomiting diarrhea constipation.  She was supposed to get repeat EGD in November for follow-up of stomach ulcer but it has not been done.  She also did not have the blood work done prior to my visit.         ROS:  Rest of the ROS was unremarkable    I reviewed other history in epic as below.      Past Medical/Surgical History:  Past Medical History:   Diagnosis Date     Abnormal Pap smear     see problem list     Allergic rhinitis      Anemia     iron infusions     Asthma     Advair, Albuterol     Cervical dysplasia     SUMI I, SUMI II, treated with Leep   later had ASCUS+HPV sev times 2009     Cervical high risk HPV (human papillomavirus) test positive     see problem list     Chlamydial cervicitis 10/2005, 3/2009    Treated both times and had neg JADEN both times     Diabetes mellitus (H)     GDDC with first pregnacy     Environmental allergies      Herpes simplex without mention of complication     valtrex at 36 weeks     History of chlamydia      Morbid obesity (H)      Postoperative hematoma involving genitourinary system 2014    large rectus sheath hematoma, after failed attempt at laparoscopy, hospitalized for pain control      Trichomonal vulvovaginitis 11/16/06     Vaginal discharge      Past Surgical History:   Procedure Laterality Date     ATTEMPTED LAPAROSCOPY  3/13/2014    Procedure: ATTEMPTED LAPAROSCOPY;;  Surgeon: Cee Garcia MD;  Location: [facility]     COLPOSCOPY CERVIX, BIOPSY CERVIX, ENDOCERVICAL CURETTAGE, COMBINED      1/6/2005:  SUMI I; 6/15/2009:  SUMI I.  Treated with cryo.  10/27/1997:  SUMI I-II     CONIZATION LEEP  3/13/2014    Procedure: CONIZATION LEEP;  LOOP ELECTROSURGICAL EXCISION PROCEDURE; LAPAROSCOPY ATTEMPTED;  Surgeon: Cee Garcia MD;  Location: [facility]     CRYOTHERAPY, CERVICAL  age 19    Pt reported     DILATION  AND CURETTAGE, ABLATE ENDOMETRIUM NOVASURE, COMBINED N/A 6/14/2018    Procedure: COMBINED DILATION AND CURETTAGE, ABLATE ENDOMETRIUM NOVASURE;  HYSTEROSCOPY, DILATION AND CURETTAGE, ENDOMETRIAL ABLATION WITH NOVASURE, LAPAROSCOPIC BILATERAL TUBAL LIGATION ;  Surgeon: Frnaki Pinedo MD;  Location: [facility]     EXAM UNDER ANESTHESIA PELVIC  3/20/2014    Procedure: EXAM UNDER ANESTHESIA PELVIC;  EXAM UNDER ANESTHESIA, REMOVAL OF STICHES ;  Surgeon: Cee Garcia MD;  Location:  OR     GASTRIC BYPASS  2004    pre-bypass weight was 320#     HC NASAL/SINUS SCOPE W THER INSTILL       HC REMOVAL OF OVARIAN CYST(S)       LAPAROSCOPIC TUBAL LIGATION Bilateral 6/14/2018    Procedure: LAPAROSCOPIC TUBAL LIGATION;;  Surgeon: Franki Pinedo MD;  Location: [facility]     Allergies:  Allergies as of 12/16/2021 - Reviewed 10/26/2021   Allergen Reaction Noted     Cat hair [cats]  02/23/2010     Dog hair [dogs]  02/23/2010     Dust mites  02/23/2010     Ragweeds  02/23/2010     Nsaids Other (See Comments) 03/09/2020     Current Medications:  Current Outpatient Medications   Medication Sig Dispense Refill     albuterol (PROAIR HFA/PROVENTIL HFA/VENTOLIN HFA) 108 (90 Base) MCG/ACT inhaler Inhale 2 puffs into the lungs every 4 hours as needed for shortness of breath / dyspnea or wheezing 18 g 3     albuterol (PROVENTIL) (2.5 MG/3ML) 0.083% neb solution Take 1 vial (2.5 mg) by nebulization every 4 hours as needed for shortness of breath / dyspnea or wheezing 3 mL 3     ARIPiprazole (ABILIFY) 2 MG tablet Take 2 tablets (4 mg) by mouth daily 60 tablet 11     Ascorbic Acid (VITAMIN C PO) Take 500 mg by mouth       busPIRone (BUSPAR) 15 MG tablet Take 1 tablet (15 mg) by mouth 3 times daily 270 tablet 3     Calcium Carb-Cholecalciferol (CALCIUM 500 + D) 500-400 MG-UNIT TABS Take 1 tablet by mouth 3 times daily 90 tablet 11     calcium carbonate-vitamin D (CALCIUM 600/VITAMIN D) 600-400 MG-UNIT chewable tablet Take one twice daily and at  least 2 hours apart from iron. 180 tablet 3     childrens multivitamin w/iron (FLINTSTONES COMPLETE) 18 MG chewable tablet Take 1 tablet by mouth 2 times daily 180 tablet 3     childrens multivitamin w/iron (FLINTSTONES COMPLETE) chewable tablet Take one tablet twice a day. 200 tablet 4     cholecalciferol 125 MCG (5000 UT) CAPS Take 1 capsule (5,000 Units) by mouth daily 90 capsule 3     cyanocobalamin (VITAMIN B-12) 1000 MCG SUBL sublingual tablet Place 1 tablet (1,000 mcg) under the tongue daily 100 tablet 3     ferrous fumarate 65 mg, [place]. FE,-Vitamin C 125 mg (VITRON C)  MG TABS tablet Take 2 tablets by mouth daily 180 tablet 3     FLUoxetine (PROZAC) 20 MG capsule Take 4 capsules (80 mg) by mouth daily 120 capsule 3     fluticasone (FLONASE) 50 MCG/ACT nasal spray Spray 1-2 sprays into both nostrils daily 18.2 mL 5     fluticasone-salmeterol (ADVAIR) 500-50 MCG/DOSE inhaler Inhale 1 puff into the lungs every 12 hours 3 each 3     gabapentin (NEURONTIN) 300 MG capsule Take two capsules in the morning and two capsules in the afternoon.  Take three capsules at bedtime. 210 capsule 11     hydrOXYzine (ATARAX) 25 MG tablet Take 2 tablets (50 mg) by mouth every 6 hours as needed for anxiety 120 tablet 2     hydrOXYzine (ATARAX) 25 MG tablet Take 1 tablet (25 mg) by mouth every 4 hours as needed for anxiety (sleep) 120 tablet 3     ipratropium - albuterol 0.5 mg/2.5 mg/3 mL (DUONEB) 0.5-2.5 (3) MG/3ML neb solution Take 1 vial (3 mLs) by nebulization every 4 hours as needed for shortness of breath / dyspnea or wheezing 75 mL 3     loratadine (CLARITIN) 10 MG tablet Take 1 tablet (10 mg) by mouth daily 90 tablet 3     methylPREDNISolone (MEDROL DOSEPAK) 4 MG tablet therapy pack Follow Package Directions 21 tablet 0     montelukast (SINGULAIR) 10 MG tablet Take 1 tablet (10 mg) by mouth At Bedtime 90 tablet 3     Multiple Vitamins-Iron (DAILY MARILYN MULTIVITAMIN/IRON) TABS Take 1 tablet by mouth 2 times daily 100  tablet 3     omeprazole (PRILOSEC) 20 MG DR capsule TAKE 1 CAPSULE BY MOUTH ONCE EVERY MORNING - TAKE 30 MINUTES BEFORE MEAL       order for DME Equipment being ordered: Nebulizer with tubing 1 each 0     order for DME Equipment being ordered: Nebulizer 1 each 0     traMADol (ULTRAM) 50 MG tablet Take 1-2 tablets ( mg) by mouth every 6 hours as needed for severe pain 20 tablet 0     vitamin B-12 (CYANOCOBALAMIN) 2500 MCG sublingual tablet Take 1 tablet (2,500 mcg) by mouth daily 90 tablet 1      Family History:  Family History   Problem Relation Age of Onset     Hypertension Mother      Allergies Mother      Obesity Mother      Asthma Father      Diabetes Father      Hypertension Father      Allergies Father      Cancer Father         Lymphoma d age 58     Asthma Brother      Allergies Sister      Depression Sister      Obesity Sister      Alzheimer Disease Maternal Grandmother      Arthritis Maternal Grandmother      Obesity Maternal Grandmother      Thyroid Disease Maternal Grandmother      Hypertension Maternal Grandfather      Cancer - colorectal Maternal Grandfather      Cerebrovascular Disease Maternal Grandfather      Diabetes Paternal Grandmother      No family history of bleeding or clotting disorder.  Social History:  Social History     Socioeconomic History     Marital status: Single     Spouse name: Not on file     Number of children: 4     Years of education: Not on file     Highest education level: Not on file   Occupational History     Employer: [employer]   Tobacco Use     Smoking status: Former Smoker     Quit date: 2009     Years since quittin.6     Smokeless tobacco: Never Used   Substance and Sexual Activity     Alcohol use: No     Drug use: No     Sexual activity: Yes     Partners: Male     Birth control/protection: None   Other Topics Concern     Parent/sibling w/ CABG, MI or angioplasty before 65F 55M? Not Asked   Social History Narrative     Not on file     Social Determinants of  Health     Financial Resource Strain: Not on file   Food Insecurity: Not on file   Transportation Needs: Not on file   Physical Activity: Not on file   Stress: Not on file   Social Connections: Not on file   Intimate Partner Violence: Not on file   Housing Stability: Not on file     Quit smoking in 2017. Occasional etoh use.     Physical Exam:  There were no vitals taken for this visit.   Wt Readings from Last 4 Encounters:   10/26/21 118.7 kg (261 lb 9.6 oz)   08/03/21 98.2 kg (216 lb 9.6 oz)   07/06/21 97.1 kg (214 lb)   06/08/20 98.9 kg (218 lb)           Constitutional.  Does not seem to be in any acute distress.  Eyes.  No redness or discharge noted.  Respiratory.  Speaking in full sentences.  Breathing seems comfortable without any accessory use of muscles.    Skin.  Visualized his skin does not show any obvious rashes.  Musculoskeletal.  Range of motion for visualized areas is intact.  Neurological.  Alert and oriented x3.  Psychiatric.  Mood, mentation and affect are normal.  Decision making capacity is intact.      The rest of a comprehensive physical examination is deferred due to Public Health Emergency video visit restrictions.      Laboratory/Imaging Studies      Reviewed    No new labs.  Previous labs reviewed.  9/13/2021.    CBC shows hemoglobin 11.9.  MCV 88.  Rest is unremarkable.  Ferritin 38, TIBC 354, iron 86, iron saturation index 24%.  B12 179.    8/3/2021.  Antiintrinsic factor antibody and antiparietal cell antibody were negative.    5/24/2021  CBC showed WBC 8.6.  Hemoglobin 9.2.  Platelets 331.  MCV 80.  CMP unremarkable except calcium 8.2.  Iron studies show ferritin 4, iron 18, TIBC 496, iron saturation index 4%.  TSH 0.96.  Vitamin .  Vitamin D 4  Zinc 58    ASSESSMENT/PLAN:    Anemia.  In a patient with previous gastric bypass surgery.  She has evidence of iron deficiency.  She also has evidence of vitamin B12 deficiency.  As she had very symptomatic anemia, I recommended giving  her INFeD which she received on 8/3/2021.  She has been doing better after that.  She continues to be on Vitron-C twice daily.  She was supposed to get labs prior to this visit but these have not been done so I have requested that she should get the labs done in the next few days.    She was also supposed to follow with GI for repeat EGD for follow-up of stomach ulcer but it has not been done so I recommend that she gets in touch with GI about that.      Vitamin B12 deficiency. Antiparietal cell antibody and antiintrinsic factor antibody were negative.  She has history of gastric bypass surgery so she does not absorb B12 well.  Continue vitamin B12 2500 mcg daily and repeat labs in the next few days.     Left leg pain.  Restless legs have resolved but she continues to have issues with left leg pain.  Recent ultrasound Doppler was negative for DVT.  LUIS Doppler was also unremarkable.  She will continue to follow with PCP.      We will determine the follow-up with me after reviewing repeat blood work.    All questions answered to her satisfaction.  She is agreeable and comfortable with the plan.    Rufino Yepez MD           Crystal is a 41 year old who is being evaluated via a billable video visit.      How would you like to obtain your AVS? MyChart  If the video visit is dropped, the invitation should be resent by: Text to cell phone: [phone]   Will anyone else be joining your video visit? No    Video Start Time: 9:00 AM  Video-Visit Details    Type of service:  Video Visit    Video End Time:9:07 AM    Originating Location (pt. Location): Home    Distant Location (provider location):  [facility]     Platform used for Video Visit: Rashmi Ospina        Again, thank you for allowing me to participate in the care of your patient.        Sincerely,        Rufino Yepez MD

## 2021-12-16 NOTE — PATIENT INSTRUCTIONS
Cont iron and vitamins    Repeat labs in the next few days    We will determine the follow up accordingly

## 2021-12-16 NOTE — PROGRESS NOTES
Hematology follow up visit:  Date on this visit: 12/16/21     Crystal Rose  is referred by Dr.Brianna Lauren Camilo for a hematology consultation. She requires evaluation for anemia.    Primary Physician: Gregoria Camilo     History Of Present Illness:  Ms. Rose is a 41 year old female who presents with anemia.    Please see my previous note for details.  I have copied and updated from prior note.    She has a history of gastric bypass surgery.  She also has history of iron deficiency anemia and has received iron sucrose in 2014. She received IV iron in 2017 as well.  She feels very fatigued. She has dyspnea on exertion but she also has asthma. Has cravings for ice chips. She has restless legs. previously she had stomach ulcers but no recent bleeding. Had EGD on 12/29/2020 which showed improving stomach ulcer at the anastomosis site.  She had endometrial ablation in 2018 so does not have heavy bleeding any more but previously she had menorrhagia. She also has anxiety issues and she is following closely with PCP. She is taking oral iron and other vitamins including B12 and Vit D and zinc. Was off these for quite sometime but restarted in May 2021. BMs are Ok with mild constipation.  I have recommended giving her INFeD.  She received INFeD on 8/3/2021.  She felt better after that.      Interval history.  This is a video visit.  She is tolerating Vitron-C and vitamin B12 sublingually well.  She is taking Vitron-C twice daily.  Denies any pica symptoms.  She has been having more pain in the left lower extremity and had several tests done for it but no specific cause has been determined.  She continues to have issues with the left leg.  She mentions this is different from the restless legs.  Overall energy has been stable.  No abnormal bleeding.  Menstrual bleeding has not been heavy.  No nausea vomiting diarrhea constipation.  She was supposed to get repeat EGD in November for follow-up of stomach  ulcer but it has not been done.  She also did not have the blood work done prior to my visit.         ROS:  Rest of the ROS was unremarkable    I reviewed other history in epic as below.      Past Medical/Surgical History:  Past Medical History:   Diagnosis Date     Abnormal Pap smear     see problem list     Allergic rhinitis      Anemia     iron infusions     Asthma     Advair, Albuterol     Cervical dysplasia     SUMI I, SUMI II, treated with Leep   later had ASCUS+HPV sev times 2009     Cervical high risk HPV (human papillomavirus) test positive     see problem list     Chlamydial cervicitis 10/2005, 3/2009    Treated both times and had neg JADEN both times     Diabetes mellitus (H)     GDDC with first pregnacy     Environmental allergies      Herpes simplex without mention of complication     valtrex at 36 weeks     History of chlamydia      Morbid obesity (H)      Postoperative hematoma involving genitourinary system 2014    large rectus sheath hematoma, after failed attempt at laparoscopy, hospitalized for pain control      Trichomonal vulvovaginitis 11/16/06     Vaginal discharge      Past Surgical History:   Procedure Laterality Date     ATTEMPTED LAPAROSCOPY  3/13/2014    Procedure: ATTEMPTED LAPAROSCOPY;;  Surgeon: Cee Garcia MD;  Location: [facility]     COLPOSCOPY CERVIX, BIOPSY CERVIX, ENDOCERVICAL CURETTAGE, COMBINED      1/6/2005:  SUMI I; 6/15/2009:  SUMI I.  Treated with cryo.  10/27/1997:  SUMI I-II     CONIZATION LEEP  3/13/2014    Procedure: CONIZATION LEEP;  LOOP ELECTROSURGICAL EXCISION PROCEDURE; LAPAROSCOPY ATTEMPTED;  Surgeon: Cee Garcia MD;  Location: [facility]     CRYOTHERAPY, CERVICAL  age 19    Pt reported     DILATION AND CURETTAGE, ABLATE ENDOMETRIUM NOVASURE, COMBINED N/A 6/14/2018    Procedure: COMBINED DILATION AND CURETTAGE, ABLATE ENDOMETRIUM NOVASURE;  HYSTEROSCOPY, DILATION AND CURETTAGE, ENDOMETRIAL ABLATION WITH NOVASURE, LAPAROSCOPIC BILATERAL TUBAL LIGATION ;  Surgeon:  Franki Pinedo MD;  Location: [facility]     EXAM UNDER ANESTHESIA PELVIC  3/20/2014    Procedure: EXAM UNDER ANESTHESIA PELVIC;  EXAM UNDER ANESTHESIA, REMOVAL OF STICHES ;  Surgeon: Cee Garcia MD;  Location:  OR     GASTRIC BYPASS  2004    pre-bypass weight was 320#     HC NASAL/SINUS SCOPE W THER INSTILL       HC REMOVAL OF OVARIAN CYST(S)       LAPAROSCOPIC TUBAL LIGATION Bilateral 6/14/2018    Procedure: LAPAROSCOPIC TUBAL LIGATION;;  Surgeon: Franki Pinedo MD;  Location:  [facility]     Allergies:  Allergies as of 12/16/2021 - Reviewed 10/26/2021   Allergen Reaction Noted     Cat hair [cats]  02/23/2010     Dog hair [dogs]  02/23/2010     Dust mites  02/23/2010     Ragweeds  02/23/2010     Nsaids Other (See Comments) 03/09/2020     Current Medications:  Current Outpatient Medications   Medication Sig Dispense Refill     albuterol (PROAIR HFA/PROVENTIL HFA/VENTOLIN HFA) 108 (90 Base) MCG/ACT inhaler Inhale 2 puffs into the lungs every 4 hours as needed for shortness of breath / dyspnea or wheezing 18 g 3     albuterol (PROVENTIL) (2.5 MG/3ML) 0.083% neb solution Take 1 vial (2.5 mg) by nebulization every 4 hours as needed for shortness of breath / dyspnea or wheezing 3 mL 3     ARIPiprazole (ABILIFY) 2 MG tablet Take 2 tablets (4 mg) by mouth daily 60 tablet 11     Ascorbic Acid (VITAMIN C PO) Take 500 mg by mouth       busPIRone (BUSPAR) 15 MG tablet Take 1 tablet (15 mg) by mouth 3 times daily 270 tablet 3     Calcium Carb-Cholecalciferol (CALCIUM 500 + D) 500-400 MG-UNIT TABS Take 1 tablet by mouth 3 times daily 90 tablet 11     calcium carbonate-vitamin D (CALCIUM 600/VITAMIN D) 600-400 MG-UNIT chewable tablet Take one twice daily and at least 2 hours apart from iron. 180 tablet 3     childrens multivitamin w/iron (FLINTSTONES COMPLETE) 18 MG chewable tablet Take 1 tablet by mouth 2 times daily 180 tablet 3     childrens multivitamin w/iron (FLINTSTONES COMPLETE) chewable tablet Take one tablet twice a  day. 200 tablet 4     cholecalciferol 125 MCG (5000 UT) CAPS Take 1 capsule (5,000 Units) by mouth daily 90 capsule 3     cyanocobalamin (VITAMIN B-12) 1000 MCG SUBL sublingual tablet Place 1 tablet (1,000 mcg) under the tongue daily 100 tablet 3     ferrous fumarate 65 mg, [place]. FE,-Vitamin C 125 mg (VITRON C)  MG TABS tablet Take 2 tablets by mouth daily 180 tablet 3     FLUoxetine (PROZAC) 20 MG capsule Take 4 capsules (80 mg) by mouth daily 120 capsule 3     fluticasone (FLONASE) 50 MCG/ACT nasal spray Spray 1-2 sprays into both nostrils daily 18.2 mL 5     fluticasone-salmeterol (ADVAIR) 500-50 MCG/DOSE inhaler Inhale 1 puff into the lungs every 12 hours 3 each 3     gabapentin (NEURONTIN) 300 MG capsule Take two capsules in the morning and two capsules in the afternoon.  Take three capsules at bedtime. 210 capsule 11     hydrOXYzine (ATARAX) 25 MG tablet Take 2 tablets (50 mg) by mouth every 6 hours as needed for anxiety 120 tablet 2     hydrOXYzine (ATARAX) 25 MG tablet Take 1 tablet (25 mg) by mouth every 4 hours as needed for anxiety (sleep) 120 tablet 3     ipratropium - albuterol 0.5 mg/2.5 mg/3 mL (DUONEB) 0.5-2.5 (3) MG/3ML neb solution Take 1 vial (3 mLs) by nebulization every 4 hours as needed for shortness of breath / dyspnea or wheezing 75 mL 3     loratadine (CLARITIN) 10 MG tablet Take 1 tablet (10 mg) by mouth daily 90 tablet 3     methylPREDNISolone (MEDROL DOSEPAK) 4 MG tablet therapy pack Follow Package Directions 21 tablet 0     montelukast (SINGULAIR) 10 MG tablet Take 1 tablet (10 mg) by mouth At Bedtime 90 tablet 3     Multiple Vitamins-Iron (DAILY MARILYN MULTIVITAMIN/IRON) TABS Take 1 tablet by mouth 2 times daily 100 tablet 3     omeprazole (PRILOSEC) 20 MG DR capsule TAKE 1 CAPSULE BY MOUTH ONCE EVERY MORNING - TAKE 30 MINUTES BEFORE MEAL       order for DME Equipment being ordered: Nebulizer with tubing 1 each 0     order for DME Equipment being ordered: Nebulizer 1 each 0      traMADol (ULTRAM) 50 MG tablet Take 1-2 tablets ( mg) by mouth every 6 hours as needed for severe pain 20 tablet 0     vitamin B-12 (CYANOCOBALAMIN) 2500 MCG sublingual tablet Take 1 tablet (2,500 mcg) by mouth daily 90 tablet 1      Family History:  Family History   Problem Relation Age of Onset     Hypertension Mother      Allergies Mother      Obesity Mother      Asthma Father      Diabetes Father      Hypertension Father      Allergies Father      Cancer Father         Lymphoma d age 58     Asthma Brother      Allergies Sister      Depression Sister      Obesity Sister      Alzheimer Disease Maternal Grandmother      Arthritis Maternal Grandmother      Obesity Maternal Grandmother      Thyroid Disease Maternal Grandmother      Hypertension Maternal Grandfather      Cancer - colorectal Maternal Grandfather      Cerebrovascular Disease Maternal Grandfather      Diabetes Paternal Grandmother      No family history of bleeding or clotting disorder.  Social History:  Social History     Socioeconomic History     Marital status: Single     Spouse name: Not on file     Number of children: 4     Years of education: Not on file     Highest education level: Not on file   Occupational History     Employer: [employer]   Tobacco Use     Smoking status: Former Smoker     Quit date: 2009     Years since quittin.6     Smokeless tobacco: Never Used   Substance and Sexual Activity     Alcohol use: No     Drug use: No     Sexual activity: Yes     Partners: Male     Birth control/protection: None   Other Topics Concern     Parent/sibling w/ CABG, MI or angioplasty before 65F 55M? Not Asked   Social History Narrative     Not on file     Social Determinants of Health     Financial Resource Strain: Not on file   Food Insecurity: Not on file   Transportation Needs: Not on file   Physical Activity: Not on file   Stress: Not on file   Social Connections: Not on file   Intimate Partner Violence: Not on file   Housing  Stability: Not on file     Quit smoking in 2017. Occasional etoh use.     Physical Exam:  There were no vitals taken for this visit.   Wt Readings from Last 4 Encounters:   10/26/21 118.7 kg (261 lb 9.6 oz)   08/03/21 98.2 kg (216 lb 9.6 oz)   07/06/21 97.1 kg (214 lb)   06/08/20 98.9 kg (218 lb)           Constitutional.  Does not seem to be in any acute distress.  Eyes.  No redness or discharge noted.  Respiratory.  Speaking in full sentences.  Breathing seems comfortable without any accessory use of muscles.    Skin.  Visualized his skin does not show any obvious rashes.  Musculoskeletal.  Range of motion for visualized areas is intact.  Neurological.  Alert and oriented x3.  Psychiatric.  Mood, mentation and affect are normal.  Decision making capacity is intact.      The rest of a comprehensive physical examination is deferred due to [facility] video visit restrictions.      Laboratory/Imaging Studies      Reviewed    No new labs.  Previous labs reviewed.  9/13/2021.    CBC shows hemoglobin 11.9.  MCV 88.  Rest is unremarkable.  Ferritin 38, TIBC 354, iron 86, iron saturation index 24%.  B12 179.    8/3/2021.  Antiintrinsic factor antibody and antiparietal cell antibody were negative.    5/24/2021  CBC showed WBC 8.6.  Hemoglobin 9.2.  Platelets 331.  MCV 80.  CMP unremarkable except calcium 8.2.  Iron studies show ferritin 4, iron 18, TIBC 496, iron saturation index 4%.  TSH 0.96.  Vitamin .  Vitamin D 4  Zinc 58    ASSESSMENT/PLAN:    Anemia.  In a patient with previous gastric bypass surgery.  She has evidence of iron deficiency.  She also has evidence of vitamin B12 deficiency.  As she had very symptomatic anemia, I recommended giving her INFeD which she received on 8/3/2021.  She has been doing better after that.  She continues to be on Vitron-C twice daily.  She was supposed to get labs prior to this visit but these have not been done so I have requested that she should get the labs  done in the next few days.    She was also supposed to follow with GI for repeat EGD for follow-up of stomach ulcer but it has not been done so I recommend that she gets in touch with GI about that.      Vitamin B12 deficiency. Antiparietal cell antibody and antiintrinsic factor antibody were negative.  She has history of gastric bypass surgery so she does not absorb B12 well.  Continue vitamin B12 2500 mcg daily and repeat labs in the next few days.     Left leg pain.  Restless legs have resolved but she continues to have issues with left leg pain.  Recent ultrasound Doppler was negative for DVT.  LUIS Doppler was also unremarkable.  She will continue to follow with PCP.      We will determine the follow-up with me after reviewing repeat blood work.    All questions answered to her satisfaction.  She is agreeable and comfortable with the plan.    Rufino Yepez MD

## 2021-12-16 NOTE — PROGRESS NOTES
Crystal is a 41 year old who is being evaluated via a billable video visit.      How would you like to obtain your AVS? MyChart  If the video visit is dropped, the invitation should be resent by: Text to cell phone: [phone]   Will anyone else be joining your video visit? No    Video Start Time: 9:00 AM  Video-Visit Details    Type of service:  Video Visit    Video End Time:9:07 AM    Originating Location (pt. Location): Home    Distant Location (provider location):  [facility]     Platform used for Video Visit: Rashmi Ospina

## 2022-01-02 ENCOUNTER — HEALTH MAINTENANCE LETTER (OUTPATIENT)
Age: 42
End: 2022-01-02

## 2022-01-10 ENCOUNTER — VIRTUAL VISIT (OUTPATIENT)
Dept: PSYCHOLOGY | Facility: CLINIC | Age: 42
End: 2022-01-10
Payer: COMMERCIAL

## 2022-01-10 DIAGNOSIS — F43.10 PTSD (POST-TRAUMATIC STRESS DISORDER): ICD-10-CM

## 2022-01-10 DIAGNOSIS — F41.1 GAD (GENERALIZED ANXIETY DISORDER): Primary | ICD-10-CM

## 2022-01-10 PROCEDURE — 90834 PSYTX W PT 45 MINUTES: CPT | Mod: 95 | Performed by: COUNSELOR

## 2022-01-10 ASSESSMENT — ANXIETY QUESTIONNAIRES
5. BEING SO RESTLESS THAT IT IS HARD TO SIT STILL: MORE THAN HALF THE DAYS
2. NOT BEING ABLE TO STOP OR CONTROL WORRYING: MORE THAN HALF THE DAYS
1. FEELING NERVOUS, ANXIOUS, OR ON EDGE: NEARLY EVERY DAY
7. FEELING AFRAID AS IF SOMETHING AWFUL MIGHT HAPPEN: MORE THAN HALF THE DAYS
7. FEELING AFRAID AS IF SOMETHING AWFUL MIGHT HAPPEN: MORE THAN HALF THE DAYS
3. WORRYING TOO MUCH ABOUT DIFFERENT THINGS: SEVERAL DAYS
GAD7 TOTAL SCORE: 15
6. BECOMING EASILY ANNOYED OR IRRITABLE: MORE THAN HALF THE DAYS
GAD7 TOTAL SCORE: 15
GAD7 TOTAL SCORE: 15
4. TROUBLE RELAXING: NEARLY EVERY DAY

## 2022-01-10 ASSESSMENT — PATIENT HEALTH QUESTIONNAIRE - PHQ9
10. IF YOU CHECKED OFF ANY PROBLEMS, HOW DIFFICULT HAVE THESE PROBLEMS MADE IT FOR YOU TO DO YOUR WORK, TAKE CARE OF THINGS AT HOME, OR GET ALONG WITH OTHER PEOPLE: EXTREMELY DIFFICULT
SUM OF ALL RESPONSES TO PHQ QUESTIONS 1-9: 11
SUM OF ALL RESPONSES TO PHQ QUESTIONS 1-9: 11
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
## 2022-01-11 ASSESSMENT — ANXIETY QUESTIONNAIRES: GAD7 TOTAL SCORE: 15

## 2022-01-11 ASSESSMENT — PATIENT HEALTH QUESTIONNAIRE - PHQ9: SUM OF ALL RESPONSES TO PHQ QUESTIONS 1-9: 11

## 2022-02-17 DIAGNOSIS — J45.40 MODERATE PERSISTENT ASTHMA WITHOUT COMPLICATION: ICD-10-CM

## 2022-02-17 RX ORDER — ALBUTEROL SULFATE 90 UG/1
2 AEROSOL, METERED RESPIRATORY (INHALATION) EVERY 4 HOURS PRN
Qty: 18 G | Refills: 0 | Status: SHIPPED | OUTPATIENT
Start: 2022-02-17 | End: 2022-03-08

## 2022-02-19 NOTE — TELEPHONE ENCOUNTER
Patient placed on cpap 10 60% for the hs Received records request and signed DIANE from [facility] to fax medical records from 10/11/2020 to the present. There are no visits to fax for that time frame. Faxed this response to [facility], [phone], right fax confirmed at 10:19 am today,10/27/2020. Copy of DIANE to [facility] and abstracting.  Cari Rollins [facility]  2nd Floor  Primary Care

## 2022-03-06 DIAGNOSIS — J45.40 MODERATE PERSISTENT ASTHMA WITHOUT COMPLICATION: ICD-10-CM

## 2022-03-08 RX ORDER — ALBUTEROL SULFATE 90 UG/1
2 AEROSOL, METERED RESPIRATORY (INHALATION) EVERY 4 HOURS PRN
Qty: 18 G | Refills: 0 | Status: SHIPPED | OUTPATIENT
Start: 2022-03-08 | End: 2022-03-25

## 2022-03-08 NOTE — TELEPHONE ENCOUNTER
"Requested Prescriptions   Pending Prescriptions Disp Refills    albuterol (PROAIR HFA/PROVENTIL HFA/VENTOLIN HFA) 108 (90 Base) MCG/ACT inhaler 18 g 0     Sig: Inhale 2 puffs into the lungs every 4 hours as needed for shortness of breath / dyspnea or wheezing        Asthma Maintenance Inhalers - Anticholinergics Failed - 3/6/2022  9:34 AM        Failed - Asthma control assessment score within normal limits in last 6 months     Please review ACT score.           Passed - Patient is age 12 years or older        Passed - Medication is active on med list        Passed - Recent (6 mo) or future (30 days) visit within the authorizing provider's specialty     Patient had office visit in the last 6 months or has a visit in the next 30 days with authorizing provider or within the authorizing provider's specialty.  See \"Patient Info\" tab in inbasket, or \"Choose Columns\" in Meds & Orders section of the refill encounter.           Short-Acting Beta Agonist Inhalers Protocol  Failed - 3/6/2022  9:34 AM        Failed - Asthma control assessment score within normal limits in last 6 months     Please review ACT score.           Passed - Patient is age 12 or older        Passed - Medication is active on med list        Passed - Recent (6 mo) or future (30 days) visit within the authorizing provider's specialty     Patient had office visit in the last 6 months or has a visit in the next 30 days with authorizing provider or within the authorizing provider's specialty.  See \"Patient Info\" tab in inbasket, or \"Choose Columns\" in Meds & Orders section of the refill encounter.                  "

## 2022-03-23 DIAGNOSIS — J45.40 MODERATE PERSISTENT ASTHMA WITHOUT COMPLICATION: ICD-10-CM

## 2022-03-24 NOTE — TELEPHONE ENCOUNTER
Routing refill request to provider for review/approval because:  ACT not at goal and > 6 months since updated    ACT Total Scores 4/28/2020 7/13/2020 8/24/2020   ACT TOTAL SCORE - - -   ASTHMA ER VISITS - - -   ASTHMA HOSPITALIZATIONS - - -   ACT TOTAL SCORE (Goal Greater than or Equal to 20) 8 9 8   In the past 12 months, how many times did you visit the emergency room for your asthma without being admitted to the hospital? 0 0 0   In the past 12 months, how many times were you hospitalized overnight because of your asthma? 1 0 0

## 2022-03-25 RX ORDER — ALBUTEROL SULFATE 90 UG/1
2 AEROSOL, METERED RESPIRATORY (INHALATION) EVERY 4 HOURS PRN
Qty: 18 G | Refills: 0 | Status: SHIPPED | OUTPATIENT
Start: 2022-03-25 | End: 2022-04-11

## 2022-04-14 ENCOUNTER — VIRTUAL VISIT (OUTPATIENT)
Dept: PSYCHOLOGY | Facility: CLINIC | Age: 42
End: 2022-04-14
Payer: COMMERCIAL

## 2022-04-14 DIAGNOSIS — F41.1 GAD (GENERALIZED ANXIETY DISORDER): Primary | ICD-10-CM

## 2022-04-14 DIAGNOSIS — F43.10 PTSD (POST-TRAUMATIC STRESS DISORDER): ICD-10-CM

## 2022-04-14 PROCEDURE — 90834 PSYTX W PT 45 MINUTES: CPT | Mod: 95 | Performed by: COUNSELOR
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
## 2022-04-25 ENCOUNTER — TELEPHONE (OUTPATIENT)
Dept: FAMILY MEDICINE | Facility: CLINIC | Age: 42
End: 2022-04-25
Payer: MEDICAID

## 2022-04-25 DIAGNOSIS — J45.40 MODERATE PERSISTENT ASTHMA WITHOUT COMPLICATION: ICD-10-CM

## 2022-04-27 RX ORDER — ALBUTEROL SULFATE 90 UG/1
2 AEROSOL, METERED RESPIRATORY (INHALATION) EVERY 4 HOURS PRN
Qty: 18 G | Refills: 0 | OUTPATIENT
Start: 2022-04-27

## 2022-04-27 NOTE — TELEPHONE ENCOUNTER
"Requested Prescriptions   Pending Prescriptions Disp Refills    albuterol (PROAIR HFA/PROVENTIL HFA/VENTOLIN HFA) 108 (90 Base) MCG/ACT inhaler 18 g 0     Sig: Inhale 2 puffs into the lungs every 4 hours as needed for shortness of breath / dyspnea or wheezing        Asthma Maintenance Inhalers - Anticholinergics Failed - 4/25/2022  8:54 AM        Failed - Asthma control assessment score within normal limits in last 6 months     Please review ACT score.           Failed - Recent (6 mo) or future (30 days) visit within the authorizing provider's specialty     Patient had office visit in the last 6 months or has a visit in the next 30 days with authorizing provider or within the authorizing provider's specialty.  See \"Patient Info\" tab in inbasket, or \"Choose Columns\" in Meds & Orders section of the refill encounter.            Passed - Patient is age 12 years or older        Passed - Medication is active on med list       Short-Acting Beta Agonist Inhalers Protocol  Failed - 4/25/2022  8:54 AM        Failed - Asthma control assessment score within normal limits in last 6 months     Please review ACT score.           Failed - Recent (6 mo) or future (30 days) visit within the authorizing provider's specialty     Patient had office visit in the last 6 months or has a visit in the next 30 days with authorizing provider or within the authorizing provider's specialty.  See \"Patient Info\" tab in inbasket, or \"Choose Columns\" in Meds & Orders section of the refill encounter.            Passed - Patient is age 12 or older        Passed - Medication is active on med list              "

## 2022-05-09 NOTE — TELEPHONE ENCOUNTER
Last rx 4/11 or so, was at least her third rx for this year.  She needs to establish care with a new provider to discuss her ostensibly poorly controlled asthma    Ant Griffith PA-C

## 2022-05-09 NOTE — TELEPHONE ENCOUNTER
To  to reach out to patient and assist with scheduling patient an establish care appointment for asthma. Previous provider no longer at clinic.  Ángela Yates RN  [facility]

## 2022-05-09 NOTE — TELEPHONE ENCOUNTER
Provider refused medication but not able to see reason for this. To provider to review and advise.  Ángela Yates RN  [facility]

## 2022-05-11 ENCOUNTER — VIRTUAL VISIT (OUTPATIENT)
Dept: FAMILY MEDICINE | Facility: CLINIC | Age: 42
End: 2022-05-11
Payer: MEDICAID

## 2022-05-11 DIAGNOSIS — E66.01 MORBID OBESITY (H): ICD-10-CM

## 2022-05-11 DIAGNOSIS — F43.10 PTSD (POST-TRAUMATIC STRESS DISORDER): ICD-10-CM

## 2022-05-11 DIAGNOSIS — J45.40 MODERATE PERSISTENT ASTHMA WITHOUT COMPLICATION: ICD-10-CM

## 2022-05-11 DIAGNOSIS — F43.22 ADJUSTMENT DISORDER WITH ANXIOUS MOOD: ICD-10-CM

## 2022-05-11 DIAGNOSIS — J30.9 CHRONIC ALLERGIC RHINITIS: ICD-10-CM

## 2022-05-11 PROCEDURE — 99214 OFFICE O/P EST MOD 30 MIN: CPT | Mod: 95 | Performed by: PREVENTIVE MEDICINE

## 2022-05-11 RX ORDER — FLUTICASONE PROPIONATE 50 MCG
1-2 SPRAY, SUSPENSION (ML) NASAL DAILY
Qty: 18.2 ML | Refills: 5 | Status: SHIPPED | OUTPATIENT
Start: 2022-05-11 | End: 2024-05-03

## 2022-05-11 RX ORDER — FLUTICASONE PROPIONATE AND SALMETEROL 500; 50 UG/1; UG/1
1 POWDER RESPIRATORY (INHALATION) 2 TIMES DAILY
Qty: 60 EACH | Refills: 1 | Status: SHIPPED | OUTPATIENT
Start: 2022-05-11 | End: 2024-01-31

## 2022-05-11 RX ORDER — LORATADINE 10 MG/1
10 TABLET ORAL DAILY
Qty: 90 TABLET | Refills: 3 | Status: SHIPPED | OUTPATIENT
Start: 2022-05-11 | End: 2024-05-03

## 2022-05-11 RX ORDER — ALBUTEROL SULFATE 90 UG/1
2 AEROSOL, METERED RESPIRATORY (INHALATION) EVERY 4 HOURS PRN
Qty: 18 G | Refills: 3 | Status: SHIPPED | OUTPATIENT
Start: 2022-05-11 | End: 2022-09-08

## 2022-05-11 RX ORDER — ALBUTEROL SULFATE 0.83 MG/ML
2.5 SOLUTION RESPIRATORY (INHALATION) EVERY 4 HOURS PRN
Qty: 3 ML | Refills: 3 | Status: SHIPPED | OUTPATIENT
Start: 2022-05-11 | End: 2024-01-11

## 2022-05-11 RX ORDER — MONTELUKAST SODIUM 10 MG/1
10 TABLET ORAL AT BEDTIME
Qty: 90 TABLET | Refills: 3 | Status: SHIPPED | OUTPATIENT
Start: 2022-05-11

## 2022-05-11 RX ORDER — HYDROXYZINE HYDROCHLORIDE 25 MG/1
25-100 TABLET, FILM COATED ORAL EVERY 6 HOURS PRN
Qty: 120 TABLET | Refills: 2 | Status: SHIPPED | OUTPATIENT
Start: 2022-05-11 | End: 2023-11-09

## 2022-05-11 RX ORDER — BUSPIRONE HYDROCHLORIDE 15 MG/1
15 TABLET ORAL 3 TIMES DAILY
Qty: 270 TABLET | Refills: 3 | Status: SHIPPED | OUTPATIENT
Start: 2022-05-11 | End: 2023-11-09

## 2022-05-11 NOTE — PROGRESS NOTES
"Crystal is a 41 year old who is being evaluated via a billable telephone visit.      What phone number would you like to be contacted at? Home number  How would you like to obtain your AVS? MyChart    Assessment & Plan     Moderate persistent asthma without complication  -out of medication and not under good control   - albuterol (PROAIR HFA/PROVENTIL HFA/VENTOLIN HFA) 108 (90 Base) MCG/ACT inhaler  Dispense: 18 g; Refill: 3  - albuterol (PROVENTIL) (2.5 MG/3ML) 0.083% neb solution  Dispense: 3 mL; Refill: 3  - fluticasone-salmeterol (ADVAIR) 500-50 MCG/DOSE inhaler  Dispense: 60 each; Refill: 1  - montelukast (SINGULAIR) 10 MG tablet  Dispense: 90 tablet; Refill: 3    Adjustment disorder with anxious mood  -Continue with Psychology   - FLUoxetine (PROZAC) 20 MG capsule  Dispense: 120 capsule; Refill: 3  - hydrOXYzine (ATARAX) 25 MG tablet  Dispense: 120 tablet; Refill: 2  - busPIRone (BUSPAR) 15 MG tablet  Dispense: 270 tablet; Refill: 3  - Adult Mental Health  Referral    PTSD (post-traumatic stress disorder)  - hydrOXYzine (ATARAX) 25 MG tablet  Dispense: 120 tablet; Refill: 2  - busPIRone (BUSPAR) 15 MG tablet  Dispense: 270 tablet; Refill: 3  - Adult Mental Health  Referral    Morbid obesity (H)  Wt Readings from Last 2 Encounters:   10/26/21 118.7 kg (261 lb 9.6 oz)   08/03/21 98.2 kg (216 lb 9.6 oz)       Chronic allergic rhinitis  - loratadine (CLARITIN) 10 MG tablet  Dispense: 90 tablet; Refill: 3  - fluticasone (FLONASE) 50 MCG/ACT nasal spray  Dispense: 18.2 mL; Refill: 5      Prescription drug management  30 minutes spent on the date of the encounter doing chart review, history and exam, documentation and further activities per the note       BMI:   Estimated body mass index is 39.78 kg/m  as calculated from the following:    Height as of 7/6/21: 1.727 m (5' 8\").    Weight as of 10/26/21: 118.7 kg (261 lb 9.6 oz).       Return in about 6 months (around 11/11/2022) for Follow up, with " any available provider.    Reyna Adam MD MPH    [facility] LIVIA Rojas is a 41 year old who presents for the following health issues:    HPI     Asthma:  Not well controlled  Has been out of the Albuterol inhaler   Needs inhaler refilled.  Has bad allergies, and symptoms tend to be worse during this time  Has been out for a month now  Has had some wheezing, sometimes at night  Needs refills on Advair, unclear if taking consistently, does not seem that she is based on refill history       Anxiety:  Has been out of medication for 2 months  Has been having more problems with anxiety  Is working with a Psychologist  Single mother  Has to work to support her family, limited time for appointments etc   Has been referred to Psychiatry in the past   However, patient has been reluctant to make an appointments or establish with anyone due to ongoing financial and time constraints      Financial issues:   Has been having insurance issues and financial issues, is working on getting her bills sorted.   Multiple frustrations about insurance, medical bills, not feeling better despite testing done.     Pinched nerve:  Also has a pinched nerve and is in a lot of pain. States keeps falling down the stairs.     Call got disconnected. I called the patient back later and completed the visit.     Review of Systems   Constitutional, HEENT, cardiovascular, pulmonary, gi and gu systems are negative, except as otherwise noted.      Objective           Vitals:  No vitals were obtained today due to virtual visit.    Physical Exam   healthy, alert and no distress  PSYCH: Alert and oriented times 3; coherent speech, normal   rate and volume, able to articulate logical thoughts, able   to abstract reason, no tangential thoughts, no hallucinations   or delusions  Her affect is normal  RESP: No cough, no audible wheezing, able to talk in full sentences  Remainder of exam unable to be completed due to  telephone visits    No results found for any visits on 05/11/22.      Phone call duration: 20 minutes

## 2022-07-06 ASSESSMENT — ANXIETY QUESTIONNAIRES
7. FEELING AFRAID AS IF SOMETHING AWFUL MIGHT HAPPEN: SEVERAL DAYS
1. FEELING NERVOUS, ANXIOUS, OR ON EDGE: MORE THAN HALF THE DAYS
6. BECOMING EASILY ANNOYED OR IRRITABLE: SEVERAL DAYS
GAD7 TOTAL SCORE: 10
7. FEELING AFRAID AS IF SOMETHING AWFUL MIGHT HAPPEN: SEVERAL DAYS
GAD7 TOTAL SCORE: 10
4. TROUBLE RELAXING: MORE THAN HALF THE DAYS
2. NOT BEING ABLE TO STOP OR CONTROL WORRYING: SEVERAL DAYS
5. BEING SO RESTLESS THAT IT IS HARD TO SIT STILL: MORE THAN HALF THE DAYS
3. WORRYING TOO MUCH ABOUT DIFFERENT THINGS: SEVERAL DAYS
8. IF YOU CHECKED OFF ANY PROBLEMS, HOW DIFFICULT HAVE THESE MADE IT FOR YOU TO DO YOUR WORK, TAKE CARE OF THINGS AT HOME, OR GET ALONG WITH OTHER PEOPLE?: VERY DIFFICULT
GAD7 TOTAL SCORE: 10

## 2022-07-08 NOTE — PROGRESS NOTES
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 there may be frightening dreams without recognizable content.      - Acting or feeling as if the traumatic event were recurring (includes a sense of reliving the experience, illusions, hallucinations, and dissociative flashback episodes, including those that occur on awakening or when intoxicated). Note: In young children, trauma-specific reenactment may occur.      - Intense psychological distress at exposure to internal or external cues that symbolize or resemble an aspect of the traumatic event.      - Physiological reactivity on exposure to internal or external cues that symbolize or resemble an aspect of the traumatic event.   C. Persistent avoidance of stimuli associated with the trauma and numbing of general responsiveness (not present before the trauma), as indicated by three (or more) of the following:     - Efforts to avoid thoughts, feelings, or conversations associated with the trauma.      - Efforts to avoid activities, places, or people that arouse recollections of the trauma.      - Inability to recall an important aspect of the trauma.      - Markedly diminished interest or participation in significant activities.      -  Feeling of detachment or estrangement from others.      - Restricted range of affect (e.g., unable to have loving feelings).      - Sense of a foreshortened future (e.g., does not expect to have a career, marriage, children, or a normal life span).   D. Persistent symptoms of increased arousal (not present before the trauma), as indicated by two (or more) of the following:     - Difficulty falling or staying asleep.      - Irritability or outbursts of anger.      - Difficulty concentrating.      - Hypervigilance.      - Exaggerated startle response.   E. Duration of the disturbance is more than 1 month.  F. The disturbance causes clinically significant distress or impairment in social, occupational, or other important areas of functioning.    Functional Status:  Patient reports the following functional impairments:  academic performance, relationship(s), self-care and social interactions.     Nonprogrammatic care:  Patient is requesting basic services to address current mental health concerns.    Clinical Summary:  1. Reason for assessment: treatment review to determine better options  .  2. Psychosocial, Cultural and Contextual Factors: parenting, financial, occupational  3. Principal DSM5 Diagnoses  (Sustained by DSM5 Criteria Listed Above):   296.22 (F32.1)  Major Depressive Disorder, Single Episode, Moderate _  300.01 (F41.0) Panic Disorder  300.02 (F41.1) Generalized Anxiety Disorder  309.81 (F43.10) Posttraumatic Stress Disorder (includes Posttraumatic Stress Disorder for Children 6 Years and Younger)  Without dissociative symptoms.  4. Other Diagnoses that is relevant to services:   .  5. Provisional Diagnosis:   as evidenced by  .  6. Prognosis: Return to Normal Functioning, Expect Improvement and Relieve Acute Symptoms.  7. Likely consequences of symptoms if not treated: further deterioration of functioning.  8. Client strengths include:  caring, educated, employed, goal-focused, good listener, has a  previous history of therapy, intelligent, motivated, open to learning, open to suggestions / feedback, responsible parent, wants to learn, willing to ask questions, willing to relate to others and work history .     Recommendations:     1. Plan for Safety and Risk Management:   Safety and Risk: Recommended that patient call 911 or go to the local ED should there be a change in any of these risk factors..          Report to child / adult protection services was NA.     2. Patient's identified mental health concerns with a cultural influence will be addressed by making reasonable accommodations at the request of the patient.     3. Initial Treatment will focus on:    Depressed Mood -    Anxiety -  .     4. Resources/Service Plan:    services are not indicated.   Modifications to assist communication are not indicated.   Additional disability accommodations are not indicated.      5. Collaboration:   Collaboration / coordination of treatment will be initiated with the following  support professionals: psychiatry.      6.  Referrals:   The following referral(s) will be initiated: pending collaboration with Dr. Ramirez. Next Scheduled Appointment: TBD.     A Release of Information has been obtained for the following: n/a.     Emergency Contact was not obtained.     7. RUPAL:    RUPAL:  Discussed the general effects of drugs and alcohol on health and well-being. Provider gave patient printed information about the effects of chemical use on their health and well being. Recommendations:  .     8. Records:   These were reviewed at time of assessment.   Information in this assessment was obtained from the medical record and  provided by patient who is a good historian.    Patient will have open access to their mental health medical record.        Provider Name/ Credentials:  Santos Sheppard PsyD, [facility]  July 11, 2022

## 2022-07-11 ENCOUNTER — VIRTUAL VISIT (OUTPATIENT)
Dept: PSYCHIATRY | Facility: CLINIC | Age: 42
End: 2022-07-11
Payer: COMMERCIAL

## 2022-07-11 ENCOUNTER — VIRTUAL VISIT (OUTPATIENT)
Dept: PSYCHOLOGY | Facility: CLINIC | Age: 42
End: 2022-07-11
Payer: COMMERCIAL

## 2022-07-11 DIAGNOSIS — F32.1 MAJOR DEPRESSIVE DISORDER, SINGLE EPISODE, MODERATE (H): ICD-10-CM

## 2022-07-11 DIAGNOSIS — F41.1 GAD (GENERALIZED ANXIETY DISORDER): ICD-10-CM

## 2022-07-11 DIAGNOSIS — F41.1 GAD (GENERALIZED ANXIETY DISORDER): Primary | ICD-10-CM

## 2022-07-11 DIAGNOSIS — F33.1 MODERATE EPISODE OF RECURRENT MAJOR DEPRESSIVE DISORDER (H): ICD-10-CM

## 2022-07-11 DIAGNOSIS — F41.0 PANIC DISORDER: ICD-10-CM

## 2022-07-11 DIAGNOSIS — F43.10 PTSD (POST-TRAUMATIC STRESS DISORDER): ICD-10-CM

## 2022-07-11 DIAGNOSIS — F41.0 PANIC DISORDER WITHOUT AGORAPHOBIA: Primary | ICD-10-CM

## 2022-07-11 PROCEDURE — 90791 PSYCH DIAGNOSTIC EVALUATION: CPT | Mod: 95 | Performed by: PSYCHOLOGIST

## 2022-07-11 PROCEDURE — 99204 OFFICE O/P NEW MOD 45 MIN: CPT | Mod: 95 | Performed by: PSYCHIATRY & NEUROLOGY

## 2022-07-11 RX ORDER — LORAZEPAM 1 MG/1
.5-1 TABLET ORAL DAILY PRN
Qty: 12 TABLET | Refills: 1 | Status: SHIPPED | OUTPATIENT
Start: 2022-07-11

## 2022-07-11 RX ORDER — VENLAFAXINE 37.5 MG/1
37.5 TABLET ORAL 2 TIMES DAILY
Qty: 60 TABLET | Refills: 1 | Status: SHIPPED | OUTPATIENT
Start: 2022-07-11 | End: 2022-09-07

## 2022-07-11 ASSESSMENT — PATIENT HEALTH QUESTIONNAIRE - PHQ9
SUM OF ALL RESPONSES TO PHQ QUESTIONS 1-9: 13
SUM OF ALL RESPONSES TO PHQ QUESTIONS 1-9: 13
10. IF YOU CHECKED OFF ANY PROBLEMS, HOW DIFFICULT HAVE THESE PROBLEMS MADE IT FOR YOU TO DO YOUR WORK, TAKE CARE OF THINGS AT HOME, OR GET ALONG WITH OTHER PEOPLE: EXTREMELY DIFFICULT

## 2022-07-11 NOTE — PATIENT INSTRUCTIONS
Treatment Plan:  Continue BuSpar/buspirone 20 mg twice daily for anxiety and to augment venlafaxine for now.  We will likely consider tapering off this medication at your next visit.  Continue hydroxyzine  mg every 6 hours as needed for anxiety, sleep.  Discontinue gabapentin since no longer taking  Discontinue Abilify since no longer taking  Discontinued fluoxetine since no longer taking  Start venlafaxine 37.5 mg twice daily for panic disorder, generalized anxiety disorder, depression.  Start lorazepam 0.5-1 mg daily as needed for severe anxiety/panic attacks.  12 tabs to last 30 days.  No early refills.  Do not take daily.  Strongly recommend individual psychotherapy for additional support and ongoing development of nonpharmacologic coping skills and strategies.  Continue all other cares per primary care provider.   Continue all other medications as reviewed per electronic medical record today.   Safety plan reviewed. To the Emergency Department as needed or call after hours crisis line at [phone] or [phone]. [place] Crisis Text Line: Text [place] to 925194  or  Suicide LifeLine Chat: suicidepreventionlifeline.org/chat  Schedule an appointment with me in 3 weeks or sooner as needed.  Call [facility] at [phone] to schedule.  Follow up with primary care provider as planned or sooner if needed for acute medical concerns.  Call the psychiatric nurse line with medication questions or concerns at [phone].  [employer] may be used to communicate with your provider, but this is not intended to be used for emergencies.    Patient Education:  Risks of benzodiazepine (Ativan, Xanax, Klonopin, Valium, etc) use including, but not limited to, sedation, tolerance, risk for addiction/dependence. Do not drink alcohol while taking benzodiazepines due to risk of trouble breathing and potential death. Do not drive or operate heavy machinery until it is known how the drug affects you. Discuss with  physician or pharmacist before ever taking a benzodiazepine with a narcotic/opioid pain medication.     Community Resources:    National Suicide Prevention Lifeline: 991.865.9226 (TTY: 186.359.2640). Call anytime for help.  (www.suicidepreventionlifeline.org)  [place] [employer] [place] (www.nicole.org): 395.352.4815 or 641-612-5749.   Mental Health Association (www.mentalhealth.org): 158.613.9945 or 690-645-5049.  [place] Crisis Text Line: Text [place] to 263620  Suicide LifeLine Chat: suicidepreventionlifeline.org/chat

## 2022-07-11 NOTE — Clinical Note
Please call this patient to get them scheduled for a follow-up visit in 3 weeks. Please schedule with me and the [facility]. Thanks!

## 2022-07-11 NOTE — PROGRESS NOTES
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 phone/video):  Vital Signs:  Vitals: There were no vitals taken for this visit.  General/Constitutional:  Appearance: awake, alert, adequately groomed, appeared stated age and no apparent distress  Attitude:  cooperative   Eye Contact:  good  Musculoskeletal:  Muscle Strength and Tone: no gross abnormalities by observation  Psychomotor Behavior:  no evidence of tardive dyskinesia, dystonia, or tics  Gait and Station: normal, no gross abnormalities noted by observation  Psychiatric:  Speech:  clear, coherent, regular rate, rhythm, and volume  Associations:  no loose associations  Thought Process:  logical, linear and goal oriented  Thought Content:  no evidence of suicidal ideation or homicidal ideation, no evidence of psychotic thought, no auditory hallucinations present and no visual hallucinations present  Mood:  anxious and depressed  Affect:  mood congruent  Insight: good  Judgment:  intact, adequate for safety  Impulse Control:  intact  Neurological:  Oriented to:  person, place, time, and situation  Attention Span and Concentration:  normal  Language: intact  Recent and Remote Memory:  Intact to interview. Not formally assessed. No amnesia.  Fund of Knowledge: appropriate    Strengths and Opportunities Per Dr. MIREYA  Santos Sheppard, during today's team-based visit:   Patient identified the following strengths or resources that will help them succeed in treatment: friends / good social support, insight, intelligence, motivation and work ethic. Things that may interfere with the patient's success in treatment include: financial hardship and lack of family support.     Suicide Risk Assessment:  Today Crystal Rose reports no suicidal ideation. Based on all available evidence including the factors cited above, Crystal Rose does not appear to be at imminent risk for self-harm, does not meet criteria for a 72-hr hold, and therefore remains appropriate for ongoing outpatient level of care.  A thorough assessment of risk factors related to suicide and self-harm have been reviewed and are noted above. The patient convincingly denies acute suicidality on several occasions. Local community safety resources reviewed and printed for patient to use if needed. There was no deceit detected, and the patient presented in a manner that was believable.     DSM5  Diagnosis:  296.32 (F33.1) Major Depressive Disorder, Recurrent Episode, Moderate _  300.01 (F41.0) Panic Disorder  300.02 (F41.1) Generalized Anxiety Disorder   PTSD    Medical Comorbidities Include:   Patient Active Problem List    Diagnosis Date Noted     Moderate persistent asthma 02/09/2011     Priority: High     PTSD (post-traumatic stress disorder) 05/24/2021     Priority: Medium     Reaction to chronic stress 05/24/2021     Priority: Medium     GI bleed 10/16/2020     Priority: Medium     Obesity (BMI 30-39.9) 06/08/2020     Priority: Medium     Adjustment disorder with anxious mood 10/29/2019     Priority: Medium     Bilateral leg weakness 10/29/2019     Priority: Medium     Acute bilateral low back pain without sciatica 10/29/2019     Priority: Medium     Chronic allergic rhinitis 10/29/2019     Priority: Medium     Carbon monoxide exposure 10/29/2019     Priority:  Medium     Postsurgical malabsorption 09/26/2018     Priority: Medium     Intertrigo 09/26/2018     Priority: Medium     Iron deficiency anemia following bariatric surgery 09/26/2018     Priority: Medium     Morbid obesity (H) 06/13/2018     Priority: Medium     Iron deficiency anemia due to chronic blood loss 06/13/2018     Priority: Medium     Menorrhagia with regular cycle 06/13/2018     Priority: Medium     Cervical dysplasia 04/05/2018     Priority: Medium     Age 19 yrs.  Cryotherapy? SUMI I, SUMI II, treated with Leep     3/2/08 ASCUS pap/+ HR HPV  3/12/09 ASCUS pap/+ HR HPV  7/25/11 ASCUS/+ HR HPV >> 10/19/11 Colpo: Bx-cervicitis, ECC-bening.   10/24/12 NIL pap/+ HR HPV  12/18/13 NIL pap/+ HR HPV >> 1/27/14 Colpo: Bx-SUMI 1  3/13/14 LEEP conization- SUMI 1  4/2/18 NIL pap/+ HR HPV 18. Plan: colposcopy by 7/2/18 with Dr. Pinedo  4/30/18 Colpo ECC-neg.  Dx NIL pap/Neg HPV.  Plan: Cotest in 1 year  06/12/19 Patient is lost to pap tracking follow-up.          Environmental allergies 01/24/2012     Priority: Medium     Abnormal Pap smear of cervix 01/24/2012     Priority: Medium     Had Cryo/  Per patient age 19       Pernicious anemia 04/30/2010     Priority: Medium     S/P gastric bypass 10/23/2009     Priority: Medium     CARDIOVASCULAR SCREENING; LDL GOAL LESS THAN 160 10/31/2010     Priority: Low       Impression:  Crystal Rose is a 41-year-old female with past psychiatric history including anxiety, depression, PTSD who presents for psychiatric evaluation.  Patient also currently with symptoms of panic disorder and having 2-3 panic attacks weekly.  Patient symptoms currently nearly as severe as they were around 2019.  Patient not functioning very well at home or at work.  She has been off fluoxetine for just over 3 months due to running out of the medication and also due to sedation.  She has been on a couple other SSRIs and so we will move to an SNRI trial.  We will start venlafaxine.  Patient with  history of bariatric surgery so we will use immediate release formulation.  We will continue buspirone but consider tapering off the medication if venlafaxine quite helpful.  We will also discontinue gabapentin and Abilify since patient is no longer taking.  Given the fact patient is having 2-3 panic attacks weekly and lorazepam has been historically very helpful, she will be given a limited quantity of tablets per month.  Hopefully we can continue to decrease the quantity given each month as she continues to improve on venlafaxine.  No historical or current chemical dependency concerns.  Risks and benefits discussed at length regarding treatment plan and benzodiazepine therapy.  Patient currently with no plans on becoming pregnant.    Medication side effects and alternatives reviewed. Health promotion activities recommended and reviewed today. All questions addressed. Education and counseling completed regarding risks and benefits of medications and psychotherapy options. Recommend therapy for additional support.     Treatment Plan:    Continue BuSpar/buspirone 20 mg twice daily for anxiety and to augment venlafaxine for now.  We will likely consider tapering off this medication at your next visit.    Continue hydroxyzine  mg every 6 hours as needed for anxiety, sleep.    Discontinue gabapentin since no longer taking    Discontinue Abilify since no longer taking    Discontinued fluoxetine since no longer taking    Start venlafaxine 37.5 mg twice daily for panic disorder, generalized anxiety disorder, depression.    Start lorazepam 0.5-1 mg daily as needed for severe anxiety/panic attacks.  12 tabs to last 30 days.  No early refills.  Do not take daily.    Strongly recommend individual psychotherapy for additional support and ongoing development of nonpharmacologic coping skills and strategies.    Continue all other cares per primary care provider.     Continue all other medications as reviewed per electronic  medical record today.     Safety plan reviewed. To the Emergency Department as needed or call after hours crisis line at [phone] or [phone]. [place] Crisis Text Line: Text [place] to 912849  or  Suicide LifeLine Chat: [email or web address]/chat    Schedule an appointment with me in 3 weeks or sooner as needed.  Call [facility] at [phone] to schedule.    Follow up with primary care provider as planned or sooner if needed for acute medical concerns.    Call the psychiatric nurse line with medication questions or concerns at [phone].    Lola Pirindolahart may be used to communicate with your provider, but this is not intended to be used for emergencies.    Patient Education:  Risks of benzodiazepine (Ativan, Xanax, Klonopin, Valium, etc) use including, but not limited to, sedation, tolerance, risk for addiction/dependence. Do not drink alcohol while taking benzodiazepines due to risk of trouble breathing and potential death. Do not drive or operate heavy machinery until it is known how the drug affects you. Discuss with physician or pharmacist before ever taking a benzodiazepine with a narcotic/opioid pain medication.     Community Resources:    National Suicide Prevention Lifeline: 290.251.6468 (TTY: 287.791.9912). Call anytime for help.  (www.suicidepreventionlifeline.org)  National [place] on Mental Illness (www.nicole.org): 650.930.4311 or 394-161-5188.   Mental Health Association (www.mentalhealth.org): 943.251.4357 or 291-488-2937.  [place] Crisis Text [place]: Text [place] to 702977  Suicide LifeLine Chat: [email or web address]/chat    Administrative Billing:   Phone Call/Video Duration: 34 Minutes  Start: 1:33p  Stop: 2:07p    Patient Status:  Patient will continue to be seen for ongoing consultation and stabilization.    Signed:   Calista Ramirez DO  [facility]    Disclaimer: This note consists of symbols derived from keyboarding, dictation and/or voice recognition software.  As a result, there may be errors in the script that have gone undetected. Please consider this when interpreting information found in this chart.  Answers for HPI/ROS submitted by the patient on 7/6/2022  TERESA 7 TOTAL SCORE: 10

## 2022-08-09 ENCOUNTER — TELEPHONE (OUTPATIENT)
Dept: FAMILY MEDICINE | Facility: CLINIC | Age: 42
End: 2022-08-09

## 2022-08-09 NOTE — TELEPHONE ENCOUNTER
Patient Quality Outreach    Patient is due for the following:   Asthma  -  ACT needed    Next Steps:   No follow up needed at this time.    Type of outreach:    Sent Music[employer]r message.      Questions for provider review:    None     Marie Ortiz MA

## 2022-09-07 DIAGNOSIS — J45.40 MODERATE PERSISTENT ASTHMA WITHOUT COMPLICATION: ICD-10-CM

## 2022-09-07 DIAGNOSIS — F33.1 MODERATE EPISODE OF RECURRENT MAJOR DEPRESSIVE DISORDER (H): ICD-10-CM

## 2022-09-07 DIAGNOSIS — F41.1 GAD (GENERALIZED ANXIETY DISORDER): ICD-10-CM

## 2022-09-07 DIAGNOSIS — F41.0 PANIC DISORDER WITHOUT AGORAPHOBIA: ICD-10-CM

## 2022-09-07 RX ORDER — VENLAFAXINE 37.5 MG/1
37.5 TABLET ORAL 2 TIMES DAILY
Qty: 60 TABLET | Refills: 0 | Status: SHIPPED | OUTPATIENT
Start: 2022-09-07 | End: 2022-09-16

## 2022-09-07 NOTE — TELEPHONE ENCOUNTER
Patient Quality Outreach    Patient is due for the following:   Asthma  -  ACT needed    Next Steps:   No follow up needed at this time.    Type of outreach:    Copy of ACT mailed to patient.      Questions for provider review:    None     Marie Ortiz MA

## 2022-09-07 NOTE — TELEPHONE ENCOUNTER
Date of Last Office Visit: 7/11/2022  Date of Next Office Visit: none (scheduling, please connect with patient and schedule follow up appointment with provider)   No shows since last visit: none  Cancellations since last visit: none    Medication requested: Venlafaxine 37.5 mg tablet Date last ordered: 7/11/2022 Qty: 60 Refills: 1     Review of [place] ?: n/a    Lapse in medication adherence greater than 5 days?: no  If yes, call patient and gather details: no  Medication refill request verified as identical to current order?: yes  Result of Last DAM, VPA, Li+ Level, CBC, or Carbamazepine Level (at or since last visit): N/A    Last visit treatment plan:    Treatment Plan:    Continue BuSpar/buspirone 20 mg twice daily for anxiety and to augment venlafaxine for now.  We will likely consider tapering off this medication at your next visit.    Continue hydroxyzine  mg every 6 hours as needed for anxiety, sleep.    Discontinue gabapentin since no longer taking    Discontinue Abilify since no longer taking    Discontinued fluoxetine since no longer taking    Start venlafaxine 37.5 mg twice daily for panic disorder, generalized anxiety disorder, depression.    Start lorazepam 0.5-1 mg daily as needed for severe anxiety/panic attacks.  12 tabs to last 30 days.  No early refills.  Do not take daily.    Strongly recommend individual psychotherapy for additional support and ongoing development of nonpharmacologic coping skills and strategies.    Continue all other cares per primary care provider.     Continue all other medications as reviewed per electronic medical record today.     Safety plan reviewed. To the Emergency Department as needed or call after hours crisis line at [phone] or [phone]. [place] Crisis Text [place]: Text [place] to 309709  or  Suicide LifeLine Chat: suicidepreventionlifeline.org/chat    Schedule an appointment with me in 3 weeks or sooner as needed.  Call [facility] at  [phone] to schedule.    Follow up with primary care provider as planned or sooner if needed for acute medical concerns.    Call the psychiatric nurse line with medication questions or concerns at [phone].    Applausehart may be used to communicate with your provider, but this is not intended to be used for emergencies.       []Medication refilled per  Medication Refill in Ambulatory Care  policy.  [x]Medication unable to be refilled by RN due to criteria not met as indicated below:    []Eligibility - not seen in the last year   []Supervision - no future appointment   []Compliance - no shows, cancellations or lapse in therapy   []Verification - order discrepancy   []Controlled medication   [x]Medication not included in policy   []90-day supply request   []Other

## 2022-09-08 RX ORDER — ALBUTEROL SULFATE 90 UG/1
AEROSOL, METERED RESPIRATORY (INHALATION)
Qty: 8.5 G | Refills: 0 | Status: SHIPPED | OUTPATIENT
Start: 2022-09-08 | End: 2022-10-12
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
## 2022-09-16 ENCOUNTER — VIRTUAL VISIT (OUTPATIENT)
Dept: PSYCHIATRY | Facility: CLINIC | Age: 42
End: 2022-09-16
Payer: COMMERCIAL

## 2022-09-16 ENCOUNTER — VIRTUAL VISIT (OUTPATIENT)
Dept: BEHAVIORAL HEALTH | Facility: CLINIC | Age: 42
End: 2022-09-16
Payer: COMMERCIAL

## 2022-09-16 DIAGNOSIS — F41.1 GAD (GENERALIZED ANXIETY DISORDER): ICD-10-CM

## 2022-09-16 DIAGNOSIS — F41.0 PANIC DISORDER WITHOUT AGORAPHOBIA: Primary | ICD-10-CM

## 2022-09-16 DIAGNOSIS — F33.41 RECURRENT MAJOR DEPRESSIVE DISORDER, IN PARTIAL REMISSION (H): ICD-10-CM

## 2022-09-16 DIAGNOSIS — F41.1 GENERALIZED ANXIETY DISORDER: Primary | ICD-10-CM

## 2022-09-16 DIAGNOSIS — F43.10 PTSD (POST-TRAUMATIC STRESS DISORDER): ICD-10-CM

## 2022-09-16 DIAGNOSIS — F33.1 MODERATE EPISODE OF RECURRENT MAJOR DEPRESSIVE DISORDER (H): ICD-10-CM

## 2022-09-16 PROCEDURE — 99214 OFFICE O/P EST MOD 30 MIN: CPT | Mod: 95 | Performed by: PSYCHIATRY & NEUROLOGY

## 2022-09-16 PROCEDURE — 90832 PSYTX W PT 30 MINUTES: CPT | Mod: 95 | Performed by: SOCIAL WORKER

## 2022-09-16 RX ORDER — VENLAFAXINE HYDROCHLORIDE 150 MG/1
150 CAPSULE, EXTENDED RELEASE ORAL DAILY
Qty: 30 CAPSULE | Refills: 1 | Status: SHIPPED | OUTPATIENT
Start: 2022-09-16 | End: 2022-11-30

## 2022-09-16 ASSESSMENT — ANXIETY QUESTIONNAIRES
7. FEELING AFRAID AS IF SOMETHING AWFUL MIGHT HAPPEN: MORE THAN HALF THE DAYS
IF YOU CHECKED OFF ANY PROBLEMS ON THIS QUESTIONNAIRE, HOW DIFFICULT HAVE THESE PROBLEMS MADE IT FOR YOU TO DO YOUR WORK, TAKE CARE OF THINGS AT HOME, OR GET ALONG WITH OTHER PEOPLE: VERY DIFFICULT
3. WORRYING TOO MUCH ABOUT DIFFERENT THINGS: SEVERAL DAYS
1. FEELING NERVOUS, ANXIOUS, OR ON EDGE: MORE THAN HALF THE DAYS
8. IF YOU CHECKED OFF ANY PROBLEMS, HOW DIFFICULT HAVE THESE MADE IT FOR YOU TO DO YOUR WORK, TAKE CARE OF THINGS AT HOME, OR GET ALONG WITH OTHER PEOPLE?: VERY DIFFICULT
GAD7 TOTAL SCORE: 9
GAD7 TOTAL SCORE: 9
5. BEING SO RESTLESS THAT IT IS HARD TO SIT STILL: SEVERAL DAYS
2. NOT BEING ABLE TO STOP OR CONTROL WORRYING: SEVERAL DAYS
4. TROUBLE RELAXING: SEVERAL DAYS
GAD7 TOTAL SCORE: 9
7. FEELING AFRAID AS IF SOMETHING AWFUL MIGHT HAPPEN: MORE THAN HALF THE DAYS
6. BECOMING EASILY ANNOYED OR IRRITABLE: SEVERAL DAYS

## 2022-09-16 NOTE — PATIENT INSTRUCTIONS
Treatment Plan:  Continue BuSpar/buspirone 20 mg twice daily for anxiety and to augment venlafaxine ER/Effexor-XR.  Could consider tapering off in the future.  Continue hydroxyzine  mg every 6 hours as needed for anxiety, sleep.  Discontinue venlafaxine immediate release  Start venlafaxine ER/Effexor- mg daily for anxiety, panic disorder, mood.  Continue lorazepam 0.5-1 mg daily as needed for severe anxiety/panic attacks.  12 tabs to last 30 days.  No early refills.  Do not take daily.  Strongly recommend individual psychotherapy for additional support and ongoing development of nonpharmacologic coping skills and strategies.  Continue all other cares per primary care provider.   Continue all other medications as reviewed per electronic medical record today.   Safety plan reviewed. To the Emergency Department as needed or call after hours crisis line at [phone] or [phone]. [place] Crisis Text Line. Text [place] to 286104 or Suicide LifeLine Chat: [email or web address]/chat  Schedule an appointment with me in 6 weeks or sooner as needed. Call [facility] at [phone] to schedule.  Follow up with primary care provider as planned or for acute medical concerns.  Call the psychiatric nurse line with medication questions or concerns at [phone].  BOLT Solutionshart may be used to communicate with your provider, but this is not intended to be used for emergencies.    Risks of benzodiazepine (Ativan, Xanax, Klonopin, Valium, etc) use including, but not limited to, sedation, tolerance, risk for addiction/dependence. Do not drink alcohol while taking benzodiazepines due to risk of trouble breathing and potential death. Do not drive or operate heavy machinery until it is known how the drug affects you. Discuss with physician or pharmacist before ever taking a benzodiazepine with a narcotic/opioid pain medication.

## 2022-09-16 NOTE — PROGRESS NOTES
"Crystal Rose is a 41 year old year old who is being evaluated via a billable video visit.      How would you like to obtain your AVS? MyChart  If you are dropped from the video visit, the video invite should be resent to: Text to cell phone: see Epic  Will anyone else be joining your video visit? No       Telemedicine Visit: The patient's condition can be safely assessed and treated via synchronous audio and visual telemedicine encounter.      Reason for Telemedicine Visit: Covid-19 Pandemic    Originating Site (Patient Location): Patient's home     Distant Site (Provider Location): Provider Remote Setting    Mode of Communication:  Video Conference via [email or web address]    As the provider I attest to compliance with applicable laws and regulations related to telemedicine.        Outpatient Psychiatric Progress Note    Name: Crystal Rose   : 1980                    Primary Care Provider: Physician No Ref-Primary   Therapist: None    PHQ-9 scores:  PHQ-9 SCORE 2021 1/10/2022 2022   PHQ-9 Total Score MyChart - 11 (Moderate depression) 13 (Moderate depression)   PHQ-9 Total Score 13 11 13   Some encounter information is confidential and restricted. Go to Review Flowsheets activity to see all data.       TERESA-7 scores:  TERESA-7 SCORE 1/10/2022 2022 2022   Total Score 15 (severe anxiety) 10 (moderate anxiety) 9 (mild anxiety)   Total Score 15 10 9   Some encounter information is confidential and restricted. Go to Review Flowsheets activity to see all data.       Patient Identification:  Patient is a 41 year old, single      or   White Not  or  female  who presents for return visit with me.  Patient is currently employed full time. Patient attended the phone/video session alone. Patient prefers to be called: \"Crystal\".    Interim History:  I last saw Crystal Rose for outpatient psychiatry Consultation on 2022. During that " appointment, we:      Continue BuSpar/buspirone 20 mg twice daily for anxiety and to augment venlafaxine for now.  We will likely consider tapering off this medication at your next visit.    Continue hydroxyzine  mg every 6 hours as needed for anxiety, sleep.    Discontinue gabapentin since no longer taking    Discontinue Abilify since no longer taking    Discontinued fluoxetine since no longer taking    Start venlafaxine 37.5 mg twice daily for panic disorder, generalized anxiety disorder, depression.    Start lorazepam 0.5-1 mg daily as needed for severe anxiety/panic attacks.  12 tabs to last 30 days.  No early refills.  Do not take daily.    Strongly recommend individual psychotherapy for additional support and ongoing development of nonpharmacologic coping skills and strategies.    Continue all other cares per primary care provider.     9/16: Patient overall with some significant improvement of her symptoms, both mood and anxiety.  Significantly fewer panic attacks.  Overall improved anxiety.  No acute safety concerns.  No SI.  No problematic drug or alcohol use.  Unfortunately some pretty significant dry mouth from venlafaxine.    Per [facility], [place] [facility], during today's team-based visit:  Things have been going ok lately. Meds made her have dry mouth. She has noticed better control over anxiety. Anxiety has been better in the morning and able to get out of bed. Panic attacks have decreased. Continues to note depressive sx. Yesterday was one year from moms death of COVID. Life stressors continue to be present but able to manage better. 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      Past Psychiatric Med Trials:  Psych Meds at Intake:  buspar 20 mg twice daily  hydroxyzine as needed for sleep     Past Psych Meds:  Trazodone  ambien  Ativan - very effective for panic attacks  Phentermine  Sertraline  prozac - felt sedated, worn out,  helped at first maybe but then felt worse on increased/higher dose  abilify  gabapentin    Psychiatric ROS:  Crystal Rose reports mood has been: Improved  Anxiety has been: Improving  Sleep has been: Improving  Felicia sxs: None  Psychosis sxs: None  ADHD/ADD sxs: N/A  PTSD sxs: Improving  PHQ9 and GAD7 scores were reviewed today if completed.   Medication side effects: Yes: Significantly dry mouth  Current stressors include: Symptoms and See HPI above  Coping mechanisms and supports include: Family, Hobbies and Friends    Current medications include:   Current Outpatient Medications   Medication Sig     albuterol (PROAIR HFA/PROVENTIL HFA/VENTOLIN HFA) 108 (90 Base) MCG/ACT inhaler INHALE 2 PUFFS BY MOUTH INTO THE LUNGS EVERY 4 HOURS AS NEEDED FOR SHORTNESS OF BREATH OR DIFFICULT BREATHING OR WHEEZING     albuterol (PROVENTIL) (2.5 MG/3ML) 0.083% neb solution Take 1 vial (2.5 mg) by nebulization every 4 hours as needed for shortness of breath / dyspnea or wheezing     Ascorbic Acid (VITAMIN C PO) Take 500 mg by mouth     busPIRone (BUSPAR) 15 MG tablet Take 1 tablet (15 mg) by mouth 3 times daily     Calcium Carb-Cholecalciferol (CALCIUM 500 + D) 500-400 MG-UNIT TABS Take 1 tablet by mouth 3 times daily     calcium carbonate-vitamin D (CALCIUM 600/VITAMIN D) 600-400 MG-UNIT chewable tablet Take one twice daily and at least 2 hours apart from iron.     childrens multivitamin w/iron (FLINTSTONES COMPLETE) 18 MG chewable tablet Take 1 tablet by mouth 2 times daily     childrens multivitamin w/iron (FLINTSTONES COMPLETE) chewable tablet Take one tablet twice a day.     cholecalciferol 125 MCG (5000 UT) CAPS Take 1 capsule (5,000 Units) by mouth daily     cyanocobalamin (VITAMIN B-12) 1000 MCG SUBL sublingual tablet Place 1 tablet (1,000 mcg) under the tongue daily     ferrous fumarate 65 mg, [place]. FE,-Vitamin C 125 mg (VITRON C)  MG TABS tablet Take 2 tablets by mouth daily     fluticasone (FLONASE) 50  MCG/ACT nasal spray Spray 1-2 sprays into both nostrils daily     fluticasone-salmeterol (ADVAIR) 500-50 MCG/DOSE inhaler Inhale 1 puff into the lungs 2 times daily     fluticasone-salmeterol (ADVAIR) 500-50 MCG/DOSE inhaler Inhale 1 puff into the lungs every 12 hours     hydrOXYzine (ATARAX) 25 MG tablet Take 1-4 tablets ( mg) by mouth every 6 hours as needed for anxiety or other (sleep)     ipratropium - albuterol 0.5 mg/2.5 mg/3 mL (DUONEB) 0.5-2.5 (3) MG/3ML neb solution Take 1 vial (3 mLs) by nebulization every 4 hours as needed for shortness of breath / dyspnea or wheezing     loratadine (CLARITIN) 10 MG tablet Take 1 tablet (10 mg) by mouth daily     LORazepam (ATIVAN) 1 MG tablet Take 0.5-1 tablets (0.5-1 mg) by mouth daily as needed (severe anxiety/panic attacks) 12 tabs to last 30 days     montelukast (SINGULAIR) 10 MG tablet Take 1 tablet (10 mg) by mouth At Bedtime     Multiple Vitamins-Iron (DAILY MARILYN MULTIVITAMIN/IRON) TABS Take 1 tablet by mouth 2 times daily     omeprazole (PRILOSEC) 20 MG DR capsule TAKE 1 CAPSULE BY MOUTH ONCE EVERY MORNING - TAKE 30 MINUTES BEFORE MEAL     order for DME Equipment being ordered: Nebulizer with tubing     order for DME Equipment being ordered: Nebulizer     venlafaxine (EFFEXOR) 37.5 MG tablet Take 1 tablet (37.5 mg) by mouth 2 times daily     vitamin B-12 (CYANOCOBALAMIN) 2500 MCG sublingual tablet Take 1 tablet (2,500 mcg) by mouth daily     No current facility-administered medications for this visit.       The [place] Prescription Monitoring Program has been reviewed and there are no concerns about diversionary activity for controlled substances at this time.   07/11/2022  1   07/11/2022  Lorazepam 1 MG Tablet    12.00  30 Al Bau   [id number]   [facility] ([id number])   0/1  0.40 LME  [place]   10/27/2021  1   10/27/2021  Tramadol Hcl 50 MG Tablet    20.00  3 Br Oswaldo   [id number]   [facility] ([id number])   0/0  33.33 [place]   10/26/2021  1   05/10/2021  Gabapentin 300  MG Capsule    210.00  30 Br Oswaldo   [id number]   [facility] ([id number])   5/11   [place]   09/25/2021  1   05/10/2021  Gabapentin 300 MG Capsule    210.00  30 Br Oswaldo   [id number]   [facility] ([id number])   4/11   [place]       Past Medical/Surgical History:  Past Medical History:   Diagnosis Date     Abnormal Pap smear     see problem list     Allergic rhinitis      Anemia     iron infusions     Asthma     Advair, Albuterol     Cervical dysplasia     SUMI I, SUMI II, treated with Leep   later had ASCUS+HPV sev times 2009     Cervical high risk HPV (human papillomavirus) test positive     see problem list     Chlamydial cervicitis 10/2005, 3/2009    Treated both times and had neg JADEN both times     Diabetes mellitus (H)     GDDC with first pregnacy     Environmental allergies      Herpes simplex without mention of complication     valtrex at 36 weeks     History of chlamydia      Morbid obesity (H)      Postoperative hematoma involving genitourinary system 2014    large rectus sheath hematoma, after failed attempt at laparoscopy, hospitalized for pain control      Trichomonal vulvovaginitis 11/16/06     Vaginal discharge       has a past medical history of Abnormal Pap smear, Allergic rhinitis, Anemia, Asthma, Cervical dysplasia, Cervical high risk HPV (human papillomavirus) test positive, Chlamydial cervicitis (10/2005, 3/2009), Diabetes mellitus (H), Environmental allergies, Herpes simplex without mention of complication, History of chlamydia, Morbid obesity (H), Postoperative hematoma involving genitourinary system (2014), Trichomonal vulvovaginitis (11/16/06), and Vaginal discharge.    She has no past medical history of PONV (postoperative nausea and vomiting).    Social History:  Reviewed. No changes to social history except as noted above in HPI.    Vital Signs:   None. This is phone/video visit.     Labs:  Most recent laboratory results reviewed and no new labs.     Review of Systems:  10 systems (general, cardiovascular,  respiratory, eyes, ENT, endocrine, GI, , M/S, neurological) were reviewed. Most pertinent finding(s) is/are: denies fever, cough, headaches, shortness of breath, chest pain, N/V, constipation/diarrhea, trouble urinating, aches and pains. The remaining systems are all unremarkable.    Mental Status Examination (limited as this is by phone/video):  Appearance: Awake, alert, appears stated age, no acute distress, well-groomed   Attitude:  cooperative, pleasant   Motor: No gross abnormalities observed via video, not formally tested   Oriented to:  person, place, time, and situation  Attention Span and Concentration:  normal  Speech:  clear, coherent, regular rate, rhythm, and volume  Language: intact  Mood:  better  Affect:  appropriate and in normal range and mood congruent  Associations:  no loose associations  Thought Process:  logical, linear and goal oriented  Thought Content:  no evidence of suicidal ideation or homicidal ideation, no evidence of psychotic thought, no auditory hallucinations present and no visual hallucinations present  Recent and Remote Memory:  Intact to interview. Not formally assessed. No amnesia.  Fund of Knowledge: appropriate  Insight:  good  Judgment:  intact, adequate for safety  Impulse Control:  intact    Suicide Risk Assessment:  Today Crystal Rose reports no suicidal ideation. Based on all available evidence including the factors cited above, Crystal Rose does not appear to be at imminent risk for self-harm, does not meet criteria for a 72-hr hold, and therefore remains appropriate for ongoing outpatient level of care.  A thorough assessment of risk factors related to suicide and self-harm have been reviewed and are noted above. The patient convincingly denies suicidality on several occasions. Local community safety resources printed and reviewed for patient to use if needed. There was no deceit detected, and the patient presented in a manner that was believable.      DSM5 Diagnosis:  Major Depressive Disorder, Recurrent Episode, In Partial Remission  300.01 (F41.0) Panic Disorder  300.02 (F41.1) Generalized Anxiety Disorder   PTSD    Medical comorbidities include:   Patient Active Problem List    Diagnosis Date Noted     Moderate persistent asthma 02/09/2011     Priority: High     PTSD (post-traumatic stress disorder) 05/24/2021     Priority: Medium     Reaction to chronic stress 05/24/2021     Priority: Medium     GI bleed 10/16/2020     Priority: Medium     Obesity (BMI 30-39.9) 06/08/2020     Priority: Medium     Adjustment disorder with anxious mood 10/29/2019     Priority: Medium     Bilateral leg weakness 10/29/2019     Priority: Medium     Acute bilateral low back pain without sciatica 10/29/2019     Priority: Medium     Chronic allergic rhinitis 10/29/2019     Priority: Medium     Carbon monoxide exposure 10/29/2019     Priority: Medium     Postsurgical malabsorption 09/26/2018     Priority: Medium     Intertrigo 09/26/2018     Priority: Medium     Iron deficiency anemia following bariatric surgery 09/26/2018     Priority: Medium     Morbid obesity (H) 06/13/2018     Priority: Medium     Iron deficiency anemia due to chronic blood loss 06/13/2018     Priority: Medium     Menorrhagia with regular cycle 06/13/2018     Priority: Medium     Cervical dysplasia 04/05/2018     Priority: Medium     Age 19 yrs.  Cryotherapy? SUMI I, SUMI II, treated with Leep     3/2/08 ASCUS pap/+ HR HPV  3/12/09 ASCUS pap/+ HR HPV  7/25/11 ASCUS/+ HR HPV >> 10/19/11 Colpo: Bx-cervicitis, ECC-bening.   10/24/12 NIL pap/+ HR HPV  12/18/13 NIL pap/+ HR HPV >> 1/27/14 Colpo: Bx-SUMI 1  3/13/14 LEEP conization- SUMI 1  4/2/18 NIL pap/+ HR HPV 18. Plan: colposcopy by 7/2/18 with Dr. Pinedo  4/30/18 Colpo ECC-neg.  Dx NIL pap/Neg HPV.  Plan: Cotest in 1 year  06/12/19 Patient is lost to pap tracking follow-up.          Environmental allergies 01/24/2012     Priority: Medium     Abnormal Pap smear of  cervix 01/24/2012     Priority: Medium     Had Cryo/  Per patient age 19       Pernicious anemia 04/30/2010     Priority: Medium     S/P gastric bypass 10/23/2009     Priority: Medium     CARDIOVASCULAR SCREENING; LDL GOAL LESS THAN 160 10/31/2010     Priority: Low       Psychosocial & Contextual Factors: see HPI above    Assessment:  From Intake, 7/11/2022:  Crystal Rose is a 41-year-old female with past psychiatric history including anxiety, depression, PTSD who presents for psychiatric evaluation.  Patient also currently with symptoms of panic disorder and having 2-3 panic attacks weekly.  Patient symptoms currently nearly as severe as they were around 2019.  Patient not functioning very well at home or at work.  She has been off fluoxetine for just over 3 months due to running out of the medication and also due to sedation.  She has been on a couple other SSRIs and so we will move to an SNRI trial.  We will start venlafaxine.  Patient with history of bariatric surgery so we will use immediate release formulation.  We will continue buspirone but consider tapering off the medication if venlafaxine quite helpful.  We will also discontinue gabapentin and Abilify since patient is no longer taking.  Given the fact patient is having 2-3 panic attacks weekly and lorazepam has been historically very helpful, she will be given a limited quantity of tablets per month.  Hopefully we can continue to decrease the quantity given each month as she continues to improve on venlafaxine.  No historical or current chemical dependency concerns.  Risks and benefits discussed at length regarding treatment plan and benzodiazepine therapy.  Patient currently with no plans on becoming pregnant.    9/16/2022:  Patient overall with some good improvement of symptoms on venlafaxine.  Unfortunately some pretty significant dry mouth.  Dry mouth occurs pretty quickly after taking her dose.  She is agreeable to transitioning to extended  release formulation to see if this might be more helpful.  Patient does have a history of bariatric surgery and so we will have to watch for worsening symptoms due to poor absorption possibility.  Rare use of lorazepam or hydroxyzine.  No safety concerns.  No SI.  No problematic drug or alcohol use.    Medication side effects and alternatives were reviewed. Health promotion activities recommended and reviewed today. All questions addressed. Education and counseling completed regarding risks and benefits of medications and psychotherapy options. Recommend therapy for additional support.     Treatment Plan:    Continue BuSpar/buspirone 20 mg twice daily for anxiety and to augment venlafaxine ER/Effexor-XR.  Could consider tapering off in the future.    Continue hydroxyzine  mg every 6 hours as needed for anxiety, sleep.    Discontinue venlafaxine immediate release    Start venlafaxine ER/Effexor- mg daily for anxiety, panic disorder, mood.    Continue lorazepam 0.5-1 mg daily as needed for severe anxiety/panic attacks.  12 tabs to last 30 days.  No early refills.  Do not take daily.    Strongly recommend individual psychotherapy for additional support and ongoing development of nonpharmacologic coping skills and strategies.    Continue all other cares per primary care provider.     Continue all other medications as reviewed per electronic medical record today.     Safety plan reviewed. To the Emergency Department as needed or call after hours crisis line at [phone] or [phone]. [place] Crisis Text Line. Text [place] to 674870 or Suicide LifeLine Chat: suicidepreventionlifeline.org/chat    Schedule an appointment with me in 6 weeks or sooner as needed. Call [facility] at [phone] to schedule.    Follow up with primary care provider as planned or for acute medical concerns.    Call the psychiatric nurse line with medication questions or concerns at [phone].    MyChart may be  used to communicate with your provider, but this is not intended to be used for emergencies.    Risks of benzodiazepine (Ativan, Xanax, Klonopin, Valium, etc) use including, but not limited to, sedation, tolerance, risk for addiction/dependence. Do not drink alcohol while taking benzodiazepines due to risk of trouble breathing and potential death. Do not drive or operate heavy machinery until it is known how the drug affects you. Discuss with physician or pharmacist before ever taking a benzodiazepine with a narcotic/opioid pain medication.     Administrative Billing:   Phone Call/Video Duration: 11 Minutes  Start: 8:10a  Stop: 8:21a    Time spent with patient was 11 minutes and greater than 50% of time or 6 minutes was spent in counseling and coordination of care regarding above diagnoses and treatment plan.    Patient Status:  Patient will continue to be seen for ongoing consultation and stabilization.    Signed:   Calista Ramirez DO  [facility]    Disclaimer: This note consists of symbols derived from keyboarding, dictation and/or voice recognition software. As a result, there may be errors in the script that have gone undetected. Please consider this when interpreting information found in this chart.

## 2022-09-16 NOTE — Clinical Note
Please call this patient to get them scheduled for a follow-up visit in 6 weeks. Please schedule with me and the [facility]. Thanks!

## 2022-09-19 ASSESSMENT — ASTHMA QUESTIONNAIRES: ACT_TOTALSCORE: 12

## 2022-10-11 DIAGNOSIS — J45.40 MODERATE PERSISTENT ASTHMA WITHOUT COMPLICATION: ICD-10-CM

## 2022-10-11 RX ORDER — ALBUTEROL SULFATE 90 UG/1
AEROSOL, METERED RESPIRATORY (INHALATION)
Qty: 8.5 G | Refills: 0 | OUTPATIENT
Start: 2022-10-11

## 2022-10-12 RX ORDER — ALBUTEROL SULFATE 90 UG/1
AEROSOL, METERED RESPIRATORY (INHALATION)
Qty: 8.5 G | Refills: 0 | Status: SHIPPED | OUTPATIENT
Start: 2022-10-12 | End: 2022-11-22

## 2022-10-12 NOTE — TELEPHONE ENCOUNTER
Called patient and she states that she lost her inhaler on a hay ride this weekend and was needing another one. Please advise.  Cari Rollins MA  [facility]  2nd Floor  Primary Care

## 2022-10-13 NOTE — TELEPHONE ENCOUNTER
I received the form via fax. This will be faxed upon the provider's return on 6/22/2020. Sent patient a My Chart message regarding this.  Cari Rollins [facility]  2nd Floor  Primary Care    
Sent Patient a My Chart message to schedule her virtual follow up with Gregoria for next week.  Cari Rollins [facility]  2nd Floor  Primary Care    
Sent patient a My Chart response.  Cari Rollins MA  [facility]  2nd Floor  Primary Care    
patient

## 2022-10-30 ENCOUNTER — TELEPHONE (OUTPATIENT)
Dept: FAMILY MEDICINE | Facility: CLINIC | Age: 42
End: 2022-10-30

## 2022-10-30 NOTE — TELEPHONE ENCOUNTER
ARIPiprazole (ABILIFY) 2 MG tablet   Last Written Prescription Date:  10/27/2021  Last Fill Quantity: 60 tablet,   # refills: 11  Last Office Visit: 10/26/2021 ORESTES  Future Office visit:       Routing refill request to provider for review/approval because:  Drug not active on patient's medication list

## 2022-10-31 NOTE — TELEPHONE ENCOUNTER
Medication was discontinued 9/16 by patient's psychiatrist Dr. Ramirez.  Routing medication refill request to psychiatry team.   Ángela Yates RN  [facility]

## 2022-10-31 NOTE — TELEPHONE ENCOUNTER
Psych is not included in [facility] specialty refill pool    Li COYNE RN Specialty Triage 10/31/2022 10:25 AM

## 2022-11-04 ENCOUNTER — TELEPHONE (OUTPATIENT)
Dept: FAMILY MEDICINE | Facility: CLINIC | Age: 42
End: 2022-11-04

## 2022-11-04 NOTE — TELEPHONE ENCOUNTER
Med was discontinued by pt's psych provider. See telephone encounter 10/30.  Ángela Yates RN  [facility]

## 2022-11-10 NOTE — H&P
No significant change in general health status based on examination of the patient, review of Nursing Admission Database and prenatal record.    EFW: 8lb 8oz   38 6/7 week    /-2  arom   Clear fluid   ctx q 2-4  Cat 1 FHT  Spontaneous labor onset this am     Hydroxychloroquine Counseling:  I discussed with the patient that a baseline ophthalmologic exam is needed at the start of therapy and every year thereafter while on therapy. A CBC may also be warranted for monitoring.  The side effects of this medication were discussed with the patient, including but not limited to agranulocytosis, aplastic anemia, seizures, rashes, retinopathy, and liver toxicity. Patient instructed to call the office should any adverse effect occur.  The patient verbalized understanding of the proper use and possible adverse effects of Plaquenil.  All the patient's questions and concerns were addressed.

## 2022-11-19 ENCOUNTER — HEALTH MAINTENANCE LETTER (OUTPATIENT)
Age: 42
End: 2022-11-19

## 2022-11-21 DIAGNOSIS — J45.40 MODERATE PERSISTENT ASTHMA WITHOUT COMPLICATION: ICD-10-CM

## 2022-11-22 RX ORDER — ALBUTEROL SULFATE 90 UG/1
AEROSOL, METERED RESPIRATORY (INHALATION)
Qty: 8.5 G | Refills: 0 | Status: SHIPPED | OUTPATIENT
Start: 2022-11-22 | End: 2023-01-18

## 2022-11-30 DIAGNOSIS — F43.10 PTSD (POST-TRAUMATIC STRESS DISORDER): ICD-10-CM

## 2022-11-30 DIAGNOSIS — F41.0 PANIC DISORDER WITHOUT AGORAPHOBIA: ICD-10-CM

## 2022-11-30 DIAGNOSIS — F41.1 GAD (GENERALIZED ANXIETY DISORDER): ICD-10-CM

## 2022-11-30 RX ORDER — VENLAFAXINE HYDROCHLORIDE 150 MG/1
150 CAPSULE, EXTENDED RELEASE ORAL DAILY
Qty: 30 CAPSULE | Refills: 0 | Status: SHIPPED | OUTPATIENT
Start: 2022-11-30 | End: 2024-01-31

## 2022-11-30 NOTE — TELEPHONE ENCOUNTER
Date of Last Office Visit: 9/16/22  Date of Next Office Visit: None indicated.  RN attempted to phone this patient to discuss scheduling a appointment with Dr. Ramirez.  There was no answer.  RN LVM to call [phone].  RN sent this patient a LOOKCAST message indicating she needs to schedule a appointment with Dr. Ramirez.    No shows since last visit: no  Cancellations since last visit: no    Medication requested: venlafaxine (EFFEXOR XR) 150 MG 24 hr capsule Date last ordered: 9/16/22 Qty: 30 Refills: 1     Lapse in medication adherence greater than 5 days?: Unknown  If yes, call patient and gather details: RN attempted to phone this patient to discuss lapse in care and scheduling a return appointment with Dr. Ramirez.  There was no answer.  RN LVM for patient to call [phone].  Awaiting call back.    Medication refill request verified as identical to current order?: yes  Result of Last DAM, VPA, Li+ Level, CBC, or Carbamazepine Level (at or since last visit): N/A    Last visit treatment plan: Treatment Plan:    Continue BuSpar/buspirone 20 mg twice daily for anxiety and to augment venlafaxine ER/Effexor-XR.  Could consider tapering off in the future.    Continue hydroxyzine  mg every 6 hours as needed for anxiety, sleep.    Discontinue venlafaxine immediate release    Start venlafaxine ER/Effexor- mg daily for anxiety, panic disorder, mood.    Continue lorazepam 0.5-1 mg daily as needed for severe anxiety/panic attacks.  12 tabs to last 30 days.  No early refills.  Do not take daily.    Strongly recommend individual psychotherapy for additional support and ongoing development of nonpharmacologic coping skills and strategies.    Continue all other cares per primary care provider.     Continue all other medications as reviewed per electronic medical record today.     Safety plan reviewed. To the Emergency Department as needed or call after hours crisis line at [phone] or [phone]. [place]  Crisis Text Line. Text [place] to 153432 or Suicide LifeLine Chat: suicidepreventionlifeline.org/chat    Schedule an appointment with me in 6 weeks or sooner as needed. Call [facility] at [phone] to schedule.    Follow up with primary care provider as planned or for acute medical concerns.    Call the psychiatric nurse line with medication questions or concerns at [phone].  MyChart may be used to communicate with your provider, but this is not intended to be used for emergencies.    []Medication refilled per  Medication Refill in Ambulatory Care  policy.  [x]Medication unable to be refilled by RN due to criteria not met as indicated below:    []Eligibility - not seen in the last year   []Supervision - no future appointment   []Compliance - no shows, cancellations or lapse in therapy   []Verification - order discrepancy   []Controlled medication   [x]Medication not included in policy   []90-day supply request   []Other

## 2023-01-17 DIAGNOSIS — J45.40 MODERATE PERSISTENT ASTHMA WITHOUT COMPLICATION: ICD-10-CM

## 2023-01-18 RX ORDER — ALBUTEROL SULFATE 90 UG/1
AEROSOL, METERED RESPIRATORY (INHALATION)
Qty: 8.5 G | Refills: 0 | Status: SHIPPED | OUTPATIENT
Start: 2023-01-18 | End: 2023-12-13

## 2023-02-15 ENCOUNTER — TELEPHONE (OUTPATIENT)
Dept: PSYCHIATRY | Facility: CLINIC | Age: 43
End: 2023-02-15
Payer: COMMERCIAL

## 2023-02-15 NOTE — TELEPHONE ENCOUNTER
RN received a refill request from [facility] for venlafaxine (EFFEXOR XR) 150 MG 24 hr capsule . This refill request was faxed DENIED back to [facility] for the following reasons:    1.  Dr. Ramirez stated pt needs appt for refills.    venlafaxine (EFFEXOR XR) 150 MG 24 hr capsule 30 capsule 0 11/30/2022  No   Sig - Route: Take 1 capsule (150 mg) by mouth daily Need appt for refills. - Oral   Sent to pharmacy as: Venlafaxine HCl  MG Oral Capsule Extended Release 24 Hour (EFFEXOR XR)        2.  When refilled on 11/30/22, LiveLoop message was sent to pt letting her know Dr. Ramirez wanted a f/u and that RN needed information on how pt was taking medications.   Pt did not respond to LiveLoop message or schedule a f/u appt.    Dayday Rojas.       I hope you are doing well.       1.  I received a refill request from [facility] in [place] for Venlafaxine 150 mg ER Capsules.    2. I have sent a refill request to Dr. Ramirez for this medication for her to review.    3. Dr. Ramirez indicated at your last visit that she wanted to have a follow up appointment with you.    4. Please call the [facility] schedulers to schedule this future return visit ASAP. This number is [phone]. Dr. Ramirez may need to see that you have a future appointment scheduled before refilling the Venlafaxine.    5. In addition, can you please message me back if you have had any lapse in taking your Venlafaxine.  In other words have you been out of the medication, and if so for how long.  Please also let me know when your last dose was.                 Thanks so much and I look forward to hearing from you.  Royal ESPINOSA RN  [phone]    Rashaun Matamoros RN on 2/15/2023 at 11:18 AM

## 2023-02-21 DIAGNOSIS — J45.40 MODERATE PERSISTENT ASTHMA WITHOUT COMPLICATION: ICD-10-CM

## 2023-02-22 RX ORDER — ALBUTEROL SULFATE 90 UG/1
AEROSOL, METERED RESPIRATORY (INHALATION)
Qty: 8.5 G | Refills: 0 | OUTPATIENT
Start: 2023-02-22

## 2023-04-09 ENCOUNTER — HEALTH MAINTENANCE LETTER (OUTPATIENT)
Age: 43
End: 2023-04-09

## 2023-04-10 ENCOUNTER — TRANSFERRED RECORDS (OUTPATIENT)
Dept: MULTI SPECIALTY CLINIC | Facility: CLINIC | Age: 43
End: 2023-04-10

## 2023-04-10 LAB
HPV ABSTRACT: NORMAL
PAP-ABSTRACT: NORMAL

## 2023-05-10 ENCOUNTER — MYC REFILL (OUTPATIENT)
Dept: PSYCHIATRY | Facility: CLINIC | Age: 43
End: 2023-05-10
Payer: COMMERCIAL

## 2023-05-10 DIAGNOSIS — F41.0 PANIC DISORDER WITHOUT AGORAPHOBIA: ICD-10-CM

## 2023-05-10 NOTE — TELEPHONE ENCOUNTER
GENTRY received faxed refill request for LORazepam (ATIVAN) 1 MG tablet. Per chart review pt was last seen 09/16/2022 this medication was last filled 10/11/2022.     Patient does not have a follow-up appointment schedule was advised to come back in 6 weeks which was around 10/28/2022.     Routing to RN to deny refill     Kae Cantu CMA May 10, 2023 11:08 AM

## 2023-05-11 RX ORDER — LORAZEPAM 1 MG/1
.5-1 TABLET ORAL DAILY PRN
Qty: 12 TABLET | Refills: 1 | OUTPATIENT
Start: 2023-05-11

## 2023-06-15 ENCOUNTER — TELEPHONE (OUTPATIENT)
Dept: FAMILY MEDICINE | Facility: CLINIC | Age: 43
End: 2023-06-15
Payer: COMMERCIAL

## 2023-06-15 NOTE — TELEPHONE ENCOUNTER
Patient Quality Outreach    Patient is due for the following:   Asthma  -  AAP  Depression  -  PHQ-9 needed and DAP  Physical Preventive Adult Physical    Next Steps:   Schedule a Adult Preventative    Type of outreach:    Sent [employer] message.    Next Steps:  Reach out within 90 days via Phone.    Max number of attempts reached: No. Will try again in 90 days if patient still on fail list.    Questions for provider review:    None           Aida Escobar MA

## 2023-07-28 NOTE — ADDENDUM NOTE
Addended by: AURA CARLISLE on: 10/28/2020 11:33 AM     Modules accepted: Orders     Pt does not have a dc date per EDI Meehan.

## 2023-11-02 DIAGNOSIS — F43.10 PTSD (POST-TRAUMATIC STRESS DISORDER): ICD-10-CM

## 2023-11-02 DIAGNOSIS — F43.22 ADJUSTMENT DISORDER WITH ANXIOUS MOOD: ICD-10-CM

## 2023-11-03 RX ORDER — HYDROXYZINE HYDROCHLORIDE 25 MG/1
TABLET, FILM COATED ORAL
Qty: 120 TABLET | Refills: 2 | OUTPATIENT
Start: 2023-11-03

## 2023-11-08 ASSESSMENT — ANXIETY QUESTIONNAIRES
4. TROUBLE RELAXING: MORE THAN HALF THE DAYS
3. WORRYING TOO MUCH ABOUT DIFFERENT THINGS: MORE THAN HALF THE DAYS
2. NOT BEING ABLE TO STOP OR CONTROL WORRYING: NEARLY EVERY DAY
1. FEELING NERVOUS, ANXIOUS, OR ON EDGE: NEARLY EVERY DAY
7. FEELING AFRAID AS IF SOMETHING AWFUL MIGHT HAPPEN: MORE THAN HALF THE DAYS
5. BEING SO RESTLESS THAT IT IS HARD TO SIT STILL: MORE THAN HALF THE DAYS
IF YOU CHECKED OFF ANY PROBLEMS ON THIS QUESTIONNAIRE, HOW DIFFICULT HAVE THESE PROBLEMS MADE IT FOR YOU TO DO YOUR WORK, TAKE CARE OF THINGS AT HOME, OR GET ALONG WITH OTHER PEOPLE: VERY DIFFICULT
6. BECOMING EASILY ANNOYED OR IRRITABLE: MORE THAN HALF THE DAYS
GAD7 TOTAL SCORE: 16
GAD7 TOTAL SCORE: 16

## 2023-11-08 ASSESSMENT — PATIENT HEALTH QUESTIONNAIRE - PHQ9
SUM OF ALL RESPONSES TO PHQ QUESTIONS 1-9: 15
10. IF YOU CHECKED OFF ANY PROBLEMS, HOW DIFFICULT HAVE THESE PROBLEMS MADE IT FOR YOU TO DO YOUR WORK, TAKE CARE OF THINGS AT HOME, OR GET ALONG WITH OTHER PEOPLE: VERY DIFFICULT
SUM OF ALL RESPONSES TO PHQ QUESTIONS 1-9: 15

## 2023-11-08 ASSESSMENT — ASTHMA QUESTIONNAIRES: ACT_TOTALSCORE: 12

## 2023-11-09 ENCOUNTER — VIRTUAL VISIT (OUTPATIENT)
Dept: FAMILY MEDICINE | Facility: CLINIC | Age: 43
End: 2023-11-09
Payer: COMMERCIAL

## 2023-11-09 DIAGNOSIS — F43.22 ADJUSTMENT DISORDER WITH ANXIOUS MOOD: ICD-10-CM

## 2023-11-09 DIAGNOSIS — E66.01 MORBID OBESITY (H): ICD-10-CM

## 2023-11-09 DIAGNOSIS — F41.1 GAD (GENERALIZED ANXIETY DISORDER): Primary | ICD-10-CM

## 2023-11-09 DIAGNOSIS — F43.10 PTSD (POST-TRAUMATIC STRESS DISORDER): ICD-10-CM

## 2023-11-09 PROCEDURE — 99214 OFFICE O/P EST MOD 30 MIN: CPT | Mod: 95 | Performed by: PREVENTIVE MEDICINE

## 2023-11-09 RX ORDER — HYDROXYZINE HYDROCHLORIDE 25 MG/1
25-100 TABLET, FILM COATED ORAL EVERY 6 HOURS PRN
Qty: 120 TABLET | Refills: 2 | Status: SHIPPED | OUTPATIENT
Start: 2023-11-09 | End: 2024-05-03

## 2023-11-09 RX ORDER — BUSPIRONE HYDROCHLORIDE 15 MG/1
15 TABLET ORAL 3 TIMES DAILY
Qty: 90 TABLET | Refills: 1 | Status: SHIPPED | OUTPATIENT
Start: 2023-11-09 | End: 2023-12-13

## 2023-11-09 RX ORDER — VENLAFAXINE HYDROCHLORIDE 75 MG/1
75 CAPSULE, EXTENDED RELEASE ORAL DAILY
Qty: 30 CAPSULE | Refills: 1 | Status: SHIPPED | OUTPATIENT
Start: 2023-11-09 | End: 2023-12-13

## 2023-11-09 RX ORDER — TIRZEPATIDE 2.5 MG/.5ML
2.5 INJECTION, SOLUTION SUBCUTANEOUS
Qty: 2 ML | Refills: 1 | Status: SHIPPED | OUTPATIENT
Start: 2023-11-09 | End: 2023-12-13

## 2023-11-09 NOTE — PATIENT INSTRUCTIONS
Referral Details    Referred By  Referred To   Reyna Adam MD   [address] [facility] [address]   Phone: [phone]   Fax: [phone]    Diagnoses: TERESA (generalized anxiety disorder)   Adjustment disorder with anxious mood   PTSD (post-traumatic stress disorder)   Order: Adult Mental Health  Referral       Comment: Please be aware that coverage of these services is subject to the terms and limitations of your health insurance plan.  Call member services at your health plan with any benefit or coverage questions.    [employer] will call you to coordinate your care as prescribed by your provider. If you don't hear from a representative within 2 business days, please call [phone].           Reyna Adam MD   [address] [facility] [address]   Phone: [phone]   Fax: [phone]    Diagnoses: TERESA (generalized anxiety disorder)   Adjustment disorder with anxious mood   PTSD (post-traumatic stress disorder)   Order: Adult Mental Health  Referral       Comment: Please be aware that coverage of these services is subject to the terms and limitations of your health insurance plan.  Call member services at your health plan with any benefit or coverage questions.    [employer] will call you to coordinate your care as prescribed by your provider. If you don't hear from a representative within 2 business days, please call [phone].

## 2023-11-09 NOTE — PROGRESS NOTES
Instructions Relayed to Patient by Virtual Roomer:       Reminded patient to ensure they were logged on to virtual visit by arrival time listed. Documented in appointment notes if patient had flexibility to initiate visit sooner than arrival time. If pediatric virtual visit, ensured pediatric patient along with parent/guardian will be present for video visit.     Patient offered the website [email or web address] and/or phone number to [facility] Help line: [phone]  Crystal is a 43 year old who is being evaluated via a billable telephone visit.      What phone number would you like to be contacted at? [phone]  How would you like to obtain your AVS? [facility]    Distant Location (provider location):  Off-site    Assessment & Plan     TERESA (generalized anxiety disorder)  -was being followed by Psychiatry and did not schedule follow up with them as they had requested, hence refill requests were denied by Psychiatry. Now patient wanting refills from primary care.   -needs to re-establish with Psychiatry   -short term refills provided  -referral for counseling and medication management placed   - REVIEW OF HEALTH MAINTENANCE PROTOCOL ORDERS  - Adult Mental Health  Referral  - venlafaxine (EFFEXOR XR) 75 MG 24 hr capsule  Dispense: 30 capsule; Refill: 1  - Adult Mental Health  Referral    Adjustment disorder with anxious mood  - Adult Mental Health [facility] Referral  - venlafaxine (EFFEXOR XR) 75 MG 24 hr capsule  Dispense: 30 capsule; Refill: 1  - hydrOXYzine (ATARAX) 25 MG tablet  Dispense: 120 tablet; Refill: 2  - busPIRone (BUSPAR) 15 MG tablet  Dispense: 90 tablet; Refill: 1  - Adult Mental Health  Referral    PTSD (post-traumatic stress disorder)  - Adult Mental Health  Referral  - hydrOXYzine (ATARAX) 25 MG tablet  Dispense: 120 tablet; Refill: 2  - busPIRone (BUSPAR) 15 MG tablet  Dispense: 90 tablet; Refill: 1  - Adult Mental Health   Referral    Morbid obesity (H)  -patient states she checked with her insurance and Mounjaro is covered for weight loss  - tirzepatide (MOUNJARO) 2.5 MG/0.5ML pen  Dispense: 2 mL; Refill: 1  -no personal or family history of medullary thyroid cancer or pancreatitis  -has had weight loss surgery in the past, but has gained weight again  -GI side effects reviewed, nausea, emesis, bloating   -patient expressed comprehension that these are CHRONIC lifelong medications for weight loss. If she loses insurance or stops the medication, she will regain the weight.  She understands that she will need to inject herself weekly lifelong.   Common side effects of medications prescribed at this visit were discussed with the patient. Severe side effects, including current applicable black box warnings, were discussed.         Prescription drug management  35 minutes spent by me on the date of the encounter doing chart review, history and exam, documentation and further activities per the note       Depression Screening Follow Up        11/8/2023     2:39 PM   PHQ   PHQ-9 Total Score 15   Q9: Thoughts of better off dead/self-harm past 2 weeks Several days   F/U: Thoughts of suicide or self-harm Yes   F/U: Self harm-plan No   F/U: Self-harm action No   F/U: Safety concerns No       Follow Up Actions Taken  Crisis resource information provided in the After Visit Summary  Referred patient back to mental health provider    Discussed the following ways the patient can remain in a safe environment:  be around others    Reyna Adam MD MPH    [facility]    Jamarcus Rojas is a 43 year old, presenting for the following health issues:  Anxiety and Depression        11/9/2023    10:25 AM   Additional Questions   Roomed by zeyad   Accompanied by self     Used to be managed by Psychiatry Dr. Ramirez, seems has not followed up with them as recommended.  Has been out of medication since 5/23  Has had some  difficulties with scheduling       History of Present Illness       Mental Health Follow-up:  Patient presents to follow-up on Depression & Anxiety.Patient's depression since last visit has been:  Bad  The patient is having other symptoms associated with depression.  Patient's anxiety since last visit has been:  Bad  The patient is having other symptoms associated with anxiety.  Any significant life events: relationship concerns, job concerns and grief or loss  Patient is feeling anxious or having panic attacks.  Patient has no concerns about alcohol or drug use.    Reason for visit:  Needs meds and referal help    She eats 2-3 servings of fruits and vegetables daily.She consumes 0 sweetened beverage(s) daily.She exercises with enough effort to increase her heart rate 30 to 60 minutes per day.  She exercises with enough effort to increase her heart rate 3 or less days per week. She is missing 7 dose(s) of medications per week.  She is not taking prescribed medications regularly due to other.         1/10/2022     4:48 PM 7/11/2022    12:59 PM 11/8/2023     2:39 PM   PHQ   PHQ-9 Total Score 11 13 15   Q9: Thoughts of better off dead/self-harm past 2 weeks Not at all Not at all Several days   F/U: Thoughts of suicide or self-harm   Yes   F/U: Self harm-plan   No   F/U: Self-harm action   No   F/U: Safety concerns   No         7/6/2022     8:59 AM 9/16/2022     7:57 AM 11/8/2023     2:42 PM   TERESA-7 SCORE   Total Score 10 (moderate anxiety) 9 (mild anxiety) 16 (severe anxiety)   Total Score 10 9 16       No plans for self harm   Pt would like a referral for a therapist and back into psychiatry     Review of Systems   Constitutional, HEENT, cardiovascular, pulmonary, gi and gu systems are negative, except as otherwise noted.      Objective           Vitals:  No vitals were obtained today due to virtual visit.    Physical Exam   healthy, alert, and no distress  PSYCH: Alert and oriented times 3; coherent speech, normal    rate and volume, able to articulate logical thoughts, able   to abstract reason, no tangential thoughts, no hallucinations   or delusions  Her affect is normal  RESP: No cough, no audible wheezing, able to talk in full sentences  Remainder of exam unable to be completed due to telephone visits      Phone call duration: 15 minutes

## 2024-01-11 ENCOUNTER — TELEPHONE (OUTPATIENT)
Dept: FAMILY MEDICINE | Facility: CLINIC | Age: 44
End: 2024-01-11
Payer: COMMERCIAL

## 2024-01-11 DIAGNOSIS — J45.40 MODERATE PERSISTENT ASTHMA WITHOUT COMPLICATION: ICD-10-CM

## 2024-01-11 RX ORDER — ALBUTEROL SULFATE 0.83 MG/ML
SOLUTION RESPIRATORY (INHALATION)
Qty: 75 ML | Refills: 0 | Status: SHIPPED | OUTPATIENT
Start: 2024-01-11 | End: 2024-03-05

## 2024-01-11 NOTE — TELEPHONE ENCOUNTER
Patient Quality Outreach    Patient is due for the following:   Asthma  -  ACT needed      Topic Date Due    Pneumococcal Vaccine (2 of 2 - PCV) 11/04/2014    Flu Vaccine (1) 09/01/2023    COVID-19 Vaccine (4 - 2023-24 season) 09/01/2023       Next Steps:   Schedule a office visit for asthma plan    Type of outreach:    Sent [employer] message.    Next Steps:  Reach out within 90 days via [employer].    Max number of attempts reached: Yes. Will try again in 90 days if patient still on fail list.    Questions for provider review:    None           Aysha Dc MA

## 2024-01-29 ENCOUNTER — MYC REFILL (OUTPATIENT)
Dept: FAMILY MEDICINE | Facility: CLINIC | Age: 44
End: 2024-01-29
Payer: COMMERCIAL

## 2024-01-29 DIAGNOSIS — F41.1 GAD (GENERALIZED ANXIETY DISORDER): ICD-10-CM

## 2024-01-29 DIAGNOSIS — E66.01 MORBID OBESITY (H): ICD-10-CM

## 2024-01-29 DIAGNOSIS — F43.22 ADJUSTMENT DISORDER WITH ANXIOUS MOOD: ICD-10-CM

## 2024-01-29 RX ORDER — VENLAFAXINE HYDROCHLORIDE 75 MG/1
75 CAPSULE, EXTENDED RELEASE ORAL DAILY
Qty: 90 CAPSULE | Refills: 0 | Status: SHIPPED | OUTPATIENT
Start: 2024-01-29 | End: 2024-10-02 | Stop reason: DRUGHIGH

## 2024-01-29 RX ORDER — TIRZEPATIDE 2.5 MG/.5ML
2.5 INJECTION, SOLUTION SUBCUTANEOUS
Qty: 2 ML | Refills: 1 | Status: SHIPPED | OUTPATIENT
Start: 2024-01-29 | End: 2024-01-31 | Stop reason: DRUGHIGH

## 2024-01-30 RX ORDER — TIRZEPATIDE 2.5 MG/.5ML
2.5 INJECTION, SOLUTION SUBCUTANEOUS
Qty: 2 ML | Refills: 1 | OUTPATIENT
Start: 2024-01-30

## 2024-01-30 NOTE — TELEPHONE ENCOUNTER
Please schedule a visit, Virtual OK, in order to titrate up, she needs to follow up. Thank you, Reyna Adam MD MPH

## 2024-01-31 ENCOUNTER — VIRTUAL VISIT (OUTPATIENT)
Dept: FAMILY MEDICINE | Facility: CLINIC | Age: 44
End: 2024-01-31
Payer: COMMERCIAL

## 2024-01-31 DIAGNOSIS — Z12.31 VISIT FOR SCREENING MAMMOGRAM: ICD-10-CM

## 2024-01-31 DIAGNOSIS — F41.1 GAD (GENERALIZED ANXIETY DISORDER): Primary | ICD-10-CM

## 2024-01-31 DIAGNOSIS — J45.40 MODERATE PERSISTENT ASTHMA WITHOUT COMPLICATION: ICD-10-CM

## 2024-01-31 DIAGNOSIS — E66.812 OBESITY, CLASS II, BMI 35-39.9, ISOLATED (SEE ACTUAL BMI): ICD-10-CM

## 2024-01-31 DIAGNOSIS — F41.0 PANIC DISORDER WITHOUT AGORAPHOBIA: ICD-10-CM

## 2024-01-31 DIAGNOSIS — F43.22 ADJUSTMENT DISORDER WITH ANXIOUS MOOD: ICD-10-CM

## 2024-01-31 DIAGNOSIS — F43.10 PTSD (POST-TRAUMATIC STRESS DISORDER): ICD-10-CM

## 2024-01-31 PROCEDURE — 99441 PR PHYSICIAN TELEPHONE EVALUATION 5-10 MIN: CPT | Mod: 93 | Performed by: PREVENTIVE MEDICINE

## 2024-01-31 RX ORDER — FLUTICASONE PROPIONATE AND SALMETEROL 500; 50 UG/1; UG/1
1 POWDER RESPIRATORY (INHALATION) 2 TIMES DAILY
Qty: 60 EACH | Refills: 1 | Status: SHIPPED | OUTPATIENT
Start: 2024-01-31 | End: 2024-03-26

## 2024-01-31 RX ORDER — ALBUTEROL SULFATE 90 UG/1
AEROSOL, METERED RESPIRATORY (INHALATION)
Qty: 18 G | Refills: 0 | Status: SHIPPED | OUTPATIENT
Start: 2024-01-31 | End: 2024-03-05

## 2024-01-31 RX ORDER — VENLAFAXINE HYDROCHLORIDE 150 MG/1
150 CAPSULE, EXTENDED RELEASE ORAL DAILY
Qty: 30 CAPSULE | Refills: 0 | Status: SHIPPED | OUTPATIENT
Start: 2024-01-31 | End: 2024-02-26

## 2024-01-31 ASSESSMENT — ASTHMA QUESTIONNAIRES
ACT_TOTALSCORE: 11
QUESTION_4 LAST FOUR WEEKS HOW OFTEN HAVE YOU USED YOUR RESCUE INHALER OR NEBULIZER MEDICATION (SUCH AS ALBUTEROL): ONE OR TWO TIMES PER DAY
ACT_TOTALSCORE: 11
QUESTION_3 LAST FOUR WEEKS HOW OFTEN DID YOUR ASTHMA SYMPTOMS (WHEEZING, COUGHING, SHORTNESS OF BREATH, CHEST TIGHTNESS OR PAIN) WAKE YOU UP AT NIGHT OR EARLIER THAN USUAL IN THE MORNING: TWO OR THREE NIGHTS A WEEK
QUESTION_5 LAST FOUR WEEKS HOW WOULD YOU RATE YOUR ASTHMA CONTROL: SOMEWHAT CONTROLLED
QUESTION_2 LAST FOUR WEEKS HOW OFTEN HAVE YOU HAD SHORTNESS OF BREATH: MORE THAN ONCE A DAY
QUESTION_1 LAST FOUR WEEKS HOW MUCH OF THE TIME DID YOUR ASTHMA KEEP YOU FROM GETTING AS MUCH DONE AT WORK, SCHOOL OR AT HOME: SOME OF THE TIME

## 2024-01-31 ASSESSMENT — PATIENT HEALTH QUESTIONNAIRE - PHQ9
10. IF YOU CHECKED OFF ANY PROBLEMS, HOW DIFFICULT HAVE THESE PROBLEMS MADE IT FOR YOU TO DO YOUR WORK, TAKE CARE OF THINGS AT HOME, OR GET ALONG WITH OTHER PEOPLE: SOMEWHAT DIFFICULT
SUM OF ALL RESPONSES TO PHQ QUESTIONS 1-9: 10
SUM OF ALL RESPONSES TO PHQ QUESTIONS 1-9: 10

## 2024-01-31 ASSESSMENT — ANXIETY QUESTIONNAIRES
1. FEELING NERVOUS, ANXIOUS, OR ON EDGE: SEVERAL DAYS
GAD7 TOTAL SCORE: 8
6. BECOMING EASILY ANNOYED OR IRRITABLE: NEARLY EVERY DAY
4. TROUBLE RELAXING: SEVERAL DAYS
8. IF YOU CHECKED OFF ANY PROBLEMS, HOW DIFFICULT HAVE THESE MADE IT FOR YOU TO DO YOUR WORK, TAKE CARE OF THINGS AT HOME, OR GET ALONG WITH OTHER PEOPLE?: VERY DIFFICULT
GAD7 TOTAL SCORE: 8
IF YOU CHECKED OFF ANY PROBLEMS ON THIS QUESTIONNAIRE, HOW DIFFICULT HAVE THESE PROBLEMS MADE IT FOR YOU TO DO YOUR WORK, TAKE CARE OF THINGS AT HOME, OR GET ALONG WITH OTHER PEOPLE: VERY DIFFICULT
5. BEING SO RESTLESS THAT IT IS HARD TO SIT STILL: NOT AT ALL
2. NOT BEING ABLE TO STOP OR CONTROL WORRYING: SEVERAL DAYS
3. WORRYING TOO MUCH ABOUT DIFFERENT THINGS: SEVERAL DAYS
7. FEELING AFRAID AS IF SOMETHING AWFUL MIGHT HAPPEN: SEVERAL DAYS
7. FEELING AFRAID AS IF SOMETHING AWFUL MIGHT HAPPEN: SEVERAL DAYS

## 2024-01-31 ASSESSMENT — ENCOUNTER SYMPTOMS: NERVOUS/ANXIOUS: 1

## 2024-01-31 NOTE — PROGRESS NOTES
Crystal is a 43 year old who is being evaluated via a billable telephone visit.      What phone number would you like to be contacted at? [phone]  How would you like to obtain your AVS? Teresa    Distant Location (provider location):  On-site    Assessment & Plan     TERESA (generalized anxiety disorder)  -Needs to reestablish with psychiatry and start counseling  - Adult Mental Health  Referral  - Adult Mental Health  Referral  - venlafaxine (EFFEXOR XR) 150 MG 24 hr capsule  Dispense: 30 capsule; Refill: 0    Visit for screening mammogram  - MA SCREENING DIGITAL BILAT - Future  (s+30)    Adjustment disorder with anxious mood  - Adult Mental Health  Referral  - Adult Mental Health  Referral    PTSD (post-traumatic stress disorder)  - Adult Mental Health  Referral  - Adult Mental Health  Referral  - venlafaxine (EFFEXOR XR) 150 MG 24 hr capsule  Dispense: 30 capsule; Refill: 0    Panic disorder without agoraphobia  -Refills on medication provided  - venlafaxine (EFFEXOR XR) 150 MG 24 hr capsule  Dispense: 30 capsule; Refill: 0    Moderate persistent asthma without complication  -Has been out of inhalers for over a week, this would account for low ACT score today  - fluticasone-salmeterol (ADVAIR) 500-50 MCG/ACT inhaler  Dispense: 60 each; Refill: 1  - albuterol (PROAIR HFA/PROVENTIL HFA/VENTOLIN HFA) 108 (90 Base) MCG/ACT inhaler  Dispense: 18 g; Refill: 0    Obesity, Class II, BMI 35-39.9, isolated (see actual BMI)  -Has had approximately 20 pounds of weight loss  -Has been tolerating the medication without side effect such as nausea, vomiting, abdominal bloating.  -Increased dose from 2.5 mg to 5 mg weekly  -Follow-up in 3 months, assess if further titration needs to be done  - tirzepatide-Weight Management (ZEPBOUND) 5 MG/0.5ML prefilled pen  Dispense: 6 mL; Refill: 0      Prescription drug management  30 minutes spent by me on the date of the encounter  doing chart review, history and exam, documentation and further activities per the note      Depression Screening Follow Up        1/31/2024     4:43 PM   PHQ   PHQ-9 Total Score 10   Q9: Thoughts of better off dead/self-harm past 2 weeks Not at all       Follow Up Actions Taken  Crisis resource information provided in After Visit Summary  Mental Health Referral placed       Jamarcus Rojas is a 43 year old, presenting for the following health issues:  Recheck Medication, Asthma, and Anxiety        1/31/2024     4:45 PM   Additional Questions   Roomed by Te STEINBERG     History of Present Illness     Asthma:  She presents for follow up of asthma.  She has no cough, no wheezing, and no shortness of breath.  She is using a relief medication 2-3 times per day. She does not miss any doses of her controller medication throughout the week. Patient is aware of the following triggers: same as previous visit and cold air. The patient has not had a visit to the Emergency Room, Urgent Care or Hospital due to asthma since the last clinic visit.     Mental Health Follow-up:  Patient presents to follow-up on Anxiety.    Patient's anxiety since last visit has been:  Medium  The patient is having other symptoms associated with anxiety.  Any significant life events: other  Patient is feeling anxious or having panic attacks.  Patient has no concerns about alcohol or drug use.    She eats 2-3 servings of fruits and vegetables daily.She consumes 0 sweetened beverage(s) daily.She exercises with enough effort to increase her heart rate 30 to 60 minutes per day.  She exercises with enough effort to increase her heart rate 4 days per week.   She is taking medications regularly.     Patient used to be managed by psychiatry for her mental health needs.  Patient had missed several appointments and as a result psychiatry had declined to refill her medications further.  As result patient scheduled with primary care and expected refills to  be managed by primary care.  I had provided refills last time with the understanding that she would be re establishing with psychiatry, however, I do not see that the patient has made any appointments with psychiatry.    No side effects of medication  Family stressors  Needs to re establish with a therapist  Turn over at Psychiatry clinic and was not able to schedule     Current weight: 2 weeks ago was at 261 pounds. Back in November was in the 280s     No abdominal pain  No bloating  No nausea  No emesis  No bowel changes  No exercise, does some walking 2 days a week at work.     Has been without inhalers for about a week.   Has a neb machine at home             Objective           Vitals:  No vitals were obtained today due to virtual visit.    Physical Exam   General: Alert and no distress //Respiratory: No audible wheeze, cough, or shortness of breath // Psychiatric:  Appropriate affect, tone, and pace of words      No results found for this or any previous visit (from the past 24 hour(s)).      Phone call duration: 10 minutes  Signed Electronically by: Reyna Adam MD MPH

## 2024-02-06 ENCOUNTER — MYC REFILL (OUTPATIENT)
Dept: FAMILY MEDICINE | Facility: CLINIC | Age: 44
End: 2024-02-06
Payer: COMMERCIAL

## 2024-02-06 DIAGNOSIS — E66.812 OBESITY, CLASS II, BMI 35-39.9, ISOLATED (SEE ACTUAL BMI): ICD-10-CM

## 2024-02-08 NOTE — TELEPHONE ENCOUNTER
Mounjaro is the brand name for diabetes treatment. Zepbound is what is prescribed for weight loss. Did the patient confirm with her insurance that the medication is covered for weight loss and not for management of diabetes? Most insurance plans cover for diabetes and NOT for weight loss.   Prescription was sent to [facility] on 1/31/24. I resent it.     Thank you,  Reyna Adam MD MPH

## 2024-02-12 RX ORDER — TIRZEPATIDE 5 MG/.5ML
5 INJECTION, SOLUTION SUBCUTANEOUS
Qty: 6 ML | Refills: 0 | Status: SHIPPED | OUTPATIENT
Start: 2024-02-12 | End: 2024-05-03 | Stop reason: DRUGHIGH

## 2024-02-12 NOTE — TELEPHONE ENCOUNTER
Sent script for Mounjaro. Like I have repeatedly said, Mounjaro is for DIABETES.    Thank you,  Reyna Adam MD MPH

## 2024-02-26 DIAGNOSIS — F41.1 GAD (GENERALIZED ANXIETY DISORDER): ICD-10-CM

## 2024-02-26 DIAGNOSIS — F43.10 PTSD (POST-TRAUMATIC STRESS DISORDER): ICD-10-CM

## 2024-02-26 DIAGNOSIS — F41.0 PANIC DISORDER WITHOUT AGORAPHOBIA: ICD-10-CM

## 2024-02-26 RX ORDER — VENLAFAXINE HYDROCHLORIDE 150 MG/1
150 CAPSULE, EXTENDED RELEASE ORAL DAILY
Qty: 30 CAPSULE | Refills: 0 | Status: SHIPPED | OUTPATIENT
Start: 2024-02-26 | End: 2024-05-03

## 2024-02-26 NOTE — TELEPHONE ENCOUNTER
30 day refill provided. Patient was supposed to establish with Psychiatry. Thank you, Reyna Adam MD MPH

## 2024-02-26 NOTE — TELEPHONE ENCOUNTER
Called and spoke to the patient and gave her Dr Adam's message. Patient understands. Patient asked for the # to call and I gave her the number on the referral order.  Cari Rollins [facility]   Primary Care

## 2024-03-01 DIAGNOSIS — J45.40 MODERATE PERSISTENT ASTHMA WITHOUT COMPLICATION: ICD-10-CM

## 2024-03-04 ENCOUNTER — MYC MEDICAL ADVICE (OUTPATIENT)
Dept: FAMILY MEDICINE | Facility: CLINIC | Age: 44
End: 2024-03-04
Payer: COMMERCIAL

## 2024-03-04 ENCOUNTER — MYC REFILL (OUTPATIENT)
Dept: FAMILY MEDICINE | Facility: CLINIC | Age: 44
End: 2024-03-04
Payer: COMMERCIAL

## 2024-03-04 DIAGNOSIS — J45.40 MODERATE PERSISTENT ASTHMA WITHOUT COMPLICATION: ICD-10-CM

## 2024-03-04 RX ORDER — ALBUTEROL SULFATE 0.83 MG/ML
SOLUTION RESPIRATORY (INHALATION)
Qty: 75 ML | Refills: 0 | Status: CANCELLED | OUTPATIENT
Start: 2024-03-04

## 2024-03-04 RX ORDER — ALBUTEROL SULFATE 90 UG/1
AEROSOL, METERED RESPIRATORY (INHALATION)
Qty: 18 G | Refills: 0 | OUTPATIENT
Start: 2024-03-04

## 2024-03-04 RX ORDER — ALBUTEROL SULFATE 90 UG/1
AEROSOL, METERED RESPIRATORY (INHALATION)
Qty: 18 G | Refills: 0 | Status: CANCELLED | OUTPATIENT
Start: 2024-03-04

## 2024-03-04 NOTE — TELEPHONE ENCOUNTER
Pt needing refill of her inhaler because it got lost.    Refer to [facility] encounter form 3/4.    Christa Vance RN  [facility]

## 2024-03-05 RX ORDER — ALBUTEROL SULFATE 0.83 MG/ML
SOLUTION RESPIRATORY (INHALATION)
Qty: 75 ML | Refills: 0 | Status: SHIPPED | OUTPATIENT
Start: 2024-03-05 | End: 2024-06-25

## 2024-03-05 RX ORDER — ALBUTEROL SULFATE 90 UG/1
AEROSOL, METERED RESPIRATORY (INHALATION)
Qty: 18 G | Refills: 0 | Status: SHIPPED | OUTPATIENT
Start: 2024-03-05 | End: 2024-05-03

## 2024-03-26 DIAGNOSIS — J45.40 MODERATE PERSISTENT ASTHMA WITHOUT COMPLICATION: ICD-10-CM

## 2024-03-26 RX ORDER — FLUTICASONE PROPIONATE AND SALMETEROL 500; 50 UG/1; UG/1
1 POWDER RESPIRATORY (INHALATION) 2 TIMES DAILY
Qty: 60 EACH | Refills: 3 | Status: SHIPPED | OUTPATIENT
Start: 2024-03-26 | End: 2024-07-30

## 2024-04-01 NOTE — TELEPHONE ENCOUNTER
Called and left a second message to return my call to my direct line.  Cari Rollins MA  [facility]  2nd Floor  Primary Care     Pt will  OTC meds and let us know how he feels

## 2024-04-02 ENCOUNTER — TRANSFERRED RECORDS (OUTPATIENT)
Dept: MULTI SPECIALTY CLINIC | Facility: CLINIC | Age: 44
End: 2024-04-02

## 2024-04-02 LAB — PAP SMEAR - HIM PATIENT REPORTED: NORMAL

## 2024-04-07 ENCOUNTER — HEALTH MAINTENANCE LETTER (OUTPATIENT)
Age: 44
End: 2024-04-07
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
## 2024-05-03 ENCOUNTER — OFFICE VISIT (OUTPATIENT)
Dept: FAMILY MEDICINE | Facility: CLINIC | Age: 44
End: 2024-05-03
Attending: PREVENTIVE MEDICINE
Payer: COMMERCIAL

## 2024-05-03 VITALS
TEMPERATURE: 97.8 F | SYSTOLIC BLOOD PRESSURE: 135 MMHG | BODY MASS INDEX: 36.98 KG/M2 | WEIGHT: 244 LBS | HEART RATE: 72 BPM | RESPIRATION RATE: 18 BRPM | DIASTOLIC BLOOD PRESSURE: 87 MMHG | OXYGEN SATURATION: 97 % | HEIGHT: 68 IN

## 2024-05-03 DIAGNOSIS — K91.2 POSTSURGICAL MALABSORPTION: ICD-10-CM

## 2024-05-03 DIAGNOSIS — J45.41 MODERATE PERSISTENT ASTHMA WITH ACUTE EXACERBATION: ICD-10-CM

## 2024-05-03 DIAGNOSIS — F43.22 ADJUSTMENT DISORDER WITH ANXIOUS MOOD: ICD-10-CM

## 2024-05-03 DIAGNOSIS — E66.812 OBESITY, CLASS II, BMI 35-39.9, ISOLATED (SEE ACTUAL BMI): Primary | ICD-10-CM

## 2024-05-03 DIAGNOSIS — J30.9 CHRONIC ALLERGIC RHINITIS: ICD-10-CM

## 2024-05-03 DIAGNOSIS — F41.0 PANIC DISORDER WITHOUT AGORAPHOBIA: ICD-10-CM

## 2024-05-03 DIAGNOSIS — J45.40 MODERATE PERSISTENT ASTHMA WITHOUT COMPLICATION: ICD-10-CM

## 2024-05-03 DIAGNOSIS — Z13.220 LIPID SCREENING: ICD-10-CM

## 2024-05-03 DIAGNOSIS — F43.10 PTSD (POST-TRAUMATIC STRESS DISORDER): ICD-10-CM

## 2024-05-03 DIAGNOSIS — F41.1 GAD (GENERALIZED ANXIETY DISORDER): ICD-10-CM

## 2024-05-03 DIAGNOSIS — E66.01 MORBID OBESITY (H): ICD-10-CM

## 2024-05-03 DIAGNOSIS — F33.41 RECURRENT MAJOR DEPRESSIVE DISORDER, IN PARTIAL REMISSION (H): ICD-10-CM

## 2024-05-03 DIAGNOSIS — Z98.84 S/P GASTRIC BYPASS: ICD-10-CM

## 2024-05-03 DIAGNOSIS — Z98.84 BARIATRIC SURGERY STATUS: ICD-10-CM

## 2024-05-03 DIAGNOSIS — E66.9 OBESITY (BMI 30-39.9): ICD-10-CM

## 2024-05-03 LAB
ERYTHROCYTE [DISTWIDTH] IN BLOOD BY AUTOMATED COUNT: 13.1 % (ref 10–15)
FOLATE SERPL-MCNC: 7.7 NG/ML (ref 4.6–34.8)
HCT VFR BLD AUTO: 37.3 % (ref 35–47)
HGB BLD-MCNC: 12.2 G/DL (ref 11.7–15.7)
MCH RBC QN AUTO: 30.5 PG (ref 26.5–33)
MCHC RBC AUTO-ENTMCNC: 32.7 G/DL (ref 31.5–36.5)
MCV RBC AUTO: 93 FL (ref 78–100)
PLATELET # BLD AUTO: 283 10E3/UL (ref 150–450)
RBC # BLD AUTO: 4 10E6/UL (ref 3.8–5.2)
WBC # BLD AUTO: 7.9 10E3/UL (ref 4–11)

## 2024-05-03 PROCEDURE — 82746 ASSAY OF FOLIC ACID SERUM: CPT | Performed by: PREVENTIVE MEDICINE

## 2024-05-03 PROCEDURE — 84443 ASSAY THYROID STIM HORMONE: CPT | Performed by: PREVENTIVE MEDICINE

## 2024-05-03 PROCEDURE — 82607 VITAMIN B-12: CPT | Performed by: PREVENTIVE MEDICINE

## 2024-05-03 PROCEDURE — 86803 HEPATITIS C AB TEST: CPT | Performed by: PREVENTIVE MEDICINE

## 2024-05-03 PROCEDURE — 99214 OFFICE O/P EST MOD 30 MIN: CPT | Performed by: PREVENTIVE MEDICINE

## 2024-05-03 PROCEDURE — 82728 ASSAY OF FERRITIN: CPT | Performed by: PREVENTIVE MEDICINE

## 2024-05-03 PROCEDURE — 36415 COLL VENOUS BLD VENIPUNCTURE: CPT | Performed by: PREVENTIVE MEDICINE

## 2024-05-03 PROCEDURE — 85027 COMPLETE CBC AUTOMATED: CPT | Performed by: PREVENTIVE MEDICINE

## 2024-05-03 PROCEDURE — 80053 COMPREHEN METABOLIC PANEL: CPT | Performed by: PREVENTIVE MEDICINE

## 2024-05-03 PROCEDURE — 80061 LIPID PANEL: CPT | Performed by: PREVENTIVE MEDICINE

## 2024-05-03 RX ORDER — LORATADINE 10 MG/1
10 TABLET ORAL DAILY
Qty: 90 TABLET | Refills: 3 | Status: SHIPPED | OUTPATIENT
Start: 2024-05-03

## 2024-05-03 RX ORDER — BUSPIRONE HYDROCHLORIDE 15 MG/1
15 TABLET ORAL 3 TIMES DAILY
Qty: 270 TABLET | Refills: 2 | Status: SHIPPED | OUTPATIENT
Start: 2024-05-03 | End: 2024-10-02

## 2024-05-03 RX ORDER — IPRATROPIUM BROMIDE AND ALBUTEROL SULFATE 2.5; .5 MG/3ML; MG/3ML
1 SOLUTION RESPIRATORY (INHALATION) EVERY 4 HOURS PRN
Qty: 75 ML | Refills: 3 | Status: SHIPPED | OUTPATIENT
Start: 2024-05-03

## 2024-05-03 RX ORDER — VENLAFAXINE HYDROCHLORIDE 150 MG/1
150 CAPSULE, EXTENDED RELEASE ORAL DAILY
Qty: 90 CAPSULE | Refills: 1 | Status: SHIPPED | OUTPATIENT
Start: 2024-05-03 | End: 2024-10-02

## 2024-05-03 RX ORDER — CALCIUM CARBONATE/VITAMIN D3 500 MG-10
1 TABLET ORAL 2 TIMES DAILY
Qty: 180 TABLET | Refills: 3 | Status: SHIPPED | OUTPATIENT
Start: 2024-05-03

## 2024-05-03 RX ORDER — ALBUTEROL SULFATE 90 UG/1
AEROSOL, METERED RESPIRATORY (INHALATION)
Qty: 18 G | Refills: 3 | Status: SHIPPED | OUTPATIENT
Start: 2024-05-03 | End: 2024-09-03

## 2024-05-03 RX ORDER — FLUTICASONE PROPIONATE 50 MCG
1-2 SPRAY, SUSPENSION (ML) NASAL DAILY
Qty: 18.2 ML | Refills: 5 | Status: SHIPPED | OUTPATIENT
Start: 2024-05-03

## 2024-05-03 RX ORDER — MAGNESIUM 200 MG
1000 TABLET ORAL DAILY
Qty: 100 TABLET | Refills: 3 | Status: SHIPPED | OUTPATIENT
Start: 2024-05-03

## 2024-05-03 RX ORDER — HYDROXYZINE HYDROCHLORIDE 25 MG/1
25-100 TABLET, FILM COATED ORAL EVERY 6 HOURS PRN
Qty: 120 TABLET | Refills: 2 | Status: SHIPPED | OUTPATIENT
Start: 2024-05-03

## 2024-05-03 RX ORDER — MULTIVITAMIN,THER AND MINERALS
1 TABLET ORAL 2 TIMES DAILY
Qty: 100 TABLET | Refills: 3 | Status: SHIPPED | OUTPATIENT
Start: 2024-05-03

## 2024-05-03 ASSESSMENT — ASTHMA QUESTIONNAIRES
QUESTION_3 LAST FOUR WEEKS HOW OFTEN DID YOUR ASTHMA SYMPTOMS (WHEEZING, COUGHING, SHORTNESS OF BREATH, CHEST TIGHTNESS OR PAIN) WAKE YOU UP AT NIGHT OR EARLIER THAN USUAL IN THE MORNING: ONCE A WEEK
ACUTE_EXACERBATION_TODAY: NO
ACT_TOTALSCORE: 13
QUESTION_2 LAST FOUR WEEKS HOW OFTEN HAVE YOU HAD SHORTNESS OF BREATH: ONCE A DAY
ACT_TOTALSCORE: 13
QUESTION_1 LAST FOUR WEEKS HOW MUCH OF THE TIME DID YOUR ASTHMA KEEP YOU FROM GETTING AS MUCH DONE AT WORK, SCHOOL OR AT HOME: SOME OF THE TIME
QUESTION_4 LAST FOUR WEEKS HOW OFTEN HAVE YOU USED YOUR RESCUE INHALER OR NEBULIZER MEDICATION (SUCH AS ALBUTEROL): ONE OR TWO TIMES PER DAY
QUESTION_5 LAST FOUR WEEKS HOW WOULD YOU RATE YOUR ASTHMA CONTROL: SOMEWHAT CONTROLLED

## 2024-05-03 ASSESSMENT — PAIN SCALES - GENERAL: PAINLEVEL: NO PAIN (0)

## 2024-05-03 ASSESSMENT — PATIENT HEALTH QUESTIONNAIRE - PHQ9: SUM OF ALL RESPONSES TO PHQ QUESTIONS 1-9: 5

## 2024-05-03 NOTE — PROGRESS NOTES
Assessment & Plan     Obesity, Class II, BMI 35-39.9, isolated (see actual BMI)  -increase dose to 7.5 mg   -follow up in 3 months, can titrate dose up further   - tirzepatide-Weight Management (ZEPBOUND) 7.5 MG/0.5ML prefilled pen  Dispense: 6 mL; Refill: 1    S/P gastric bypass  - Calcium Carb-Cholecalciferol (CALCIUM 500 + D) 500-10 MG-MCG TABS  Dispense: 180 tablet; Refill: 3    Moderate persistent asthma without complication  -stable on medication   - albuterol (VENTOLIN HFA) 108 (90 Base) MCG/ACT inhaler  Dispense: 18 g; Refill: 3  - ipratropium - albuterol 0.5 mg/2.5 mg/3 mL (DUONEB) 0.5-2.5 (3) MG/3ML neb solution  Dispense: 75 mL; Refill: 3    Obesity (BMI 30-39.9)  - cyanocobalamin 1000 MCG sublingual tablet  Dispense: 100 tablet; Refill: 3  - Multiple Vitamins-Iron (DAILY MARILYN MULTIVITAMIN/IRON) TABS  Dispense: 100 tablet; Refill: 3    Chronic allergic rhinitis  - fluticasone (FLONASE) 50 MCG/ACT nasal spray  Dispense: 18.2 mL; Refill: 5  - loratadine (CLARITIN) 10 MG tablet  Dispense: 90 tablet; Refill: 3    TERESA (generalized anxiety disorder)  -refills provided  - venlafaxine (EFFEXOR XR) 150 MG 24 hr capsule  Dispense: 90 capsule; Refill: 1    PTSD (post-traumatic stress disorder)  -has not been able to establish with Psychiatry as yet   - venlafaxine (EFFEXOR XR) 150 MG 24 hr capsule  Dispense: 90 capsule; Refill: 1  - busPIRone (BUSPAR) 15 MG tablet  Dispense: 270 tablet; Refill: 2  - hydrOXYzine HCl (ATARAX) 25 MG tablet  Dispense: 120 tablet; Refill: 2    Panic disorder without agoraphobia  - venlafaxine (EFFEXOR XR) 150 MG 24 hr capsule  Dispense: 90 capsule; Refill: 1    Bariatric surgery status  - Hepatitis C Screen Reflex to HCV RNA Quant and Genotype  - CBC with platelets  - Comprehensive metabolic panel (BMP + Alb, Alk Phos, ALT, AST, Total. Bili, TP)  - TSH with free T4 reflex  - Vitamin B12  - Ferritin  - Folate  - Elemental iron 65 mg Vitamin C 125 mg (VITRON C)  MG TABS tablet   "Dispense: 180 tablet; Refill: 3    Postsurgical malabsorption  - Elemental iron 65 mg Vitamin C 125 mg (VITRON C)  MG TABS tablet  Dispense: 180 tablet; Refill: 3    Adjustment disorder with anxious mood  - busPIRone (BUSPAR) 15 MG tablet  Dispense: 270 tablet; Refill: 2  - hydrOXYzine HCl (ATARAX) 25 MG tablet  Dispense: 120 tablet; Refill: 2    Lipid screening  - Lipid panel reflex to direct LDL Non-fasting    Recurrent major depressive disorder, in partial remission (H24)  -stable on current medication     Morbid obesity (H)  -on GLP 1 receptor agonists for weight loss       Ordering of each unique test  Prescription drug management  25 minutes spent by me on the date of the encounter doing chart review, history and exam, documentation and further activities per the note      BMI  Estimated body mass index is 37.1 kg/m  as calculated from the following:    Height as of this encounter: 1.727 m (5' 8\").    Weight as of this encounter: 110.7 kg (244 lb).   Weight management plan: Discussed healthy diet and exercise guidelines      Work on weight loss  Regular exercise    Subjective   Crystal is a 43 year old, presenting for the following health issues:  Recheck Medication        5/3/2024     4:08 PM   Additional Questions   Roomed by Crystal   Accompanied by self         5/3/2024     4:08 PM   Patient Reported Additional Medications   Patient reports taking the following new medications none     History of Present Illness     Asthma:  She presents for follow up of asthma.  She has no cough, no wheezing, and some shortness of breath.  She is using a relief medication daily. She does not miss any doses of her controller medication throughout the week. Patient is aware of the following triggers: emotions, exercise or sports, humidity, smoke and strong odors and fumes. The patient has not had a visit to the Emergency Room, Urgent Care or Hospital due to asthma since the last clinic visit.     Reason for visit:  " "Med check    She eats 2-3 servings of fruits and vegetables daily.She consumes 1 sweetened beverage(s) daily.She exercises with enough effort to increase her heart rate 30 to 60 minutes per day.  She exercises with enough effort to increase her heart rate 4 days per week.   She is taking medications regularly.     Wt Readings from Last 2 Encounters:   05/03/24 110.7 kg (244 lb)   10/26/21 118.7 kg (261 lb 9.6 oz)      GYN exam done 3/7/24  Pap smear done 4/23, UTD     Working with getting established with Psychiatry  Working with her son for special needs and medical concerns  Hectic month+  No thoughts of self harm  Nightmares+  Got connected for resources through [employer]       Medication Followup of albuterol and mounjaro  Taking Medication as prescribed: yes  Side Effects:  None  Medication Helping Symptoms:  yes        Review of Systems  Constitutional, HEENT, cardiovascular, pulmonary, gi and gu systems are negative, except as otherwise noted.      Objective    /87 (BP Location: Left arm, Patient Position: Sitting, Cuff Size: Adult Large)   Pulse 72   Temp 97.8  F (36.6  C) (Oral)   Resp 18   Ht 1.727 m (5' 8\")   Wt 110.7 kg (244 lb)   LMP 05/01/2024 (Exact Date)   SpO2 97%   BMI 37.10 kg/m    Body mass index is 37.1 kg/m .  Physical Exam   GENERAL APPEARANCE: healthy, alert and no distress  EYES: Eyes grossly normal to inspection and conjunctivae and sclerae normal  NECK: no adenopathy and trachea midline and normal to palpation  RESP: lungs clear to auscultation - no rales, rhonchi or wheezes  CV: regular rates and rhythm, normal S1 S2, no S3 or S4 and no murmur, click or rub  ABDOMEN: soft, non-tender and no rebound or guarding   MS: extremities normal- no gross deformities noted and peripheral pulses normal  SKIN: no suspicious lesions or rashes  NEURO: Normal strength and tone, mentation intact and speech normal  PSYCH: mentation appears normal      No results found for this or any " previous visit (from the past 24 hour(s)).        Signed Electronically by: Reyna Adam MD MPH

## 2024-05-04 ENCOUNTER — MYC MEDICAL ADVICE (OUTPATIENT)
Dept: FAMILY MEDICINE | Facility: CLINIC | Age: 44
End: 2024-05-04
Payer: COMMERCIAL

## 2024-05-04 DIAGNOSIS — E66.812 OBESITY, CLASS II, BMI 35-39.9, ISOLATED (SEE ACTUAL BMI): Primary | ICD-10-CM

## 2024-05-04 LAB
ALBUMIN SERPL BCG-MCNC: 4.2 G/DL (ref 3.5–5.2)
ALP SERPL-CCNC: 78 U/L (ref 40–150)
ALT SERPL W P-5'-P-CCNC: 15 U/L (ref 0–50)
ANION GAP SERPL CALCULATED.3IONS-SCNC: 10 MMOL/L (ref 7–15)
AST SERPL W P-5'-P-CCNC: 15 U/L (ref 0–45)
BILIRUB SERPL-MCNC: 0.2 MG/DL
BUN SERPL-MCNC: 7.4 MG/DL (ref 6–20)
CALCIUM SERPL-MCNC: 8.7 MG/DL (ref 8.6–10)
CHLORIDE SERPL-SCNC: 108 MMOL/L (ref 98–107)
CHOLEST SERPL-MCNC: 152 MG/DL
CREAT SERPL-MCNC: 0.6 MG/DL (ref 0.51–0.95)
DEPRECATED HCO3 PLAS-SCNC: 24 MMOL/L (ref 22–29)
EGFRCR SERPLBLD CKD-EPI 2021: >90 ML/MIN/1.73M2
FASTING STATUS PATIENT QL REPORTED: NO
FERRITIN SERPL-MCNC: 30 NG/ML (ref 6–175)
GLUCOSE SERPL-MCNC: 87 MG/DL (ref 70–99)
HCV AB SERPL QL IA: NONREACTIVE
HDLC SERPL-MCNC: 52 MG/DL
LDLC SERPL CALC-MCNC: 76 MG/DL
NONHDLC SERPL-MCNC: 100 MG/DL
POTASSIUM SERPL-SCNC: 4.2 MMOL/L (ref 3.4–5.3)
PROT SERPL-MCNC: 6.8 G/DL (ref 6.4–8.3)
SODIUM SERPL-SCNC: 142 MMOL/L (ref 135–145)
TRIGL SERPL-MCNC: 119 MG/DL
TSH SERPL DL<=0.005 MIU/L-ACNC: 3.24 UIU/ML (ref 0.3–4.2)
VIT B12 SERPL-MCNC: 162 PG/ML (ref 232–1245)

## 2024-05-06 NOTE — TELEPHONE ENCOUNTER
Per pt, prescription needs to specify Mounjaro. Zepbound not covered per pt.     Routing to provider. See [employer] message. Mounjaro and pharmacy pended below.    Margaret Browne RN

## 2024-05-07 NOTE — TELEPHONE ENCOUNTER
As I have mentioned in multiple messages in the past, Tirzepatide is the medication sold under Mounjaro for diabetes and Zepbound for weight loss.    The diagnosis code will still be for Obesity. I have not heard of Mounjaro being covered for weight loss in someone who is not diabetic.     Thank you,  Reyna Adam MD MPH

## 2024-05-08 NOTE — TELEPHONE ENCOUNTER
Now wants 5 mg dose of Mounjaro for weight loss due to availability issues.  New script for 5 mg dose sent.  Thank you,  Reyna Adam MD MPH

## 2024-05-08 NOTE — RESULT ENCOUNTER NOTE
Crystal,     Vitamin B 12 levels are low. Please take the B 12 supplements.  Electrolytes, non fasting glucose, kidney function and liver function tests are normal.  Thyroid function is normal.  Cholesterol is at goal for you.  Ferritin (iron stores) are normal.  Screening test for Hepatitis C is negative.  Folic acid levels are normal.  Complete blood count is not showing anemia or infection.     Please do not hesitate to call us at [phone] if you have any questions or concerns.    Thank you,    Reyna Adam MD MPH

## 2024-06-15 ENCOUNTER — HEALTH MAINTENANCE LETTER (OUTPATIENT)
Age: 44
End: 2024-06-15

## 2024-06-25 DIAGNOSIS — J45.40 MODERATE PERSISTENT ASTHMA WITHOUT COMPLICATION: ICD-10-CM

## 2024-06-25 RX ORDER — ALBUTEROL SULFATE 0.83 MG/ML
SOLUTION RESPIRATORY (INHALATION)
Qty: 75 ML | Refills: 0 | Status: SHIPPED | OUTPATIENT
Start: 2024-06-25 | End: 2024-10-02

## 2024-07-27 DIAGNOSIS — J45.40 MODERATE PERSISTENT ASTHMA WITHOUT COMPLICATION: ICD-10-CM

## 2024-07-30 RX ORDER — FLUTICASONE PROPIONATE AND SALMETEROL 500; 50 UG/1; UG/1
1 POWDER RESPIRATORY (INHALATION) 2 TIMES DAILY
Qty: 60 EACH | Refills: 0 | Status: SHIPPED | OUTPATIENT
Start: 2024-07-30 | End: 2024-08-31

## 2024-08-30 ENCOUNTER — TELEPHONE (OUTPATIENT)
Dept: FAMILY MEDICINE | Facility: CLINIC | Age: 44
End: 2024-08-30
Payer: COMMERCIAL

## 2024-08-30 NOTE — LETTER
August 30, 2024    Crystal Rose  [address] [address]    Dear Crystal,    At [facility] we care about your health and are committed to providing quality patient care.     Here is a list of Health Maintenance topics that are due now or due soon:  Health Maintenance Due   Topic Date Due    ADVANCE CARE PLANNING  Never done    DEPRESSION ACTION PLAN  Never done    MAMMO SCREENING  Never done    Pneumococcal Vaccine: Pediatrics (0 to 5 Years) and At-Risk Patients (6 to 64 Years) (2 of 2 - PCV) 11/04/2014    ASTHMA ACTION PLAN  02/04/2015    COVID-19 Vaccine (4 - 2023-24 season) 09/01/2023    YEARLY PREVENTIVE VISIT  04/10/2024    HPV TEST  04/10/2024    PAP  04/10/2024        We are recommending that you:  Schedule a WELLNESS (Preventative/Physical) APPOINTMENT with your primary care provider. If you go elsewhere for your wellness appointments then please disregard this reminder    To schedule an appointment or discuss this further, you may contact us by phone at the [facility] at [phone] or online through the patient portal/[email or web address] @ [email or web address]    Thank you for trusting [facility] and we appreciate the opportunity to serve you.  We look forward to supporting your healthcare needs in the future.    Your partners in health,      Quality Committee at [facility]

## 2024-08-30 NOTE — TELEPHONE ENCOUNTER
Patient Quality Outreach    Patient is due for the following:   Asthma  -  AAP  Cervical Cancer Screening - PAP Needed  Depression  -  DAP  Physical Preventive Adult Physical      Topic Date Due    Pneumococcal Vaccine (2 of 2 - PCV) 11/04/2014    COVID-19 Vaccine (4 - 2023-24 season) 09/01/2023       Next Steps:   Schedule a Adult Preventative    Type of outreach:    Sent letter.      Questions for provider review:    None     Debi Hall MA

## 2024-08-31 DIAGNOSIS — J45.40 MODERATE PERSISTENT ASTHMA WITHOUT COMPLICATION: ICD-10-CM

## 2024-08-31 RX ORDER — FLUTICASONE PROPIONATE AND SALMETEROL 500; 50 UG/1; UG/1
1 POWDER RESPIRATORY (INHALATION) 2 TIMES DAILY
Qty: 60 EACH | Refills: 2 | Status: SHIPPED | OUTPATIENT
Start: 2024-08-31 | End: 2024-10-02

## 2024-09-01 DIAGNOSIS — J45.40 MODERATE PERSISTENT ASTHMA WITHOUT COMPLICATION: ICD-10-CM

## 2024-09-03 RX ORDER — ALBUTEROL SULFATE 90 UG/1
AEROSOL, METERED RESPIRATORY (INHALATION)
Qty: 6.7 G | Refills: 1 | Status: SHIPPED | OUTPATIENT
Start: 2024-09-03 | End: 2024-10-02

## 2024-10-02 ENCOUNTER — VIRTUAL VISIT (OUTPATIENT)
Dept: FAMILY MEDICINE | Facility: CLINIC | Age: 44
End: 2024-10-02
Attending: PREVENTIVE MEDICINE
Payer: COMMERCIAL

## 2024-10-02 DIAGNOSIS — F41.1 GAD (GENERALIZED ANXIETY DISORDER): ICD-10-CM

## 2024-10-02 DIAGNOSIS — F41.0 PANIC DISORDER WITHOUT AGORAPHOBIA: ICD-10-CM

## 2024-10-02 DIAGNOSIS — E66.9 OBESITY (BMI 30-39.9): Primary | ICD-10-CM

## 2024-10-02 DIAGNOSIS — F43.10 PTSD (POST-TRAUMATIC STRESS DISORDER): ICD-10-CM

## 2024-10-02 DIAGNOSIS — F33.41 RECURRENT MAJOR DEPRESSIVE DISORDER, IN PARTIAL REMISSION (H): ICD-10-CM

## 2024-10-02 DIAGNOSIS — F43.22 ADJUSTMENT DISORDER WITH ANXIOUS MOOD: ICD-10-CM

## 2024-10-02 DIAGNOSIS — J45.40 MODERATE PERSISTENT ASTHMA WITHOUT COMPLICATION: ICD-10-CM

## 2024-10-02 PROCEDURE — G2211 COMPLEX E/M VISIT ADD ON: HCPCS | Mod: 95 | Performed by: PREVENTIVE MEDICINE

## 2024-10-02 PROCEDURE — 99214 OFFICE O/P EST MOD 30 MIN: CPT | Mod: 95 | Performed by: PREVENTIVE MEDICINE

## 2024-10-02 RX ORDER — ALBUTEROL SULFATE 0.83 MG/ML
2.5 SOLUTION RESPIRATORY (INHALATION) EVERY 6 HOURS PRN
Qty: 75 ML | Refills: 1 | Status: SHIPPED | OUTPATIENT
Start: 2024-10-02

## 2024-10-02 RX ORDER — BUSPIRONE HYDROCHLORIDE 15 MG/1
15 TABLET ORAL 3 TIMES DAILY
Qty: 270 TABLET | Refills: 1 | Status: SHIPPED | OUTPATIENT
Start: 2024-10-02

## 2024-10-02 RX ORDER — ALBUTEROL SULFATE 90 UG/1
INHALANT RESPIRATORY (INHALATION)
Qty: 18 G | Refills: 1 | Status: SHIPPED | OUTPATIENT
Start: 2024-10-02

## 2024-10-02 RX ORDER — VENLAFAXINE HYDROCHLORIDE 150 MG/1
150 CAPSULE, EXTENDED RELEASE ORAL DAILY
Qty: 90 CAPSULE | Refills: 1 | Status: SHIPPED | OUTPATIENT
Start: 2024-10-02

## 2024-10-02 RX ORDER — FLUTICASONE PROPIONATE AND SALMETEROL 500; 50 UG/1; UG/1
1 POWDER RESPIRATORY (INHALATION) 2 TIMES DAILY
Qty: 60 EACH | Refills: 2 | Status: SHIPPED | OUTPATIENT
Start: 2024-10-02

## 2024-10-02 ASSESSMENT — ASTHMA QUESTIONNAIRES
QUESTION_1 LAST FOUR WEEKS HOW MUCH OF THE TIME DID YOUR ASTHMA KEEP YOU FROM GETTING AS MUCH DONE AT WORK, SCHOOL OR AT HOME: A LITTLE OF THE TIME
QUESTION_2 LAST FOUR WEEKS HOW OFTEN HAVE YOU HAD SHORTNESS OF BREATH: THREE TO SIX TIMES A WEEK
ACT_TOTALSCORE: 18
QUESTION_3 LAST FOUR WEEKS HOW OFTEN DID YOUR ASTHMA SYMPTOMS (WHEEZING, COUGHING, SHORTNESS OF BREATH, CHEST TIGHTNESS OR PAIN) WAKE YOU UP AT NIGHT OR EARLIER THAN USUAL IN THE MORNING: ONCE OR TWICE
ACT_TOTALSCORE: 18
QUESTION_4 LAST FOUR WEEKS HOW OFTEN HAVE YOU USED YOUR RESCUE INHALER OR NEBULIZER MEDICATION (SUCH AS ALBUTEROL): TWO OR THREE TIMES PER WEEK
QUESTION_5 LAST FOUR WEEKS HOW WOULD YOU RATE YOUR ASTHMA CONTROL: WELL CONTROLLED

## 2024-10-02 NOTE — PROGRESS NOTES
Crystal is a 43 year old who is being evaluated via a billable video visit.    How would you like to obtain your AVS? [employer]  If the video visit is dropped, the invitation should be resent by: Text to cell phone: [phone]  Will anyone else be joining your video visit? No      If patient has telephone visit, have they been educated on video visit as preferred visit method and offered to change to video visit? no -         Instructions Relayed to Patient by Virtual Roomer:       Patient Confirmed they will join visit via: Text Link to Cell Phone  Reminded patient to ensure they were logged on to virtual visit by arrival time listed.   Asked if patient has flexibility to initiate visit sooner than arrival time: patient stated yes, documented in appointment notes availability to initiate visit earlier than arrival time     If pediatric virtual visit, ensured pediatric patient along with parent/guardian will be present for video visit.     Patient offered the website [email or web address] and/or phone number to [facility] Help line: [phone]   Assessment & Plan     Obesity (BMI 30-39.9)  -increased dose to 10 mg from 7.5 mg   - tirzepatide (MOUNJARO) 10 MG/0.5ML pen  Dispense: 6 mL; Refill: 1    PTSD (post-traumatic stress disorder)  - venlafaxine (EFFEXOR XR) 150 MG 24 hr capsule  Dispense: 90 capsule; Refill: 1  - busPIRone (BUSPAR) 15 MG tablet  Dispense: 270 tablet; Refill: 1    Recurrent major depressive disorder, in partial remission (H)  -refills on medication provided   -mood symptoms are stable  -no thoughts of self harm     TERESA (generalized anxiety disorder)  - venlafaxine (EFFEXOR XR) 150 MG 24 hr capsule  Dispense: 90 capsule; Refill: 1    Panic disorder without agoraphobia  - venlafaxine (EFFEXOR XR) 150 MG 24 hr capsule  Dispense: 90 capsule; Refill: 1    Moderate persistent asthma without complication  - albuterol (PROAIR HFA/PROVENTIL HFA/VENTOLIN HFA) 108 (90 Base) MCG/ACT inhaler   Dispense: 18 g; Refill: 1  - albuterol (PROVENTIL) (2.5 MG/3ML) 0.083% neb solution  Dispense: 75 mL; Refill: 1  - fluticasone-salmeterol (WIXELA INHUB) 500-50 MCG/ACT inhaler  Dispense: 60 each; Refill: 2    Adjustment disorder with anxious mood  - busPIRone (BUSPAR) 15 MG tablet  Dispense: 270 tablet; Refill: 1      Medications are more cost effective through [facility]. Had to pay $60 for Albuterol at the pharmacy.       Jamarcus Rojas is a 43 year old, presenting for the following health issues:  Medication Problem (Refill )        10/2/2024     2:19 PM   Additional Questions   Roomed by orlando hanley   Accompanied by self         10/2/2024     2:19 PM   Patient Reported Additional Medications   Patient reports taking the following new medications none     Via the Health Maintenance questionnaire, the patient has reported the following services have been completed -Cervical Cancer Screening: [facility] [place] 2024-04-02, this information has been sent to the abstraction team.  Video Start Time: 2:28 PM    History of Present Illness     Asthma:  She presents for follow up of asthma.  She has no cough, no wheezing, and some shortness of breath.  She is using a relief medication a few times a week. She does not miss any doses of her controller medication throughout the week. Patient is aware of the following triggers: animal dander, cold air, emotions, exercise or sports, humidity, smoke and strong odors and fumes. The patient has not had a visit to the Emergency Room, Urgent Care or Hospital due to asthma since the last clinic visit.     Reason for visit:  Med check / new rxs needed    She eats 2-3 servings of fruits and vegetables daily.She consumes 0 sweetened beverage(s) daily.She exercises with enough effort to increase her heart rate 30 to 60 minutes per day.  She exercises with enough effort to increase her heart rate 5 days per week.   She is taking medications regularly.       Mood is  good.  Was not able to find a Psychiatrist.  No counseling  [facility].  No thoughts of self harm       Doing good with Mounjaro  No side effects, no nausea or emesis  Has been doing exercise 4 times a week.     Objective    Vitals - Patient Reported  Systolic (Patient Reported): 118 (10/1/2024)  Diastolic (Patient Reported): 78  Blood pressure taken with manual cuff (will exclude from quality measure): Yes  Weight (Patient Reported): 101.2 kg (223 lb)        Physical Exam   GENERAL: alert and no distress  EYES: Eyes grossly normal to inspection.  No discharge or erythema, or obvious scleral/conjunctival abnormalities.  RESP: No audible wheeze, cough, or visible cyanosis.    SKIN: Visible skin clear. No significant rash, abnormal pigmentation or lesions.  NEURO: Cranial nerves grossly intact.  Mentation and speech appropriate for age.  PSYCH: Appropriate affect, tone, and pace of words        Video-Visit Details    Type of service:  Video Visit   Video End Time:2:42 PM  Originating Location (pt. Location): Home    Distant Location (provider location):  On-site  Platform used for Video Visit: Rashmi  Signed Electronically by: Reyna Adam MD MPH

## 2024-10-02 NOTE — PATIENT INSTRUCTIONS
At [facility], we strive to deliver an exceptional experience to you, every time we see you. If you receive a survey, please let us know what we are doing well and/or what we could improve upon, as we do value your feedback.  If you have MyChart, you can expect to receive results automatically within 24 hours of their completion.  Your provider will send a note interpreting your results as well.   If you do not have MyChart, you should receive your results in about a week by mail.    Your care team:                            Family Medicine Internal Medicine   MD Kraig Samuel, MD Jennifer Fortune, MD Rodríguez Doe, MD Ruth Miner, PAJameC    Danilo Sanchez, MD Pediatrics   Reyna Adam, MD Catia Linn, MD Rosa Rueda, APRN CNP Chrissy Dunbar APRN CNP   MD Yenni Jara, MD Vivienne Díaz, CNP     Martin Gusman, CNP Same-Day Provider (No follow-up visits)   SHERIE Bernal, DNP Emi Clark, SHERIE Rivera, FNP, BC NATI SmallsC     Clinic hours: Monday - Thursday 7 am-6 pm; Fridays 7 am-5 pm.   Urgent care: Monday - Friday 10 am- 8 pm; Saturday and Sunday 9 am-5 pm.    Clinic: [phone]       [facility]: Monday - Thursday 8 am - 7 pm; Friday 8 am - 6 pm  [facility]: [phone]

## 2024-12-09 DIAGNOSIS — J45.40 MODERATE PERSISTENT ASTHMA WITHOUT COMPLICATION: ICD-10-CM

## 2024-12-09 RX ORDER — ALBUTEROL SULFATE 90 UG/1
INHALANT RESPIRATORY (INHALATION)
Qty: 25.5 G | Refills: 3 | OUTPATIENT
Start: 2024-12-09

## 2024-12-30 ENCOUNTER — MYC REFILL (OUTPATIENT)
Dept: FAMILY MEDICINE | Facility: CLINIC | Age: 44
End: 2024-12-30
Payer: COMMERCIAL

## 2024-12-30 DIAGNOSIS — J45.40 MODERATE PERSISTENT ASTHMA WITHOUT COMPLICATION: ICD-10-CM

## 2024-12-31 RX ORDER — ALBUTEROL SULFATE 90 UG/1
INHALANT RESPIRATORY (INHALATION)
Qty: 54 G | Refills: 1 | Status: SHIPPED | OUTPATIENT
Start: 2024-12-31

## 2025-01-15 NOTE — TELEPHONE ENCOUNTER
Problem: Chronic Conditions and Co-morbidities  Goal: Patient's chronic conditions and co-morbidity symptoms are monitored and maintained or improved  1/12/2025 1029 by Melany Madden RN  Outcome: Progressing  1/12/2025 0015 by Alice Irvin RN  Outcome: Progressing     Problem: Discharge Planning  Goal: Discharge to home or other facility with appropriate resources  1/12/2025 1029 by Melany Madden RN  Outcome: Progressing  1/12/2025 0015 by Alice Irvin RN  Outcome: Progressing     Problem: Pain  Goal: Verbalizes/displays adequate comfort level or baseline comfort level  1/12/2025 1029 by Melany Madden RN  Outcome: Progressing  1/12/2025 0015 by Alice Irvin RN  Outcome: Progressing     Problem: Safety - Adult  Goal: Free from fall injury  1/12/2025 1029 by Melany Madden RN  Outcome: Progressing  1/12/2025 0015 by Alice Irvin RN  Outcome: Progressing     Problem: ABCDS Injury Assessment  Goal: Absence of physical injury  1/12/2025 1029 by Melany Madden RN  Outcome: Progressing  1/12/2025 0015 by Alice Irvin RN  Outcome: Progressing     Problem: Skin/Tissue Integrity  Goal: Absence of new skin breakdown  Description: 1.  Monitor for areas of redness and/or skin breakdown  2.  Assess vascular access sites hourly  3.  Every 4-6 hours minimum:  Change oxygen saturation probe site  4.  Every 4-6 hours:  If on nasal continuous positive airway pressure, respiratory therapy assess nares and determine need for appliance change or resting period.  1/12/2025 1029 by Melany Madden RN  Outcome: Progressing  1/12/2025 0015 by Alice Irvin RN  Outcome: Progressing        Problem: Chronic Conditions and Co-morbidities  Goal: Patient's chronic conditions and co-morbidity symptoms are monitored and maintained or improved  1/12/2025 2248 by Melva Eckert RN  Outcome: Progressing  Flowsheets (Taken 1/12/2025 1215 by Yamile Jolly RN)  Care Plan - Patient's Chronic Conditions and Co-Morbidity Symptoms are Monitored and Maintained or Improved:   Monitor and assess patient's chronic conditions and comorbid symptoms for stability, deterioration, or improvement   Collaborate with multidisciplinary team to address chronic and comorbid conditions and prevent exacerbation or deterioration   Update acute care plan with appropriate goals if chronic or comorbid symptoms are exacerbated and prevent overall improvement and discharge  1/12/2025 1029 by Melany Madden RN  Outcome: Progressing  1/12/2025 1029 by Melany Madden RN  Outcome: Progressing     Problem: Discharge Planning  Goal: Discharge to home or other facility with appropriate resources  1/12/2025 2248 by Melva Eckert RN  Outcome: Progressing  Flowsheets (Taken 1/12/2025 1215 by Yamile Jolly RN)  Discharge to home or other facility with appropriate resources:   Identify barriers to discharge with patient and caregiver   Arrange for needed discharge resources and transportation as appropriate   Identify discharge learning needs (meds, wound care, etc)   Refer to discharge planning if patient needs post-hospital services based on physician order or complex needs related to functional status, cognitive ability or social support system  1/12/2025 1029 by Melany Madden RN  Outcome: Progressing  1/12/2025 1029 by Melany Madden RN  Outcome: Progressing     Problem: Pain  Goal: Verbalizes/displays adequate comfort level or baseline comfort level  1/12/2025 2248 by Melva Eckert, RN  Outcome: Progressing  Flowsheets  Taken 1/12/2025 2200 by Melva Eckert, RN  Verbalizes/displays    Problem: Chronic Conditions and Co-morbidities  Goal: Patient's chronic conditions and co-morbidity symptoms are monitored and maintained or improved  1/15/2025 1528 by Kira Rapp LPN  Outcome: Progressing  1/15/2025 0214 by Melva Eckert RN  Outcome: Progressing     Problem: Discharge Planning  Goal: Discharge to home or other facility with appropriate resources  1/15/2025 1528 by Kira Rapp LPN  Outcome: Progressing  1/15/2025 0214 by Melva Eckert RN  Outcome: Progressing     Problem: Pain  Goal: Verbalizes/displays adequate comfort level or baseline comfort level  1/15/2025 1528 by Kira Rapp LPN  Outcome: Progressing  1/15/2025 0214 by Melva Eckert RN  Outcome: Progressing  Flowsheets (Taken 1/15/2025 0015)  Verbalizes/displays adequate comfort level or baseline comfort level: Assess pain using appropriate pain scale     Problem: Safety - Adult  Goal: Free from fall injury  1/15/2025 1528 by Kira Rapp LPN  Outcome: Progressing  1/15/2025 0214 by Melva Eckert RN  Outcome: Progressing  Flowsheets (Taken 1/14/2025 2020)  Free From Fall Injury: Instruct family/caregiver on patient safety     Problem: ABCDS Injury Assessment  Goal: Absence of physical injury  1/15/2025 1528 by Kira Rapp LPN  Outcome: Progressing  1/15/2025 0214 by Melva Eckert RN  Outcome: Progressing  Flowsheets (Taken 1/14/2025 2020)  Absence of Physical Injury: Implement safety measures based on patient assessment     Problem: Skin/Tissue Integrity  Goal: Absence of new skin breakdown  Description: 1.  Monitor for areas of redness and/or skin breakdown  2.  Assess vascular access sites hourly  3.  Every 4-6 hours minimum:  Change oxygen saturation probe site  4.  Every 4-6 hours:  If on nasal continuous positive airway pressure, respiratory therapy assess nares and determine need for appliance change or resting period.  Outcome: Progressing        Problem: Chronic Conditions and Co-morbidities  Goal: Patient's chronic conditions and co-morbidity symptoms are monitored and maintained or improved  1/8/2025 1947 by Kristin Urena RN  Outcome: Progressing  Flowsheets (Taken 1/8/2025 0800)  Care Plan - Patient's Chronic Conditions and Co-Morbidity Symptoms are Monitored and Maintained or Improved:   Monitor and assess patient's chronic conditions and comorbid symptoms for stability, deterioration, or improvement   Collaborate with multidisciplinary team to address chronic and comorbid conditions and prevent exacerbation or deterioration   Update acute care plan with appropriate goals if chronic or comorbid symptoms are exacerbated and prevent overall improvement and discharge  1/8/2025 0645 by Krista Shelley RN  Outcome: Progressing  Flowsheets (Taken 1/8/2025 0345)  Care Plan - Patient's Chronic Conditions and Co-Morbidity Symptoms are Monitored and Maintained or Improved:   Monitor and assess patient's chronic conditions and comorbid symptoms for stability, deterioration, or improvement   Collaborate with multidisciplinary team to address chronic and comorbid conditions and prevent exacerbation or deterioration   Update acute care plan with appropriate goals if chronic or comorbid symptoms are exacerbated and prevent overall improvement and discharge     Problem: Discharge Planning  Goal: Discharge to home or other facility with appropriate resources  1/8/2025 1947 by Kristin Urena, RN  Outcome: Progressing  Flowsheets (Taken 1/8/2025 0800)  Discharge to home or other facility with appropriate resources:   Identify barriers to discharge with patient and caregiver   Arrange for needed discharge resources and transportation as appropriate   Identify discharge learning needs (meds, wound care, etc)   Refer to discharge planning if patient needs post-hospital services based on physician order or complex needs related to functional status, cognitive    Problem: Chronic Conditions and Co-morbidities  Goal: Patient's chronic conditions and co-morbidity symptoms are monitored and maintained or improved  1/9/2025 0036 by Renuka Hatfield RN  Outcome: Progressing  1/8/2025 1947 by Kristin Urena RN  Outcome: Progressing  Flowsheets  Taken 1/8/2025 1945 by Renuka Hatfield RN  Care Plan - Patient's Chronic Conditions and Co-Morbidity Symptoms are Monitored and Maintained or Improved: Monitor and assess patient's chronic conditions and comorbid symptoms for stability, deterioration, or improvement  Taken 1/8/2025 0800 by Kristin Urena RN  Care Plan - Patient's Chronic Conditions and Co-Morbidity Symptoms are Monitored and Maintained or Improved:   Monitor and assess patient's chronic conditions and comorbid symptoms for stability, deterioration, or improvement   Collaborate with multidisciplinary team to address chronic and comorbid conditions and prevent exacerbation or deterioration   Update acute care plan with appropriate goals if chronic or comorbid symptoms are exacerbated and prevent overall improvement and discharge     Problem: Discharge Planning  Goal: Discharge to home or other facility with appropriate resources  1/8/2025 1947 by Kristin Urena RN  Outcome: Progressing  Flowsheets  Taken 1/8/2025 1945 by Renuka Hatfield RN  Discharge to home or other facility with appropriate resources: Identify barriers to discharge with patient and caregiver  Taken 1/8/2025 0800 by Kristin Urena RN  Discharge to home or other facility with appropriate resources:   Identify barriers to discharge with patient and caregiver   Arrange for needed discharge resources and transportation as appropriate   Identify discharge learning needs (meds, wound care, etc)   Refer to discharge planning if patient needs post-hospital services based on physician order or complex needs related to functional status, cognitive ability or social support system     Problem:    Problem: Chronic Conditions and Co-morbidities  Goal: Patient's chronic conditions and co-morbidity symptoms are monitored and maintained or improved  Outcome: Progressing     Problem: Discharge Planning  Goal: Discharge to home or other facility with appropriate resources  Outcome: Progressing     Problem: Pain  Goal: Verbalizes/displays adequate comfort level or baseline comfort level  Outcome: Progressing     Problem: Safety - Adult  Goal: Free from fall injury  Outcome: Progressing     Problem: ABCDS Injury Assessment  Goal: Absence of physical injury  Outcome: Progressing     Problem: Skin/Tissue Integrity  Goal: Absence of new skin breakdown  Description: 1.  Monitor for areas of redness and/or skin breakdown  2.  Assess vascular access sites hourly  3.  Every 4-6 hours minimum:  Change oxygen saturation probe site  4.  Every 4-6 hours:  If on nasal continuous positive airway pressure, respiratory therapy assess nares and determine need for appliance change or resting period.  Outcome: Progressing        Problem: Chronic Conditions and Co-morbidities  Goal: Patient's chronic conditions and co-morbidity symptoms are monitored and maintained or improved  Outcome: Progressing     Problem: Discharge Planning  Goal: Discharge to home or other facility with appropriate resources  Outcome: Progressing     Problem: Pain  Goal: Verbalizes/displays adequate comfort level or baseline comfort level  Outcome: Progressing  Flowsheets  Taken 1/14/2025 0447 by Melva Eckert RN  Verbalizes/displays adequate comfort level or baseline comfort level: Assess pain using appropriate pain scale  Taken 1/13/2025 2315 by Melva Eckert RN  Verbalizes/displays adequate comfort level or baseline comfort level: Assess pain using appropriate pain scale     Problem: Safety - Adult  Goal: Free from fall injury  Outcome: Progressing  Flowsheets (Taken 1/13/2025 2241 by Melva Eckert RN)  Free From Fall Injury: Instruct family/caregiver on patient safety     Problem: ABCDS Injury Assessment  Goal: Absence of physical injury  Outcome: Progressing  Flowsheets (Taken 1/13/2025 2241 by Melva Eckert RN)  Absence of Physical Injury: Implement safety measures based on patient assessment     Problem: Skin/Tissue Integrity  Goal: Absence of new skin breakdown  Description: 1.  Monitor for areas of redness and/or skin breakdown  2.  Assess vascular access sites hourly  3.  Every 4-6 hours minimum:  Change oxygen saturation probe site  4.  Every 4-6 hours:  If on nasal continuous positive airway pressure, respiratory therapy assess nares and determine need for appliance change or resting period.  Outcome: Progressing     Problem: Physical Therapy - Adult  Goal: By Discharge: Performs mobility at highest level of function for planned discharge setting.  See evaluation for individualized goals.  Description: FUNCTIONAL STATUS PRIOR TO ADMISSION: Patient was modified independent using a rollator for functional mobility.    HOME SUPPORT    Problem: Chronic Conditions and Co-morbidities  Goal: Patient's chronic conditions and co-morbidity symptoms are monitored and maintained or improved  Outcome: Progressing     Problem: Discharge Planning  Goal: Discharge to home or other facility with appropriate resources  Outcome: Progressing     Problem: Pain  Goal: Verbalizes/displays adequate comfort level or baseline comfort level  Outcome: Progressing  Flowsheets (Taken 1/15/2025 0015)  Verbalizes/displays adequate comfort level or baseline comfort level: Assess pain using appropriate pain scale     Problem: Safety - Adult  Goal: Free from fall injury  Outcome: Progressing     Problem: ABCDS Injury Assessment  Goal: Absence of physical injury  Outcome: Progressing        Problem: Chronic Conditions and Co-morbidities  Goal: Patient's chronic conditions and co-morbidity symptoms are monitored and maintained or improved  Outcome: Progressing     Problem: Pain  Goal: Verbalizes/displays adequate comfort level or baseline comfort level  Outcome: Progressing     Problem: Safety - Adult  Goal: Free from fall injury  Outcome: Progressing     Problem: ABCDS Injury Assessment  Goal: Absence of physical injury  Outcome: Progressing     Problem: Skin/Tissue Integrity  Goal: Absence of new skin breakdown  Description: 1.  Monitor for areas of redness and/or skin breakdown  2.  Assess vascular access sites hourly  3.  Every 4-6 hours minimum:  Change oxygen saturation probe site  4.  Every 4-6 hours:  If on nasal continuous positive airway pressure, respiratory therapy assess nares and determine need for appliance change or resting period.  Outcome: Progressing        Problem: Chronic Conditions and Co-morbidities  Goal: Patient's chronic conditions and co-morbidity symptoms are monitored and maintained or improved  Outcome: Progressing  Flowsheets (Taken 1/13/2025 0825 by Yamile Jolly, RN)  Care Plan - Patient's Chronic Conditions and Co-Morbidity Symptoms are Monitored and Maintained or Improved:   Monitor and assess patient's chronic conditions and comorbid symptoms for stability, deterioration, or improvement   Collaborate with multidisciplinary team to address chronic and comorbid conditions and prevent exacerbation or deterioration   Update acute care plan with appropriate goals if chronic or comorbid symptoms are exacerbated and prevent overall improvement and discharge     Problem: Discharge Planning  Goal: Discharge to home or other facility with appropriate resources  Outcome: Progressing  Flowsheets (Taken 1/13/2025 0825 by Yamile Jolly, RN)  Discharge to home or other facility with appropriate resources:   Identify barriers to discharge with patient and caregiver   Arrange for needed discharge resources and transportation as appropriate   Identify discharge learning needs (meds, wound care, etc)   Refer to discharge planning if patient needs post-hospital services based on physician order or complex needs related to functional status, cognitive ability or social support system     Problem: Pain  Goal: Verbalizes/displays adequate comfort level or baseline comfort level  Outcome: Progressing  Flowsheets (Taken 1/13/2025 1945)  Verbalizes/displays adequate comfort level or baseline comfort level: Assess pain using appropriate pain scale        Problem: Chronic Conditions and Co-morbidities  Goal: Patient's chronic conditions and co-morbidity symptoms are monitored and maintained or improved  Outcome: Progressing  Flowsheets (Taken 1/8/2025 0345)  Care Plan - Patient's Chronic Conditions and Co-Morbidity Symptoms are Monitored and Maintained or Improved:   Monitor and assess patient's chronic conditions and comorbid symptoms for stability, deterioration, or improvement   Collaborate with multidisciplinary team to address chronic and comorbid conditions and prevent exacerbation or deterioration   Update acute care plan with appropriate goals if chronic or comorbid symptoms are exacerbated and prevent overall improvement and discharge     Problem: Discharge Planning  Goal: Discharge to home or other facility with appropriate resources  Outcome: Progressing  Flowsheets (Taken 1/8/2025 0345)  Discharge to home or other facility with appropriate resources:   Identify barriers to discharge with patient and caregiver   Arrange for needed discharge resources and transportation as appropriate   Identify discharge learning needs (meds, wound care, etc)   Refer to discharge planning if patient needs post-hospital services based on physician order or complex needs related to functional status, cognitive ability or social support system     Problem: Pain  Goal: Verbalizes/displays adequate comfort level or baseline comfort level  Outcome: Progressing     Problem: Safety - Adult  Goal: Free from fall injury  Outcome: Progressing     Problem: ABCDS Injury Assessment  Goal: Absence of physical injury  Outcome: Progressing        Problem: Occupational Therapy - Adult  Goal: By Discharge: Performs self-care activities at highest level of function for planned discharge setting.  See evaluation for individualized goals.  Description: FUNCTIONAL STATUS PRIOR TO ADMISSION: Patient was independent with ADLs and functional mobility/ ambulatory with rollator.  2 recent falls while attempting to sit but missing seat- 2nd leading to current admission.  Recent shingles resulting in impaired R eye vision    HOME SUPPORT: Patient resided at home with her supportive daughter and did not require assistance.    Occupational Therapy Goals:  Initiated 1/10/2025  1.  Patient will perform upper body dressing with Set-up within 7 day(s).  2.  Patient will perform bathing with Minimal Assist within 7 day(s).  3.  Patient will perform lower body dressing with Moderate Assist within 7 day(s).  4.  Patient will perform toilet transfers with Moderate Assist  within 7 day(s).  5.  Patient will perform all aspects of toileting with Moderate Assist within 7 day(s).  6.  Patient will participate in upper extremity therapeutic exercise/activities with Supervision for 10 minutes within 7 day(s).    7.  Patient will utilize energy conservation techniques during functional activities with verbal cues within 7 day(s).  8.  Patient will utilize fall prevention techniques during functional activities with verbal cues within 7 day(s).      Outcome: Progressing     OCCUPATIONAL THERAPY EVALUATION    Patient: Cuca Padron (81 y.o. female)  Date: 1/10/2025  Primary Diagnosis: Elevated troponin [R79.89]  Atrial flutter with rapid ventricular response (HCC) [I48.92]  Injury of head, initial encounter [S09.90XA]  Fall, initial encounter [W19.XXXA]      Precautions: Contact Precautions, Fall Risk                  ASSESSMENT :  Patient presents for OT evaluation with overall weakness, impaired balance, reduced endurance, and impaired LB reach for ADLs.  She mobilized OOB for    Problem: Occupational Therapy - Adult  Goal: By Discharge: Performs self-care activities at highest level of function for planned discharge setting.  See evaluation for individualized goals.  Description: FUNCTIONAL STATUS PRIOR TO ADMISSION: Patient was independent with ADLs and functional mobility/ ambulatory with rollator.  2 recent falls while attempting to sit but missing seat- 2nd leading to current admission.  Recent shingles resulting in impaired R eye vision    HOME SUPPORT: Patient resided at home with her supportive daughter and did not require assistance.    Occupational Therapy Goals:  Initiated 1/10/2025  1.  Patient will perform upper body dressing with Set-up within 7 day(s).  2.  Patient will perform bathing with Minimal Assist within 7 day(s).  3.  Patient will perform lower body dressing with Moderate Assist within 7 day(s).  4.  Patient will perform toilet transfers with Moderate Assist  within 7 day(s).  5.  Patient will perform all aspects of toileting with Moderate Assist within 7 day(s).  6.  Patient will participate in upper extremity therapeutic exercise/activities with Supervision for 10 minutes within 7 day(s).    7.  Patient will utilize energy conservation techniques during functional activities with verbal cues within 7 day(s).  8.  Patient will utilize fall prevention techniques during functional activities with verbal cues within 7 day(s).      Outcome: Progressing   OCCUPATIONAL THERAPY TREATMENT  Patient: Cuca Padron (81 y.o. female)  Date: 1/14/2025  Primary Diagnosis: Elevated troponin [R79.89]  Atrial flutter with rapid ventricular response (HCC) [I48.92]  Injury of head, initial encounter [S09.90XA]  Fall, initial encounter [W19.XXXA]       Precautions: Contact Precautions, Fall Risk                Chart, occupational therapy assessment, plan of care, and goals were reviewed.    ASSESSMENT  Patient continues to benefit from skilled OT services and is slowly    Problem: Physical Therapy - Adult  Goal: By Discharge: Performs mobility at highest level of function for planned discharge setting.  See evaluation for individualized goals.  Description: FUNCTIONAL STATUS PRIOR TO ADMISSION: Patient was modified independent using a rollator for functional mobility.    HOME SUPPORT PRIOR TO ADMISSION: The patient lived with daughter in 2 level home. Daughter assists into home up 2-3 stairs with rails. She has a stair glide to 2nd floor. Typically independent ADL's, does need help to remove compression stockings at night. Daughter works from home 3 days/week and is in the office 2x/week.    Physical Therapy Goals  Initiated 1/10/2025  1.  Patient will move from supine to sit and sit to supine in bed with minimal assistance within 7 day(s).    2.  Patient will perform sit to stand with minimal assistance within 7 day(s).  3.  Patient will transfer from bed to chair and chair to bed with minimal assistance using the least restrictive device within 7 day(s).  4.  Patient will ambulate with minimal assistance for 30 feet with the least restrictive device within 7 day(s).   Outcome: Progressing   PHYSICAL THERAPY TREATMENT    Patient: Cuca Padron (81 y.o. female)  Date: 1/14/2025  Diagnosis: Elevated troponin [R79.89]  Atrial flutter with rapid ventricular response (HCC) [I48.92]  Injury of head, initial encounter [S09.90XA]  Fall, initial encounter [W19.XXXA] Atrial flutter with rapid ventricular response (HCC)      Precautions: Contact Precautions, Fall Risk                      ASSESSMENT:  Patient continues to benefit from skilled PT services and is slowly progressing towards goals. Pt limited by impaired balance, coordination, decreased strength and activity tolerance. Increased time for motor processing with Max A for follow through. BLE fatigue quickly with attempt to stand with RW support, tolerating ~ 5 seconds with assist x 2. Did tolerate more upright posture with  My chart message sent.        on patient assessment     Problem: Skin/Tissue Integrity  Goal: Absence of new skin breakdown  Description: 1.  Monitor for areas of redness and/or skin breakdown  2.  Assess vascular access sites hourly  3.  Every 4-6 hours minimum:  Change oxygen saturation probe site  4.  Every 4-6 hours:  If on nasal continuous positive airway pressure, respiratory therapy assess nares and determine need for appliance change or resting period.  1/15/2025 1530 by Kira Rapp LPN  Outcome: Completed  1/15/2025 1528 by Kira Rapp LPN  Outcome: Progressing      [id number]. PMID: [id number]; PMCID: [id number].  4. Raza SLADE, Shraddha S, Asiya W, Kandis ANGLIN. AM-PAC Short Forms Manual 4.0. Revised 2/2020.                                                                                                                                                                                                                               Pain Rating:  Hypersensitivity to touch in lower extremities    Pain Intervention(s):   repositioning and MD present/aware, AROM    Activity Tolerance:   Fair , requires rest breaks, and SpO2 stable on room air    After treatment:   Patient left in no apparent distress sitting up in chair, Call bell within reach, Caregiver / family present, and Updated patient's board on functional status and mobility recommendations    COMMUNICATION/EDUCATION:   The patient's plan of care was discussed with: occupational therapist, registered nurse, and physician    Patient Education  Education Given To: Patient;Family  Education Provided: Role of Therapy;Plan of Care;Home Exercise Program;Transfer Training;Mobility Training;Fall Prevention Strategies;Equipment;Family Education  Education Method: Verbal;Demonstration;Teach Back  Barriers to Learning:  (medical status/fatigue)  Education Outcome: Continued education needed;Demonstrated understanding;Verbalized understanding    Thank you for this referral.  Carly Pratt, PT  Minutes: 41      Physical Therapy Evaluation Charge Determination   History Examination Presentation Decision-Making   HIGH Complexity :3+ comorbidities / personal factors will impact the outcome/ POC  MEDIUM Complexity : 3 Standardized tests and measures addressin body structure, function, activity limitation and / or participation in recreation  MEDIUM Complexity : Evolving with changing characteristics  AM-PAC  HIGH    Based on the above components, the patient evaluation is determined to be of the following complexity level: Medium

## 2025-04-05 ASSESSMENT — ANXIETY QUESTIONNAIRES
GAD7 TOTAL SCORE: 4
7. FEELING AFRAID AS IF SOMETHING AWFUL MIGHT HAPPEN: SEVERAL DAYS
GAD7 TOTAL SCORE: 4
6. BECOMING EASILY ANNOYED OR IRRITABLE: NOT AT ALL
3. WORRYING TOO MUCH ABOUT DIFFERENT THINGS: NOT AT ALL
4. TROUBLE RELAXING: SEVERAL DAYS
2. NOT BEING ABLE TO STOP OR CONTROL WORRYING: SEVERAL DAYS
8. IF YOU CHECKED OFF ANY PROBLEMS, HOW DIFFICULT HAVE THESE MADE IT FOR YOU TO DO YOUR WORK, TAKE CARE OF THINGS AT HOME, OR GET ALONG WITH OTHER PEOPLE?: SOMEWHAT DIFFICULT
GAD7 TOTAL SCORE: 4
IF YOU CHECKED OFF ANY PROBLEMS ON THIS QUESTIONNAIRE, HOW DIFFICULT HAVE THESE PROBLEMS MADE IT FOR YOU TO DO YOUR WORK, TAKE CARE OF THINGS AT HOME, OR GET ALONG WITH OTHER PEOPLE: SOMEWHAT DIFFICULT
5. BEING SO RESTLESS THAT IT IS HARD TO SIT STILL: NOT AT ALL
1. FEELING NERVOUS, ANXIOUS, OR ON EDGE: SEVERAL DAYS
7. FEELING AFRAID AS IF SOMETHING AWFUL MIGHT HAPPEN: SEVERAL DAYS

## 2025-04-05 ASSESSMENT — ASTHMA QUESTIONNAIRES
QUESTION_3 LAST FOUR WEEKS HOW OFTEN DID YOUR ASTHMA SYMPTOMS (WHEEZING, COUGHING, SHORTNESS OF BREATH, CHEST TIGHTNESS OR PAIN) WAKE YOU UP AT NIGHT OR EARLIER THAN USUAL IN THE MORNING: ONCE OR TWICE
QUESTION_2 LAST FOUR WEEKS HOW OFTEN HAVE YOU HAD SHORTNESS OF BREATH: THREE TO SIX TIMES A WEEK
QUESTION_1 LAST FOUR WEEKS HOW MUCH OF THE TIME DID YOUR ASTHMA KEEP YOU FROM GETTING AS MUCH DONE AT WORK, SCHOOL OR AT HOME: A LITTLE OF THE TIME
ACT_TOTALSCORE: 17
QUESTION_5 LAST FOUR WEEKS HOW WOULD YOU RATE YOUR ASTHMA CONTROL: WELL CONTROLLED
QUESTION_4 LAST FOUR WEEKS HOW OFTEN HAVE YOU USED YOUR RESCUE INHALER OR NEBULIZER MEDICATION (SUCH AS ALBUTEROL): ONE OR TWO TIMES PER DAY

## 2025-04-08 ENCOUNTER — VIRTUAL VISIT (OUTPATIENT)
Dept: FAMILY MEDICINE | Facility: CLINIC | Age: 45
End: 2025-04-08
Attending: PREVENTIVE MEDICINE
Payer: COMMERCIAL

## 2025-04-08 DIAGNOSIS — J45.40 MODERATE PERSISTENT ASTHMA WITHOUT COMPLICATION: ICD-10-CM

## 2025-04-08 DIAGNOSIS — F43.10 PTSD (POST-TRAUMATIC STRESS DISORDER): ICD-10-CM

## 2025-04-08 DIAGNOSIS — Z98.84 S/P GASTRIC BYPASS: ICD-10-CM

## 2025-04-08 DIAGNOSIS — F33.41 RECURRENT MAJOR DEPRESSIVE DISORDER, IN PARTIAL REMISSION: ICD-10-CM

## 2025-04-08 DIAGNOSIS — F43.22 ADJUSTMENT DISORDER WITH ANXIOUS MOOD: ICD-10-CM

## 2025-04-08 DIAGNOSIS — F41.0 PANIC DISORDER WITHOUT AGORAPHOBIA: ICD-10-CM

## 2025-04-08 DIAGNOSIS — E66.9 OBESITY (BMI 30-39.9): Primary | ICD-10-CM

## 2025-04-08 DIAGNOSIS — F41.1 GAD (GENERALIZED ANXIETY DISORDER): ICD-10-CM

## 2025-04-08 PROBLEM — E66.01 MORBID OBESITY (H): Status: RESOLVED | Noted: 2018-06-13 | Resolved: 2025-04-08

## 2025-04-08 PROCEDURE — 98005 SYNCH AUDIO-VIDEO EST LOW 20: CPT | Performed by: PREVENTIVE MEDICINE

## 2025-04-08 RX ORDER — HYDROXYZINE HYDROCHLORIDE 25 MG/1
25-100 TABLET, FILM COATED ORAL EVERY 6 HOURS PRN
Qty: 120 TABLET | Refills: 2 | Status: SHIPPED | OUTPATIENT
Start: 2025-04-08

## 2025-04-08 RX ORDER — BUSPIRONE HYDROCHLORIDE 15 MG/1
15 TABLET ORAL 3 TIMES DAILY
Qty: 270 TABLET | Refills: 1 | Status: SHIPPED | OUTPATIENT
Start: 2025-04-08

## 2025-04-08 RX ORDER — FLUTICASONE PROPIONATE AND SALMETEROL 500; 50 UG/1; UG/1
1 POWDER RESPIRATORY (INHALATION) 2 TIMES DAILY
Qty: 60 EACH | Refills: 2 | Status: SHIPPED | OUTPATIENT
Start: 2025-04-08

## 2025-04-08 RX ORDER — ALBUTEROL SULFATE 90 UG/1
INHALANT RESPIRATORY (INHALATION)
Qty: 54 G | Refills: 1 | Status: SHIPPED | OUTPATIENT
Start: 2025-04-08

## 2025-04-08 RX ORDER — VENLAFAXINE HYDROCHLORIDE 150 MG/1
150 CAPSULE, EXTENDED RELEASE ORAL DAILY
Qty: 90 CAPSULE | Refills: 1 | Status: SHIPPED | OUTPATIENT
Start: 2025-04-08

## 2025-04-08 ASSESSMENT — PATIENT HEALTH QUESTIONNAIRE - PHQ9
SUM OF ALL RESPONSES TO PHQ QUESTIONS 1-9: 1
10. IF YOU CHECKED OFF ANY PROBLEMS, HOW DIFFICULT HAVE THESE PROBLEMS MADE IT FOR YOU TO DO YOUR WORK, TAKE CARE OF THINGS AT HOME, OR GET ALONG WITH OTHER PEOPLE: NOT DIFFICULT AT ALL
SUM OF ALL RESPONSES TO PHQ QUESTIONS 1-9: 1

## 2025-04-08 NOTE — PROGRESS NOTES
Crystal is a 44 year old who is being evaluated via a billable video visit.          Assessment & Plan     Obesity (BMI 30-39.9)  -Has been tolerating tirzepatide without side effect, hence increased dosage from 10 mg to 12.5 mg  - tirzepatide (MOUNJARO) 12.5 MG/0.5ML SOAJ auto-injector pen  Dispense: 6 mL; Refill: 1    S/P gastric bypass  - tirzepatide (MOUNJARO) 12.5 MG/0.5ML SOAJ auto-injector pen  Dispense: 6 mL; Refill: 1    PTSD (post-traumatic stress disorder)  -Has been working with a therapist weekly, has not been able to find a psychiatrist as yet  - busPIRone (BUSPAR) 15 MG tablet  Dispense: 270 tablet; Refill: 1  - hydrOXYzine HCl (ATARAX) 25 MG tablet  Dispense: 120 tablet; Refill: 2  - venlafaxine (EFFEXOR XR) 150 MG 24 hr capsule  Dispense: 90 capsule; Refill: 1    TERESA (generalized anxiety disorder)  -Stable on medications  - venlafaxine (EFFEXOR XR) 150 MG 24 hr capsule  Dispense: 90 capsule; Refill: 1    Moderate persistent asthma without complication  -Some increase in symptoms secondary to seasonal allergies  -Overall asthma is well-controlled on current regimen  - albuterol (PROAIR HFA/PROVENTIL HFA/VENTOLIN HFA) 108 (90 Base) MCG/ACT inhaler  Dispense: 54 g; Refill: 1  - fluticasone-salmeterol (WIXELA INHUB) 500-50 MCG/ACT inhaler  Dispense: 60 each; Refill: 2    Recurrent major depressive disorder, in partial remission  No thoughts of self-harm    Adjustment disorder with anxious mood  - busPIRone (BUSPAR) 15 MG tablet  Dispense: 270 tablet; Refill: 1  - hydrOXYzine HCl (ATARAX) 25 MG tablet  Dispense: 120 tablet; Refill: 2    Panic disorder without agoraphobia  - venlafaxine (EFFEXOR XR) 150 MG 24 hr capsule  Dispense: 90 capsule; Refill: 1    We discussed the treatment for anxiety and depression in detail.  The importance of a multi faceted approach in controlling symptoms was reviewed.  The benefits of cognitive behavioral therapy reviewed, benefits of exercise, and stress reduction also  "discussed.     Do not stop medication suddenly, medication will need to be tapered off.  Slight increased risk of suicide with SSRI group of medications discussed.        I ended our visit today by discussing the patient's diagnoses and recommended treatment. Please refer to today's diagnoses and orders for further details. I briefly discussed the pathophysiology of these conditions and outlined their expected course. I discussed the warning symptoms and signs that indicate an atypical course that would need urgent or emergent care. I also discussed self care strategies for symptom relief.  Common side effects of medications prescribed at this visit were discussed with the patient. Severe side effects, including current applicable black box warnings, were discussed.           BMI  Estimated body mass index is 37.1 kg/m  as calculated from the following:    Height as of 5/3/24: 1.727 m (5' 8\").    Weight as of 5/3/24: 110.7 kg (244 lb).   Weight management plan: Discussed healthy diet and exercise guidelines      Jamarcus Rojas is a 44 year old, presenting for the following health issues:  Recheck Medication        10/2/2024     2:19 PM   Additional Questions   Roomed by orlando hanley   Accompanied by self       Video Start Time: 11:26 AM    History of Present Illness       Mental Health Follow-up:  Patient presents to follow-up on Anxiety.    Patient's anxiety since last visit has been:  Medium  The patient is not having other symptoms associated with anxiety.  Any significant life events: grief or loss  Patient is feeling anxious or having panic attacks.  Patient has no concerns about alcohol or drug use.         Mood is good.  Was not able to find a Psychiatrist.  Has been working with [employer] and is working with a psychologist, weekly+   Will be looking into [place] care.  No thoughts of self harm   Doing well on mood symptoms  Some family stressors  Special needs son   Sleep is ok   Some anxiety " around special needs son  210 pounds  120/72 blood pressure  Exercise+ 3-4 days a week   Some strength training+  Some back issues.    All meds to [facility] except Mounjaro.  Tolerating Mounjaro without side effects. No nausea, no emesis.      Asthma is well controlled, some symptoms related to seasonal allergies, has been on anti histamine.           Objective    Vitals - Patient Reported  Systolic (Patient Reported): 120  Diastolic (Patient Reported): 72  Weight (Patient Reported): 95.3 kg (210 lb)      Vitals:  No vitals were obtained today due to virtual visit.    Physical Exam   GENERAL: alert and no distress  EYES: Eyes grossly normal to inspection.  No discharge or erythema, or obvious scleral/conjunctival abnormalities.  RESP: No audible wheeze, cough, or visible cyanosis.    SKIN: Visible skin clear. No significant rash, abnormal pigmentation or lesions.  NEURO: Cranial nerves grossly intact.  Mentation and speech appropriate for age.  PSYCH: Appropriate affect, tone, and pace of words          Video-Visit Details    Type of service:  Video Visit   Video End Time:11:36 AM  Originating Location (pt. Location): Home    Distant Location (provider location):  On-site  Platform used for Video Visit: Rashmi  Signed Electronically by: Reyna Adam MD

## 2025-04-14 ENCOUNTER — OFFICE VISIT (OUTPATIENT)
Dept: URGENT CARE | Facility: URGENT CARE | Age: 45
End: 2025-04-14
Payer: COMMERCIAL

## 2025-04-14 VITALS
DIASTOLIC BLOOD PRESSURE: 84 MMHG | BODY MASS INDEX: 32.13 KG/M2 | WEIGHT: 212 LBS | HEIGHT: 68 IN | SYSTOLIC BLOOD PRESSURE: 138 MMHG | RESPIRATION RATE: 18 BRPM | HEART RATE: 89 BPM | TEMPERATURE: 97.8 F | OXYGEN SATURATION: 99 %

## 2025-04-14 DIAGNOSIS — T75.3XXA MOTION SICKNESS, INITIAL ENCOUNTER: ICD-10-CM

## 2025-04-14 DIAGNOSIS — R30.0 DYSURIA: Primary | ICD-10-CM

## 2025-04-14 LAB
ALBUMIN UR-MCNC: NEGATIVE MG/DL
APPEARANCE UR: CLEAR
BACTERIA #/AREA URNS HPF: ABNORMAL /HPF
BILIRUB UR QL STRIP: NEGATIVE
CLUE CELLS: ABNORMAL
COLOR UR AUTO: YELLOW
GLUCOSE UR STRIP-MCNC: NEGATIVE MG/DL
HGB UR QL STRIP: ABNORMAL
KETONES UR STRIP-MCNC: ABNORMAL MG/DL
LEUKOCYTE ESTERASE UR QL STRIP: NEGATIVE
MUCOUS THREADS #/AREA URNS LPF: PRESENT /LPF
NITRATE UR QL: NEGATIVE
PH UR STRIP: 6 [PH] (ref 5–7)
RBC #/AREA URNS AUTO: ABNORMAL /HPF
SP GR UR STRIP: >=1.03 (ref 1–1.03)
SQUAMOUS #/AREA URNS AUTO: ABNORMAL /LPF
TRICHOMONAS, WET PREP: ABNORMAL
UROBILINOGEN UR STRIP-ACNC: 2 E.U./DL
WBC #/AREA URNS AUTO: ABNORMAL /HPF
WBC'S/HIGH POWER FIELD, WET PREP: ABNORMAL
YEAST, WET PREP: ABNORMAL

## 2025-04-14 PROCEDURE — 81001 URINALYSIS AUTO W/SCOPE: CPT | Performed by: PHYSICIAN ASSISTANT

## 2025-04-14 PROCEDURE — 1125F AMNT PAIN NOTED PAIN PRSNT: CPT | Performed by: PHYSICIAN ASSISTANT

## 2025-04-14 PROCEDURE — 87088 URINE BACTERIA CULTURE: CPT | Performed by: PHYSICIAN ASSISTANT

## 2025-04-14 PROCEDURE — 87210 SMEAR WET MOUNT SALINE/INK: CPT | Performed by: PHYSICIAN ASSISTANT

## 2025-04-14 PROCEDURE — 3079F DIAST BP 80-89 MM HG: CPT | Performed by: PHYSICIAN ASSISTANT

## 2025-04-14 PROCEDURE — 3075F SYST BP GE 130 - 139MM HG: CPT | Performed by: PHYSICIAN ASSISTANT

## 2025-04-14 PROCEDURE — 99214 OFFICE O/P EST MOD 30 MIN: CPT | Performed by: PHYSICIAN ASSISTANT

## 2025-04-14 PROCEDURE — 87086 URINE CULTURE/COLONY COUNT: CPT | Performed by: PHYSICIAN ASSISTANT

## 2025-04-14 RX ORDER — SCOPOLAMINE 1 MG/3D
1 PATCH, EXTENDED RELEASE TRANSDERMAL
Qty: 10 PATCH | Refills: 0 | Status: SHIPPED | OUTPATIENT
Start: 2025-04-14

## 2025-04-14 RX ORDER — ONDANSETRON 4 MG/1
4 TABLET, ORALLY DISINTEGRATING ORAL EVERY 8 HOURS PRN
Qty: 20 TABLET | Refills: 0 | Status: SHIPPED | OUTPATIENT
Start: 2025-04-14

## 2025-04-14 ASSESSMENT — PAIN SCALES - GENERAL: PAINLEVEL_OUTOF10: MODERATE PAIN (4)

## 2025-04-14 NOTE — PROGRESS NOTES
"Assessment & Plan     Dysuria  - UA Macroscopic with reflex to Microscopic and Culture - Lab Collect; Future  - UA Macroscopic with reflex to Microscopic and Culture - Lab Collect  - UA Microscopic with Reflex to Culture  - Wet prep - lab collect; Future  - Wet prep - lab collect  - Urine Culture Aerobic Bacterial - lab collect; Future    Motion sickness, initial encounter  - scopolamine (TRANSDERM) 1 MG/3DAYS 72 hr patch; Place 1 patch over 72 hours onto the skin every 72 hours.  - ondansetron (ZOFRAN ODT) 4 MG ODT tab; Take 1 tablet (4 mg) by mouth every 8 hours as needed for nausea.    UA without sign of infection, 2.0 urobilinogen. Took AZO before test. Wet prep normal. Awaiting culture result. Drink plenty of fluids, AZO as needed. Be seen if symptoms worsen, a fever, back pain or vomitnig develop or if symptoms do not improve after 1 more week. She is planning on taking a cruise soon and gets motion sick. Rx given for relief of symptoms.    Return in about 1 week (around 4/21/2025) for visit with primary care provider if not improving.     Asuncion Muhammad PA-C  [facility]    Subjective   Crystal Rose is a 44 year old who presents for the following health issues     Patient presents with:  Urinary Problem: Dysuria, urine discolored/bloody, vaginal spotting       HPI    Crystal presents for evaluation of dysuria and vaginal spotting  Symptoms began Friday/Saturday  Urinary frequency, minimal discomfort  LMP 3/27/25 normal    Review of Systems   ROS negative except as stated above.      Objective    /84 (BP Location: Left arm, Patient Position: Sitting, Cuff Size: Adult Large)   Pulse 89   Temp 97.8  F (36.6  C) (Oral)   Resp 18   Ht 1.727 m (5' 8\")   Wt 96.2 kg (212 lb)   SpO2 99%   BMI 32.23 kg/m    Physical Exam   GENERAL: alert and no distress  NECK: no adenopathy, no asymmetry, masses, or scars  RESP: lungs clear to auscultation - no rales, rhonchi or " wheezes  CV: regular rate and rhythm, normal S1 S2, no S3 or S4, no murmur, click or rub, no peripheral edema  ABDOMEN: soft, nontender  BACK: no CVA tenderness, no paralumbar tenderness    Results for orders placed or performed in visit on 04/14/25   UA Macroscopic with reflex to Microscopic and Culture - Lab Collect     Status: Abnormal    Specimen: Urine, Midstream   Result Value Ref Range    Color Urine Yellow Colorless, Straw, Light Yellow, Yellow    Appearance Urine Clear Clear    Glucose Urine Negative Negative mg/dL    Bilirubin Urine Negative Negative    Ketones Urine Trace (A) Negative mg/dL    Specific Gravity Urine >=1.030 1.003 - 1.035    Blood Urine Small (A) Negative    pH Urine 6.0 5.0 - 7.0    Protein Albumin Urine Negative Negative mg/dL    Urobilinogen Urine 2.0 (A) 0.2, 1.0 E.U./dL    Nitrite Urine Negative Negative    Leukocyte Esterase Urine Negative Negative   UA Microscopic with Reflex to Culture     Status: Abnormal   Result Value Ref Range    Bacteria Urine Moderate (A) None Seen /HPF    RBC Urine 0-2 0-2 /HPF /HPF    WBC Urine 0-5 0-5 /HPF /HPF    Squamous Epithelials Urine Many (A) None Seen /LPF    Mucus Urine Present (A) None Seen /LPF    Narrative    Urine Culture not indicated   Wet prep - lab collect     Status: Abnormal    Specimen: Vagina; Swab   Result Value Ref Range    Trichomonas Absent Absent    Yeast Absent Absent    Clue Cells Absent Absent    WBCs/high power field 3+ (A) None

## 2025-04-14 NOTE — PROGRESS NOTES
Urgent Care Clinic Visit    Chief Complaint   Patient presents with    Urinary Problem     Dysuria, urine discolored/bloody, vaginal spotting                4/14/2025     4:55 PM   Additional Questions   Roomed by Katt   Accompanied by Self     Pre-Provider Visit Orders- Urinalysis UA/UC  Patient reports the following symptoms:  possible urinary tract infection (UTI)  and frequent urination   Does the patient report any of the following symptoms: vaginal discharge, vaginal itching, possible yeast infection, has a urinary catheter in place, or unable to void in a specimen cup?  No

## 2025-04-14 NOTE — PATIENT INSTRUCTIONS
May use over the counter AZO pain relief (phenazopyridine) for urinary frequency- this turns your urine orange.  Drink plenty of fluids. Limit caffeine and alcohol as these are bladder irritants.  May take tylenol as needed for discomfort.   If you develop any vomiting, high fevers or lower back pain, these can be signs of a kidney infection and you should be seen in urgent care or in the ER.  Prevention of future infections by drinking cranberry juice, urination after intercourse and wiping from front to back after using the toilet.  Please follow up with primary care provider if symptoms return, if you're not improving, worsening or new symptoms or for any adverse reactions to medications.     Scopolamine patch once every 3 days (leave on for 72 hours) for motion sickness  Zofran every 8 hours as needed

## 2025-04-15 ENCOUNTER — TELEPHONE (OUTPATIENT)
Dept: URGENT CARE | Facility: URGENT CARE | Age: 45
End: 2025-04-15
Payer: COMMERCIAL

## 2025-04-15 NOTE — TELEPHONE ENCOUNTER
Plan does not cover scopolamine (TRANSDERM) 1 MG/3DAYS 72 hr patch .      Please go to [email or web address] to initiate Prior Auth or switch to alternative medication.    Insurance type and ID number: [id number]      Additional Information: [phone]    Maki Christine

## 2025-04-16 LAB
BACTERIA UR CULT: ABNORMAL
BACTERIA UR CULT: ABNORMAL

## 2025-04-17 NOTE — TELEPHONE ENCOUNTER
Prior Authorization Not Needed per Insurance    Medication: SCOPOLAMINE 1 MG/3DAYS TD PT72  Insurance Company: [employer] - Phone [phone] Fax [phone]  Expected CoPay: $    Pharmacy Filling the Rx: [id number] - [place], [place] - [address] AT [facility]  Pharmacy Notified: YES  Patient Notified: **Instructed pharmacy to notify patient when script is ready to /ship.**

## 2025-05-20 DIAGNOSIS — J45.40 MODERATE PERSISTENT ASTHMA WITHOUT COMPLICATION: ICD-10-CM

## 2025-05-21 RX ORDER — ALBUTEROL SULFATE 90 UG/1
INHALANT RESPIRATORY (INHALATION)
Qty: 18 G | Refills: 3 | Status: SHIPPED | OUTPATIENT
Start: 2025-05-21

## 2025-06-06 DIAGNOSIS — J45.41 MODERATE PERSISTENT ASTHMA WITH ACUTE EXACERBATION: ICD-10-CM

## 2025-06-06 DIAGNOSIS — J30.9 CHRONIC ALLERGIC RHINITIS: ICD-10-CM

## 2025-06-09 RX ORDER — FLUTICASONE PROPIONATE 50 MCG
1-2 SPRAY, SUSPENSION (ML) NASAL DAILY
Qty: 16 G | Refills: 2 | Status: SHIPPED | OUTPATIENT
Start: 2025-06-09

## 2025-06-09 RX ORDER — LORATADINE 10 MG/1
1 TABLET ORAL DAILY
Qty: 90 TABLET | Refills: 2 | Status: SHIPPED | OUTPATIENT
Start: 2025-06-09

## 2025-06-10 RX ORDER — IPRATROPIUM BROMIDE AND ALBUTEROL SULFATE 2.5; .5 MG/3ML; MG/3ML
SOLUTION RESPIRATORY (INHALATION)
Qty: 90 ML | Refills: 0 | Status: SHIPPED | OUTPATIENT
Start: 2025-06-10

## 2025-06-21 ENCOUNTER — HEALTH MAINTENANCE LETTER (OUTPATIENT)
Age: 45
End: 2025-06-21
